# Patient Record
Sex: FEMALE | Race: WHITE | NOT HISPANIC OR LATINO | Employment: OTHER | URBAN - METROPOLITAN AREA
[De-identification: names, ages, dates, MRNs, and addresses within clinical notes are randomized per-mention and may not be internally consistent; named-entity substitution may affect disease eponyms.]

---

## 2017-05-11 DIAGNOSIS — L70.9 ACNE: ICD-10-CM

## 2017-05-18 DIAGNOSIS — M81.0 SENILE OSTEOPOROSIS: ICD-10-CM

## 2017-05-18 RX ORDER — ADAPALENE 45 G/G
GEL TOPICAL
Qty: 45 G | Refills: 3 | Status: SHIPPED | OUTPATIENT
Start: 2017-05-18 | End: 2017-11-20

## 2017-05-18 NOTE — TELEPHONE ENCOUNTER
Received refill request for Fosamax. Unable to fill per protocol. Will pend med to Dr. Hicks for approval.

## 2017-05-19 RX ORDER — ALENDRONATE SODIUM 70 MG/1
70 TABLET ORAL
Qty: 12 TABLET | Refills: 3 | Status: SHIPPED | OUTPATIENT
Start: 2017-05-19 | End: 2017-11-20

## 2017-05-30 ENCOUNTER — RADIANT APPOINTMENT (OUTPATIENT)
Dept: MAMMOGRAPHY | Facility: CLINIC | Age: 67
End: 2017-05-30

## 2017-05-30 DIAGNOSIS — Z12.31 VISIT FOR SCREENING MAMMOGRAM: ICD-10-CM

## 2017-11-20 ENCOUNTER — OFFICE VISIT (OUTPATIENT)
Dept: INTERNAL MEDICINE | Facility: CLINIC | Age: 67
End: 2017-11-20
Attending: INTERNAL MEDICINE
Payer: MEDICARE

## 2017-11-20 VITALS
HEIGHT: 64 IN | HEART RATE: 76 BPM | WEIGHT: 154 LBS | SYSTOLIC BLOOD PRESSURE: 127 MMHG | DIASTOLIC BLOOD PRESSURE: 76 MMHG | BODY MASS INDEX: 26.29 KG/M2

## 2017-11-20 DIAGNOSIS — I10 BENIGN ESSENTIAL HYPERTENSION: ICD-10-CM

## 2017-11-20 DIAGNOSIS — L68.0 FEMALE HIRSUTISM: ICD-10-CM

## 2017-11-20 DIAGNOSIS — M25.551 HIP PAIN, RIGHT: ICD-10-CM

## 2017-11-20 DIAGNOSIS — J45.40 MODERATE PERSISTENT ASTHMA WITHOUT COMPLICATION: ICD-10-CM

## 2017-11-20 DIAGNOSIS — Z00.00 ROUTINE GENERAL MEDICAL EXAMINATION AT A HEALTH CARE FACILITY: Primary | ICD-10-CM

## 2017-11-20 DIAGNOSIS — L68.9 EXCESS BODY AND FACIAL HAIR: ICD-10-CM

## 2017-11-20 DIAGNOSIS — L70.0 ACNE VULGARIS: ICD-10-CM

## 2017-11-20 DIAGNOSIS — E55.9 VITAMIN D DEFICIENCY: ICD-10-CM

## 2017-11-20 DIAGNOSIS — M81.0 SENILE OSTEOPOROSIS: ICD-10-CM

## 2017-11-20 RX ORDER — ALBUTEROL SULFATE 0.83 MG/ML
1 SOLUTION RESPIRATORY (INHALATION) EVERY 6 HOURS PRN
Qty: 100 VIAL | Refills: 3 | Status: SHIPPED | OUTPATIENT
Start: 2017-11-20 | End: 2018-11-26

## 2017-11-20 RX ORDER — ALBUTEROL SULFATE 90 UG/1
AEROSOL, METERED RESPIRATORY (INHALATION)
Qty: 3 INHALER | Refills: 1 | Status: SHIPPED | OUTPATIENT
Start: 2017-11-20 | End: 2017-11-21

## 2017-11-20 RX ORDER — ALENDRONATE SODIUM 70 MG/1
70 TABLET ORAL
Qty: 12 TABLET | Refills: 3 | Status: SHIPPED | OUTPATIENT
Start: 2017-11-20 | End: 2018-06-18

## 2017-11-20 RX ORDER — HYDROCHLOROTHIAZIDE 25 MG/1
25 TABLET ORAL DAILY
Qty: 90 TABLET | Refills: 3 | Status: SHIPPED | OUTPATIENT
Start: 2017-11-20 | End: 2018-11-26

## 2017-11-20 RX ORDER — LOSARTAN POTASSIUM 25 MG/1
25 TABLET ORAL DAILY
Qty: 90 TABLET | Refills: 3 | Status: SHIPPED | OUTPATIENT
Start: 2017-11-20 | End: 2018-11-26

## 2017-11-20 RX ORDER — ADAPALENE 45 G/G
GEL TOPICAL
Qty: 45 G | Refills: 3 | Status: SHIPPED | OUTPATIENT
Start: 2017-11-20 | End: 2018-11-26

## 2017-11-20 RX ORDER — ALBUTEROL SULFATE 90 UG/1
2 AEROSOL, METERED RESPIRATORY (INHALATION) EVERY 6 HOURS PRN
Qty: 1 INHALER | Refills: 1 | Status: CANCELLED | OUTPATIENT
Start: 2017-11-20

## 2017-11-20 ASSESSMENT — ASTHMA QUESTIONNAIRES
QUESTION_4 LAST FOUR WEEKS HOW OFTEN HAVE YOU USED YOUR RESCUE INHALER OR NEBULIZER MEDICATION (SUCH AS ALBUTEROL): ONCE A WEEK OR LESS
ACT_TOTALSCORE: 21
QUESTION_1 LAST FOUR WEEKS HOW MUCH OF THE TIME DID YOUR ASTHMA KEEP YOU FROM GETTING AS MUCH DONE AT WORK, SCHOOL OR AT HOME: NONE OF THE TIME
QUESTION_3 LAST FOUR WEEKS HOW OFTEN DID YOUR ASTHMA SYMPTOMS (WHEEZING, COUGHING, SHORTNESS OF BREATH, CHEST TIGHTNESS OR PAIN) WAKE YOU UP AT NIGHT OR EARLIER THAN USUAL IN THE MORNING: NOT AT ALL
QUESTION_5 LAST FOUR WEEKS HOW WOULD YOU RATE YOUR ASTHMA CONTROL: WELL CONTROLLED
QUESTION_2 LAST FOUR WEEKS HOW OFTEN HAVE YOU HAD SHORTNESS OF BREATH: THREE TO SIX TIMES A WEEK

## 2017-11-20 ASSESSMENT — PAIN SCALES - GENERAL: PAINLEVEL: NO PAIN (0)

## 2017-11-20 NOTE — LETTER
11/20/2017     RE: Yolande Hussein  [address] [facility] [address]     Dear Colleague,    Thank you for referring your patient, Yolande Hussein, to the [facility]  at [facility]. Please see a copy of my visit note below.      SUBJECTIVE:   Yolande Hussein is a 67 year old female who presents for Preventive Visit.      Are you in the first 12 months of your Medicare Part B coverage?      Healthy Habits:    Do you get at least three servings of calcium containing foods daily (dairy, green leafy vegetables, etc.)? yes    Amount of exercise or daily activities, outside of work: walking, not consistent    Problems taking medications regularly No    Medication side effects: No    Have you had an eye exam in the past two years? yes    Do you see a dentist twice per year? no    Do you have sleep apnea, excessive snoring or daytime drowsiness? no    COGNITIVE SCREEN  1) Repeat 3 items (Banana, Sunrise, Chair)    2) Clock draw: NORMAL  3) 3 item recall: Recalls 3 objects  Results: 3 items recalled: COGNITIVE IMPAIRMENT LESS LIKELY    Mini-CogTM Copyright S Kellen. Licensed by the author for use in [facility]; reprinted with permission (hanh@[email or web address]). All rights reserved.        Patient reports that she has been waking up at night She is not able to fall asleep once she is awake. She has been staying in bed and worrying about things. She usually watches TV in bed.   Patient also reports pain in the lateral right hip with standing up from sitting position.         -------------------------------------    Reviewed and updated as needed this visit by clinical staffTobacco  Allergies  Meds         Reviewed and updated as needed this visit by Provider        Social History   Substance Use Topics     Smoking status: Never Smoker     Smokeless tobacco: Never Used     Alcohol use Yes      Comment: rare        The patient does  not drink >3 drinks per day nor >7 drinks per week.     Today's PHQ-2 Score:   PHQ-2 ( 1999 Pfizer) 9/13/2016 4/22/2014   Q1: Little interest or pleasure in doing things 0 0   Q2: Feeling down, depressed or hopeless 0 0   PHQ-2 Score 0 0         Do you feel safe in your environment - Yes    Do you have a Health Care Directive?: Yes: Advance Directive has been received and scanned.    Current providers sharing in care for this patient include: Patient Care Team:  Isabel Hicks MD as PCP - General (Internal Medicine)  Bing Horn MD as MD (Ophthalmology)      Hearing impairment: No    Ability to successfully perform activities of daily living: Yes, no assistance needed     Fall risk:         Home safety:  none identified      The following health maintenance items are reviewed in Epic and correct as of today:Health Maintenance   Topic Date Due     ASTHMA ACTION PLAN Q1 YR  07/04/1955     ASTHMA CONTROL TEST Q6 MOS  07/04/1953     HEPATITIS C SCREENING  07/04/1966     ADVANCE DIRECTIVE PLANNING Q5 YRS  07/04/2003     INFLUENZA VACCINE (SYSTEM ASSIGNED)  09/01/2017     FALL RISK ASSESSMENT  10/05/2017     MAMMO Q1 YR  05/30/2018     LIPID SCREEN Q5 YR FEMALE (SYSTEM ASSIGNED)  10/06/2021     TETANUS IMMUNIZATION (SYSTEM ASSIGNED)  11/12/2022     COLONOSCOPY Q10 YR  12/19/2022     DEXA SCAN SCREENING (SYSTEM ASSIGNED)  Completed     PNEUMOCOCCAL  Completed     Labs reviewed in [facility]    Mammogram Screening: Patient over age 50, mutual decision to screen reflected in health maintenance.      ROS:  C: NEGATIVE for fever, chills, change in weight  I: NEGATIVE for worrisome rashes, moles or lesions  E: NEGATIVE for vision changes or irritation  E/M: NEGATIVE for ear, mouth and throat problems  R: NEGATIVE for significant cough or SOB  B: NEGATIVE for masses, tenderness or discharge  CV: NEGATIVE for chest pain, palpitations or peripheral edema  GI: NEGATIVE for nausea, abdominal pain, heartburn, or change in  "bowel habits  : NEGATIVE for frequency, dysuria, or hematuria  M: NEGATIVE for significant arthralgias or myalgia  N: NEGATIVE for weakness, dizziness or paresthesias  E: NEGATIVE for temperature intolerance, skin/hair changes  H: NEGATIVE for bleeding problems  P: NEGATIVE for changes in mood or affect    OBJECTIVE:   /76  Pulse 76  Ht 1.626 m (5' 4\")  Wt 69.9 kg (154 lb)  Breastfeeding? No  BMI 26.43 kg/m2 Estimated body mass index is 26.43 kg/(m^2) as calculated from the following:    Height as of this encounter: 1.626 m (5' 4\").    Weight as of this encounter: 69.9 kg (154 lb).  EXAM:   GENERAL APPEARANCE: healthy, alert and no distress  EYES: Eyes grossly normal to inspection and conjunctivae and sclerae normal  HENT: nose and mouth without ulcers or lesions, oropharynx clear and oral mucous membranes moist  NECK: no asymmetry, masses, or scars  RESP: normal effort  CV: regular rate and rhythm, normal S1 S2, no S3 or S4, no murmur, click or rub, no peripheral edema and peripheral pulses strong  ABDOMEN: soft and bowel sounds normal  MS: no musculoskeletal defects are noted and gait is age appropriate without ataxia  SKIN: no suspicious lesions or rashes  NEURO: Normal strength and tone, sensory exam grossly normal, mentation intact and speech normal  PSYCH: mentation appears normal and affect normal/bright    ASSESSMENT / PLAN:   1. Senile osteoporosis  Patient was advised on management of osteoporosis. Recommend continue with current medical therapy as she has been able to tolerate it well.   - alendronate (FOSAMAX) 70 MG tablet; Take 1 tablet (70 mg) by mouth every 7 days Take with over 8 ounces water and stay upright for at least 30. Take an hour before eating.  Dispense: 12 tablet; Refill: 3    2. Moderate persistent asthma without complication  Discussed asthma control with patient. Will continue with current dose of Advair. Refills for albuterol were given today.   - albuterol (2.5 MG/3ML) " 0.083% neb solution; Take 1 vial (2.5 mg) by nebulization every 6 hours as needed for shortness of breath / dyspnea  Dispense: 100 vial; Refill: 3  - fluticasone-salmeterol (ADVAIR) 250-50 MCG/DOSE diskus inhaler; Inhale 1 puff into the lungs 2 times daily  Dispense: 3 Inhaler; Refill: 3    3. Benign essential hypertension  Blood pressure is at goal. Will continue with current medical therapy.   - losartan (COZAAR) 25 MG tablet; Take 1 tablet (25 mg) by mouth daily  Dispense: 90 tablet; Refill: 3  - hydrochlorothiazide (HYDRODIURIL) 25 MG tablet; Take 1 tablet (25 mg) by mouth daily  Dispense: 90 tablet; Refill: 3  - Basic Metabolic Panel; Future    4. Female hirsutism    - eflornithine HCl (VANIQA) 13.9 % CREA; Apply every 12 hours.  Dispense: 30 g; Refill: 3    5. Excess body and facial hair    - eflornithine HCl (VANIQA) 13.9 % CREA; Apply every 12 hours.  Dispense: 30 g; Refill: 3    6. Acne vulgaris    - adapalene (DIFFERIN) 0.1 % gel; APPLY TOPICALLY AT BEDTIME AS DIRECTED.  Dispense: 45 g; Refill: 3    7. Routine general medical examination at a health care facility  Patient is up to date on vaccinations. Recommend checking lipid panel. Patient was also advised on colon and breast cancer screening.   - Lipid Profile; Future    8. Hip pain, right  Discussed possible causes, recommend evaluation by Sports Medicine.   - SPORTS MEDICINE REFERRAL    9. Vitamin D deficiency    - 25 OH Vit D therapy monitoring; Future    End of Life Planning:  Patient currently has an advanced directive: No.  I have verified the patient's ablity to prepare an advanced directive/make health care decisions.  Literature was provided to assist patient in preparing an advanced directive.    COUNSELING:  Reviewed preventive health counseling, as reflected in patient instructions       Regular exercise       Healthy diet/nutrition       Vision screening        Estimated body mass index is 26.43 kg/(m^2) as calculated from the following:     "Height as of this encounter: 1.626 m (5' 4\").    Weight as of this encounter: 69.9 kg (154 lb).     reports that she has never smoked. She has never used smokeless tobacco.        Appropriate preventive services were discussed with this patient, including applicable screening as appropriate for cardiovascular disease, diabetes, osteopenia/osteoporosis, and glaucoma.  As appropriate for age/gender, discussed screening for colorectal cancer, prostate cancer, breast cancer, and cervical cancer. Checklist reviewing preventive services available has been given to the patient.    Reviewed patients plan of care and provided an AVS. The Basic Care Plan (routine screening as documented in Health Maintenance) for Yolande meets the Care Plan requirement. This Care Plan has been established and reviewed with the Patient.    Counseling Resources:  ATP IV Guidelines  Pooled Cohorts Equation Calculator  Breast Cancer Risk Calculator  FRAX Risk Assessment  ICSI Preventive Guidelines  Dietary Guidelines for Americans, 2010  USDA's MyPlate  ASA Prophylaxis  Lung CA Screening    Isabel Hicks MD  [facility]     "

## 2017-11-20 NOTE — MR AVS SNAPSHOT
After Visit Summary   11/20/2017    Yolande Hussein    MRN: [id number]           Patient Information     Date Of Birth          1950        Visit Information        Provider Department      11/20/2017 10:00 AM Isabel Hicks MD [facility]         Today's Diagnoses     Routine general medical examination at a health care facility    -  1    Senile osteoporosis        Moderate persistent asthma without complication        Benign essential hypertension        Female hirsutism        Excess body and facial hair        Acne vulgaris        Hip pain, right        Vitamin D deficiency          Care Instructions      Preventive Health Recommendations  Female Ages 65 +    Yearly exam:     See your health care provider every year in order to  o Review health changes.   o Discuss preventive care.    o Review your medicines if your doctor has prescribed any.      You no longer need a yearly Pap test unless you've had an abnormal Pap test in the past 10 years. If you have vaginal symptoms, such as bleeding or discharge, be sure to talk with your provider about a Pap test.      Every 1 to 2 years, have a mammogram.  If you are over 69, talk with your health care provider about whether or not you want to continue having screening mammograms.      Every 10 years, have a colonoscopy. Or, have a yearly FIT test (stool test). These exams will check for colon cancer.       Have a cholesterol test every 5 years, or more often if your doctor advises it.       Have a diabetes test (fasting glucose) every three years. If you are at risk for diabetes, you should have this test more often.       At age 65, have a bone density scan (DEXA) to check for osteoporosis (brittle bone disease).    Shots:    Get a flu shot each year.    Get a tetanus shot every 10 years.    Talk to your doctor about your pneumonia vaccines. There are now two you should receive - Pneumovax (PPSV 23) and Prevnar  (PCV 13).    Talk to your doctor about the shingles vaccine.    Talk to your doctor about the hepatitis B vaccine.    Nutrition:     Eat at least 5 servings of fruits and vegetables each day.      Eat whole-grain bread, whole-wheat pasta and brown rice instead of white grains and rice.      Talk to your provider about Calcium and Vitamin D.     Lifestyle    Exercise at least 150 minutes a week (30 minutes a day, 5 days a week). This will help you control your weight and prevent disease.      Limit alcohol to one drink per day.      No smoking.       Wear sunscreen to prevent skin cancer.       See your dentist twice a year for an exam and cleaning.      See your eye doctor every 1 to 2 years to screen for conditions such as glaucoma, macular degeneration, cataracts, etc           Follow-ups after your visit        Additional Services     SPORTS MEDICINE REFERRAL       Your provider has referred you to:  [facility]: Sports Medicine Clinic [facility] [phone]   [email or web address]    Please be aware that coverage of these services is subject to the terms and limitations of your health insurance plan.  Call member services at your health plan with any benefit or coverage questions.      Please bring the following to your appointment:    >>   Any x-rays, CTs or MRIs which have been performed.  Contact the facility where they were done to arrange for  prior to your scheduled appointment.    >>   List of current medications   >>   This referral request   >>   Any documents/labs given to you for this referral                  Who to contact     Please call your clinic at [phone] to:    Ask questions about your health    Make or cancel appointments    Discuss your medicines    Learn about your test results    Speak to your doctor   If you have compliments or concerns about an experience at your clinic, or if you wish to file a complaint, please contact [facility]  "[place] [facility] Patient Relations at [phone] or email us at [email or web address]         Additional Information About Your Visit        Adlyhart Information     [employer] is an electronic gateway that provides easy, online access to your medical records. With [employer], you can request a clinic appointment, read your test results, renew a prescription or communicate with your care team.     To sign up for [employer] visit the website at [email or web address]   You will be asked to enter the access code listed below, as well as some personal information. Please follow the directions to create your username and password.     Your access code is: [email or web address]  Expires: 2018  6:30 AM     Your access code will  in 90 days. If you need help or a new code, please contact your [facility] or call [phone] for assistance.        Care EveryWhere ID     This is your Care EveryWhere ID. This could be used by other organizations to access your [place] medical records  [id number]        Your Vitals Were     Pulse Height Breastfeeding? BMI (Body Mass Index)          76 1.626 m (5' 4\") No 26.43 kg/m2         Blood Pressure from Last 3 Encounters:   17 127/76   16 117/76   10/05/16 119/72    Weight from Last 3 Encounters:   17 69.9 kg (154 lb)   10/05/16 67.6 kg (149 lb)   16 63.5 kg (140 lb 1.6 oz)              We Performed the Following     25 OH Vit D therapy monitoring     Asthma Action Plan (AAP)     Basic Metabolic Panel     Hepatitis C Screen Reflex to HCV RNA Quant and Genotype     Lipid Profile     SPORTS MEDICINE REFERRAL          Today's Medication Changes          These changes are accurate as of: 17 10:58 AM.  If you have any questions, ask your nurse or doctor.               These medicines have changed or have updated prescriptions.        Dose/Directions    adapalene 0.1 % gel   Commonly known as:  DIFFERIN   This may " have changed:  See the new instructions.   Used for:  Acne vulgaris   Changed by:  Isabel Hicks MD        APPLY TOPICALLY AT BEDTIME AS DIRECTED.   Quantity:  45 g   Refills:  3         Stop taking these medicines if you haven't already. Please contact your care team if you have questions.     levofloxacin 500 MG tablet   Commonly known as:  LEVAQUIN   Stopped by:  Isabel Hicks MD           OMEGA-3 FISH OIL PO   Stopped by:  Isabel Hicks MD                Where to get your medicines      These medications were sent to [facility] [address] [place] [address] [address] [place] [address], N.  [address] [place] [address] N.[facility] [address]     Phone:  [phone]     adapalene 0.1 % gel    albuterol (2.5 MG/3ML) 0.083% neb solution    albuterol 108 (90 BASE) MCG/ACT Inhaler    alendronate 70 MG tablet    eflornithine HCl 13.9 % Crea    fluticasone-salmeterol 250-50 MCG/DOSE diskus inhaler    hydrochlorothiazide 25 MG tablet    losartan 25 MG tablet                Primary Care Provider Office Phone # Fax #    Isabel Hicks [phone]       [facility] [address] [facility] [address]        Equal Access to Services     BLAYNE JONAS AH: Hadii david ku hadasho Soomaali, waaxda luqadaha, qaybta kaalmada adeegyada, waxay idiin hayaan micheline cuevas . So [facility] [phone].    ATENCIÓN: Si habla español, tiene a jacobson disposición servicios gratuitos de asistencia lingüística. Marcusame al [phone].    We comply with applicable federal civil rights laws and [place] laws. We do not discriminate on the basis of race, color, national origin, age, disability, sex, sexual orientation, or gender identity.            Thank you!     Thank you for choosing [facility]   for your care. Our goal is always to provide you with excellent care. Hearing back from our patients is one way we can continue to improve our services. Please take a few minutes to  complete the written survey that you may receive in the mail after your visit with us. Thank you!             Your Updated Medication List - Protect others around you: Learn how to safely use, store and throw away your medicines at www.disposemymeds.org.          This list is accurate as of: 11/20/17 10:58 AM.  Always use your most recent med list.                   Brand Name Dispense Instructions for use Diagnosis    adapalene 0.1 % gel    DIFFERIN    45 g    APPLY TOPICALLY AT BEDTIME AS DIRECTED.    Acne vulgaris       * albuterol 108 (90 BASE) MCG/ACT Inhaler    PROAIR HFA/PROVENTIL HFA/VENTOLIN HFA    1 Inhaler    Inhale 2 puffs into the lungs every 6 hours as needed for shortness of breath / dyspnea or wheezing    Moderate persistent asthma without complication       * albuterol (2.5 MG/3ML) 0.083% neb solution     100 vial    Take 1 vial (2.5 mg) by nebulization every 6 hours as needed for shortness of breath / dyspnea    Moderate persistent asthma without complication       * albuterol 108 (90 BASE) MCG/ACT Inhaler    PROAIR HFA/PROVENTIL HFA/VENTOLIN HFA    3 Inhaler    Inhale 2 puffs into the lungs as needed    Moderate persistent asthma without complication       albuterol 90 MCG/ACT inhaler     1 Inhaler    Inhale 2 puffs into the lungs as needed.    Asthma, moderate persistent       alendronate 70 MG tablet    FOSAMAX    12 tablet    Take 1 tablet (70 mg) by mouth every 7 days Take with over 8 ounces water and stay upright for at least 30. Take an hour before eating.    Senile osteoporosis       calcium citrate-vitamin D 315-250 MG-UNIT Tabs per tablet    CITRACAL     Take 1 tablet by mouth daily        desonide 0.05 % cream    DESOWEN    30 g    Apply to breast BID    Eczema       eflornithine HCl 13.9 % Crea    VANIQA    30 g    Apply every 12 hours.    Female hirsutism, Excess body and facial hair       fluticasone 50 MCG/ACT spray    FLONASE    1 Package    Spray 1-2 sprays into both nostrils daily     Rhinitis, chronic       fluticasone-salmeterol 250-50 MCG/DOSE diskus inhaler    ADVAIR    3 Inhaler    Inhale 1 puff into the lungs 2 times daily    Moderate persistent asthma without complication       hydrochlorothiazide 25 MG tablet    HYDRODIURIL    90 tablet    Take 1 tablet (25 mg) by mouth daily    Benign essential hypertension       ibuprofen 600 MG tablet    ADVIL/MOTRIN    30 tablet    Take 1 tablet (600 mg) by mouth every 6 hours as needed for moderate pain    Acute recurrent maxillary sinusitis       losartan 25 MG tablet    COZAAR    90 tablet    Take 1 tablet (25 mg) by mouth daily    Benign essential hypertension       LUTEIN 20 PO           omega-3 acid ethyl esters 1 G capsule    Lovaza    120 capsule    Take 2 capsules (2 g) by mouth daily        ranitidine 150 MG tablet   Generic drug:  ranitidine      Take 150 mg by mouth At Bedtime 1 tab nightly        vitamin D 1000 UNITS capsule      Take 1 capsule by mouth daily.        * Notice:  This list has 3 medication(s) that are the same as other medications prescribed for you. Read the directions carefully, and ask your doctor or other care provider to review them with you.

## 2017-11-20 NOTE — PROGRESS NOTES
SUBJECTIVE:   Yolande Hussein is a 67 year old female who presents for Preventive Visit.      Are you in the first 12 months of your Medicare Part B coverage?      Healthy Habits:    Do you get at least three servings of calcium containing foods daily (dairy, green leafy vegetables, etc.)? yes    Amount of exercise or daily activities, outside of work: walking, not consistent    Problems taking medications regularly No    Medication side effects: No    Have you had an eye exam in the past two years? yes    Do you see a dentist twice per year? no    Do you have sleep apnea, excessive snoring or daytime drowsiness? no    COGNITIVE SCREEN  1) Repeat 3 items (Banana, Sunrise, Chair)    2) Clock draw: NORMAL  3) 3 item recall: Recalls 3 objects  Results: 3 items recalled: COGNITIVE IMPAIRMENT LESS LIKELY    Mini-CogTM Copyright YOLIE Foreman. Licensed by the author for use in [facility]; reprinted with permission (hanh@[facility]). All rights reserved.        Patient reports that she has been waking up at night She is not able to fall asleep once she is awake. She has been staying in bed and worrying about things. She usually watches TV in bed.   Patient also reports pain in the lateral right hip with standing up from sitting position.         -------------------------------------    Reviewed and updated as needed this visit by clinical staffTobacco  Allergies  Meds         Reviewed and updated as needed this visit by Provider        Social History   Substance Use Topics     Smoking status: Never Smoker     Smokeless tobacco: Never Used     Alcohol use Yes      Comment: rare        The patient does not drink >3 drinks per day nor >7 drinks per week.     Today's PHQ-2 Score:   PHQ-2 ( 1999 Pf[employer]) 9/13/2016 4/22/2014   Q1: Little interest or pleasure in doing things 0 0   Q2: Feeling down, depressed or hopeless 0 0   PHQ-2 Score 0 0         Do you feel safe in your environment - Yes    Do you have a  Health Care Directive?: Yes: Advance Directive has been received and scanned.    Current providers sharing in care for this patient include: Patient Care Team:  Isabel Hicks MD as PCP - General (Internal Medicine)  Bing Horn MD as MD (Ophthalmology)      Hearing impairment: No    Ability to successfully perform activities of daily living: Yes, no assistance needed     Fall risk:         Home safety:  none identified      The following health maintenance items are reviewed in Epic and correct as of today:Health Maintenance   Topic Date Due     ASTHMA ACTION PLAN Q1 YR  07/04/1955     ASTHMA CONTROL TEST Q6 MOS  07/04/1955     HEPATITIS C SCREENING  07/04/1966     ADVANCE DIRECTIVE PLANNING Q5 YRS  07/04/2003     INFLUENZA VACCINE (SYSTEM ASSIGNED)  09/01/2017     FALL RISK ASSESSMENT  10/05/2017     MAMMO Q1 YR  05/30/2018     LIPID SCREEN Q5 YR FEMALE (SYSTEM ASSIGNED)  10/06/2021     TETANUS IMMUNIZATION (SYSTEM ASSIGNED)  11/12/2022     COLONOSCOPY Q10 YR  12/19/2022     DEXA SCAN SCREENING (SYSTEM ASSIGNED)  Completed     PNEUMOCOCCAL  Completed     Labs reviewed in [facility]    Mammogram Screening: Patient over age 50, mutual decision to screen reflected in health maintenance.      ROS:  C: NEGATIVE for fever, chills, change in weight  I: NEGATIVE for worrisome rashes, moles or lesions  E: NEGATIVE for vision changes or irritation  E/M: NEGATIVE for ear, mouth and throat problems  R: NEGATIVE for significant cough or SOB  B: NEGATIVE for masses, tenderness or discharge  CV: NEGATIVE for chest pain, palpitations or peripheral edema  GI: NEGATIVE for nausea, abdominal pain, heartburn, or change in bowel habits  : NEGATIVE for frequency, dysuria, or hematuria  M: NEGATIVE for significant arthralgias or myalgia  N: NEGATIVE for weakness, dizziness or paresthesias  E: NEGATIVE for temperature intolerance, skin/hair changes  H: NEGATIVE for bleeding problems  P: NEGATIVE for changes in mood or  "affect    OBJECTIVE:   /76  Pulse 76  Ht 1.626 m (5' 4\")  Wt 69.9 kg (154 lb)  Breastfeeding? No  BMI 26.43 kg/m2 Estimated body mass index is 26.43 kg/(m^2) as calculated from the following:    Height as of this encounter: 1.626 m (5' 4\").    Weight as of this encounter: 69.9 kg (154 lb).  EXAM:   GENERAL APPEARANCE: healthy, alert and no distress  EYES: Eyes grossly normal to inspection and conjunctivae and sclerae normal  HENT: nose and mouth without ulcers or lesions, oropharynx clear and oral mucous membranes moist  NECK: no asymmetry, masses, or scars  RESP: normal effort  CV: regular rate and rhythm, normal S1 S2, no S3 or S4, no murmur, click or rub, no peripheral edema and peripheral pulses strong  ABDOMEN: soft and bowel sounds normal  MS: no musculoskeletal defects are noted and gait is age appropriate without ataxia  SKIN: no suspicious lesions or rashes  NEURO: Normal strength and tone, sensory exam grossly normal, mentation intact and speech normal  PSYCH: mentation appears normal and affect normal/bright    ASSESSMENT / PLAN:   1. Senile osteoporosis  Patient was advised on management of osteoporosis. Recommend continue with current medical therapy as she has been able to tolerate it well.   - alendronate (FOSAMAX) 70 MG tablet; Take 1 tablet (70 mg) by mouth every 7 days Take with over 8 ounces water and stay upright for at least 30. Take an hour before eating.  Dispense: 12 tablet; Refill: 3    2. Moderate persistent asthma without complication  Discussed asthma control with patient. Will continue with current dose of Advair. Refills for albuterol were given today.   - albuterol (2.5 MG/3ML) 0.083% neb solution; Take 1 vial (2.5 mg) by nebulization every 6 hours as needed for shortness of breath / dyspnea  Dispense: 100 vial; Refill: 3  - fluticasone-salmeterol (ADVAIR) 250-50 MCG/DOSE diskus inhaler; Inhale 1 puff into the lungs 2 times daily  Dispense: 3 Inhaler; Refill: 3    3. Benign " "essential hypertension  Blood pressure is at goal. Will continue with current medical therapy.   - losartan (COZAAR) 25 MG tablet; Take 1 tablet (25 mg) by mouth daily  Dispense: 90 tablet; Refill: 3  - hydrochlorothiazide (HYDRODIURIL) 25 MG tablet; Take 1 tablet (25 mg) by mouth daily  Dispense: 90 tablet; Refill: 3  - Basic Metabolic Panel; Future    4. Female hirsutism    - eflornithine HCl (VANIQA) 13.9 % CREA; Apply every 12 hours.  Dispense: 30 g; Refill: 3    5. Excess body and facial hair    - eflornithine HCl (VANIQA) 13.9 % CREA; Apply every 12 hours.  Dispense: 30 g; Refill: 3    6. Acne vulgaris    - adapalene (DIFFERIN) 0.1 % gel; APPLY TOPICALLY AT BEDTIME AS DIRECTED.  Dispense: 45 g; Refill: 3    7. Routine general medical examination at a health care facility  Patient is up to date on vaccinations. Recommend checking lipid panel. Patient was also advised on colon and breast cancer screening.   - Lipid Profile; Future    8. Hip pain, right  Discussed possible causes, recommend evaluation by Sports Medicine.   - SPORTS MEDICINE REFERRAL    9. Vitamin D deficiency    - 25 OH Vit D therapy monitoring; Future    End of Life Planning:  Patient currently has an advanced directive: No.  I have verified the patient's ablity to prepare an advanced directive/make health care decisions.  Literature was provided to assist patient in preparing an advanced directive.    COUNSELING:  Reviewed preventive health counseling, as reflected in patient instructions       Regular exercise       Healthy diet/nutrition       Vision screening        Estimated body mass index is 26.43 kg/(m^2) as calculated from the following:    Height as of this encounter: 1.626 m (5' 4\").    Weight as of this encounter: 69.9 kg (154 lb).     reports that she has never smoked. She has never used smokeless tobacco.        Appropriate preventive services were discussed with this patient, including applicable screening as appropriate for " cardiovascular disease, diabetes, osteopenia/osteoporosis, and glaucoma.  As appropriate for age/gender, discussed screening for colorectal cancer, prostate cancer, breast cancer, and cervical cancer. Checklist reviewing preventive services available has been given to the patient.    Reviewed patients plan of care and provided an AVS. The Basic Care Plan (routine screening as documented in Health Maintenance) for Yolande meets the Care Plan requirement. This Care Plan has been established and reviewed with the Patient.    Counseling Resources:  ATP IV Guidelines  Pooled Cohorts Equation Calculator  Breast Cancer Risk Calculator  FRAX Risk Assessment  ICSI Preventive Guidelines  Dietary Guidelines for Americans, 2010  USDA's MyPlate  ASA Prophylaxis  Lung CA Screening    Isabel Hicks MD  [facility]

## 2017-11-21 ENCOUNTER — TELEPHONE (OUTPATIENT)
Dept: OBGYN | Facility: CLINIC | Age: 67
End: 2017-11-21

## 2017-11-21 DIAGNOSIS — J45.40 MODERATE PERSISTENT ASTHMA WITHOUT COMPLICATION: ICD-10-CM

## 2017-11-21 RX ORDER — ALBUTEROL SULFATE 90 UG/1
AEROSOL, METERED RESPIRATORY (INHALATION)
Qty: 3 INHALER | Refills: 1 | Status: SHIPPED | OUTPATIENT
Start: 2017-11-21 | End: 2018-11-26

## 2017-11-21 ASSESSMENT — ASTHMA QUESTIONNAIRES: ACT_TOTALSCORE: 21

## 2017-11-21 NOTE — TELEPHONE ENCOUNTER
[facility] pharmacy requesting Proair inhaler prescription from yesterday requesting more information for how often she can inhale 2 puffs. Discussed with Dr Hicks in clinic and prescription resent

## 2017-11-24 DIAGNOSIS — Z00.00 ROUTINE GENERAL MEDICAL EXAMINATION AT A HEALTH CARE FACILITY: ICD-10-CM

## 2017-11-24 DIAGNOSIS — I10 BENIGN ESSENTIAL HYPERTENSION: ICD-10-CM

## 2017-11-24 DIAGNOSIS — E55.9 VITAMIN D DEFICIENCY: ICD-10-CM

## 2017-11-24 LAB
ANION GAP SERPL CALCULATED.3IONS-SCNC: 8 MMOL/L (ref 3–14)
BUN SERPL-MCNC: 10 MG/DL (ref 7–30)
CALCIUM SERPL-MCNC: 8.8 MG/DL (ref 8.5–10.1)
CHLORIDE SERPL-SCNC: 103 MMOL/L (ref 94–109)
CHOLEST SERPL-MCNC: 210 MG/DL
CO2 SERPL-SCNC: 31 MMOL/L (ref 20–32)
CREAT SERPL-MCNC: 0.54 MG/DL (ref 0.52–1.04)
GFR SERPL CREATININE-BSD FRML MDRD: >90 ML/MIN/1.7M2
GLUCOSE SERPL-MCNC: 97 MG/DL (ref 70–99)
HDLC SERPL-MCNC: 90 MG/DL
LDLC SERPL CALC-MCNC: 107 MG/DL
NONHDLC SERPL-MCNC: 120 MG/DL
POTASSIUM SERPL-SCNC: 3.3 MMOL/L (ref 3.4–5.3)
SODIUM SERPL-SCNC: 142 MMOL/L (ref 133–144)
TRIGL SERPL-MCNC: 65 MG/DL

## 2017-11-24 PROCEDURE — 36415 COLL VENOUS BLD VENIPUNCTURE: CPT | Performed by: INTERNAL MEDICINE

## 2017-11-24 PROCEDURE — 80048 BASIC METABOLIC PNL TOTAL CA: CPT | Performed by: INTERNAL MEDICINE

## 2017-11-24 PROCEDURE — 82306 VITAMIN D 25 HYDROXY: CPT | Performed by: INTERNAL MEDICINE

## 2017-11-24 PROCEDURE — 80061 LIPID PANEL: CPT | Performed by: INTERNAL MEDICINE

## 2017-11-28 LAB
DEPRECATED CALCIDIOL+CALCIFEROL SERPL-MC: <42 UG/L (ref 20–75)
VITAMIN D2 SERPL-MCNC: <5 UG/L
VITAMIN D3 SERPL-MCNC: 37 UG/L

## 2017-12-28 DIAGNOSIS — E87.6 HYPOKALEMIA: Primary | ICD-10-CM

## 2018-06-18 ENCOUNTER — OFFICE VISIT (OUTPATIENT)
Dept: FAMILY MEDICINE | Facility: CLINIC | Age: 68
End: 2018-06-18
Payer: COMMERCIAL

## 2018-06-18 VITALS
SYSTOLIC BLOOD PRESSURE: 124 MMHG | DIASTOLIC BLOOD PRESSURE: 73 MMHG | OXYGEN SATURATION: 97 % | HEART RATE: 78 BPM | BODY MASS INDEX: 25.66 KG/M2 | WEIGHT: 154 LBS | HEIGHT: 65 IN | TEMPERATURE: 98.4 F | RESPIRATION RATE: 16 BRPM

## 2018-06-18 DIAGNOSIS — J01.10 SUBACUTE FRONTAL SINUSITIS: Primary | ICD-10-CM

## 2018-06-18 RX ORDER — DOXYCYCLINE 100 MG/1
100 CAPSULE ORAL 2 TIMES DAILY
Qty: 28 CAPSULE | Refills: 0 | Status: SHIPPED | OUTPATIENT
Start: 2018-06-18 | End: 2018-08-13 | Stop reason: DRUGHIGH

## 2018-06-18 ASSESSMENT — ANXIETY QUESTIONNAIRES
5. BEING SO RESTLESS THAT IT IS HARD TO SIT STILL: NOT AT ALL
2. NOT BEING ABLE TO STOP OR CONTROL WORRYING: NOT AT ALL
1. FEELING NERVOUS, ANXIOUS, OR ON EDGE: NOT AT ALL
6. BECOMING EASILY ANNOYED OR IRRITABLE: NOT AT ALL
3. WORRYING TOO MUCH ABOUT DIFFERENT THINGS: NOT AT ALL
7. FEELING AFRAID AS IF SOMETHING AWFUL MIGHT HAPPEN: NOT AT ALL
IF YOU CHECKED OFF ANY PROBLEMS ON THIS QUESTIONNAIRE, HOW DIFFICULT HAVE THESE PROBLEMS MADE IT FOR YOU TO DO YOUR WORK, TAKE CARE OF THINGS AT HOME, OR GET ALONG WITH OTHER PEOPLE: NOT DIFFICULT AT ALL
GAD7 TOTAL SCORE: 0

## 2018-06-18 ASSESSMENT — ENCOUNTER SYMPTOMS
CHILLS: 0
WEAKNESS: 0
RHINORRHEA: 1
NUMBNESS: 0
TROUBLE SWALLOWING: 0
DIARRHEA: 0
ACTIVITY CHANGE: 1
VOMITING: 0
COUGH: 0
EYE ITCHING: 1
EYE DISCHARGE: 1
NAUSEA: 0
FATIGUE: 1
WHEEZING: 0
SHORTNESS OF BREATH: 0
SINUS PRESSURE: 1
FEVER: 0
DIZZINESS: 1
SINUS PAIN: 1
CONSTIPATION: 0
EYE REDNESS: 1

## 2018-06-18 ASSESSMENT — PATIENT HEALTH QUESTIONNAIRE - PHQ9: 5. POOR APPETITE OR OVEREATING: NOT AT ALL

## 2018-06-18 NOTE — MR AVS SNAPSHOT
After Visit Summary   6/18/2018    Yolande Hussein    MRN: [id number]           Patient Information     Date Of Birth          1950        Visit Information        Provider Department      6/18/2018 10:00 AM Carmen Chu APRN CNP [facility]        Today's Diagnoses     Subacute frontal sinusitis    -  1      Care Instructions    Doxycycline 100mg 1 tab twice daily until gone; take with a large glass of water and remain upright for at least 30 minutes after taking.   Continue the Activa pro-biotic.     If your eye drainage does not improve with the oral antibiotics please let me know.       Sinusitis (Antibiotic Treatment)    The sinuses are air-filled spaces within the bones of the face. They connect to the inside of the nose. Sinusitis is an inflammation of the tissue that lines the sinuses. Sinusitis can occur during a cold. It can also happen due to allergies to pollens and other particles in the air. Sinusitis can cause symptoms of sinus congestion and a feeling of fullness. A sinus infection causes fever, headache, and facial pain. There is often green or yellow fluid draining from the nose or into the back of the throat (post-nasal drip). You have been given antibiotics to treat this condition.  Home care    Take the full course of antibiotics as instructed. Do not stop taking them, even when you feel better.    Drink plenty of water, hot tea, and other liquids. This may help thin nasal mucus. It also may help your sinuses drain fluids.    Heat may help soothe painful areas of your face. Use a towel soaked in hot water. Or,  the shower and direct the warm spray onto your face. Using a vaporizer along with a menthol rub at night may also help soothe symptoms.     An expectorant with guaifenesin may help thin nasal mucus and help your sinuses drain fluids.    You can use an over-the-counter decongestant, unless a similar medicine was prescribed to you. Nasal  sprays work the fastest. Use one that contains phenylephrine or oxymetazoline. First blow your nose gently. Then use the spray. Do not use these medicines more often than directed on the label. If you do, your symptoms may get worse. You may also take pills that contain pseudoephedrine. Don t use products that combine multiple medicines. This is because side effects may be increased. Read labels. You can also ask the pharmacist for help. (People with high blood pressure should not use decongestants. They can raise blood pressure.)    Over-the-counter antihistamines may help if allergies contributed to your sinusitis.      Do not use nasal rinses or irrigation during an acute sinus infection, unless your healthcare provider tells you to. Rinsing may spread the infection to other areas in your sinuses.    Use acetaminophen or ibuprofen to control pain, unless another pain medicine was prescribed to you. If you have chronic liver or kidney disease or ever had a stomach ulcer, talk with your healthcare provider before using these medicines. (Aspirin should never be taken by anyone under age 18 who is ill with a fever. It may cause severe liver damage.)    Don't smoke. This can make symptoms worse.  Follow-up care  Follow up with your healthcare provider or our staff if you are better in 1 week.  When to seek medical advice  Call your healthcare provider if any of these occur:    Facial pain or headache that gets worse    Stiff neck    Unusual drowsiness or confusion    Swelling of your forehead or eyelids    Vision problems, such as blurred or double vision    Fever of 100.4 F (38 C) or higher, or as directed by your healthcare provider    Seizure    Breathing problems    Symptoms don't go away in 10 days  Prevention  Here are steps you can take to help prevent an infection:    Keep good hand washing habits.    Don t have close contact with people who have sore throats, colds, or other upper respiratory  "infections.    Don t smoke, and stay away from secondhand smoke.    Stay up to date with of your vaccines.  Date Last Reviewed: 11/1/2017 2000-2017 [employer] [address] [place] [address], [place] [address]. All rights reserved. This information is not intended as a substitute for professional medical care. Always follow your healthcare professional's instructions.                Follow-ups after your visit        Who to contact     Please call your clinic at [phone] to:    Ask questions about your health    Make or cancel appointments    Discuss your medicines    Learn about your test results    Speak to your doctor            Additional Information About Your Visit        CrossLoop Information     CrossLoop gives you secure access to your electronic health record. If you see a primary care provider, you can also send messages to your care team and make appointments. If you have questions, please call your primary care clinic.  If you do not have a primary care provider, please call [phone] and they will assist you.      CrossLoop is an electronic gateway that provides easy, online access to your medical records. With CrossLoop, you can request a clinic appointment, read your test results, renew a prescription or communicate with your care team.     To access your existing account, please contact your [facility] or call [phone] for assistance.        Care EveryWhere ID     This is your Care EveryWhere ID. This could be used by other organizations to access your [facility] medical records  [id number]        Your Vitals Were     Pulse Temperature Respirations Height Pulse Oximetry BMI (Body Mass Index)    78 98.4  F (36.9  C) (Oral) 16 5' 5.1\" (165.4 cm) 97% 25.55 kg/m2       Blood Pressure from Last 3 Encounters:   06/18/18 124/73   11/20/17 127/76   11/02/16 117/76    Weight from Last 3 Encounters:   06/18/18 154 lb (69.9 kg)   11/20/17 154 lb (69.9 kg)   10/05/16 " 149 lb (67.6 kg)              Today, you had the following     No orders found for display         Today's Medication Changes          These changes are accurate as of 6/18/18 10:16 AM.  If you have any questions, ask your nurse or doctor.               Start taking these medicines.        Dose/Directions    doxycycline 100 MG capsule   Commonly known as:  VIBRAMYCIN   Used for:  Subacute frontal sinusitis   Started by:  Carmen Chu APRN CNP        Dose:  100 mg   Take 1 capsule (100 mg) by mouth 2 times daily   Quantity:  28 capsule   Refills:  0            Where to get your medicines      These medications were sent to [facility] [address] [place], [place] [address], [place] - [address] [place] [address], [address]  [address], [place], [facility] [address]     Phone:  [phone]     doxycycline 100 MG capsule                Primary Care Provider Office Phone # Fax #    Isabel Hortensia Hicks [phone]       [facility] [address]  [facility] [address]        Equal Access to Services     [facility]: Hadii david ku hadasho Soomaali, waaxda luqadaha, qaybta kaalmada adeegyada, waxay shabbirin hayjanie cuevas . So [facility] [phone].    ATENCIÓN: Si habla español, tiene a jacobson disposición servicios gratuitos de asistencia lingüística. Llame al [phone].    We comply with applicable federal civil rights laws and [place] laws. We do not discriminate on the basis of race, color, national origin, age, disability, sex, sexual orientation, or gender identity.            Thank you!     Thank you for choosing [facility]  for your care. Our goal is always to provide you with excellent care. Hearing back from our patients is one way we can continue to improve our services. Please take a few minutes to complete the written survey that you may receive in the mail after your visit with us. Thank you!             Your Updated Medication List - Protect others around you: Learn how to safely  use, store and throw away your medicines at www.disposemymeds.org.          This list is accurate as of 6/18/18 10:16 AM.  Always use your most recent med list.                   Brand Name Dispense Instructions for use Diagnosis    adapalene 0.1 % gel    DIFFERIN    45 g    APPLY TOPICALLY AT BEDTIME AS DIRECTED.    Acne vulgaris       * albuterol (2.5 MG/3ML) 0.083% neb solution     100 vial    Take 1 vial (2.5 mg) by nebulization every 6 hours as needed for shortness of breath / dyspnea    Moderate persistent asthma without complication       * albuterol 108 (90 Base) MCG/ACT Inhaler    PROAIR HFA/PROVENTIL HFA/VENTOLIN HFA    3 Inhaler    Inhale 2 puffs into the lungs every 6 hrs prn    Moderate persistent asthma without complication       calcium citrate-vitamin D 315-250 MG-UNIT Tabs per tablet    CITRACAL     Take 1 tablet by mouth daily        desonide 0.05 % cream    DESOWEN    30 g    Apply to breast BID    Eczema       doxycycline 100 MG capsule    VIBRAMYCIN    28 capsule    Take 1 capsule (100 mg) by mouth 2 times daily    Subacute frontal sinusitis       eflornithine HCl 13.9 % Crea    VANIQA    30 g    Apply every 12 hours.    Female hirsutism, Excess body and facial hair       fluticasone 50 MCG/ACT spray    FLONASE    1 Package    Spray 1-2 sprays into both nostrils daily    Rhinitis, chronic       fluticasone-salmeterol 250-50 MCG/DOSE diskus inhaler    ADVAIR    3 Inhaler    Inhale 1 puff into the lungs 2 times daily    Moderate persistent asthma without complication       hydrochlorothiazide 25 MG tablet    HYDRODIURIL    90 tablet    Take 1 tablet (25 mg) by mouth daily    Benign essential hypertension       ibuprofen 600 MG tablet    ADVIL/MOTRIN    30 tablet    Take 1 tablet (600 mg) by mouth every 6 hours as needed for moderate pain    Acute recurrent maxillary sinusitis       losartan 25 MG tablet    COZAAR    90 tablet    Take 1 tablet (25 mg) by mouth daily    Benign essential hypertension        LUTEIN 20 PO           omega-3 acid ethyl esters 1 g capsule    Lovaza    120 capsule    Take 2 capsules (2 g) by mouth daily        ranitidine 150 MG tablet   Generic drug:  ranitidine      Take 150 mg by mouth At Bedtime 1 tab nightly        vitamin D 1000 units capsule      Take 1 capsule by mouth daily.        * Notice:  This list has 2 medication(s) that are the same as other medications prescribed for you. Read the directions carefully, and ask your doctor or other care provider to review them with you.

## 2018-06-18 NOTE — PATIENT INSTRUCTIONS
Doxycycline 100mg 1 tab twice daily until gone; take with a large glass of water and remain upright for at least 30 minutes after taking.   Continue the Activa pro-biotic.     If your eye drainage does not improve with the oral antibiotics please let me know.       Sinusitis (Antibiotic Treatment)    The sinuses are air-filled spaces within the bones of the face. They connect to the inside of the nose. Sinusitis is an inflammation of the tissue that lines the sinuses. Sinusitis can occur during a cold. It can also happen due to allergies to pollens and other particles in the air. Sinusitis can cause symptoms of sinus congestion and a feeling of fullness. A sinus infection causes fever, headache, and facial pain. There is often green or yellow fluid draining from the nose or into the back of the throat (post-nasal drip). You have been given antibiotics to treat this condition.  Home care    Take the full course of antibiotics as instructed. Do not stop taking them, even when you feel better.    Drink plenty of water, hot tea, and other liquids. This may help thin nasal mucus. It also may help your sinuses drain fluids.    Heat may help soothe painful areas of your face. Use a towel soaked in hot water. Or,  the shower and direct the warm spray onto your face. Using a vaporizer along with a menthol rub at night may also help soothe symptoms.     An expectorant with guaifenesin may help thin nasal mucus and help your sinuses drain fluids.    You can use an over-the-counter decongestant, unless a similar medicine was prescribed to you. Nasal sprays work the fastest. Use one that contains phenylephrine or oxymetazoline. First blow your nose gently. Then use the spray. Do not use these medicines more often than directed on the label. If you do, your symptoms may get worse. You may also take pills that contain pseudoephedrine. Don t use products that combine multiple medicines. This is because side effects may be  increased. Read labels. You can also ask the pharmacist for help. (People with high blood pressure should not use decongestants. They can raise blood pressure.)    Over-the-counter antihistamines may help if allergies contributed to your sinusitis.      Do not use nasal rinses or irrigation during an acute sinus infection, unless your healthcare provider tells you to. Rinsing may spread the infection to other areas in your sinuses.    Use acetaminophen or ibuprofen to control pain, unless another pain medicine was prescribed to you. If you have chronic liver or kidney disease or ever had a stomach ulcer, talk with your healthcare provider before using these medicines. (Aspirin should never be taken by anyone under age 18 who is ill with a fever. It may cause severe liver damage.)    Don't smoke. This can make symptoms worse.  Follow-up care  Follow up with your healthcare provider or our staff if you are better in 1 week.  When to seek medical advice  Call your healthcare provider if any of these occur:    Facial pain or headache that gets worse    Stiff neck    Unusual drowsiness or confusion    Swelling of your forehead or eyelids    Vision problems, such as blurred or double vision    Fever of 100.4 F (38 C) or higher, or as directed by your healthcare provider    Seizure    Breathing problems    Symptoms don't go away in 10 days  Prevention  Here are steps you can take to help prevent an infection:    Keep good hand washing habits.    Don t have close contact with people who have sore throats, colds, or other upper respiratory infections.    Don t smoke, and stay away from secondhand smoke.    Stay up to date with of your vaccines.  Date Last Reviewed: 11/1/2017 2000-2017 The [employer]. [address] [place] [address], [place] [address]. All rights reserved. This information is not intended as a substitute for professional medical care. Always follow your healthcare professional's instructions.

## 2018-06-18 NOTE — NURSING NOTE
"67 year old  Chief Complaint   Patient presents with     Consult     Pt. presents to the clinic today with a possible sinus infection. Pt[facility] and was given amoxicillin and tobramycyn eye drops. Not improving       Blood pressure 124/73, pulse 78, temperature 98.4  F (36.9  C), temperature source Oral, resp. rate 16, height 5' 5.1\" (165.4 cm), weight 154 lb (69.9 kg), SpO2 97 %, not currently breastfeeding. Body mass index is 25.55 kg/(m^2).  BP completed using cuff size:    Patient Active Problem List   Diagnosis     Thoracalgia     Shoulder pain     Hypertension     CARDIOVASCULAR SCREENING; LDL GOAL LESS THAN 130     Menopausal state -- age 49     TMJ (temporomandibular joint syndrome)     Vaginal atrophy     Breast atypical ductal hyperplasia-- Right     Eczema     Nephrolithiasis     Hypertension goal BP (blood pressure) < 140/90     Dysphonia     Neck stiffness     Unilateral vocal fold paresis       Wt Readings from Last 2 Encounters:   06/18/18 154 lb (69.9 kg)   11/20/17 154 lb (69.9 kg)     BP Readings from Last 3 Encounters:   06/18/18 124/73   11/20/17 127/76   11/02/16 117/76       Allergies   Allergen Reactions     Aspirin      Tight breathing     Macrobid [Nitrofuran Derivatives]        Current Outpatient Prescriptions   Medication     adapalene (DIFFERIN) 0.1 % gel     albuterol (2.5 MG/3ML) 0.083% neb solution     albuterol (PROAIR HFA, PROVENTIL HFA, VENTOLIN HFA) 108 (90 BASE) MCG/ACT inhaler     albuterol (PROAIR HFA/PROVENTIL HFA/VENTOLIN HFA) 108 (90 BASE) MCG/ACT Inhaler     albuterol 90 MCG/ACT inhaler     alendronate (FOSAMAX) 70 MG tablet     calcium citrate-vitamin D (CITRACAL) 315-250 MG-UNIT TABS per tablet     Cholecalciferol (VITAMIN D) 1000 UNIT capsule     desonide (DESOWEN) 0.05 % cream     eflornithine HCl (VANIQA) 13.9 % CREA     fluticasone (FLONASE) 50 MCG/ACT nasal spray     fluticasone-salmeterol (ADVAIR) 250-50 MCG/DOSE diskus inhaler     " hydrochlorothiazide (HYDRODIURIL) 25 MG tablet     ibuprofen (ADVIL,MOTRIN) 600 MG tablet     losartan (COZAAR) 25 MG tablet     Misc Natural Products (LUTEIN 20 PO)     omega-3 acid ethyl esters (LOVAZA) 1 G capsule     ranitidine (RANITIDINE) 150 MG tablet     No current facility-administered medications for this visit.        Social History   Substance Use Topics     Smoking status: Never Smoker     Smokeless tobacco: Never Used     Alcohol use Yes      Comment: rare          Honoring Choices - Health Care Directive Guide offered to patient at time of visit.    Health Maintenance Due   Topic Date Due     HEPATITIS C SCREENING  07/04/1966     ADVANCE DIRECTIVE PLANNING Q5 YRS  07/04/2003     ASTHMA CONTROL TEST Q6 MOS  05/20/2018     MAMMO Q1 YR  05/30/2018       Immunization History   Administered Date(s) Administered     Influenza (High Dose) 3 valent vaccine 10/26/2015     Influenza (IIV3) PF 10/16/2013     Pneumo Conj 13-V (2010&after) 08/21/2015     Pneumococcal 23 valent 11/27/2013     TDAP Vaccine (Boostrix) 11/12/2012     Zoster vaccine, live 10/16/2013       Lab Results   Component Value Date    PAP NIL 08/13/2012         Recent Labs   Lab Test  11/24/17   0812  10/06/16   1014  08/25/15   0953   04/22/14   0824   01/14/14   1146  11/27/13   1021  11/14/12   0906   LDL  107*  126*  106   --    --    --    --   113  121   HDL  90  79  74   --    --    --    --   78  74   TRIG  65  68  77   --    --    --    --   88  67   ALT   --    --    --    --    --    --   30   --    --    CR  0.54  0.65  0.61   < >  0.67   < >  0.70  0.70  0.60   GFRESTIMATED  >90  90  >90  Non  GFR Calc     < >  88   < >  84  84  >90   GFRESTBLACK  >90  >90  African American GFR Calc    >90   GFR Calc     < >  >90   < >  >90  >90  >90   ALBUMIN   --    --    --    --   3.9   --   4.4   --    --    POTASSIUM  3.3*  3.7  3.9   < >  4.2   < >  3.7  4.1  3.9   TSH   --    --    --    --    --    --    --    1.67  1.01    < > = values in this interval not displayed.       PHQ-2 ( 1999 Pfizer) 6/18/2018 9/13/2016   Q1: Little interest or pleasure in doing things 0 0   Q2: Feeling down, depressed or hopeless 0 0   PHQ-2 Score 0 0       PHQ-9 SCORE 10/5/2016 11/2/2016 6/18/2018   Total Score 1 5 2       ANNE-7 SCORE 6/18/2018   Total Score 0       ACT Total Scores 11/20/2017   ACT TOTAL SCORE (Goal Greater than or Equal to 20) 21   In the past 12 months, how many times did you visit the emergency room for your asthma without being admitted to the hospital? 0   In the past 12 months, how many times were you hospitalized overnight because of your asthma? 0       Lynn Larios CMA  June 18, 2018 9:56 AM

## 2018-06-18 NOTE — PROGRESS NOTES
"      HPI:       Yolande Hussein is a 67 year old who presents for the following  Patient presents with:  Consult: Pt. presents to the clinic today with a possible sinus infection. Pt. was seen in the [facility] 6/5/18 and was given amoxicillin and tobramycyn eye drops. Not improving    Since end of May hadn't been feeling well - woke up AM of 6/5/18 with matted eyes and was given amoxicillin for 10 days (from [facility]) and antibacterial eye drop. Sinus symptoms resolved with 10 day course (finished Thursday) and on Saturday symptoms started again with jaw pain to where she cannot clench/chew, continues bilateral eye drainage which is \"mucky\". Eye drainage never stopped with amoxicillin.     Problem, Medication and Allergy Lists were   reviewed and are current.      Current Outpatient Prescriptions   Medication Sig Dispense Refill     adapalene (DIFFERIN) 0.1 % gel APPLY TOPICALLY AT BEDTIME AS DIRECTED. 45 g 3     albuterol (2.5 MG/3ML) 0.083% neb solution Take 1 vial (2.5 mg) by nebulization every 6 hours as needed for shortness of breath / dyspnea 100 vial 3     albuterol (PROAIR HFA/PROVENTIL HFA/VENTOLIN HFA) 108 (90 BASE) MCG/ACT Inhaler Inhale 2 puffs into the lungs every 6 hrs prn 3 Inhaler 1     calcium citrate-vitamin D (CITRACAL) 315-250 MG-UNIT TABS per tablet Take 1 tablet by mouth daily       Cholecalciferol (VITAMIN D) 1000 UNIT capsule Take 1 capsule by mouth daily.       desonide (DESOWEN) 0.05 % cream Apply to breast BID 30 g 0     doxycycline (VIBRAMYCIN) 100 MG capsule Take 1 capsule (100 mg) by mouth 2 times daily 28 capsule 0     eflornithine HCl (VANIQA) 13.9 % CREA Apply every 12 hours. 30 g 3     fluticasone (FLONASE) 50 MCG/ACT nasal spray Spray 1-2 sprays into both nostrils daily 1 Package 11     fluticasone-salmeterol (ADVAIR) 250-50 MCG/DOSE diskus inhaler Inhale 1 puff into the lungs 2 times daily 3 Inhaler 3     hydrochlorothiazide (HYDRODIURIL) 25 MG tablet " "Take 1 tablet (25 mg) by mouth daily 90 tablet 3     ibuprofen (ADVIL,MOTRIN) 600 MG tablet Take 1 tablet (600 mg) by mouth every 6 hours as needed for moderate pain 30 tablet 1     losartan (COZAAR) 25 MG tablet Take 1 tablet (25 mg) by mouth daily 90 tablet 3     Misc Natural Products (LUTEIN 20 PO)        omega-3 acid ethyl esters (LOVAZA) 1 G capsule Take 2 capsules (2 g) by mouth daily 120 capsule      ranitidine (RANITIDINE) 150 MG tablet Take 150 mg by mouth At Bedtime 1 tab nightly       [DISCONTINUED] albuterol (PROAIR HFA, PROVENTIL HFA, VENTOLIN HFA) 108 (90 BASE) MCG/ACT inhaler Inhale 2 puffs into the lungs every 6 hours as needed for shortness of breath / dyspnea or wheezing 1 Inhaler 1         Allergies   Allergen Reactions     Aspirin      Tight breathing     Macrobid [Nitrofuran Derivatives]      Patient is a new patient to this clinic.         Review of Systems:   Review of Systems   Constitutional: Positive for activity change and fatigue. Negative for chills and fever.   HENT: Positive for congestion, dental problem, postnasal drip, rhinorrhea, sinus pain and sinus pressure. Negative for tinnitus and trouble swallowing.    Eyes: Positive for discharge, redness and itching.   Respiratory: Negative for cough, shortness of breath and wheezing.    Cardiovascular: Negative for chest pain.   Gastrointestinal: Negative for constipation, diarrhea, nausea and vomiting.   Neurological: Positive for dizziness. Negative for syncope, weakness and numbness.      See under HPI for additional ROS       Physical Exam:   Patient Vitals for the past 24 hrs:   BP Temp Temp src Pulse Resp SpO2 Height Weight   06/18/18 0952 124/73 98.4  F (36.9  C) Oral 78 16 97 % 5' 5.1\" (165.4 cm) 154 lb (69.9 kg)     Body mass index is 25.55 kg/(m^2).  Vitals were reviewed and were normal     Physical Exam   Constitutional: She is oriented to person, place, and time. She appears well-developed and well-nourished. No distress. "   HENT:   Head: Normocephalic and atraumatic.   Right Ear: Hearing and ear canal normal. No drainage. Tympanic membrane is injected. Tympanic membrane is not perforated. A middle ear effusion is present.   Left Ear: Hearing and ear canal normal. Tympanic membrane is injected.   Nose: Rhinorrhea present. Right sinus exhibits maxillary sinus tenderness and frontal sinus tenderness. Left sinus exhibits maxillary sinus tenderness and frontal sinus tenderness.   Mouth/Throat: Uvula is midline. No oropharyngeal exudate.   Eyes: EOM are normal. Pupils are equal, round, and reactive to light. Right eye exhibits discharge. Left eye exhibits discharge. No scleral icterus.   Neck: Normal range of motion. Neck supple. No thyromegaly present.   Cardiovascular: Normal rate, regular rhythm and normal heart sounds.  Exam reveals no gallop and no friction rub.    No murmur heard.  Pulmonary/Chest: Effort normal and breath sounds normal. No respiratory distress. She has no wheezes. She has no rales. She exhibits no tenderness.   Lymphadenopathy:     She has cervical adenopathy.   Neurological: She is alert and oriented to person, place, and time. No cranial nerve deficit. Coordination normal.   Skin: Skin is warm and dry. No rash noted. She is not diaphoretic. No erythema. No pallor.   Psychiatric: She has a normal mood and affect. Her behavior is normal.   Vitals reviewed.      Results:      Results from the last 24 hoursNo results found for this or any previous visit (from the past 24 hour(s)).  Assessment and Plan     1. Subacute frontal sinusitis  Discussed different antibiotic therapies for patient; as she has previously been on amoxicillin for treatment and did not completely treat sinus infection, a different class of antibiotics should be prescribed. Patient agreeable to doxycycline; advised of increased risk of GI upset with known history of GERD - patient will ensure to take with large glass of water and remain upright for  at least 30 minutes after taking. Will contact clinic if eye drainage does not resolve with treatment of ongoing sinusitis.   - doxycycline (VIBRAMYCIN) 100 MG capsule; Take 1 capsule (100 mg) by mouth 2 times daily  Dispense: 28 capsule; Refill: 0  There are no discontinued medications.  Options for treatment and follow-up care were reviewed with the patient. Yolande Hussein  engaged in the decision making process and verbalized understanding of the options discussed and agreed with the final plan.    ESTRADA Dunbar CNP

## 2018-06-19 ASSESSMENT — PATIENT HEALTH QUESTIONNAIRE - PHQ9: SUM OF ALL RESPONSES TO PHQ QUESTIONS 1-9: 2

## 2018-06-19 ASSESSMENT — ANXIETY QUESTIONNAIRES: GAD7 TOTAL SCORE: 0

## 2018-07-02 ENCOUNTER — OFFICE VISIT (OUTPATIENT)
Dept: OPHTHALMOLOGY | Facility: CLINIC | Age: 68
End: 2018-07-02
Attending: OPHTHALMOLOGY
Payer: MEDICARE

## 2018-07-02 DIAGNOSIS — H01.02B SQUAMOUS BLEPHARITIS OF UPPER AND LOWER EYELIDS OF BOTH EYES: Primary | ICD-10-CM

## 2018-07-02 DIAGNOSIS — H01.02A SQUAMOUS BLEPHARITIS OF UPPER AND LOWER EYELIDS OF BOTH EYES: Primary | ICD-10-CM

## 2018-07-02 PROCEDURE — G0463 HOSPITAL OUTPT CLINIC VISIT: HCPCS | Mod: ZF

## 2018-07-02 RX ORDER — NEOMYCIN SULFATE, POLYMYXIN B SULFATE, AND DEXAMETHASONE 3.5; 10000; 1 MG/G; [USP'U]/G; MG/G
0.5 OINTMENT OPHTHALMIC AT BEDTIME
Qty: 1 TUBE | Refills: 1 | Status: SHIPPED | OUTPATIENT
Start: 2018-07-02 | End: 2018-08-13

## 2018-07-02 ASSESSMENT — VISUAL ACUITY
OS_CC+: -1
OS_CC: 20/20
CORRECTION_TYPE: GLASSES
METHOD: SNELLEN - LINEAR
OD_CC: 20/20

## 2018-07-02 ASSESSMENT — CONF VISUAL FIELD
OS_NORMAL: 1
OD_NORMAL: 1
METHOD: COUNTING FINGERS

## 2018-07-02 ASSESSMENT — EXTERNAL EXAM - LEFT EYE: OS_EXAM: NORMAL

## 2018-07-02 ASSESSMENT — TONOMETRY
IOP_METHOD: TONOPEN
OD_IOP_MMHG: 18
OS_IOP_MMHG: 17

## 2018-07-02 ASSESSMENT — CUP TO DISC RATIO
OD_RATIO: 0.1
OS_RATIO: 0.1

## 2018-07-02 ASSESSMENT — SLIT LAMP EXAM - LIDS
COMMENTS: 1+ BLEPHARITIS
COMMENTS: 1+ BLEPHARITIS

## 2018-07-02 ASSESSMENT — EXTERNAL EXAM - RIGHT EYE: OD_EXAM: NORMAL

## 2018-07-02 NOTE — MR AVS SNAPSHOT
"              After Visit Summary   7/2/2018    Yolande Hussein    MRN: [id number]           Patient Information     Date Of Birth          1950        Visit Information        Provider Department      7/2/2018 12:30 PM Bing Horn MD [facility]        Today's Diagnoses     Squamous blepharitis of upper and lower eyelids of both eyes    -  1       Follow-ups after your visit        Follow-up notes from your care team     Return in about 2 weeks (around 7/16/2018) for ocular surface check.      Your next 10 appointments already scheduled     Jul 12, 2018 11:00 AM CDT   MA SCREENING BILATERAL W/ SAMIA with [email or web address]   [facility] ([facility])    [address]  [place] [address] [phone]           Three-dimensional (3D) mammograms are available at [place] locations in [place], [place], [place], [place], [facility], [place], [place], and [place]. [facility] locations include [place] and [facility] in [place]. Benefits of 3D mammograms include: - Improved rate of cancer detection - Decreases your chance of having to go back for more tests, which means fewer: - \"False-positive\" results (This means that there is an abnormal area but it isn't cancer.) - Invasive testing procedures, such as a biopsy or surgery - Can provide clearer images of the breast if you have dense breast tissue. 3D mammography is an optional exam that anyone can have with a 2D mammogram. It doesn't replace or take the place of a 2D mammogram. 2D mammograms remain an effective screening test for all women.  Not all insurance companies cover the cost of a 3D mammogram. Check with your insurance.            Jul 16, 2018  9:45 AM CDT   RETURN GENERAL with Bing Horn MD   [facility] Clinic ([facility])    [address]  [address] [address]  [place] [id number] [phone]              Future tests " that were ordered for you today     Open Future Orders        Priority Expected Expires Ordered    MA Screen Bilateral w/Juancarlos Routine  7/2/2019 7/2/2018            Who to contact     Please call your clinic at [phone] to:    Ask questions about your health    Make or cancel appointments    Discuss your medicines    Learn about your test results    Speak to your doctor            Additional Information About Your Visit        [employer] Information     [employer] gives you secure access to your electronic health record. If you see a primary care provider, you can also send messages to your care team and make appointments. If you have questions, please call your primary care clinic.  If you do not have a primary care provider, please call [phone] and they will assist you.      K[employer] is an electronic gateway that provides easy, online access to your medical records. With [employer], you can request a clinic appointment, read your test results, renew a prescription or communicate with your care team.     To access your existing account, please contact your [facility] or call [phone] for assistance.        Care EveryWhere ID     This is your Care EveryWhere ID. This could be used by other organizations to access your [facility] medical records  [id number]         Blood Pressure from Last 3 Encounters:   06/18/18 124/73   11/20/17 127/76   11/02/16 117/76    Weight from Last 3 Encounters:   06/18/18 69.9 kg (154 lb)   11/20/17 69.9 kg (154 lb)   10/05/16 67.6 kg (149 lb)              Today, you had the following     No orders found for display         Today's Medication Changes          These changes are accurate as of 7/2/18  1:26 PM.  If you have any questions, ask your nurse or doctor.               Start taking these medicines.        Dose/Directions    neomycin-polymyxin-dexamethasone 3.5-82836-4.1 Oint ophthalmic ointment   Commonly known as:  MAXITROL   Used for:  Squamous  blepharitis of upper and lower eyelids of both eyes   Started by:  Bing Horn MD        Dose:  0.5 inch   Place 0.5 inches into both eyes At Bedtime   Quantity:  1 Tube   Refills:  1            Where to get your medicines      These medications were sent to [id number] - [place], [place] - [address]  [address] [address]     Phone:  [phone]     neomycin-polymyxin-dexamethasone 3.5-89303-4.1 Oint ophthalmic ointment                Primary Care Provider Office Phone # Fax #    Isabel Hortensia Hicks [phone]       [facility] [address] [facility] [address]        Equal Access to Services     BLAYNE JONAS AH: Hadii david lyon hadasho Somagda, waaxda luqadaha, qaybta kaalmada adeegyada, carlitos cuevas . [facility] [phone].    ATENCIÓN: Si habla español, tiene a jacobson disposición servicios gratuitos de asistencia lingüística. Llame al [phone].    We comply with applicable federal civil rights laws and [place] laws. We do not discriminate on the basis of race, color, national origin, age, disability, sex, sexual orientation, or gender identity.            Thank you!     Thank you for choosing [facility]  for your care. Our goal is always to provide you with excellent care. Hearing back from our patients is one way we can continue to improve our services. Please take a few minutes to complete the written survey that you may receive in the mail after your visit with us. Thank you!             Your Updated Medication List - Protect others around you: Learn how to safely use, store and throw away your medicines at www.disposemymeds.org.          This list is accurate as of 7/2/18  1:26 PM.  Always use your most recent med list.                   Brand Name Dispense Instructions for use Diagnosis    adapalene 0.1 % gel    DIFFERIN    45 g    APPLY TOPICALLY AT BEDTIME AS DIRECTED.    Acne vulgaris       *  albuterol (2.5 MG/3ML) 0.083% neb solution     100 vial    Take 1 vial (2.5 mg) by nebulization every 6 hours as needed for shortness of breath / dyspnea    Moderate persistent asthma without complication       * albuterol 108 (90 Base) MCG/ACT Inhaler    PROAIR HFA/PROVENTIL HFA/VENTOLIN HFA    3 Inhaler    Inhale 2 puffs into the lungs every 6 hrs prn    Moderate persistent asthma without complication       calcium citrate-vitamin D 315-250 MG-UNIT Tabs per tablet    CITRACAL     Take 1 tablet by mouth daily        desonide 0.05 % cream    DESOWEN    30 g    Apply to breast BID    Eczema       doxycycline 100 MG capsule    VIBRAMYCIN    28 capsule    Take 1 capsule (100 mg) by mouth 2 times daily    Subacute frontal sinusitis       eflornithine HCl 13.9 % Crea    VANIQA    30 g    Apply every 12 hours.    Female hirsutism, Excess body and facial hair       fluticasone 50 MCG/ACT spray    FLONASE    1 Package    Spray 1-2 sprays into both nostrils daily    Rhinitis, chronic       fluticasone-salmeterol 250-50 MCG/DOSE diskus inhaler    ADVAIR    3 Inhaler    Inhale 1 puff into the lungs 2 times daily    Moderate persistent asthma without complication       hydrochlorothiazide 25 MG tablet    HYDRODIURIL    90 tablet    Take 1 tablet (25 mg) by mouth daily    Benign essential hypertension       ibuprofen 600 MG tablet    ADVIL/MOTRIN    30 tablet    Take 1 tablet (600 mg) by mouth every 6 hours as needed for moderate pain    Acute recurrent maxillary sinusitis       losartan 25 MG tablet    COZAAR    90 tablet    Take 1 tablet (25 mg) by mouth daily    Benign essential hypertension       LUTEIN 20 PO           neomycin-polymyxin-dexamethasone 3.5-03702-5.1 Oint ophthalmic ointment    MAXITROL    1 Tube    Place 0.5 inches into both eyes At Bedtime    Squamous blepharitis of upper and lower eyelids of both eyes       omega-3 acid ethyl esters 1 g capsule    Lovaza    120 capsule    Take 2 capsules (2 g) by mouth  daily        ranitidine 150 MG tablet   Generic drug:  ranitidine      Take 150 mg by mouth At Bedtime 1 tab nightly        vitamin D 1000 units capsule      Take 1 capsule by mouth daily.        * Notice:  This list has 2 medication(s) that are the same as other medications prescribed for you. Read the directions carefully, and ask your doctor or other care provider to review them with you.

## 2018-07-03 NOTE — PROGRESS NOTES
HPI  Yolande Hussein is a 67 year old female here for evaluation of eye redness, left > right. It started a few weeks ago with swelling around the left eye. She was treated with oral amoxicillin for sinusitis. Then, when the antibiotics completed, the swelling came back around her left jaw. She was then put on oral doxycycline and topical tobramycin. The swelling is all gone, but she still has some persistent eye redness.   No flashes/floaters. She uses ATs occasionally for dryness.    POH: Cataracts, refractive error  PMH: HTN, no diabetes  FH: Mother and sibling with macular degeneration  SH: Non-smoker    Assessment & Plan    (H01.021,  H01.022,  H01.024,  H01.025) Squamous blepharitis of upper and lower eyelids of both eyes  (primary encounter diagnosis)  Comment: Blepharitis and some conjunctivitis.   Plan: ATs 3-4 x daily, warm compresses OU BID  Maxitrol ointment QHS    Not addressed today:  (Z83.518) Family history of macular degeneration  (primary encounter diagnosis)  Comment: Mother and sibling. No evidence of macular degeneration on exam today.  Plan: Observe.    (H25.13) Senile nuclear sclerosis, bilateral  Comment: Not visually significant  Plan: Observe    (H52.13,  H52.203) Myopia with astigmatism and presbyopia, bilateral  Comment: Good vision with refraction  Plan: Given updated glasses Rx.      -----------------------------------------------------------------------------------    Patient disposition:   Return in about 2 weeks (around 7/16/2018) for ocular surface check. or sooner as needed.    Teaching statement:  Complete documentation of historical and exam elements from today's encounter can be found in the full encounter summary report (not reduplicated in this progress note). I personally obtained the chief complaint(s) and history of present illness.  I confirmed and edited as necessary the review of systems, past medical/surgical history, family history, social history, and  examination findings as documented by others; and I examined the patient myself. I personally reviewed the relevant tests, images, and reports as documented above.     I formulated and edited as necessary the assessment and plan and discussed the findings and management plan with the patient and family.      Bing Horn MD  Comprehensive Ophthalmology & Ocular Pathology  Department of Ophthalmology and Visual Neurosciences  [email or web address]  Pager [phone]

## 2018-07-12 ENCOUNTER — RADIANT APPOINTMENT (OUTPATIENT)
Dept: MAMMOGRAPHY | Facility: CLINIC | Age: 68
End: 2018-07-12
Attending: INTERNAL MEDICINE
Payer: COMMERCIAL

## 2018-07-12 DIAGNOSIS — Z12.31 VISIT FOR SCREENING MAMMOGRAM: ICD-10-CM

## 2018-07-16 ENCOUNTER — OFFICE VISIT (OUTPATIENT)
Dept: OPHTHALMOLOGY | Facility: CLINIC | Age: 68
End: 2018-07-16
Attending: OPHTHALMOLOGY
Payer: MEDICARE

## 2018-07-16 DIAGNOSIS — H01.02A SQUAMOUS BLEPHARITIS OF UPPER AND LOWER EYELIDS OF BOTH EYES: Primary | ICD-10-CM

## 2018-07-16 DIAGNOSIS — H01.02B SQUAMOUS BLEPHARITIS OF UPPER AND LOWER EYELIDS OF BOTH EYES: Primary | ICD-10-CM

## 2018-07-16 PROCEDURE — G0463 HOSPITAL OUTPT CLINIC VISIT: HCPCS | Mod: ZF

## 2018-07-16 RX ORDER — DOXYCYCLINE 100 MG/1
100 CAPSULE ORAL 2 TIMES DAILY
Qty: 60 CAPSULE | Refills: 1 | Status: SHIPPED | OUTPATIENT
Start: 2018-07-16 | End: 2018-11-27

## 2018-07-16 RX ORDER — FLUOROMETHOLONE 0.1 %
1 SUSPENSION, DROPS(FINAL DOSAGE FORM)(ML) OPHTHALMIC (EYE) 2 TIMES DAILY
Qty: 1 BOTTLE | Refills: 1 | Status: SHIPPED | OUTPATIENT
Start: 2018-07-16 | End: 2018-11-27

## 2018-07-16 ASSESSMENT — REFRACTION_WEARINGRX
OD_CYLINDER: +1.00
OD_SPHERE: -4.25
OS_SPHERE: -3.00
OD_AXIS: 141
OS_AXIS: 0
OS_CYLINDER: +0.00

## 2018-07-16 ASSESSMENT — TONOMETRY
OD_IOP_MMHG: 21
IOP_METHOD: ICARE
OS_IOP_MMHG: 20

## 2018-07-16 ASSESSMENT — EXTERNAL EXAM - LEFT EYE: OS_EXAM: NORMAL

## 2018-07-16 ASSESSMENT — VISUAL ACUITY
CORRECTION_TYPE: GLASSES
OD_CC+: +1
METHOD: SNELLEN - LINEAR
OD_CC: 20/25
OS_CC: 20/25
OS_CC+: +1

## 2018-07-16 ASSESSMENT — EXTERNAL EXAM - RIGHT EYE: OD_EXAM: NORMAL

## 2018-07-16 ASSESSMENT — CUP TO DISC RATIO
OS_RATIO: 0.1
OD_RATIO: 0.1

## 2018-07-16 ASSESSMENT — SLIT LAMP EXAM - LIDS
COMMENTS: 1+ BLEPHARITIS
COMMENTS: 1+ BLEPHARITIS

## 2018-07-16 ASSESSMENT — CONF VISUAL FIELD
OS_NORMAL: 1
OD_NORMAL: 1

## 2018-07-16 NOTE — NURSING NOTE
Chief Complaints and History of Present Illnesses   Patient presents with     Follow Up For     Squamous blepharitis of upper and lower eyelids of both eyes...     HPI    Affected eye(s):  Both   Symptoms:     Redness   Dryness   No itching   Burning   Eye discharge (Comment: Pal yellow OU)      Duration:  2 weeks   Frequency:  Constant       Do you have eye pain now?:  No      Comments:  Pt. stated symptoms have improved slightly over the last 2 weeks still discharge and redness OU.  AT's 3 X daily   Oint. At night OU  Uriel Butt  10:08 AM July 16, 2018

## 2018-07-16 NOTE — PROGRESS NOTES
"HPI  Yolande Hussein is a 68 year old female who was seen 2 weeks ago in general clinic for eval for eye discomfort. She noted 2 months ago, eyes were \"goopy\" assoc w/ sinus infections. Got 2 rounds of oral abx from urgent care clinics and felt that this helped somewhat. Still has blurry vision OU, OS>OD. Has been using maxitrol qHS, Soothe ATs TID, warm compresses every morning and most nights. Feels that symptoms have improved but not resolved.     POH: Cataracts, refractive error  PMH: HTN, no diabetes  FH: Mother and sibling with macular degeneration  SH: Non-smoker    Assessment & Plan    (H01.021,  H01.022,  H01.024,  H01.025) Squamous blepharitis of upper and lower eyelids of both eyes  (primary encounter diagnosis)  Comment: Blepharitis and some conjunctivitis.   Plan:   Continue ATs 3-4 x daily, warm compresses OU BID  Continue Maxitrol ointment QHS  Start FML 2x daily  Start Doxycycline 100mg po BID for 1 month, then daily for 2 months    Not addressed today:  (Z83.518) Family history of macular degeneration  (primary encounter diagnosis)  Comment: Mother and sibling. No evidence of macular degeneration on prior dilated exam.  Plan: Observe.    (H25.13) Senile nuclear sclerosis, bilateral  Comment: Not visually significant  Plan: Observe    (H52.13,  H52.203) Myopia with astigmatism and presbyopia, bilateral  Comment: Good vision  Plan: Observe     -----------------------------------------------------------------------------------    Patient disposition:   Return in about 4 weeks (around 8/13/2018) for ocular surface check. or sooner as needed.    Favian Aguayo M.D.  PGY-2  Ophthalmology    Teaching statement:  Complete documentation of historical and exam elements from today's encounter can be found in the full encounter summary report (not reduplicated in this progress note). I personally obtained the chief complaint(s) and history of present illness.  I confirmed and edited as necessary the " review of systems, past medical/surgical history, family history, social history, and examination findings as documented by others; and I examined the patient myself. I personally reviewed the relevant tests, images, and reports as documented above.     I formulated and edited as necessary the assessment and plan and discussed the findings and management plan with the patient and family.    Bing Horn MD  Comprehensive Ophthalmology & Ocular Pathology  Department of Ophthalmology and Visual Neurosciences  [email or web address]  Pager [phone]

## 2018-07-16 NOTE — MR AVS SNAPSHOT
After Visit Summary   7/16/2018    Yolande Hussein    MRN: [id number]           Patient Information     Date Of Birth          1950        Visit Information        Provider Department      7/16/2018 9:45 AM Bing Horn MD [facility]        Today's Diagnoses     Squamous blepharitis of upper and lower eyelids of both eyes    -  1      Care Instructions    CONTINUE:  Warm compresses to both eyes 2 times per day  Artificial tears 3 to 4 times per day  Maxitrol ointment at bedtime    START:  Fluorometholone 1 drop both eyes 2 times per day (morning and mid-day)  Doxycycline pills 100mg by mouth 2 times per day for 1 month, then daily for 2 months          Follow-ups after your visit        Follow-up notes from your care team     Return in about 4 weeks (around 8/13/2018) for ocular surface check.      Your next 10 appointments already scheduled     Aug 15, 2018 10:30 AM CDT   RETURN GENERAL with Bing Horn MD   Eye Clinic ([facility])    [address] [id number] [phone]              Who to contact     Please call your clinic at [phone] to:    Ask questions about your health    Make or cancel appointments    Discuss your medicines    Learn about your test results    Speak to your doctor            Additional Information About Your Visit        [employer] Information     [employer] gives you secure access to your electronic health record. If you see a primary care provider, you can also send messages to your care team and make appointments. If you have questions, please call your primary care clinic.  If you do not have a primary care provider, please call [phone] and they will assist you.      [employer] is an electronic gateway that provides easy, online access to your medical records. With [employer], you can request a clinic appointment, read your test results, renew a prescription or communicate with  your care team.     To access your existing account, please contact your [facility] or call [phone] for assistance.        Care EveryWhere ID     This is your Care EveryWhere ID. This could be used by other organizations to access your [facility] medical records  [id number]         Blood Pressure from Last 3 Encounters:   06/18/18 124/73   11/20/17 127/76   11/02/16 117/76    Weight from Last 3 Encounters:   06/18/18 69.9 kg (154 lb)   11/20/17 69.9 kg (154 lb)   10/05/16 67.6 kg (149 lb)              Today, you had the following     No orders found for display         Today's Medication Changes          These changes are accurate as of 7/16/18 11:26 AM.  If you have any questions, ask your nurse or doctor.               Start taking these medicines.        Dose/Directions    fluorometholone 0.1 % ophthalmic susp   Commonly known as:  FML LIQUIFILM   Used for:  Squamous blepharitis of upper and lower eyelids of both eyes   Started by:  Bing Horn MD        Dose:  1 drop   Place 1 drop into both eyes 2 times daily   Quantity:  1 Bottle   Refills:  1         These medicines have changed or have updated prescriptions.        Dose/Directions    * doxycycline 100 MG capsule   Commonly known as:  VIBRAMYCIN   This may have changed:  Another medication with the same name was added. Make sure you understand how and when to take each.   Used for:  Subacute frontal sinusitis   Changed by:  Bing Horn MD        Dose:  100 mg   Take 1 capsule (100 mg) by mouth 2 times daily   Quantity:  28 capsule   Refills:  0       * doxycycline 100 MG capsule   Commonly known as:  VIBRAMYCIN   This may have changed:  You were already taking a medication with the same name, and this prescription was added. Make sure you understand how and when to take each.   Used for:  Squamous blepharitis of upper and lower eyelids of both eyes   Changed by:  Bing Horn MD        Dose:  100 mg   Take  1 capsule (100 mg) by mouth 2 times daily For 1 month then daily for 2 months   Quantity:  60 capsule   Refills:  1       * Notice:  This list has 2 medication(s) that are the same as other medications prescribed for you. Read the directions carefully, and ask your doctor or other care provider to review them with you.         Where to get your medicines      These medications were sent to [facility] [address] [place] [address], [place] - [address] [place] [address], [place]  [address] [place] [address], [place], [facility] [address]     Phone:  [phone]     doxycycline 100 MG capsule    fluorometholone 0.1 % ophthalmic susp                Primary Care Provider Office Phone # Fax #    Isabel Hicks [phone]       [facility] [address] [facility] [address]        Equal Access to Services     [facility] : Jason Meek, watess langford, janiya finneganaljuan multani, carlitos cuevas . So [facility] [phone].    ATENCIÓN: Si habla español, tiene a jacobson disposición servicios gratuitos de asistencia lingüística. Omid al [phone].    We comply with applicable federal civil rights laws and [place] laws. We do not discriminate on the basis of race, color, national origin, age, disability, sex, sexual orientation, or gender identity.            Thank you!     Thank you for choosing EYE CLINIC  for your care. Our goal is always to provide you with excellent care. Hearing back from our patients is one way we can continue to improve our services. Please take a few minutes to complete the written survey that you may receive in the mail after your visit with us. Thank you!             Your Updated Medication List - Protect others around you: Learn how to safely use, store and throw away your medicines at www.disposemymeds.org.          This list is accurate as of 7/16/18 11:26 AM.  Always use your most recent med list.                   Brand Name Dispense Instructions for  use Diagnosis    adapalene 0.1 % gel    DIFFERIN    45 g    APPLY TOPICALLY AT BEDTIME AS DIRECTED.    Acne vulgaris       * albuterol (2.5 MG/3ML) 0.083% neb solution     100 vial    Take 1 vial (2.5 mg) by nebulization every 6 hours as needed for shortness of breath / dyspnea    Moderate persistent asthma without complication       * albuterol 108 (90 Base) MCG/ACT Inhaler    PROAIR HFA/PROVENTIL HFA/VENTOLIN HFA    3 Inhaler    Inhale 2 puffs into the lungs every 6 hrs prn    Moderate persistent asthma without complication       calcium citrate-vitamin D 315-250 MG-UNIT Tabs per tablet    CITRACAL     Take 1 tablet by mouth daily        desonide 0.05 % cream    DESOWEN    30 g    Apply to breast BID    Eczema       * doxycycline 100 MG capsule    VIBRAMYCIN    28 capsule    Take 1 capsule (100 mg) by mouth 2 times daily    Subacute frontal sinusitis       * doxycycline 100 MG capsule    VIBRAMYCIN    60 capsule    Take 1 capsule (100 mg) by mouth 2 times daily For 1 month then daily for 2 months    Squamous blepharitis of upper and lower eyelids of both eyes       eflornithine HCl 13.9 % Crea    VANIQA    30 g    Apply every 12 hours.    Female hirsutism, Excess body and facial hair       fluorometholone 0.1 % ophthalmic susp    FML LIQUIFILM    1 Bottle    Place 1 drop into both eyes 2 times daily    Squamous blepharitis of upper and lower eyelids of both eyes       fluticasone 50 MCG/ACT spray    FLONASE    1 Package    Spray 1-2 sprays into both nostrils daily    Rhinitis, chronic       fluticasone-salmeterol 250-50 MCG/DOSE diskus inhaler    ADVAIR    3 Inhaler    Inhale 1 puff into the lungs 2 times daily    Moderate persistent asthma without complication       hydrochlorothiazide 25 MG tablet    HYDRODIURIL    90 tablet    Take 1 tablet (25 mg) by mouth daily    Benign essential hypertension       ibuprofen 600 MG tablet    ADVIL/MOTRIN    30 tablet    Take 1 tablet (600 mg) by mouth every 6 hours as needed  for moderate pain    Acute recurrent maxillary sinusitis       losartan 25 MG tablet    COZAAR    90 tablet    Take 1 tablet (25 mg) by mouth daily    Benign essential hypertension       LUTEIN 20 PO           neomycin-polymyxin-dexamethasone 3.5-19090-4.1 Oint ophthalmic ointment    MAXITROL    1 Tube    Place 0.5 inches into both eyes At Bedtime    Squamous blepharitis of upper and lower eyelids of both eyes       omega-3 acid ethyl esters 1 g capsule    Lovaza    120 capsule    Take 2 capsules (2 g) by mouth daily        ranitidine 150 MG tablet   Generic drug:  ranitidine      Take 150 mg by mouth At Bedtime 1 tab nightly        vitamin D 1000 units capsule      Take 1 capsule by mouth daily.        * Notice:  This list has 4 medication(s) that are the same as other medications prescribed for you. Read the directions carefully, and ask your doctor or other care provider to review them with you.

## 2018-07-18 ENCOUNTER — TELEPHONE (OUTPATIENT)
Dept: OPHTHALMOLOGY | Facility: CLINIC | Age: 68
End: 2018-07-18

## 2018-07-18 NOTE — TELEPHONE ENCOUNTER
[facility] Call Center    Phone Message    May a detailed message be left on voicemail: yes    Reason for Call: The RX fluorometholone (FML LIQUIFILM) 0.1 % ophthalmic susp that the PT was prescribed was very expensive and PT is wondering if they can get a different RX that can do the same thing but for a cheaper price. PT can be called back at [phone] with any questions. PT's pharmacy is [facility] [address] [place], [place] [address] - [address] [facility] Thanks.     Action Taken: Message routed to:  [facility] EYE

## 2018-07-20 ENCOUNTER — TELEPHONE (OUTPATIENT)
Dept: OPHTHALMOLOGY | Facility: CLINIC | Age: 68
End: 2018-07-20

## 2018-07-20 DIAGNOSIS — H01.01B ULCERATIVE BLEPHARITIS OF UPPER AND LOWER EYELIDS OF BOTH EYES: Primary | ICD-10-CM

## 2018-07-20 DIAGNOSIS — H01.01A ULCERATIVE BLEPHARITIS OF UPPER AND LOWER EYELIDS OF BOTH EYES: Primary | ICD-10-CM

## 2018-07-20 RX ORDER — LOTEPREDNOL ETABONATE 5 MG/ML
1 SUSPENSION/ DROPS OPHTHALMIC 2 TIMES DAILY
Qty: 1 BOTTLE | Refills: 1 | Status: SHIPPED | OUTPATIENT
Start: 2018-07-20 | End: 2018-08-13

## 2018-07-20 NOTE — TELEPHONE ENCOUNTER
Health Call Center    Phone Message    May a detailed message be left on voicemail: yes    Reason for Call: Medication Question or concern regarding medication   Prescription Clarification  Name of Medication: fluorometholone (FML LIQUIFILM) 0.1 % ophthalmic susp   Prescribing Provider: ABELARDO   Pharmacy: [facility] [address] [place] [address] [address] [facility] [address]    What on the order needs clarification? Pt stated that in addition to the message that was sent over on Wednesday 07/18, Pharmacy has been calling everyday. Current Rx is over $100. Pt is requesting for a cheaper alternative for this Rx. Pt will be going out of town on Monday and her Pharmacy closes on Sunday. Pt would like this Rx sent over to her pharmacy today. I was unable to get a hold of the clinic. Please f/u asap!    Action Taken: Message routed to:  [facility]

## 2018-07-20 NOTE — TELEPHONE ENCOUNTER
Called pt.     Started her on lotemax 1 drop both eyes BID.     Alfie Ruiz MD  Ophthalmology Resident  PGY-2

## 2018-07-20 NOTE — TELEPHONE ENCOUNTER
Previously message to provider 7-18-18.    Will forward to Dr. Lamb to review possible change to lotemax today  Chuck Walden RN 8:56 AM 07/20/18

## 2018-07-20 NOTE — TELEPHONE ENCOUNTER
Health Call Center    Phone Message    May a detailed message be left on voicemail: yes    Reason for Call: Medication Question or concern regarding medication   Prescription Clarification  Name of Medication: flourometholone   Prescribing Provider: Bing Horn   Pharmacy: [facility] in [place]   What on the order needs clarification? This Rx costs hundreds of dollars, pt has to pay over $100.00.  Pt has asked for help on this issue since this past Monday. Pt has called 3x for help this ramu, pharmacy has reached out to clinic 3x this week, per pt.  Pt is leaving to go out of town this weekend.  Pt's pharmacy is NOT open on Sundays.  Pt needs this resolved today, please.  Call pt either to let her know how the clinic can help her.          Action Taken: Message routed to:  [facility]

## 2018-07-23 ENCOUNTER — TELEPHONE (OUTPATIENT)
Dept: OPHTHALMOLOGY | Facility: CLINIC | Age: 68
End: 2018-07-23

## 2018-07-23 NOTE — TELEPHONE ENCOUNTER
The pharmacy noted that the patient picked up the original RX prescribed (Lotemax) and she is now out of town.  She will discuss medication options at her follow up appointment.

## 2018-07-23 NOTE — TELEPHONE ENCOUNTER
Health Call Center    Phone Message    May a detailed message be left on voicemail: yes    Reason for Call: Medication Question or concern regarding medication   Prescription Clarification  Name of Medication: loteprednol (LOTEMAX) 0.5 % ophthalmic susp   Prescribing Provider: Alfie Ruiz MD (on call provider on Fri night) for Dr Horn   Pharmacy: [facility], [address] [place] [address], Ph # [phone]   What on the order needs clarification? The medication Dr Horn put Pt on was going to cost too much money, so she called and then the on call provider is the one on Fri night who called her back and switched medications and sent to her Pharmacy. She said Pharmacy never got it. Please resend. Pt needs asap today as she is leaving for out of town. Please return Pt call to confirm you got this msg. Thanks!          Action Taken: Message routed to:  [facility] Eye Clinic

## 2018-07-23 NOTE — TELEPHONE ENCOUNTER
[facility] Call [facility]    Phone Message    May a detailed message be left on voicemail: yes    Reason for Call: Medication Refill Request    Has the patient contacted the pharmacy for the refill? Yes   Name of medication being requested: loteprednol (LOTEMAX) 0.5 % ophthalmic susp  Provider who prescribed the medication: Dr. Ruiz  Pharmacy: [facility] [place]  Date medication is needed: ASAP - pt is leaving for trip later today, Dr. Ruiz was supposed to send in on Friday         Action Taken: Message routed to:  [facility]): Ophthamology

## 2018-07-23 NOTE — TELEPHONE ENCOUNTER
"Called to discuss steroid perscirption.  Patient frustrated beacause alternative steroid med (lotemax) is more expensive than FML which she requested a cheaper alternative.  She ended up filling the FML anyway because she is going on a trip today.  Discussed rationale for a low potency steroid and confirmed follow-up window of 3 weeks.  Patient to use FML until then.  All questions answered at this time and patient did inform me that she will have a \"good vacation\" now that her medications have been sorted out  "

## 2018-08-13 ENCOUNTER — OFFICE VISIT (OUTPATIENT)
Dept: OPHTHALMOLOGY | Facility: CLINIC | Age: 68
End: 2018-08-13
Attending: OPHTHALMOLOGY
Payer: MEDICARE

## 2018-08-13 DIAGNOSIS — H01.02B SQUAMOUS BLEPHARITIS OF UPPER AND LOWER EYELIDS OF BOTH EYES: Primary | ICD-10-CM

## 2018-08-13 DIAGNOSIS — H04.123 DRY EYES, BILATERAL: ICD-10-CM

## 2018-08-13 DIAGNOSIS — H01.02A SQUAMOUS BLEPHARITIS OF UPPER AND LOWER EYELIDS OF BOTH EYES: Primary | ICD-10-CM

## 2018-08-13 PROCEDURE — G0463 HOSPITAL OUTPT CLINIC VISIT: HCPCS | Mod: ZF

## 2018-08-13 ASSESSMENT — REFRACTION_WEARINGRX
OS_SPHERE: -3.00
OD_AXIS: 141
OD_SPHERE: -4.25
OS_AXIS: 0
OD_CYLINDER: +1.00
OS_CYLINDER: +0.00

## 2018-08-13 ASSESSMENT — TONOMETRY
OD_IOP_MMHG: 19
IOP_METHOD: ICARE
OS_IOP_MMHG: 19

## 2018-08-13 ASSESSMENT — VISUAL ACUITY
OS_CC+: +1
OS_PH_CC: 20/30+2
OD_CC+: -3
METHOD: SNELLEN - LINEAR
OD_CC: 20/25
OS_CC: 20/40
CORRECTION_TYPE: GLASSES

## 2018-08-13 ASSESSMENT — CONF VISUAL FIELD
METHOD: COUNTING FINGERS
OS_NORMAL: 1
OD_NORMAL: 1

## 2018-08-13 ASSESSMENT — CUP TO DISC RATIO
OS_RATIO: 0.1
OD_RATIO: 0.1

## 2018-08-13 ASSESSMENT — EXTERNAL EXAM - LEFT EYE: OS_EXAM: NORMAL

## 2018-08-13 ASSESSMENT — SLIT LAMP EXAM - LIDS
COMMENTS: 1+ BLEPHARITIS
COMMENTS: 1+ BLEPHARITIS

## 2018-08-13 ASSESSMENT — EXTERNAL EXAM - RIGHT EYE: OD_EXAM: NORMAL

## 2018-08-13 NOTE — MR AVS SNAPSHOT
After Visit Summary   8/13/2018    Yolande Hussein    MRN: [id number]           Patient Information     Date Of Birth          1950        Visit Information        Provider Department      8/13/2018 8:45 AM Bing Horn MD [facility]         Follow-ups after your visit        Follow-up notes from your care team     Return in about 4 weeks (around 9/10/2018) for refraction, dilation, macula OCT OU.      Your next 10 appointments already scheduled     Sep 10, 2018  9:45 AM CDT   RETURN GENERAL with Bing Horn MD   Eye Clinic ([facility])    [address] Clin [address]  [facility] [id number] [phone]              Who to contact     Please call your clinic at [phone] to:    Ask questions about your health    Make or cancel appointments    Discuss your medicines    Learn about your test results    Speak to your doctor            Additional Information About Your Visit        MyChart Information     [employer] gives you secure access to your electronic health record. If you see a primary care provider, you can also send messages to your care team and make appointments. If you have questions, please call your primary care clinic.  If you do not have a primary care provider, please call [phone] and they will assist you.      [employer] is an electronic gateway that provides easy, online access to your medical records. With [employer], you can request a clinic appointment, read your test results, renew a prescription or communicate with your care team.     To access your existing account, please contact your [facility] or call [phone] for assistance.        Care EveryWhere ID     This is your Care EveryWhere ID. This could be used by other organizations to access your [place] medical records  [id number]         Blood Pressure from Last 3 Encounters:   06/18/18 124/73   11/20/17 127/76   11/02/16  117/76    Weight from Last 3 Encounters:   06/18/18 69.9 kg (154 lb)   11/20/17 69.9 kg (154 lb)   10/05/16 67.6 kg (149 lb)              Today, you had the following     No orders found for display         Today's Medication Changes          These changes are accurate as of 8/13/18 10:15 AM.  If you have any questions, ask your nurse or doctor.               These medicines have changed or have updated prescriptions.        Dose/Directions    doxycycline 100 MG capsule   Commonly known as:  VIBRAMYCIN   This may have changed:  Another medication with the same name was removed. Continue taking this medication, and follow the directions you see here.   Used for:  Squamous blepharitis of upper and lower eyelids of both eyes   Changed by:  Bing Horn MD        Dose:  100 mg   Take 1 capsule (100 mg) by mouth 2 times daily For 1 month then daily for 2 months   Quantity:  60 capsule   Refills:  1                Primary Care Provider Office Phone # Fax #    Isabel Hortensia Hicks [phone]       [facility] [address] [facility] [address]        Equal Access to Services     [facility] AH: Hadii david rivera Somagda, waaxda luqadaha, qaybta kaaljuan multani, carlitos cuevas . So [facility] [phone].    ATENCIÓN: Si habla español, tiene a jacobson disposición servicios gratuitos de asistencia lingüística. Llame al [phone].    We comply with applicable federal civil rights laws and [place] laws. We do not discriminate on the basis of race, color, national origin, age, disability, sex, sexual orientation, or gender identity.            Thank you!     Thank you for choosing EYE CLINIC  for your care. Our goal is always to provide you with excellent care. Hearing back from our patients is one way we can continue to improve our services. Please take a few minutes to complete the written survey that you may receive in the mail after your visit with us. Thank  you!             Your Updated Medication List - Protect others around you: Learn how to safely use, store and throw away your medicines at www.disposemymeds.org.          This list is accurate as of 8/13/18 10:15 AM.  Always use your most recent med list.                   Brand Name Dispense Instructions for use Diagnosis    adapalene 0.1 % gel    DIFFERIN    45 g    APPLY TOPICALLY AT BEDTIME AS DIRECTED.    Acne vulgaris       * albuterol (2.5 MG/3ML) 0.083% neb solution     100 vial    Take 1 vial (2.5 mg) by nebulization every 6 hours as needed for shortness of breath / dyspnea    Moderate persistent asthma without complication       * albuterol 108 (90 Base) MCG/ACT inhaler    PROAIR HFA/PROVENTIL HFA/VENTOLIN HFA    3 Inhaler    Inhale 2 puffs into the lungs every 6 hrs prn    Moderate persistent asthma without complication       calcium citrate-vitamin D 315-250 MG-UNIT Tabs per tablet    CITRACAL     Take 1 tablet by mouth daily        desonide 0.05 % cream    DESOWEN    30 g    Apply to breast BID    Eczema       doxycycline 100 MG capsule    VIBRAMYCIN    60 capsule    Take 1 capsule (100 mg) by mouth 2 times daily For 1 month then daily for 2 months    Squamous blepharitis of upper and lower eyelids of both eyes       eflornithine HCl 13.9 % Crea    VANIQA    30 g    Apply every 12 hours.    Female hirsutism, Excess body and facial hair       fluorometholone 0.1 % ophthalmic susp    FML LIQUIFILM    1 Bottle    Place 1 drop into both eyes 2 times daily    Squamous blepharitis of upper and lower eyelids of both eyes       fluticasone 50 MCG/ACT spray    FLONASE    1 Package    Spray 1-2 sprays into both nostrils daily    Rhinitis, chronic       fluticasone-salmeterol 250-50 MCG/DOSE diskus inhaler    ADVAIR    3 Inhaler    Inhale 1 puff into the lungs 2 times daily    Moderate persistent asthma without complication       hydrochlorothiazide 25 MG tablet    HYDRODIURIL    90 tablet    Take 1 tablet (25 mg)  by mouth daily    Benign essential hypertension       ibuprofen 600 MG tablet    ADVIL/MOTRIN    30 tablet    Take 1 tablet (600 mg) by mouth every 6 hours as needed for moderate pain    Acute recurrent maxillary sinusitis       losartan 25 MG tablet    COZAAR    90 tablet    Take 1 tablet (25 mg) by mouth daily    Benign essential hypertension       LUTEIN 20 PO           omega-3 acid ethyl esters 1 g capsule    Lovaza    120 capsule    Take 2 capsules (2 g) by mouth daily        ranitidine 150 MG tablet   Generic drug:  ranitidine      Take 150 mg by mouth as needed        SOOTHE OP      Apply to eye as needed        vitamin D 1000 units capsule      Take 1 capsule by mouth daily.        * Notice:  This list has 2 medication(s) that are the same as other medications prescribed for you. Read the directions carefully, and ask your doctor or other care provider to review them with you.

## 2018-08-13 NOTE — NURSING NOTE
"Chief Complaints and History of Present Illnesses   Patient presents with     Follow Up For     1 month follow up Squamous blepharitis of upper and lower eyelids of both eyes      HPI    Affected eye(s):  Both   Symptoms:     No floaters   No flashes   No tearing   No itching         Do you have eye pain now?:  No      Comments:  Pt states that eyes are more comfortable with drops. Pt still seeing \"shadowed images\". Blinking does not clear vision.  Pt still having redness in both eyes, slightly better today.    Martha CANTU August 13, 2018 8:51 AM               "

## 2018-08-13 NOTE — PROGRESS NOTES
HPI  Yolande Hussein is a 68 year old female here for follow-up of blepharitis. The FML drop helps make her eyes feel better, and she thinks she is a little better, but she continues to have some shadowing left eye. This shadowing is making her more uncomfortable with highway driving.  Has been using FML BID, Soothe ATs TID, doxycycline 100mg po BID, warm compresses every morning and most nights.    POH: Cataracts, refractive error  PMH: HTN, no diabetes  FH: Mother and sibling with macular degeneration  SH: Non-smoker    Assessment & Plan    (H01.021,  H01.022,  H01.024,  H01.025) Squamous blepharitis of upper and lower eyelids of both eyes  (primary encounter diagnosis)  Comment: Blepharitis and some conjunctivitis - continued small improvement  Plan:   Continue ATs 3-4 x daily, warm compresses OU BID  Increase FML 3x daily  Continue Doxycycline 100mg po BID for 1 month, then daily for 2 months    Next visit - refraction, dilate, macula OCT. ? Cataract progression causing some of her issues left eye.    Not addressed today:  (Z83.518) Family history of macular degeneration  (primary encounter diagnosis)  Comment: Mother and sibling. No evidence of macular degeneration on prior dilated exam.  Plan: Observe.    (H25.13) Senile nuclear sclerosis, bilateral  Comment: Not visually significant  Plan: Observe    (H52.13,  H52.203) Myopia with astigmatism and presbyopia, bilateral  Comment: Good vision  Plan: Observe     -----------------------------------------------------------------------------------    Patient disposition:   Return in about 4 weeks (around 9/10/2018) for refraction, dilation, macula OCT OU. or sooner as needed.    Teaching statement:  Complete documentation of historical and exam elements from today's encounter can be found in the full encounter summary report (not reduplicated in this progress note). I personally obtained the chief complaint(s) and history of present illness.  I confirmed and  edited as necessary the review of systems, past medical/surgical history, family history, social history, and examination findings as documented by others; and I examined the patient myself. I personally reviewed the relevant tests, images, and reports as documented above.     I formulated and edited as necessary the assessment and plan and discussed the findings and management plan with the patient and family.    Bing Horn MD  Comprehensive Ophthalmology & Ocular Pathology  Department of Ophthalmology and Visual Neurosciences  [email or web address]  Pager [phone]

## 2018-09-10 ENCOUNTER — OFFICE VISIT (OUTPATIENT)
Dept: OPHTHALMOLOGY | Facility: CLINIC | Age: 68
End: 2018-09-10
Attending: OPHTHALMOLOGY
Payer: MEDICARE

## 2018-09-10 DIAGNOSIS — Z83.518 FAMILY HISTORY OF MACULAR DEGENERATION: ICD-10-CM

## 2018-09-10 DIAGNOSIS — H25.13 NUCLEAR SCLEROTIC CATARACT OF BOTH EYES: ICD-10-CM

## 2018-09-10 DIAGNOSIS — H52.13 MYOPIC ASTIGMATISM OF BOTH EYES: ICD-10-CM

## 2018-09-10 DIAGNOSIS — H01.02B SQUAMOUS BLEPHARITIS OF UPPER AND LOWER EYELIDS OF BOTH EYES: Primary | ICD-10-CM

## 2018-09-10 DIAGNOSIS — H04.123 DRY EYES, BILATERAL: ICD-10-CM

## 2018-09-10 DIAGNOSIS — H01.02A SQUAMOUS BLEPHARITIS OF UPPER AND LOWER EYELIDS OF BOTH EYES: Primary | ICD-10-CM

## 2018-09-10 DIAGNOSIS — H52.203 MYOPIC ASTIGMATISM OF BOTH EYES: ICD-10-CM

## 2018-09-10 DIAGNOSIS — H53.2 MONOCULAR DIPLOPIA OF LEFT EYE: ICD-10-CM

## 2018-09-10 DIAGNOSIS — H52.4 PRESBYOPIA: ICD-10-CM

## 2018-09-10 PROCEDURE — G0463 HOSPITAL OUTPT CLINIC VISIT: HCPCS | Mod: ZF

## 2018-09-10 PROCEDURE — 92134 CPTRZ OPH DX IMG PST SGM RTA: CPT | Mod: ZF | Performed by: OPHTHALMOLOGY

## 2018-09-10 PROCEDURE — 92015 DETERMINE REFRACTIVE STATE: CPT | Mod: ZF

## 2018-09-10 ASSESSMENT — REFRACTION_WEARINGRX
OS_CYLINDER: SPHERE
OD_AXIS: 141
OS_SPHERE: -3.00
OD_ADD: +2.50
OD_SPHERE: -4.00
OD_SPHERE: -4.25
OS_ADD: +2.50
OD_AXIS: 139
OD_CYLINDER: +1.00
OD_CYLINDER: +1.50
OS_SPHERE: -3.25
OS_CYLINDER: +0.50
OS_AXIS: 015

## 2018-09-10 ASSESSMENT — REFRACTION_MANIFEST
OD_CYLINDER: +1.25
OD_SPHERE: -4.50
OD_ADD: +3.00
OS_SPHERE: -4.00
OD_AXIS: 135
OS_CYLINDER: +0.50
OS_ADD: +3.00
OS_AXIS: 035

## 2018-09-10 ASSESSMENT — VISUAL ACUITY
METHOD: SNELLEN - LINEAR
OS_CC: 20/30
OS_CC+: -2
OD_CC: 20/30
CORRECTION_TYPE: GLASSES

## 2018-09-10 ASSESSMENT — CONF VISUAL FIELD
OS_NORMAL: 1
OD_NORMAL: 1
METHOD: COUNTING FINGERS

## 2018-09-10 ASSESSMENT — SLIT LAMP EXAM - LIDS
COMMENTS: 1+ BLEPHARITIS
COMMENTS: 1+ BLEPHARITIS

## 2018-09-10 ASSESSMENT — CUP TO DISC RATIO
OS_RATIO: 0.1
OD_RATIO: 0.1

## 2018-09-10 ASSESSMENT — TONOMETRY
OD_IOP_MMHG: 10
IOP_METHOD: TONOPEN
OS_IOP_MMHG: 13

## 2018-09-10 ASSESSMENT — EXTERNAL EXAM - RIGHT EYE: OD_EXAM: NORMAL

## 2018-09-10 ASSESSMENT — EXTERNAL EXAM - LEFT EYE: OS_EXAM: NORMAL

## 2018-09-10 NOTE — PROGRESS NOTES
HPI  Yolande Hussein is a 68 year old female here for follow-up of blepharitis and monocular diplopia left eye. The FML drop helps make her eyes feel better without burning, and she thinks she is a little better, but she continues to have some doubling left eye. This shadowing is making her more uncomfortable with highway driving.  Has been using FML TID, Soothe ATs TID, doxycycline 100mg po daily, warm compresses every morning and most nights.    POH: Cataracts, refractive error  PMH: HTN, no diabetes  FH: Mother and sibling with macular degeneration  SH: Non-smoker    Assessment & Plan    (H01.021,  H01.022,  H01.024,  H01.025) Squamous blepharitis of upper and lower eyelids of both eyes  (primary encounter diagnosis)  Comment: Blepharitis and some conjunctivitis - continued small improvement  Plan:   Continue ATs 3-4 x daily, warm compresses OU BID  Continue FML 3x daily  Continue Doxycycline daily for 2 month course    (Z83.518) Family history of macular degeneration  (primary encounter diagnosis)  Comment: Mother and sibling. No evidence of macular degeneration on exam or macula OCT today.  Plan: Observe.    (H25.13) Senile nuclear sclerosis, bilateral  Comment: Ocular surface now looks pretty good. No retinal cause for monocular diplopia. ? Cataract.  Plan: Observe for now. See below    (H52.13,  H52.203) Myopia with astigmatism and presbyopia, bilateral  Comment: Good vision with updated refraction today  Plan: Recommend updating glasses. If does not improve monocular diplopia, then may need to consider cataract removal     -----------------------------------------------------------------------------------    Patient disposition:   Return in about 3 months (around 12/10/2018) for ocular surface check. or sooner as needed.    Teaching statement:  Complete documentation of historical and exam elements from today's encounter can be found in the full encounter summary report (not reduplicated in this  progress note). I personally obtained the chief complaint(s) and history of present illness.  I confirmed and edited as necessary the review of systems, past medical/surgical history, family history, social history, and examination findings as documented by others; and I examined the patient myself. I personally reviewed the relevant tests, images, and reports as documented above.     I formulated and edited as necessary the assessment and plan and discussed the findings and management plan with the patient and family.    Bing Horn MD  Comprehensive Ophthalmology & Ocular Pathology  Department of Ophthalmology and Visual Neurosciences  [email or web address]  Pager [phone]

## 2018-09-10 NOTE — NURSING NOTE
Chief Complaints and History of Present Illnesses   Patient presents with     Follow Up For     Squamous blepharitis of upper and lower eyelids of both eyes     HPI    Affected eye(s):  Both   Symptoms:     Decreased vision   Double vision   Burning   Photophobia      Duration:  4 weeks   Frequency:  Constant       Do you have eye pain now?:  No      Comments:  Patient presents for 4 wk f/u of Squamous blepharitis of upper and lower eyelids of both eyes. Since the last visit the vision has not changed. Still experiencing some diplopia, the burning sensation has lessened. The diplopia is at distance, as she gets closer to the objects they converge. Increased photophobia. Symptoms are worse in the LE. Taking rx eye drops as prescribed and OTC ATS PRN BE. Wears glasses PT for distance, takes them off for near. No flashes or floaters. Sandra Chavez COT 9:53 AM September 10, 2018

## 2018-09-10 NOTE — MR AVS SNAPSHOT
After Visit Summary   9/10/2018    Yolande Hussein    MRN: [id number]           Patient Information     Date Of Birth          1950        Visit Information        Provider Department      9/10/2018 9:45 AM [facility]        Today's Diagnoses     Squamous blepharitis of upper and lower eyelids of both eyes    -  1    Dry eyes, bilateral        Nuclear sclerotic cataract of both eyes        Myopic astigmatism of both eyes        Presbyopia        Family history of macular degeneration        Monocular diplopia of left eye           Follow-ups after your visit        Follow-up notes from your care team     Return in about 3 months (around 12/10/2018) for ocular surface check.      Who to contact     Please call your clinic at [phone] to:    Ask questions about your health    Make or cancel appointments    Discuss your medicines    Learn about your test results    Speak to your doctor            Additional Information About Your Visit        MyChart Information     [employer] gives you secure access to your electronic health record. If you see a primary care provider, you can also send messages to your care team and make appointments. If you have questions, please call your primary care clinic.  If you do not have a primary care provider, please call [phone] and they will assist you.      [employer] is an electronic gateway that provides easy, online access to your medical records. With [employer], you can request a clinic appointment, read your test results, renew a prescription or communicate with your care team.     To access your existing account, please contact your [facility] or call [phone] for assistance.        Care EveryWhere ID     This is your Care EveryWhere ID. This could be used by other organizations to access your [facility] medical records  [id number]         Blood Pressure from Last 3 Encounters:   06/18/18 124/73    11/20/17 127/76   11/02/16 117/76    Weight from Last 3 Encounters:   06/18/18 69.9 kg (154 lb)   11/20/17 69.9 kg (154 lb)   10/05/16 67.6 kg (149 lb)              We Performed the Following     OCT Retina Spectralis OU (both eyes)        Primary Care Provider Office Phone # Fax #    Isabel Hortensia Hicks [phone]       [facility] [address] [facility] [address]        Equal Access to Services     [facility]: Hadii aad ku hadasho Soomaali, waaxda luqadaha, qaybta kaalmada adetrent, carlitos cuevas . So [facility] [phone].    ATENCIÓN: Si habla español, tiene a jacobson disposición servicios gratuitos de asistencia lingüística. [facility] [phone].    We comply with applicable federal civil rights laws and [place] laws. We do not discriminate on the basis of race, color, national origin, age, disability, sex, sexual orientation, or gender identity.            Thank you!     Thank you for choosing [facility]  for your care. Our goal is always to provide you with excellent care. Hearing back from our patients is one way we can continue to improve our services. Please take a few minutes to complete the written survey that you may receive in the mail after your visit with us. Thank you!             Your Updated Medication List - Protect others around you: Learn how to safely use, store and throw away your medicines at www.disposemymeds.org.          This list is accurate as of 9/10/18 12:14 PM.  Always use your most recent med list.                   Brand Name Dispense Instructions for use Diagnosis    adapalene 0.1 % gel    DIFFERIN    45 g    APPLY TOPICALLY AT BEDTIME AS DIRECTED.    Acne vulgaris       * albuterol (2.5 MG/3ML) 0.083% neb solution     100 vial    Take 1 vial (2.5 mg) by nebulization every 6 hours as needed for shortness of breath / dyspnea    Moderate persistent asthma without complication       * albuterol 108 (90 Base) MCG/ACT  inhaler    PROAIR HFA/PROVENTIL HFA/VENTOLIN HFA    3 Inhaler    Inhale 2 puffs into the lungs every 6 hrs prn    Moderate persistent asthma without complication       calcium citrate-vitamin D 315-250 MG-UNIT Tabs per tablet    CITRACAL     Take 1 tablet by mouth daily        desonide 0.05 % cream    DESOWEN    30 g    Apply to breast BID    Eczema       doxycycline 100 MG capsule    VIBRAMYCIN    60 capsule    Take 1 capsule (100 mg) by mouth 2 times daily For 1 month then daily for 2 months    Squamous blepharitis of upper and lower eyelids of both eyes       eflornithine HCl 13.9 % Crea    VANIQA    30 g    Apply every 12 hours.    Female hirsutism, Excess body and facial hair       fluorometholone 0.1 % ophthalmic susp    FML LIQUIFILM    1 Bottle    Place 1 drop into both eyes 2 times daily    Squamous blepharitis of upper and lower eyelids of both eyes       fluticasone 50 MCG/ACT spray    FLONASE    1 Package    Spray 1-2 sprays into both nostrils daily    Rhinitis, chronic       fluticasone-salmeterol 250-50 MCG/DOSE diskus inhaler    ADVAIR    3 Inhaler    Inhale 1 puff into the lungs 2 times daily    Moderate persistent asthma without complication       hydrochlorothiazide 25 MG tablet    HYDRODIURIL    90 tablet    Take 1 tablet (25 mg) by mouth daily    Benign essential hypertension       ibuprofen 600 MG tablet    ADVIL/MOTRIN    30 tablet    Take 1 tablet (600 mg) by mouth every 6 hours as needed for moderate pain    Acute recurrent maxillary sinusitis       losartan 25 MG tablet    COZAAR    90 tablet    Take 1 tablet (25 mg) by mouth daily    Benign essential hypertension       LUTEIN 20 PO           omega-3 acid ethyl esters 1 g capsule    Lovaza    120 capsule    Take 2 capsules (2 g) by mouth daily        ranitidine 150 MG tablet   Generic drug:  ranitidine      Take 150 mg by mouth as needed        SOOTHE OP      Apply to eye as needed        vitamin D 1000 units capsule      Take 1 capsule by  mouth daily.        * Notice:  This list has 2 medication(s) that are the same as other medications prescribed for you. Read the directions carefully, and ask your doctor or other care provider to review them with you.

## 2018-11-26 ENCOUNTER — OFFICE VISIT (OUTPATIENT)
Dept: INTERNAL MEDICINE | Facility: CLINIC | Age: 68
End: 2018-11-26
Attending: INTERNAL MEDICINE
Payer: MEDICARE

## 2018-11-26 VITALS
SYSTOLIC BLOOD PRESSURE: 130 MMHG | HEIGHT: 64 IN | HEART RATE: 82 BPM | DIASTOLIC BLOOD PRESSURE: 78 MMHG | WEIGHT: 154 LBS | BODY MASS INDEX: 26.29 KG/M2

## 2018-11-26 DIAGNOSIS — Z78.0 ASYMPTOMATIC MENOPAUSAL STATE: Primary | ICD-10-CM

## 2018-11-26 DIAGNOSIS — Z00.00 PREVENTATIVE HEALTH CARE: ICD-10-CM

## 2018-11-26 DIAGNOSIS — L70.0 ACNE VULGARIS: ICD-10-CM

## 2018-11-26 DIAGNOSIS — R14.0 BLOATING: ICD-10-CM

## 2018-11-26 DIAGNOSIS — G89.29 CHRONIC RIGHT SHOULDER PAIN: ICD-10-CM

## 2018-11-26 DIAGNOSIS — M25.511 CHRONIC RIGHT SHOULDER PAIN: ICD-10-CM

## 2018-11-26 DIAGNOSIS — I10 BENIGN ESSENTIAL HYPERTENSION: ICD-10-CM

## 2018-11-26 DIAGNOSIS — J45.40 MODERATE PERSISTENT ASTHMA WITHOUT COMPLICATION: ICD-10-CM

## 2018-11-26 PROCEDURE — 80061 LIPID PANEL: CPT | Performed by: INTERNAL MEDICINE

## 2018-11-26 PROCEDURE — 86803 HEPATITIS C AB TEST: CPT | Performed by: INTERNAL MEDICINE

## 2018-11-26 PROCEDURE — G0463 HOSPITAL OUTPT CLINIC VISIT: HCPCS | Mod: ZF

## 2018-11-26 RX ORDER — ALBUTEROL SULFATE 90 UG/1
AEROSOL, METERED RESPIRATORY (INHALATION)
Qty: 3 INHALER | Refills: 1 | Status: SHIPPED | OUTPATIENT
Start: 2018-11-26 | End: 2019-12-16

## 2018-11-26 RX ORDER — LOSARTAN POTASSIUM 25 MG/1
25 TABLET ORAL DAILY
Qty: 90 TABLET | Refills: 3 | Status: SHIPPED | OUTPATIENT
Start: 2018-11-26 | End: 2019-12-16

## 2018-11-26 RX ORDER — ALBUTEROL SULFATE 0.83 MG/ML
2.5 SOLUTION RESPIRATORY (INHALATION) EVERY 6 HOURS PRN
Qty: 100 VIAL | Refills: 3 | Status: SHIPPED | OUTPATIENT
Start: 2018-11-26 | End: 2020-03-03

## 2018-11-26 RX ORDER — ADAPALENE 45 G/G
GEL TOPICAL
Qty: 45 G | Refills: 3 | Status: SHIPPED | OUTPATIENT
Start: 2018-11-26 | End: 2022-06-14

## 2018-11-26 RX ORDER — HYDROCHLOROTHIAZIDE 25 MG/1
25 TABLET ORAL DAILY
Qty: 90 TABLET | Refills: 3 | Status: SHIPPED | OUTPATIENT
Start: 2018-11-26 | End: 2019-12-16

## 2018-11-26 ASSESSMENT — ASTHMA QUESTIONNAIRES
QUESTION_3 LAST FOUR WEEKS HOW OFTEN DID YOUR ASTHMA SYMPTOMS (WHEEZING, COUGHING, SHORTNESS OF BREATH, CHEST TIGHTNESS OR PAIN) WAKE YOU UP AT NIGHT OR EARLIER THAN USUAL IN THE MORNING: NOT AT ALL
QUESTION_1 LAST FOUR WEEKS HOW MUCH OF THE TIME DID YOUR ASTHMA KEEP YOU FROM GETTING AS MUCH DONE AT WORK, SCHOOL OR AT HOME: A LITTLE OF THE TIME
QUESTION_5 LAST FOUR WEEKS HOW WOULD YOU RATE YOUR ASTHMA CONTROL: WELL CONTROLLED
QUESTION_4 LAST FOUR WEEKS HOW OFTEN HAVE YOU USED YOUR RESCUE INHALER OR NEBULIZER MEDICATION (SUCH AS ALBUTEROL): ONCE A WEEK OR LESS
QUESTION_2 LAST FOUR WEEKS HOW OFTEN HAVE YOU HAD SHORTNESS OF BREATH: ONCE OR TWICE A WEEK
ACT_TOTALSCORE: 21

## 2018-11-26 ASSESSMENT — PAIN SCALES - GENERAL: PAINLEVEL: NO PAIN (0)

## 2018-11-26 ASSESSMENT — PATIENT HEALTH QUESTIONNAIRE - PHQ9: SUM OF ALL RESPONSES TO PHQ QUESTIONS 1-9: 1

## 2018-11-26 NOTE — PATIENT INSTRUCTIONS

## 2018-11-26 NOTE — MR AVS SNAPSHOT
After Visit Summary   11/26/2018    Yolande Hussein    MRN: [id number]           Patient Information     Date Of Birth          1950        Visit Information        Provider Department      11/26/2018 10:40 AM Isabel Hicks MD [facility]         Today's Diagnoses     Asymptomatic menopausal state    -  1    Benign essential hypertension        Acne vulgaris        Moderate persistent asthma without complication        Preventative health care        Chronic right shoulder pain        Bloating          Care Instructions      Preventive Health Recommendations    See your health care provider every year to    Review health changes.     Discuss preventive care.      Review your medicines if your doctor has prescribed any.      You no longer need a yearly Pap test unless you've had an abnormal Pap test in the past 10 years. If you have vaginal symptoms, such as bleeding or discharge, be sure to talk with your provider about a Pap test.      Every 1 to 2 years, have a mammogram.  If you are over 69, talk with your health care provider about whether or not you want to continue having screening mammograms.      Every 10 years, have a colonoscopy. Or, have a yearly FIT test (stool test). These exams will check for colon cancer.       Have a cholesterol test every 5 years, or more often if your doctor advises it.       Have a diabetes test (fasting glucose) every three years. If you are at risk for diabetes, you should have this test more often.       At age 65, have a bone density scan (DEXA) to check for osteoporosis (brittle bone disease).    Shots:    Get a flu shot each year.    Get a tetanus shot every 10 years.    Talk to your doctor about your pneumonia vaccines. There are now two you should receive - Pneumovax (PPSV 23) and Prevnar (PCV 13).    Talk to your pharmacist about the shingles vaccine.    Talk to your doctor about the hepatitis B vaccine.    Nutrition:      Eat at least 5 servings of fruits and vegetables each day.      Eat whole-grain bread, whole-wheat pasta and brown rice instead of white grains and rice.      Get adequate Calcium and Vitamin D.     Lifestyle    Exercise at least 150 minutes a week (30 minutes a day, 5 days a week). This will help you control your weight and prevent disease.      Limit alcohol to one drink per day.      No smoking.       Wear sunscreen to prevent skin cancer.       See your dentist twice a year for an exam and cleaning.      See your eye doctor every 1 to 2 years to screen for conditions such as glaucoma, macular degeneration and cataracts.    Personalized Prevention Plan  You are due for the preventive services outlined below.  Your care team is available to assist you in scheduling these services.  If you have already completed any of these items, please share that information with your care team to update in your medical record.  Health Maintenance Due   Topic Date Due     Hepatitis C Screening  07/04/1966     Discuss Advance Directive Planning  07/04/2003     Asthma Control Test - every 6 months  05/20/2018     Asthma Action Plan - yearly  11/20/2018             Follow-ups after your visit        Additional Services     MED THERAPY MANAGE REFERRAL       Your provider has referred you to: [facility]: [facility] [phone]   [email or web address]    Reason for Referral: asthma    The [place] [facility] department will contact you to schedule an appointment.  You may also schedule the appointment by calling [phone].  For [place] patients, please call [phone] to confirm/schedule your appointment on the next business day.    This service is designed to help you get the most from your medications.  A specially trained Pharmacist will work closely with you and your providers to solve any questions, concerns,  issues or problems related to your medications.    Please bring all of your prescription and non-prescription medications (such as vitamins, over-the-counter medications, and herbals) or a detailed medication list to your appointment.    If you have a glucose meter or other home monitoring information, please also bring this to your appointment (i.e. blood glucose log, blood pressure log, pain log, etc.).            SPORTS MEDICINE REFERRAL       Your provider has referred you to:  [facility]: [facility] [phone]   [email or web address]    Please be aware that coverage of these services is subject to the terms and limitations of your health insurance plan.  Call member services at your health plan with any benefit or coverage questions.      Please bring the following to your appointment:    >>   Any x-rays, CTs or MRIs which have been performed.  Contact the facility where they were done to arrange for  prior to your scheduled appointment.    >>   List of current medications   >>   This referral request   >>   Any documents/labs given to you for this referral                  Future tests that were ordered for you today     Open Future Orders        Priority Expected Expires Ordered    Dexa hip/pelvis/spine Routine  11/26/2019 11/26/2018    General PFT Lab (Please always keep checked) Routine  11/26/2019 11/26/2018    Pulmonary Function Test Routine  11/26/2019 11/26/2018    US Pelvic Complete with Transvaginal Routine  11/26/2019 11/26/2018            Who to contact     Please call your clinic at [phone] to:    Ask questions about your health    Make or cancel appointments    Discuss your medicines    Learn about your test results    Speak to your doctor            Additional Information About Your Visit        MyChart Information     [employer] gives you secure access to your electronic health record. If you see a primary care provider, you  "can also send messages to your care team and make appointments. If you have questions, please call your primary care clinic.  If you do not have a primary care provider, please call [phone] and they will assist you.      [employer] is an electronic gateway that provides easy, online access to your medical records. With [employer], you can request a clinic appointment, read your test results, renew a prescription or communicate with your care team.     To access your existing account, please contact your [facility] or call [phone] for assistance.        Care EveryWhere ID     This is your Care EveryWhere ID. This could be used by other organizations to access your [place] medical records  [id number]        Your Vitals Were     Pulse Height Breastfeeding? BMI (Body Mass Index)          82 1.626 m (5' 4\") No 26.43 kg/m2         Blood Pressure from Last 3 Encounters:   11/26/18 130/78   06/18/18 124/73   11/20/17 127/76    Weight from Last 3 Encounters:   11/26/18 69.9 kg (154 lb)   06/18/18 69.9 kg (154 lb)   11/20/17 69.9 kg (154 lb)              We Performed the Following     Basic Metabolic Panel     Hepatitis C Screen Reflex to HCV RNA Quant and Genotype     Lipid Profile     MED THERAPY MANAGE REFERRAL     SPORTS MEDICINE REFERRAL          Where to get your medicines      These medications were sent to [facility] [address] [place], [place] - [address]  [address] [facility] [address]     Phone:  [phone]     adapalene 0.1 % gel    albuterol (2.5 MG/3ML) 0.083% neb solution    albuterol 108 (90 Base) MCG/ACT inhaler    fluticasone-salmeterol 250-50 MCG/DOSE inhaler    hydrochlorothiazide 25 MG tablet    losartan 25 MG tablet          Primary Care Provider Office Phone # Fax #    Isabel Hicks [phone]       [facility] [address] [facility] [address]        Equal Access to Services     BLAYNE JONAS AH: " Hadii aad ku haddamasoo Sotiffaniali, waaxda luqadaha, qaybta kaalmada nerissa, carlitos orsario. So [facility] [phone].    ATENCIÓN: Si taiwo dubon, tiene a jacobson disposición servicios gratuitos de asistencia lingüística. Marcusame al [phone].    We comply with applicable federal civil rights laws and [place] laws. We do not discriminate on the basis of race, color, national origin, age, disability, sex, sexual orientation, or gender identity.            Thank you!     Thank you for choosing [facility]   for your care. Our goal is always to provide you with excellent care. Hearing back from our patients is one way we can continue to improve our services. Please take a few minutes to complete the written survey that you may receive in the mail after your visit with us. Thank you!             Your Updated Medication List - Protect others around you: Learn how to safely use, store and throw away your medicines at www.disposemymeds.org.          This list is accurate as of 11/26/18 11:28 AM.  Always use your most recent med list.                   Brand Name Dispense Instructions for use Diagnosis    adapalene 0.1 % gel    DIFFERIN    45 g    APPLY TOPICALLY AT BEDTIME AS DIRECTED.    Acne vulgaris       * albuterol (2.5 MG/3ML) 0.083% neb solution    PROVENTIL    100 vial    Take 1 vial (2.5 mg) by nebulization every 6 hours as needed for shortness of breath / dyspnea    Moderate persistent asthma without complication       * albuterol 108 (90 Base) MCG/ACT inhaler    PROAIR HFA/PROVENTIL HFA/VENTOLIN HFA    3 Inhaler    Inhale 2 puffs into the lungs every 6 hrs prn    Moderate persistent asthma without complication       calcium citrate-vitamin D 315-250 MG-UNIT Tabs per tablet    CITRACAL     Take 1 tablet by mouth daily        desonide 0.05 % cream    DESOWEN    30 g    Apply to breast BID    Eczema       doxycycline 100 MG capsule    VIBRAMYCIN    60 capsule    Take 1  capsule (100 mg) by mouth 2 times daily For 1 month then daily for 2 months    Squamous blepharitis of upper and lower eyelids of both eyes       eflornithine HCl 13.9 % Crea    VANIQA    30 g    Apply every 12 hours.    Female hirsutism, Excess body and facial hair       fluorometholone 0.1 % ophthalmic susp    FML LIQUIFILM    1 Bottle    Place 1 drop into both eyes 2 times daily    Squamous blepharitis of upper and lower eyelids of both eyes       fluticasone 50 MCG/ACT spray    FLONASE    1 Package    Spray 1-2 sprays into both nostrils daily    Rhinitis, chronic       fluticasone-salmeterol 250-50 MCG/DOSE inhaler    ADVAIR    3 Inhaler    Inhale 1 puff into the lungs 2 times daily    Moderate persistent asthma without complication       hydrochlorothiazide 25 MG tablet    HYDRODIURIL    90 tablet    Take 1 tablet (25 mg) by mouth daily    Benign essential hypertension       ibuprofen 600 MG tablet    ADVIL/MOTRIN    30 tablet    Take 1 tablet (600 mg) by mouth every 6 hours as needed for moderate pain    Acute recurrent maxillary sinusitis       losartan 25 MG tablet    COZAAR    90 tablet    Take 1 tablet (25 mg) by mouth daily    Benign essential hypertension       LUTEIN 20 PO           omega-3 acid ethyl esters 1 g capsule    Lovaza    120 capsule    Take 2 capsules (2 g) by mouth daily        ranitidine 150 MG tablet   Generic drug:  ranitidine      Take 150 mg by mouth as needed        SOOTHE OP      Apply to eye as needed        vitamin D 1000 units capsule      Take 1 capsule by mouth daily.        * Notice:  This list has 2 medication(s) that are the same as other medications prescribed for you. Read the directions carefully, and ask your doctor or other care provider to review them with you.

## 2018-11-26 NOTE — PROGRESS NOTES
"  SUBJECTIVE:   Yolande Hussein is a 68 year old female who presents for Preventive Visit.      Are you in the first 12 months of your Medicare Part B coverage?  No    Physical Health:    In general, how would you rate your overall physical health? good    Outside of work, how many days during the week do you exercise? 2-3 days/week    Outside of work, approximately how many minutes a day do you exercise?15-30 minutes    If you drink alcohol do you typically have >3 drinks per day or >7 drinks per week? No    Do you usually eat at least 4 servings of fruit and vegetables a day, include whole grains & fiber and avoid regularly eating high fat or \"junk\" foods? Yes    Do you have any problems taking medications regularly?  No    Do you have any side effects from medications? none    Needs assistance for the following daily activities: transportation    Which of the following safety concerns are present in your home?:  none identified     Hearing impairment: No    In the past 6 months, have you been bothered by leaking of urine? no    Mental Health:    In general, how would you rate your overall mental or emotional health? good  PHQ-2 Score:      Additional concerns to address?  YES    Fall risk:               COGNITIVE SCREEN  1) Repeat 3 items (Leader, Season, Table)    2) Clock draw: NORMAL  3) 3 item recall: Recalls 2 objects   Results: NORMAL clock, 1-2 items recalled: COGNITIVE IMPAIRMENT LESS LIKELY    Mini-CogTM Copyright YOLIE Foreman. Licensed by the author for use in [facility]; reprinted with permission ([email or web address]). All rights reserved.            -------------------------------------    Reviewed and updated as needed this visit by clinical staff  Tobacco  Allergies  Meds         Reviewed and updated as needed this visit by Provider        Social History   Substance Use Topics     Smoking status: Never Smoker     Smokeless tobacco: Never Used     Alcohol use Yes      Comment: rare        " "                      Do you feel safe in your environment - Yes    Do you have a Health Care Directive?: Yes: Advance Directive has been received and scanned.    Current providers sharing in care for this patient include:   Patient Care Team:  Isabel Hicks MD as PCP - General (Internal Medicine)  Bing Horn MD as MD (Ophthalmology)    The following health maintenance items are reviewed in Epic and correct as of today:  Health Maintenance   Topic Date Due     HEPATITIS C SCREENING  07/04/1966     ADVANCE DIRECTIVE PLANNING Q5 YRS  07/04/2003     ASTHMA CONTROL TEST Q6 MOS  05/20/2018     ASTHMA ACTION PLAN Q1 YR  11/20/2018     PHQ-2 Q1 YR  06/18/2019     MAMMO Q1 YR  07/12/2019     FALL RISK ASSESSMENT  08/13/2019     TETANUS IMMUNIZATION (SYSTEM ASSIGNED)  11/12/2022     LIPID SCREEN Q5 YR FEMALE (SYSTEM ASSIGNED)  11/24/2022     COLONOSCOPY Q10 YR  12/19/2022     DEXA SCAN SCREENING (SYSTEM ASSIGNED)  Completed     PNEUMOCOCCAL  Completed     INFLUENZA VACCINE  Completed     Labs reviewed in [facility]    Mammogram Screening: Patient over age 50, mutual decision to screen reflected in health maintenance.    ROS:  CONSTITUTIONAL: NEGATIVE for fever, chills, change in weight  INTEGUMENTARY/SKIN: NEGATIVE for worrisome rashes, moles or lesions  EYES: NEGATIVE for vision changes or irritation  ENT/MOUTH: NEGATIVE for ear, mouth and throat problems  RESP: NEGATIVE for significant cough or SOB  BREAST: NEGATIVE for masses, tenderness or discharge  CV: NEGATIVE for chest pain, palpitations or peripheral edema  GI: gas or bloating  : NEGATIVE for frequency, dysuria, or hematuria  MUSCULOSKELETAL:right shoulder pain  NEURO: NEGATIVE for weakness, dizziness or paresthesias  ENDOCRINE: NEGATIVE for temperature intolerance, skin/hair changes  HEME: NEGATIVE for bleeding problems  PSYCHIATRIC: NEGATIVE for changes in mood or affect    OBJECTIVE:   /78  Pulse 82  Ht 1.626 m (5' 4\")  Wt 69.9 kg (154 lb)  " "Breastfeeding? No  BMI 26.43 kg/m2 Estimated body mass index is 26.43 kg/(m^2) as calculated from the following:    Height as of this encounter: 1.626 m (5' 4\").    Weight as of this encounter: 69.9 kg (154 lb).  EXAM:   GENERAL APPEARANCE: healthy, alert and no distress  EYES: Eyes grossly normal to inspection, PERRL and conjunctivae and sclerae normal  HENT: ear canals and TM's normal, nose and mouth without ulcers or lesions, oropharynx clear and oral mucous membranes moist  NECK: no adenopathy, no asymmetry, masses, or scars and thyroid normal to palpation  RESP: lungs clear to auscultation - no rales, rhonchi or wheezes  CV: regular rate and rhythm, normal S1 S2, no S3 or S4, no murmur, click or rub, no peripheral edema and peripheral pulses strong  ABDOMEN: soft, tender to palpation left lower quadrant, no hepatosplenomegaly, no masses and bowel sounds normal  MS: no musculoskeletal defects are noted and gait is age appropriate without ataxia  SKIN: no suspicious lesions or rashes  NEURO: Normal strength and tone, sensory exam grossly normal, mentation intact and speech normal  PSYCH: mentation appears normal and affect normal/bright    Diagnostic Test Results:  none     ASSESSMENT / PLAN:   1. Benign essential hypertension  Blood pressure is within acceptable range. Recommend to continue with current medical therapy without changes. Patient is in agreement with the plan.   - hydrochlorothiazide (HYDRODIURIL) 25 MG tablet; Take 1 tablet (25 mg) by mouth daily  Dispense: 90 tablet; Refill: 3  - losartan (COZAAR) 25 MG tablet; Take 1 tablet (25 mg) by mouth daily  Dispense: 90 tablet; Refill: 3  - Basic Metabolic Panel; Future  - Lipid Profile; Future    2. Acne vulgaris  Refill for adapalene was given to the patient.   - adapalene (DIFFERIN) 0.1 % gel; APPLY TOPICALLY AT BEDTIME AS DIRECTED.  Dispense: 45 g; Refill: 3    3. Moderate persistent asthma without complication  Patient reports good control of asthma " "symptoms. Will continue with current inhalers.   - albuterol (PROVENTIL) (2.5 MG/3ML) 0.083% neb solution; Take 1 vial (2.5 mg) by nebulization every 6 hours as needed for shortness of breath / dyspnea  Dispense: 100 vial; Refill: 3  - albuterol (PROAIR HFA/PROVENTIL HFA/VENTOLIN HFA) 108 (90 Base) MCG/ACT inhaler; Inhale 2 puffs into the lungs every 6 hrs prn  Dispense: 3 Inhaler; Refill: 1  - fluticasone-salmeterol (ADVAIR) 250-50 MCG/DOSE inhaler; Inhale 1 puff into the lungs 2 times daily  Dispense: 3 Inhaler; Refill: 3  - General PFT Lab (Please always keep checked); Future  - Pulmonary Function Test; Future    4. Asymptomatic menopausal state  Patient is due for DEXA scan.   - Dexa hip/pelvis/spine; Future    5. Preventative health care  Patient is up to date on colon and breast cancer screening. Discussed hepatitis C screening ans Shingrix vaccine.   - Hepatitis C Screen Reflex to HCV RNA Quant and Genotype; Future    6. Chronic right shoulder pain  Patient was referred to Sports Medicine for evaluation of possible right frozen shoulder.   - SPORTS MEDICINE REFERRAL    7. Bloating  Discussed possible causes of bloating with patient. Recommend pelvic ultrasound. Patient will be advised on test result accordingly.   - US Pelvic Complete with Transvaginal; Future    End of Life Planning:  Patient currently has an advanced directive: Yes.  Practitioner is supportive of decision.    COUNSELING:  Reviewed preventive health counseling, as reflected in patient instructions       Regular exercise       Healthy diet/nutrition       Vision screening    BP Readings from Last 1 Encounters:   11/26/18 130/78     Estimated body mass index is 26.43 kg/(m^2) as calculated from the following:    Height as of this encounter: 1.626 m (5' 4\").    Weight as of this encounter: 69.9 kg (154 lb).           reports that she has never smoked. She has never used smokeless tobacco.      Appropriate preventive services were discussed with " this patient, including applicable screening as appropriate for cardiovascular disease, diabetes, osteopenia/osteoporosis, and glaucoma.  As appropriate for age/gender, discussed screening for colorectal cancer, prostate cancer, breast cancer, and cervical cancer. Checklist reviewing preventive services available has been given to the patient.    Reviewed patients plan of care and provided an AVS. The Intermediate Care Plan ( asthma action plan, low back pain action plan, and migraine action plan) for Yolande meets the Care Plan requirement. This Care Plan has been established and reviewed with the Patient.    Counseling Resources:  ATP IV Guidelines  Pooled Cohorts Equation Calculator  Breast Cancer Risk Calculator  FRAX Risk Assessment  ICSI Preventive Guidelines  Dietary Guidelines for Americans, 2010  [employer]'s MyPlate  ASA Prophylaxis  Lung CA Screening    Isabel Hicks MD  [facility]

## 2018-11-26 NOTE — LETTER
"11/26/2018       RE: Yolande Hussein  [address]  [facility] [address]     Dear Colleague,    Thank you for referring your patient, Yolande Hussein, to the [facility]  at [facility]. Please see a copy of my visit note below.      SUBJECTIVE:   Yolande Hussein is a 68 year old female who presents for Preventive Visit.      Are you in the first 12 months of your Medicare Part B coverage?  No    Physical Health:    In general, how would you rate your overall physical health? good    Outside of work, how many days during the week do you exercise? 2-3 days/week    Outside of work, approximately how many minutes a day do you exercise?15-30 minutes    If you drink alcohol do you typically have >3 drinks per day or >7 drinks per week? No    Do you usually eat at least 4 servings of fruit and vegetables a day, include whole grains & fiber and avoid regularly eating high fat or \"junk\" foods? Yes    Do you have any problems taking medications regularly?  No    Do you have any side effects from medications? none    Needs assistance for the following daily activities: transportation    Which of the following safety concerns are present in your home?:  none identified     Hearing impairment: No    In the past 6 months, have you been bothered by leaking of urine? no    Mental Health:    In general, how would you rate your overall mental or emotional health? good  PHQ-2 Score:      Additional concerns to address?  YES    Fall risk:               COGNITIVE SCREEN  1) Repeat 3 items (Leader, Season, Table)    2) Clock draw: NORMAL  3) 3 item recall: Recalls 2 objects   Results: NORMAL clock, 1-2 items recalled: COGNITIVE IMPAIRMENT LESS LIKELY    Mini-CogTM Copyright S Kellen. Licensed by the author for use in [facility]; reprinted with permission (hanh@[facility]). All rights reserved.  " "          -------------------------------------    Reviewed and updated as needed this visit by clinical staff  Tobacco  Allergies  Meds         Reviewed and updated as needed this visit by Provider        Social History   Substance Use Topics     Smoking status: Never Smoker     Smokeless tobacco: Never Used     Alcohol use Yes      Comment: rare                              Do you feel safe in your environment - Yes    Do you have a Health Care Directive?: Yes: Advance Directive has been received and scanned.    Current providers sharing in care for this patient include:   Patient Care Team:  Isabel Hicks MD as PCP - General (Internal Medicine)  Bing Horn MD as MD (Ophthalmology)    The following health maintenance items are reviewed in Epic and correct as of today:  Health Maintenance   Topic Date Due     HEPATITIS C SCREENING  07/04/1966     ADVANCE DIRECTIVE PLANNING Q5 YRS  07/04/2003     ASTHMA CONTROL TEST Q6 MOS  05/20/2018     ASTHMA ACTION PLAN Q1 YR  11/20/2018     PHQ-2 Q1 YR  06/18/2019     MAMMO Q1 YR  07/12/2019     FALL RISK ASSESSMENT  08/13/2019     TETANUS IMMUNIZATION (SYSTEM ASSIGNED)  11/12/2022     LIPID SCREEN Q5 YR FEMALE (SYSTEM ASSIGNED)  11/24/2022     COLONOSCOPY Q10 YR  12/19/2022     DEXA SCAN SCREENING (SYSTEM ASSIGNED)  Completed     PNEUMOCOCCAL  Completed     INFLUENZA VACCINE  Completed     Labs reviewed in [facility]    Mammogram Screening: Patient over age 50, mutual decision to screen reflected in health maintenance.    OBJECTIVE:   /78  Pulse 82  Ht 1.626 m (5' 4\")  Wt 69.9 kg (154 lb)  Breastfeeding? No  BMI 26.43 kg/m2 Estimated body mass index is 26.43 kg/(m^2) as calculated from the following:    Height as of this encounter: 1.626 m (5' 4\").    Weight as of this encounter: 69.9 kg (154 lb).  EXAM:   GENERAL APPEARANCE: healthy, alert and no distress  EYES: Eyes grossly normal to inspection, PERRL and conjunctivae and sclerae normal  HENT: ear " canals and TM's normal, nose and mouth without ulcers or lesions, oropharynx clear and oral mucous membranes moist  NECK: no adenopathy, no asymmetry, masses, or scars and thyroid normal to palpation  RESP: lungs clear to auscultation - no rales, rhonchi or wheezes  CV: regular rate and rhythm, normal S1 S2, no S3 or S4, no murmur, click or rub, no peripheral edema and peripheral pulses strong  ABDOMEN: soft, tender to palpation left lower quadrant, no hepatosplenomegaly, no masses and bowel sounds normal  MS: no musculoskeletal defects are noted and gait is age appropriate without ataxia  SKIN: no suspicious lesions or rashes  NEURO: Normal strength and tone, sensory exam grossly normal, mentation intact and speech normal  PSYCH: mentation appears normal and affect normal/bright    Diagnostic Test Results:  none     ASSESSMENT / PLAN:   1. Benign essential hypertension  Blood pressure is within acceptable range. Recommend to continue with current medical therapy without changes. Patient is in agreement with the plan.   - hydrochlorothiazide (HYDRODIURIL) 25 MG tablet; Take 1 tablet (25 mg) by mouth daily  Dispense: 90 tablet; Refill: 3  - losartan (COZAAR) 25 MG tablet; Take 1 tablet (25 mg) by mouth daily  Dispense: 90 tablet; Refill: 3  - Basic Metabolic Panel; Future  - Lipid Profile; Future    2. Acne vulgaris  Refill for adapalene was given to the patient.   - adapalene (DIFFERIN) 0.1 % gel; APPLY TOPICALLY AT BEDTIME AS DIRECTED.  Dispense: 45 g; Refill: 3    3. Moderate persistent asthma without complication  Patient reports good control of asthma symptoms. Will continue with current inhalers.   - albuterol (PROVENTIL) (2.5 MG/3ML) 0.083% neb solution; Take 1 vial (2.5 mg) by nebulization every 6 hours as needed for shortness of breath / dyspnea  Dispense: 100 vial; Refill: 3  - albuterol (PROAIR HFA/PROVENTIL HFA/VENTOLIN HFA) 108 (90 Base) MCG/ACT inhaler; Inhale 2 puffs into the lungs every 6 hrs prn   "Dispense: 3 Inhaler; Refill: 1  - fluticasone-salmeterol (ADVAIR) 250-50 MCG/DOSE inhaler; Inhale 1 puff into the lungs 2 times daily  Dispense: 3 Inhaler; Refill: 3  - General PFT Lab (Please always keep checked); Future  - Pulmonary Function Test; Future    4. Asymptomatic menopausal state  Patient is due for DEXA scan.   - Dexa hip/pelvis/spine; Future    5. Preventative health care  Patient is up to date on colon and breast cancer screening. Discussed hepatitis C screening ans Shingrix vaccine.   - Hepatitis C Screen Reflex to HCV RNA Quant and Genotype; Future    6. Chronic right shoulder pain  Patient was referred to Sports Medicine for evaluation of possible right frozen shoulder.   - SPORTS MEDICINE REFERRAL    7. Bloating  Discussed possible causes of bloating with patient. Recommend pelvic ultrasound. Patient will be advised on test result accordingly.   - US Pelvic Complete with Transvaginal; Future    End of Life Planning:  Patient currently has an advanced directive: Yes.  Practitioner is supportive of decision.    COUNSELING:  Reviewed preventive health counseling, as reflected in patient instructions       Regular exercise       Healthy diet/nutrition       Vision screening    BP Readings from Last 1 Encounters:   11/26/18 130/78     Estimated body mass index is 26.43 kg/(m^2) as calculated from the following:    Height as of this encounter: 1.626 m (5' 4\").    Weight as of this encounter: 69.9 kg (154 lb).           reports that she has never smoked. She has never used smokeless tobacco.      Appropriate preventive services were discussed with this patient, including applicable screening as appropriate for cardiovascular disease, diabetes, osteopenia/osteoporosis, and glaucoma.  As appropriate for age/gender, discussed screening for colorectal cancer, prostate cancer, breast cancer, and cervical cancer. Checklist reviewing preventive services available has been given to the patient.    Reviewed " patients plan of care and provided an AVS. The Intermediate Care Plan ( asthma action plan, low back pain action plan, and migraine action plan) for Yolande meets the Care Plan requirement. This Care Plan has been established and reviewed with the Patient.    Counseling Resources:  ATP IV Guidelines  Pooled Cohorts Equation Calculator  Breast Cancer Risk Calculator  FRAX Risk Assessment  ICSI Preventive Guidelines  Dietary Guidelines for Americans, 2010  [employer] [place]'s MyPlate  ASA Prophylaxis  Lung CA Screening    Isabel Hicks MD  [facility]

## 2018-11-27 ENCOUNTER — ALLIED HEALTH/NURSE VISIT (OUTPATIENT)
Dept: PHARMACY | Facility: CLINIC | Age: 68
End: 2018-11-27
Payer: COMMERCIAL

## 2018-11-27 DIAGNOSIS — E56.9 VITAMIN DEFICIENCY, UNSPECIFIED: ICD-10-CM

## 2018-11-27 DIAGNOSIS — J45.40 MODERATE PERSISTENT ASTHMA, UNSPECIFIED WHETHER COMPLICATED: Primary | ICD-10-CM

## 2018-11-27 DIAGNOSIS — I10 HYPERTENSION GOAL BP (BLOOD PRESSURE) < 140/90: ICD-10-CM

## 2018-11-27 DIAGNOSIS — R12 HEARTBURN: ICD-10-CM

## 2018-11-27 DIAGNOSIS — L67.8 ABNORMAL FACIAL HAIR: ICD-10-CM

## 2018-11-27 DIAGNOSIS — R21 RASH: ICD-10-CM

## 2018-11-27 DIAGNOSIS — R52 PAIN: ICD-10-CM

## 2018-11-27 PROCEDURE — 99605 MTMS BY PHARM NP 15 MIN: CPT | Performed by: PHARMACIST

## 2018-11-27 PROCEDURE — 99607 MTMS BY PHARM ADDL 15 MIN: CPT | Performed by: PHARMACIST

## 2018-11-27 RX ORDER — FLUTICASONE PROPIONATE AND SALMETEROL 113; 14 UG/1; UG/1
1 POWDER, METERED RESPIRATORY (INHALATION) 2 TIMES DAILY
Qty: 3 INHALER | Refills: 3 | Status: SHIPPED | OUTPATIENT
Start: 2018-11-27 | End: 2019-02-11

## 2018-11-27 RX ORDER — OMEGA-3 FATTY ACIDS/FISH OIL 300-1000MG
2 CAPSULE ORAL DAILY
COMMUNITY
End: 2021-03-15

## 2018-11-27 RX ORDER — IBUPROFEN 200 MG
400 TABLET ORAL EVERY 8 HOURS PRN
Status: ON HOLD | COMMUNITY
End: 2018-12-28

## 2018-11-27 RX ORDER — ACETAMINOPHEN 500 MG
500-1000 TABLET ORAL EVERY 6 HOURS PRN
COMMUNITY

## 2018-11-27 ASSESSMENT — ASTHMA QUESTIONNAIRES: ACT_TOTALSCORE: 21

## 2018-11-27 NOTE — MR AVS SNAPSHOT
"              After Visit Summary   11/27/2018    Yolande Hussein    MRN: [id number]           Patient Information     Date Of Birth          1950        Visit Information        Provider Department      11/27/2018 9:00 AM Whitney Bradshaw, [facility] MTM        Today's Diagnoses     Moderate persistent asthma, unspecified whether complicated    -  1    Hypertension goal BP (blood pressure) < 140/90        Pain        Heartburn        Rash        Vitamin deficiency, unspecified        Abnormal facial hair          Care Instructions    Recommendations from today's MTM visit:                                                    MTM (medication therapy management) is a service provided by a clinical pharmacist designed to help you get the most of out of your medicines.     1. We discussed that the cost of your Advair inhaler (fluticasone/salmeterol) contributed to you reaching the \"doughnut hole\" this year.  After looking at prices of similar medications, another form of fluticasone/salmeterol, called AirDuo, is significantly less expensive (~$110/month vs. $400/month with Advair).  The AirDuo should work just as well for you.  The dose will be 1 puff twice a day.  The inhaler device will look different -- be sure to ask your pharmacist as [employer] to show you how to use the new inhaler.  I sent the prescription to your pharmacy, and you can request to have it filled at your convenience.    2. As you know, Dr. Hicks ordered pulmonary function tests for you at [place].  The phone number is [phone]      Next MTM visit: as needed;  Yearly appointments likely fine.      To schedule another MTM appointment, please call the clinic directly or you may call the MTM scheduling line at [phone] or toll-free at [phone].     My Clinical Pharmacist's contact information:                                                      It was a pleasure talking with you today!  " Please feel free to contact me with any questions or concerns you have.      Whitney Bradshaw, Pharm.D., [facility]  Phone:  [phone]      You may receive a survey about the [facility] services you received.  I would appreciate your feedback to help me serve you better in the future. Please fill it out and return it when you can. Your comments will be anonymous.                      Follow-ups after your visit        Follow-up notes from your care team     Return in about 1 year (around 11/27/2019).      Your next 10 appointments already scheduled     Nov 30, 2018  1:00 PM CST   US PELVIC COMPLETE W TRANSVAGINAL with URUS1   [facility], [place], Ultrasound ([facility])    [facility], [id number] [facility] [address] [phone]           How do I prepare for my exam? (Food and drink instructions) Adults: Drink four 8-ounce glasses of fluid. Finish drinking an hour before your exam, so you have a full bladder. If you need to empty your bladder before your exam, try to release only a little urine. Then, drink another glass of fluid.  Children: * Children who are potty trained up to 6 years old should drink at least 2 cups (16 oz) of water/non-carbonated beverage 30 minutes prior to the exam. * Children who are 6-10 years should drink at least 3 cups (24 oz) of water/non-carbonated beverage 45 minutes prior to the exam. * Children who are 10 years or older should drink at least 4 cups (32 oz) of water/non-carbonated beverage 45 minutes prior to the exam.  If your child is very uncomfortable or has an urgent need to pee, please notify a technologist; they will try to find out how much longer the wait may be and provide instructions to help relieve the pressure.  What should I wear: Wear comfortable clothes.  How long does the exam take: Most ultrasounds take 30 to 60 minutes.  What should I bring: Bring a list of your medicines, including vitamins, minerals and over-the-counter  drugs. It is safest to leave personal items at home.  Do I need a :  No  is needed.  What do I need to tell my doctor: Tell your doctor about any allergies you may have.  What should I do after the exam: No restrictions, you may resume normal activities.  What is this test: An ultrasound uses sound waves to make pictures of the body. Sound waves do not cause pain. The only discomfort may be the pressure of the wand against your skin or full bladder.  Who should I call with questions: If you have any questions, please call the Imaging Department where you will have your exam. Directions, parking instructions, and other information are available on our website, [email or web address].            Nov 30, 2018  1:45 PM CST   DX HIP/PELVIS/SPINE with URXR4   Nakul PERKINS Dexa ([facility])    [address] [place] [address] [phone]           How do I prepare for my exam? (Food and drink instructions) No Food and Drink Restrictions.  How do I prepare for my exam? (Other instructions) Please do not take any of the following 24 hours prior to the day of your exam: vitamins, calcium tablets, antacids.  What should I wear: If possible, please wear clothes without metal (snaps, zippers). A sweat suit works well.  How long does the exam take: The exam takes about 20 minutes.  What should I bring: Bring a list of your current medicines to your exam (including vitamins, minerals and over-the-counter drugs).  Do I need a :  No  is needed.  What should I do after the exam: No restrictions, You may resume normal activities.  How do I prepare for my exam? (Food and drink instructions) A DEXA scan is a bone-density scan. It uses a low level of radiation to check the strength of your bones. As you lie on a padded table, a machine will take X-rays. We most often scan the hips and lower spine.  Who should I call with questions: If you have any  questions, please call the Imaging Department where you will have your exam. Directions, parking instructions, and other information is available on our website, [email or web address].            Dec 03, 2018 11:00 AM CST   (Arrive by 10:45 AM)   New Patient Visit with Favian Hoffmann MD   [facility] ([facility])    [address]  [address] [facility] [id number] [phone]              Who to contact     If you have questions or need follow up information about today's clinic visit or your schedule please contact [facility] directly at [phone].  Normal or non-critical lab and imaging results will be communicated to you by MyChart, letter or phone within 4 business days after the clinic has received the results. If you do not hear from us within 7 days, please contact the clinic through [employer] or phone. If you have a critical or abnormal lab result, we will notify you by phone as soon as possible.  Submit refill requests through [employer] or call your pharmacy and they will forward the refill request to us. Please allow 3 business days for your refill to be completed.          Additional Information About Your Visit        [employer] Information     [employer] gives you secure access to your electronic health record. If you see a primary care provider, you can also send messages to your care team and make appointments. If you have questions, please call your primary care clinic.  If you do not have a primary care provider, please call [phone] and they will assist you.        Care EveryWhere ID     This is your Care EveryWhere ID. This could be used by other organizations to access your [place] medical records  [id number]         Blood Pressure from Last 3 Encounters:   11/26/18 130/78   06/18/18 124/73   11/20/17 127/76    Weight from Last 3 Encounters:   11/26/18 154 lb (69.9 kg)   06/18/18 154 lb (69.9 kg)   11/20/17 154 lb  (69.9 kg)              Today, you had the following     No orders found for display         Today's Medication Changes          These changes are accurate as of 11/27/18 11:59 PM.  If you have any questions, ask your nurse or doctor.               Start taking these medicines.        Dose/Directions    fluticasone-salmeterol 113-14 MCG/ACT inhaler   Commonly known as:  AIRDUO RESPICLICK   Used for:  Moderate persistent asthma, unspecified whether complicated   Replaces:  fluticasone-salmeterol 250-50 MCG/DOSE inhaler   Started by:  Whitney Bradshaw RPH        Dose:  1 puff   Inhale 1 puff into the lungs 2 times daily   Quantity:  3 Inhaler   Refills:  3         These medicines have changed or have updated prescriptions.        Dose/Directions    desonide 0.05 % external cream   Commonly known as:  DESOWEN   This may have changed:  additional instructions   Used for:  Eczema        Apply to breast BID   Quantity:  30 g   Refills:  0       fluticasone 50 MCG/ACT nasal spray   Commonly known as:  FLONASE   This may have changed:    - when to take this  - reasons to take this   Used for:  Rhinitis, chronic        Dose:  1-2 spray   Spray 1-2 sprays into both nostrils daily   Quantity:  1 Package   Refills:  11         Stop taking these medicines if you haven't already. Please contact your care team if you have questions.     calcium citrate-vitamin D 315-250 MG-UNIT Tabs per tablet   Commonly known as:  CITRACAL   Stopped by:  Whitney Bradshaw RPH           eflornithine HCl 13.9 % Crea   Commonly known as:  VANIQA   Stopped by:  Whitney Bradshaw RPH           fluticasone-salmeterol 250-50 MCG/DOSE inhaler   Commonly known as:  ADVAIR   Replaced by:  fluticasone-salmeterol 113-14 MCG/ACT inhaler   Stopped by:  Whitney Bradshaw RPH           omega-3 acid ethyl esters 1 g capsule   Commonly known as:  Lovaza   Stopped by:  Whitney Bradshaw RPH                Where to get your medicines       These medications were sent to [id number] - [place], [place] - [address] [place]  [address] [place], [place] [address]     Phone:  [phone]     fluticasone-salmeterol 113-14 MCG/ACT inhaler                Primary Care Provider Office Phone # Fax #    Isabel Hortensia Hicks [phone]       [facility] [address] [facility] [address]        Equal Access to Services     [facility]: Hadii aad ku hadasho Soomaali, waaxda luqadaha, qaybta kaalmada adeegyada, waxashanti sheain hayjanie cuevas . So [facility] [phone].    ATENCIÓN: Si habla español, tiene a jacobson disposición servicios gratuitos de asistencia lingüística. [facility] [phone].    We comply with applicable federal civil rights laws and [place] laws. We do not discriminate on the basis of race, color, national origin, age, disability, sex, sexual orientation, or gender identity.            Thank you!     Thank you for choosing [facility]  for your care. Our goal is always to provide you with excellent care. Hearing back from our patients is one way we can continue to improve our services. Please take a few minutes to complete the written survey that you may receive in the mail after your visit with us. Thank you!             Your Updated Medication List - Protect others around you: Learn how to safely use, store and throw away your medicines at www.disposemymeds.org.          This list is accurate as of 11/27/18 11:59 PM.  Always use your most recent med list.                   Brand Name Dispense Instructions for use Diagnosis    ACETAMINOPHEN EXTRA STRENGTH 500 MG tablet   Generic drug:  acetaminophen      Take 500-1,000 mg by mouth every 6 hours as needed for mild pain        adapalene 0.1 % external gel    DIFFERIN    45 g    APPLY TOPICALLY AT BEDTIME AS DIRECTED.    Acne vulgaris       * albuterol (2.5 MG/3ML) 0.083% neb solution    PROVENTIL    100 vial     Take 1 vial (2.5 mg) by nebulization every 6 hours as needed for shortness of breath / dyspnea    Moderate persistent asthma without complication       * albuterol 108 (90 Base) MCG/ACT inhaler    PROAIR HFA/PROVENTIL HFA/VENTOLIN HFA    3 Inhaler    Inhale 2 puffs into the lungs every 6 hrs prn    Moderate persistent asthma without complication       calcium carbonate 600 mg-vitamin D 400 units 600-400 MG-UNIT per tablet    CALTRATE     Take 1 tablet by mouth daily        desonide 0.05 % external cream    DESOWEN    30 g    Apply to breast BID    Eczema       fluticasone 50 MCG/ACT nasal spray    FLONASE    1 Package    Spray 1-2 sprays into both nostrils daily    Rhinitis, chronic       fluticasone-salmeterol 113-14 MCG/ACT inhaler    AIRDUO RESPICLICK    3 Inhaler    Inhale 1 puff into the lungs 2 times daily    Moderate persistent asthma, unspecified whether complicated       hydrochlorothiazide 25 MG tablet    HYDRODIURIL    90 tablet    Take 1 tablet (25 mg) by mouth daily    Benign essential hypertension       ibuprofen 200 MG tablet    ADVIL/MOTRIN     Take 400 mg by mouth every 8 hours as needed for mild pain        losartan 25 MG tablet    COZAAR    90 tablet    Take 1 tablet (25 mg) by mouth daily    Benign essential hypertension       LUTEIN 20 PO           omega 3 1000 MG Caps      Take 2 capsules by mouth daily        ranitidine 150 MG tablet   Generic drug:  ranitidine      Take 150 mg by mouth as needed        SOOTHE OP      Apply to eye as needed        vitamin D 1000 units capsule      Take 1 capsule by mouth daily.        * Notice:  This list has 2 medication(s) that are the same as other medications prescribed for you. Read the directions carefully, and ask your doctor or other care provider to review them with you.

## 2018-11-27 NOTE — PATIENT INSTRUCTIONS
"Recommendations from today's MTM visit:                                                    MTM (medication therapy management) is a service provided by a clinical pharmacist designed to help you get the most of out of your medicines.     1. We discussed that the cost of your Advair inhaler (fluticasone/salmeterol) contributed to you reaching the \"doughnut hole\" this year.  After looking at prices of similar medications, another form of fluticasone/salmeterol, called AirDuo, is significantly less expensive (~$110/month vs. $400/month with Advair).  The AirDuo should work just as well for you.  The dose will be 1 puff twice a day.  The inhaler device will look different -- be sure to ask your pharmacist as [employer] to show you how to use the new inhaler.  I sent the prescription to your pharmacy, and you can request to have it filled at your convenience.    2. As you know, Dr. Hicks ordered pulmonary function tests for you at [place].  The phone number is [phone]      Next MTM visit: as needed;  Yearly appointments likely fine.      To schedule another MTM appointment, please call the clinic directly or you may call the MTM scheduling line at [phone] or toll-free at [phone].     My Clinical Pharmacist's contact information:                                                      It was a pleasure talking with you today!  Please feel free to contact me with any questions or concerns you have.      Whitney Bradshaw, Pharm.D., [facility]  Phone:  [phone]      You may receive a survey about the MTM services you received.  I would appreciate your feedback to help me serve you better in the future. Please fill it out and return it when you can. Your comments will be anonymous.              "

## 2018-11-27 NOTE — LETTER
My Asthma Action Plan  Name: Yolande Hussein   YOB: 1950  Date: 11/27/2018   My doctor: Whitney Bradshaw [facility]   My clinic: [facility]        My Control Medicine: AirDuo 113-14 mg   My Rescue Medicine: Albuterol (Proair/Ventolin/Proventil) inhaler and albuterol nebs as needed   My Asthma Severity: moderate persistent  Avoid your asthma triggers: pollens and cold air               GREEN ZONE   Good Control    I feel good    No cough or wheeze    Can work, sleep and play without asthma symptoms       Take your asthma control medicine every day.     1. If exercise triggers your asthma, take your rescue medication    15 minutes before exercise or sports, and    During exercise if you have asthma symptoms  2. Spacer to use with inhaler: If you have a spacer, make sure to use it with your inhaler             YELLOW ZONE Getting Worse  I have ANY of these:    I do not feel good    Cough or wheeze    Chest feels tight    Wake up at night   1. Keep taking your Green Zone medications  2. Start taking your rescue medicine:    every 20 minutes for up to 1 hour. Then every 4 hours for 24-48 hours.  3. If you stay in the Yellow Zone for more than 12-24 hours, contact your doctor.  4. If you do not return to the Green Zone in 12-24 hours or you get worse, start taking your oral steroid medicine if prescribed by your provider.           RED ZONE Medical Alert - Get Help  I have ANY of these:    I feel awful    Medicine is not helping    Breathing getting harder    Trouble walking or talking    Nose opens wide to breathe       1. Take your rescue medicine NOW  2. If your provider has prescribed an oral steroid medicine, start taking it NOW  3. Call your doctor NOW  4. If you are still in the Red Zone after 20 minutes and you have not reached your doctor:    Take your rescue medicine again and    Call 911 or go to the emergency room right away    See your regular doctor within 2  weeks of an Emergency Room or Urgent Care visit for follow-up treatment.          Annual Reminders:  Meet with Asthma Educator,  Flu Shot in the Fall, consider Pneumonia Vaccination for patients with asthma (aged 19 and older).    Pharmacy: [facility] [place] - [address]                      Asthma Triggers  How To Control Things That Make Your Asthma Worse    Triggers are things that make your asthma worse.  Look at the list below to help you find your triggers and what you can do about them.  You can help prevent asthma flare-ups by staying away from your triggers.      Trigger                                                          What you can do   Cigarette Smoke  Tobacco smoke can make asthma worse. Do not allow smoking in your home, car or around you.  Be sure no one smokes at a child s day care or school.  If you smoke, ask your health care provider for ways to help you quit.  Ask family members to quit too.  Ask your health care provider for a referral to Quit Plan to help you quit smoking, or call 4-193-322-PLAN.     Colds, Flu, Bronchitis  These are common triggers of asthma. Wash your hands often.  Don t touch your eyes, nose or mouth.  Get a flu shot every year.     Dust Mites  These are tiny bugs that live in cloth or carpet. They are too small to see. Wash sheets and blankets in hot water every week.   Encase pillows and mattress in dust mite proof covers.  Avoid having carpet if you can. If you have carpet, vacuum weekly.   Use a dust mask and HEPA vacuum.   Pollen and Outdoor Mold  Some people are allergic to trees, grass, or weed pollen, or molds. Try to keep your windows closed.  Limit time out doors when pollen count is high.   Ask you health care provider about taking medicine during allergy season.     Animal Dander  Some people are allergic to skin flakes, urine or saliva from pets with fur or feathers. Keep pets with fur or feathers out of your home.    If  you can t keep the pet outdoors, then keep the pet out of your bedroom.  Keep the bedroom door closed.  Keep pets off cloth furniture and away from stuffed toys.     Mice, Rats, and Cockroaches  Some people are allergic to the waste from these pests.   Cover food and garbage.  Clean up spills and food crumbs.  Store grease in the refrigerator.   Keep food out of the bedroom.   Indoor Mold  This can be a trigger if your home has high moisture. Fix leaking faucets, pipes, or other sources of water.   Clean moldy surfaces.  Dehumidify basement if it is damp and smelly.   Smoke, Strong Odors, and Sprays  These can reduce air quality. Stay away from strong odors and sprays, such as perfume, powder, hair spray, paints, smoke incense, paint, cleaning products, candles and new carpet.   Exercise or Sports  Some people with asthma have this trigger. Be active!  Ask your doctor about taking medicine before sports or exercise to prevent symptoms.    Warm up for 5-10 minutes before and after sports or exercise.     Other Triggers of Asthma  Cold air:  Cover your nose and mouth with a scarf.  Sometimes laughing or crying can be a trigger.  Some medicines and food can trigger asthma.

## 2018-11-27 NOTE — PROGRESS NOTES
SUBJECTIVE/OBJECTIVE:                           Yolande Hussein is a 68 year old female called for an initial visit for Medication Therapy Management.  She was referred to me from Dr. Hicks.    Chief Complaint: cost of medication;  Went into the doughnut hole last year and wants to avoid that in 2019      Allergies/ADRs: Reviewed in Epic  Tobacco: No tobacco use  Alcohol: Less than 1 beverage / month    PMH: Reviewed in Epic    Medication Adherence/Access:  Concerns about costs      Asthma/Allergy:  Current asthma medications: Albuterol MDI and Nebs when needed;  Uses Advair 250/50 twice daily .  Asthma triggers include: upper respiratory infections, pollens and cold air, and some perfumes.  Also uses fluticasone nasal spray as needed  Pt reports the following symptoms: stable;  Is bothered by cold air.  .  AAP on file: yes but from 2017  ACT Total Scores 11/20/2017 11/26/2018   ACT TOTAL SCORE (Goal Greater than or Equal to 20) 21 21   In the past 12 months, how many times did you visit the emergency room for your asthma without being admitted to the hospital? 0 0   In the past 12 months, how many times were you hospitalized overnight because of your asthma? 0 0     Hypertension: Current medications include hydrochlorothiazide and losartan. Patient reports no current medication side effects.  Facial hair: liked vaniqa, but became too expensive ($200/month);  She now shaves annoying facial hair.  Heartburn: rare use of ranitidine -- maybe 2 per month  Pain: Uses ibuprofen or APAP for headache/muscle aches approximately 1-2 x/month  Dermatologic agents: has desonide cream available as needed for a rash that comes and goes on her breast -- uses maybe 1 time per year;  Uses adapalene rarely  Supplements/OTC:  Uses calcium, vitamin D, lutein, omega 3's daily.   Has artificial tears for dry eyes.     BP Readings from Last 3 Encounters:   11/26/18 130/78   06/18/18 124/73   11/20/17 127/76     Current Outpatient  Prescriptions   Medication Sig Dispense Refill     acetaminophen (ACETAMINOPHEN EXTRA STRENGTH) 500 MG tablet Take 500-1,000 mg by mouth every 6 hours as needed for mild pain       adapalene (DIFFERIN) 0.1 % gel APPLY TOPICALLY AT BEDTIME AS DIRECTED. 45 g 3     albuterol (PROAIR HFA/PROVENTIL HFA/VENTOLIN HFA) 108 (90 Base) MCG/ACT inhaler Inhale 2 puffs into the lungs every 6 hrs prn 3 Inhaler 1     albuterol (PROVENTIL) (2.5 MG/3ML) 0.083% neb solution Take 1 vial (2.5 mg) by nebulization every 6 hours as needed for shortness of breath / dyspnea 100 vial 3     calcium carbonate 600 mg-vitamin D 400 units (CALTRATE) 600-400 MG-UNIT per tablet Take 1 tablet by mouth daily       Cholecalciferol (VITAMIN D) 1000 UNIT capsule Take 1 capsule by mouth daily.       desonide (DESOWEN) 0.05 % cream Apply to breast BID (Patient taking differently: Apply to breast as needed) 30 g 0     fluticasone (FLONASE) 50 MCG/ACT nasal spray Spray 1-2 sprays into both nostrils daily (Patient taking differently: Spray 1-2 sprays into both nostrils daily as needed ) 1 Package 11     fluticasone-salmeterol (AIRDUO RESPICLICK) 113-14 MCG/ACT inhaler Inhale 1 puff into the lungs 2 times daily 3 Inhaler 3     hydrochlorothiazide (HYDRODIURIL) 25 MG tablet Take 1 tablet (25 mg) by mouth daily 90 tablet 3     ibuprofen (ADVIL/MOTRIN) 200 MG tablet Take 400 mg by mouth every 8 hours as needed for mild pain       losartan (COZAAR) 25 MG tablet Take 1 tablet (25 mg) by mouth daily 90 tablet 3     Misc Natural Products (LUTEIN 20 PO)        omega 3 1000 MG CAPS Take 2 capsules by mouth daily       Propylene Glycol-Glycerin (SOOTHE OP) Apply to eye as needed       ranitidine (RANITIDINE) 150 MG tablet Take 150 mg by mouth as needed             ASSESSMENT:                             Current medications were reviewed today.     Medication Adherence: good, no issues identified    Asthma/Allergy: Stable. Up to date and at goal of ACT > or equal to 20.   Would benefit from switching to less expensive controller inhaler.  Hypertension: Stable. Patient is meeting BP goal of < 140/90mmHg.   Facial hair: stable  Heartburn: stable  Pain: stable;  No safety concerns;  Patient educated to let us know if her need for NSAIDs increases  Dermatologic agents: stable  Supplements/OTC:  stable    PLAN:                            1. Change Advair to AirDuo to save costs.  Orders placed and patient informed.      2. Dr. Hicks ordered pulmonary function tests for you at [facility].  The phone number is [phone]    I spent 30 minutes with this patient today. All changes were made via collaborative practice agreement with Dr. Hicks. A copy of the visit note was provided to the patient's primary care provider.    Will follow up in 1 year or earlier if needed.    The patient was given a summary of these recommendations as an after visit summary.         Whitney Bradshaw, Pharm.D., BCPS

## 2018-11-28 DIAGNOSIS — E87.6 HYPOKALEMIA: ICD-10-CM

## 2018-11-28 DIAGNOSIS — Z00.00 PREVENTATIVE HEALTH CARE: ICD-10-CM

## 2018-11-28 DIAGNOSIS — I10 BENIGN ESSENTIAL HYPERTENSION: ICD-10-CM

## 2018-11-28 PROCEDURE — 86803 HEPATITIS C AB TEST: CPT | Performed by: INTERNAL MEDICINE

## 2018-11-28 PROCEDURE — 80061 LIPID PANEL: CPT | Performed by: INTERNAL MEDICINE

## 2018-11-28 PROCEDURE — 80048 BASIC METABOLIC PNL TOTAL CA: CPT | Performed by: INTERNAL MEDICINE

## 2018-11-28 PROCEDURE — 36415 COLL VENOUS BLD VENIPUNCTURE: CPT | Performed by: INTERNAL MEDICINE

## 2018-11-29 LAB
ANION GAP SERPL CALCULATED.3IONS-SCNC: 5 MMOL/L (ref 3–14)
BUN SERPL-MCNC: 7 MG/DL (ref 7–30)
CALCIUM SERPL-MCNC: 8.9 MG/DL (ref 8.5–10.1)
CHLORIDE SERPL-SCNC: 102 MMOL/L (ref 94–109)
CHOLEST SERPL-MCNC: 229 MG/DL
CO2 SERPL-SCNC: 33 MMOL/L (ref 20–32)
CREAT SERPL-MCNC: 0.67 MG/DL (ref 0.52–1.04)
GFR SERPL CREATININE-BSD FRML MDRD: 87 ML/MIN/1.7M2
GLUCOSE SERPL-MCNC: 85 MG/DL (ref 70–99)
HCV AB SERPL QL IA: NONREACTIVE
HDLC SERPL-MCNC: 78 MG/DL
LDLC SERPL CALC-MCNC: 133 MG/DL
NONHDLC SERPL-MCNC: 151 MG/DL
POTASSIUM SERPL-SCNC: 3.3 MMOL/L (ref 3.4–5.3)
SODIUM SERPL-SCNC: 140 MMOL/L (ref 133–144)
TRIGL SERPL-MCNC: 90 MG/DL

## 2018-11-29 NOTE — TELEPHONE ENCOUNTER
FUTURE VISIT INFORMATION      FUTURE VISIT INFORMATION:    Date: 12/3/18    Time:     [place] [facility]  REFERRAL INFORMATION:    Referring provider:  Isabel Hicks    Referring providers clinic:      Reason for visit/diagnosis  Right Shoulder Pain - Internal Referral - Schedule Per PT     RECORDS REQUESTED FROM:       Clinic name Comments Records Status Imaging Status     internal n/a

## 2018-11-30 ENCOUNTER — TELEPHONE (OUTPATIENT)
Dept: OBGYN | Facility: CLINIC | Age: 68
End: 2018-11-30

## 2018-11-30 ENCOUNTER — RADIANT APPOINTMENT (OUTPATIENT)
Dept: BONE DENSITY | Facility: CLINIC | Age: 68
End: 2018-11-30
Attending: INTERNAL MEDICINE
Payer: MEDICARE

## 2018-11-30 ENCOUNTER — HOSPITAL ENCOUNTER (OUTPATIENT)
Dept: ULTRASOUND IMAGING | Facility: CLINIC | Age: 68
Discharge: HOME | End: 2018-11-30
Attending: INTERNAL MEDICINE | Admitting: INTERNAL MEDICINE
Payer: MEDICARE

## 2018-11-30 DIAGNOSIS — Z78.0 ASYMPTOMATIC MENOPAUSAL STATE: ICD-10-CM

## 2018-11-30 DIAGNOSIS — R14.0 BLOATING: ICD-10-CM

## 2018-11-30 LAB — RADIOLOGIST FLAGS: ABNORMAL

## 2018-11-30 PROCEDURE — 76830 TRANSVAGINAL US NON-OB: CPT

## 2018-11-30 PROCEDURE — 77081 DXA BONE DENSITY APPENDICULR: CPT

## 2018-11-30 PROCEDURE — 77080 DXA BONE DENSITY AXIAL: CPT

## 2018-11-30 NOTE — TELEPHONE ENCOUNTER
Called Yolande to inform ovarian mass has grown and Dr. Hicks recommends consult with OBGYN next week. Pt agrees with plan and ok with scheduling any time but Thursday morning.    ROuting to triage pool to schedule

## 2018-12-03 ENCOUNTER — PRE VISIT (OUTPATIENT)
Dept: ORTHOPEDICS | Facility: CLINIC | Age: 68
End: 2018-12-03

## 2018-12-05 ENCOUNTER — OFFICE VISIT (OUTPATIENT)
Dept: OBGYN | Facility: CLINIC | Age: 68
End: 2018-12-05
Attending: OBSTETRICS & GYNECOLOGY
Payer: MEDICARE

## 2018-12-05 VITALS
HEART RATE: 106 BPM | DIASTOLIC BLOOD PRESSURE: 80 MMHG | WEIGHT: 154 LBS | SYSTOLIC BLOOD PRESSURE: 156 MMHG | HEIGHT: 64 IN | BODY MASS INDEX: 26.29 KG/M2

## 2018-12-05 DIAGNOSIS — N83.8 OVARIAN MASS: ICD-10-CM

## 2018-12-05 DIAGNOSIS — D39.11 NEOPLASM OF UNCERTAIN BEHAVIOR OF RIGHT OVARY: Primary | ICD-10-CM

## 2018-12-05 LAB
CANCER AG125 SERPL-ACNC: 7 U/ML (ref 0–30)
ESTRADIOL SERPL-MCNC: 21 PG/ML
LDH SERPL L TO P-CCNC: 211 U/L (ref 81–234)

## 2018-12-05 PROCEDURE — 84403 ASSAY OF TOTAL TESTOSTERONE: CPT | Performed by: OBSTETRICS & GYNECOLOGY

## 2018-12-05 PROCEDURE — 86304 IMMUNOASSAY TUMOR CA 125: CPT | Performed by: OBSTETRICS & GYNECOLOGY

## 2018-12-05 PROCEDURE — 36415 COLL VENOUS BLD VENIPUNCTURE: CPT | Performed by: OBSTETRICS & GYNECOLOGY

## 2018-12-05 PROCEDURE — 84270 ASSAY OF SEX HORMONE GLOBUL: CPT | Performed by: OBSTETRICS & GYNECOLOGY

## 2018-12-05 PROCEDURE — 82670 ASSAY OF TOTAL ESTRADIOL: CPT | Performed by: OBSTETRICS & GYNECOLOGY

## 2018-12-05 PROCEDURE — 83615 LACTATE (LD) (LDH) ENZYME: CPT | Performed by: OBSTETRICS & GYNECOLOGY

## 2018-12-05 RX ORDER — CEFAZOLIN SODIUM 2 G/100ML
2 INJECTION, SOLUTION INTRAVENOUS
Status: CANCELLED | OUTPATIENT
Start: 2018-12-05

## 2018-12-05 RX ORDER — CEFAZOLIN SODIUM 1 G/3ML
1 INJECTION, POWDER, FOR SOLUTION INTRAMUSCULAR; INTRAVENOUS SEE ADMIN INSTRUCTIONS
Status: CANCELLED | OUTPATIENT
Start: 2018-12-05

## 2018-12-05 RX ORDER — PHENAZOPYRIDINE HYDROCHLORIDE 100 MG/1
200 TABLET, FILM COATED ORAL ONCE
Status: CANCELLED | OUTPATIENT
Start: 2018-12-05 | End: 2018-12-05

## 2018-12-05 NOTE — MR AVS SNAPSHOT
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 If you see a primary care provider, you can also send messages to your care team and make appointments. If you have questions, please call your primary care clinic.  If you do not have a primary care provider, please call [phone] and they will assist you.      [employer] is an electronic gateway that provides easy, online access to your medical records. With [employer], you can request a clinic appointment, read your test results, renew a prescription or communicate with your care team.     To access your existing account, please contact your [facility] or call [phone] for assistance.        Care EveryWhere ID     This is your Care EveryWhere ID. This could be used by other organizations to access your [facility] medical records  [id number]        Your Vitals Were     Pulse Height BMI (Body Mass Index)             106 1.626 m (5' 4\") 26.43 kg/m2          Blood Pressure from Last 3 Encounters:   12/05/18 156/80   11/26/18 130/78   06/18/18 124/73    Weight from Last 3 Encounters:   12/05/18 69.9 kg (154 lb)   11/26/18 69.9 kg (154 lb)   06/18/18 69.9 kg (154 lb)              We Performed the Following  "         Estradiol     Lactate Dehydrogenase     Jen-Operative Worksheet (Total Abdominal Hysterectomy)     Testosterone Free and Total          Today's Medication Changes          These changes are accurate as of 12/5/18  1:40 PM.  If you have any questions, ask your nurse or doctor.               These medicines have changed or have updated prescriptions.        Dose/Directions    desonide 0.05 % external cream   Commonly known as:  DESOWEN   This may have changed:  additional instructions   Used for:  Eczema        Apply to breast BID   Quantity:  30 g   Refills:  0       fluticasone 50 MCG/ACT nasal spray   Commonly known as:  FLONASE   This may have changed:    - when to take this  - reasons to take this   Used for:  Rhinitis, chronic        Dose:  1-2 spray   Spray 1-2 sprays into both nostrils daily   Quantity:  1 Package   Refills:  11                Primary Care Provider Office Phone # Fax #    Isabel Hortensia Hicks [phone]       [facility] [address] [facility] [address]        Equal Access to Services     NATHALY JONAS : Hadtiera rivera Somagda, waaxda luantonio, qaybta kaalmada adetrent, carlitos cuevas . So [facility] [phone].    ATENCIÓN: Si habla español, tiene a jacobson disposición servicios gratuitos de asistencia lingüística. Llame al [phone].    We comply with applicable federal civil rights laws and [place] laws. We do not discriminate on the basis of race, color, national origin, age, disability, sex, sexual orientation, or gender identity.            Thank you!     Thank you for choosing [facility]  for your care. Our goal is always to provide you with excellent care. Hearing back from our patients is one way we can continue to improve our services. Please take a few minutes to complete the written survey that you may receive in the mail after your visit with us. Thank you!             Your  Updated Medication List - Protect others around you: Learn how to safely use, store and throw away your medicines at www.disposemymeds.org.          This list is accurate as of 12/5/18  1:40 PM.  Always use your most recent med list.                   Brand Name Dispense Instructions for use Diagnosis    ACETAMINOPHEN EXTRA STRENGTH 500 MG tablet   Generic drug:  acetaminophen      Take 500-1,000 mg by mouth every 6 hours as needed for mild pain        adapalene 0.1 % external gel    DIFFERIN    45 g    APPLY TOPICALLY AT BEDTIME AS DIRECTED.    Acne vulgaris       * albuterol (2.5 MG/3ML) 0.083% neb solution    PROVENTIL    100 vial    Take 1 vial (2.5 mg) by nebulization every 6 hours as needed for shortness of breath / dyspnea    Moderate persistent asthma without complication       * albuterol 108 (90 Base) MCG/ACT inhaler    PROAIR HFA/PROVENTIL HFA/VENTOLIN HFA    3 Inhaler    Inhale 2 puffs into the lungs every 6 hrs prn    Moderate persistent asthma without complication       calcium carbonate 600 mg-vitamin D 400 units 600-400 MG-UNIT per tablet    CALTRATE     Take 1 tablet by mouth daily        desonide 0.05 % external cream    DESOWEN    30 g    Apply to breast BID    Eczema       fluticasone 50 MCG/ACT nasal spray    FLONASE    1 Package    Spray 1-2 sprays into both nostrils daily    Rhinitis, chronic       fluticasone-salmeterol 113-14 MCG/ACT inhaler    AIRDUO RESPICLICK    3 Inhaler    Inhale 1 puff into the lungs 2 times daily    Moderate persistent asthma, unspecified whether complicated       hydrochlorothiazide 25 MG tablet    HYDRODIURIL    90 tablet    Take 1 tablet (25 mg) by mouth daily    Benign essential hypertension       ibuprofen 200 MG tablet    ADVIL/MOTRIN     Take 400 mg by mouth every 8 hours as needed for mild pain        losartan 25 MG tablet    COZAAR    90 tablet    Take 1 tablet (25 mg) by mouth daily    Benign essential hypertension       LUTEIN 20 PO           omega 3 1000  MG Caps      Take 2 capsules by mouth daily        ranitidine 150 MG tablet   Generic drug:  ranitidine      Take 150 mg by mouth as needed        SOOTHE OP      Apply to eye as needed        vitamin D 1000 units capsule      Take 1 capsule by mouth daily.        * Notice:  This list has 2 medication(s) that are the same as other medications prescribed for you. Read the directions carefully, and ask your doctor or other care provider to review them with you.

## 2018-12-05 NOTE — PROGRESS NOTES
67 yo  here for consultation for right ovarian mass. States has had increased pelvic cramping and hot flashes over past 1-2 months. Dr. Hicks ordered ultrasound which shows 5.9x5.3 cm solid ovarian mass. Similar lesion was seen on CT in  2014 (3.7cm) but not on ultrasound in 2015.   No nausea, emesis, diarrhea, constipation. No change in satiety.   H/o GERD but only takes zantac when eats tomato based products.    Very worried about this lesion. Losing sleep, anxious.     Past Medical History:   Diagnosis Date     Asthma      Breast atypical ductal hyperplasia-- Right 6/13/2002    Lumpectomy.  Problem list name updated by automated process. Provider to review and confirm     Dysphonia 9/8/2014     Eczema 10/15/2012     Gastro-oesophageal reflux disease      Hypertension      Kidney stone      Menopausal state -- age 49 8/13/2012    With NEGATIVE HR HPV neg on Pap today, there is NO further need for routine pap screens.        Neck stiffness 11/3/2014     Nephrolithiasis 5/12/2014     Shoulder pain 3/30/2009    Controlled with stretching, unless lapses or overworks 8/2012      Thoracalgia 3/30/2009     TMJ (temporomandibular joint syndrome) 1/18/2012    Jaw PT and Mouth guard helping! 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  Increase from 2x/wk to 3xs/wk.      Patient Active Problem List   Diagnosis     Thoracalgia     Shoulder pain     Hypertension     CARDIOVASCULAR SCREENING; LDL GOAL LESS THAN 130     Menopausal state -- age 49     TMJ (temporomandibular joint syndrome)     Vaginal atrophy     Breast atypical ductal hyperplasia-- Right     Eczema     Nephrolithiasis     Hypertension goal BP (blood pressure) < 140/90     Dysphonia     Neck stiffness     Unilateral vocal fold paresis     Past Surgical History:   Procedure Laterality Date     BREAST BIOPSY, CORE RT/LT      R-- precancer cells     COLONOSCOPY        EXPLORE NECK N/A 8/19/2014    Procedure: EXPLORE NECK;  Surgeon: Alea Jacobs MD;  Location: [facility] OR     EXTRACORPOREAL SHOCK WAVE LITHOTRIPSY (ESWL)  2/26/2014    Procedure: EXTRACORPOREAL SHOCK WAVE LITHOTRIPSY (ESWL);  Left Kidney Extracorporeal Shockwave Lithotripsy;  Surgeon: Sabas Choi MD;  Location: [facility] OR     LUMPECTOMY BREAST  age 48    R -- margins clear -- declined tamoxofen     PARATHYROIDECTOMY N/A 8/19/2014    Procedure: PARATHYROIDECTOMY;  Surgeon: Alea Jacobs MD;  Location: [facility] OR     STERILIZATION  1984    PP b/4 discharge      TUBAL LIGATION       Social History     Social History     Marital status:      Spouse name: N/A     Number of children: N/A     Years of education: N/A     Social History Main Topics     Smoking status: Never Smoker     Smokeless tobacco: Never Used     Alcohol use Yes      Comment: rare      Drug use: No     Sexual activity: Yes     Partners: Male     Birth control/ protection: Post-menopausal      Comment: age 49     Other Topics Concern     Parent/Sibling W/ Cabg, Mi Or Angioplasty Before 65f 55m? No     Blood Transfusions No     Caffeine Concern No     1s/d     Occupational Exposure No     Hobby Hazards No     Sleep Concern No     Stress Concern No     Weight Concern No     gained 3 lbs since Christmas     Special Diet No     Back Care No     Exercise No     walks 30' daily (one mile)     Bike Helmet No     Social History Narrative    Retired .     Evelyne Yung         Family History   Problem Relation Age of Onset     Asthma Mother      C.A.D. Mother 70     CHF     Diabetes Mother 80     diet and pill tx     Hypertension Mother      Macular Degeneration Mother      Asthma Father      Hypertension Father      Alzheimer Disease Father 70     Asthma Maternal Grandfather      Asthma Daughter      C.A.D. Sister 60     CHF     Hypertension Sister      Macular Degeneration Sister      Glaucoma No family hx of       "Cancer No family hx of      Amblyopia No family hx of      Retinal detachment No family hx of      Obstetric History       T2      L3     SAB2   TAB0   Ectopic0   Multiple0   Live Births3       # Outcome Date GA Lbr Aureliano/2nd Weight Sex Delivery Anes PTL Lv   5 Term 12 40w0d  3.005 kg (6 lb 10 oz) F    LIVE BIRTH      Name: Grace Hammond Term 84 40w0d  3.26 kg (7 lb 3 oz) M    LIVE BIRTH      Name: Tirso Soliz  82 36w0d  3.005 kg (6 lb 10 oz) M    LIVE BIRTH      Name: Kevin Gomez SAB            1 SAB                 /80  Pulse 106  Ht 1.626 m (5' 4\")  Wt 69.9 kg (154 lb)  BMI 26.43 kg/m2  Appears well  Exam:  Constitutional: healthy, alert and no distress  Head: Normocephalic. No masses, lesions, tenderness or abnormalities  Neck: Neck supple. No adenopathy. Thyroid symmetric, normal size,, Carotids without bruits.  ENT:   Cardiovascular: negative, PMI normal. No lifts, heaves, or thrills. RRR. No murmurs, clicks gallops or rub  Respiratory: negative, Percussion normal. Good diaphragmatic excursion. Lungs clear  Gastrointestinal: Abdomen soft, non-tender. BS normal. No masses, organomegaly  : Deferred  Musculoskeletal: extremities normal- no gross deformities noted, gait normal and normal muscle tone  Skin: no suspicious lesions or rashes  Neurologic: Gait normal. Reflexes normal and symmetric. Sensation grossly WNL.  Psychiatric: mentation appears normal and affect normal/bright  Hematologic/Lymphatic/Immunologic: Normal cervical lymph nodes    A/P: 5.9cm right ovarian mass in postmenopausal woman  Will obtain  and hormonal assays (due to recent hot flases)  If labs wnl patient eager to move ahead with pelvic organ removal: desires JARETH BSO. Discussed routes of surgery and she desires laparotomy to avoid risk of spilling ovarian contents (which she read about).     Orders for surgery placed pending lab results.   Take zantac for 7 days daily " preoperatively.  Use inhaler daily for 7 days preoperatively.       Evelyne Yung

## 2018-12-05 NOTE — LETTER
12/5/2018       RE: Yolande Hussein  [address]  [facility] [address]     Dear Colleague,    Thank you for referring your patient, Yolande Hussein, to the [facility] at [facility]. Please see a copy of my visit note below.    69 yo  here for consultation for right ovarian mass. States has had increased pelvic cramping and hot flashes over past 1-2 months. Dr. Hicks ordered ultrasound which shows 5.9x5.3 cm solid ovarian mass. Similar lesion was seen on CT in  2014 (3.7cm) but not on ultrasound in 2015.   No nausea, emesis, diarrhea, constipation. No change in satiety.   H/o GERD but only takes zantac when eats tomato based products.    Very worried about this lesion. Losing sleep, anxious.     Past Medical History:   Diagnosis Date     Asthma      Breast atypical ductal hyperplasia-- Right 6/13/2002    Lumpectomy.  Problem list name updated by automated process. Provider to review and confirm     Dysphonia 9/8/2014     Eczema 10/15/2012     Gastro-oesophageal reflux disease      Hypertension      Kidney stone      Menopausal state -- age 49 8/13/2012    With NEGATIVE HR HPV neg on Pap today, there is NO further need for routine pap screens.        Neck stiffness 11/3/2014     Nephrolithiasis 5/12/2014     Shoulder pain 3/30/2009    Controlled with stretching, unless lapses or overworks 8/2012      Thoracalgia 3/30/2009     TMJ (temporomandibular joint syndrome) 1/18/2012    Jaw PT and Mouth guard helping! 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  Increase from 2x/wk to 3xs/wk.      Patient Active Problem List   Diagnosis     Thoracalgia     Shoulder pain     Hypertension     CARDIOVASCULAR SCREENING; LDL GOAL LESS THAN 130     Menopausal state -- age 49     TMJ (temporomandibular joint syndrome)     Vaginal atrophy     Breast  atypical ductal hyperplasia-- Right     Eczema     Nephrolithiasis     Hypertension goal BP (blood pressure) < 140/90     Dysphonia     Neck stiffness     Unilateral vocal fold paresis     Past Surgical History:   Procedure Laterality Date     BREAST BIOPSY, CORE RT/LT      R-- precancer cells     COLONOSCOPY       EXPLORE NECK N/A 8/19/2014    Procedure: EXPLORE NECK;  Surgeon: Alea Jacobs MD;  Location: [facility] OR     EXTRACORPOREAL SHOCK WAVE LITHOTRIPSY (ESWL)  2/26/2014    Procedure: EXTRACORPOREAL SHOCK WAVE LITHOTRIPSY (ESWL);  Left Kidney Extracorporeal Shockwave Lithotripsy;  Surgeon: Sabas Choi MD;  Location: [facility] OR     LUMPECTOMY BREAST  age 48    R -- margins clear -- declined tamoxofen     PARATHYROIDECTOMY N/A 8/19/2014    Procedure: PARATHYROIDECTOMY;  Surgeon: Alea Jacobs MD;  Location: [facility] OR     STERILIZATION  1984    PP b/4 discharge      TUBAL LIGATION       Social History     Social History     Marital status:      Spouse name: N/A     Number of children: N/A     Years of education: N/A     Social History Main Topics     Smoking status: Never Smoker     Smokeless tobacco: Never Used     Alcohol use Yes      Comment: rare      Drug use: No     Sexual activity: Yes     Partners: Male     Birth control/ protection: Post-menopausal      Comment: age 49     Other Topics Concern     Parent/Sibling W/ Cabg, Mi Or Angioplasty Before 65f 55m? No     Blood Transfusions No     Caffeine Concern No     1s/d     Occupational Exposure No     Hobby Hazards No     Sleep Concern No     Stress Concern No     Weight Concern No     gained 3 lbs since Christmas     Special Diet No     Back Care No     Exercise No     walks 30' daily (one mile)     Bike Helmet No     Social History Narrative    Retired .     Evelyne Yung         Family History   Problem Relation Age of Onset     Asthma Mother      C.A.D. Mother 70     CHF     Diabetes Mother 80     diet and  "pill tx     Hypertension Mother      Macular Degeneration Mother      Asthma Father      Hypertension Father      Alzheimer Disease Father 70     Asthma Maternal Grandfather      Asthma Daughter      C.A.D. Sister 60     CHF     Hypertension Sister      Macular Degeneration Sister      Glaucoma No family hx of      Cancer No family hx of      Amblyopia No family hx of      Retinal detachment No family hx of      Obstetric History       T2      L3     SAB2   TAB0   Ectopic0   Multiple0   Live Births3       # Outcome Date GA Lbr Aureliano/2nd Weight Sex Delivery Anes PTL Lv   5 Term 12 40w0d  3.005 kg (6 lb 10 oz) F    LIVE BIRTH      Name: Grace Hammond Term 84 40w0d  3.26 kg (7 lb 3 oz) M    LIVE BIRTH      Name: Tirso Soliz  82 36w0d  3.005 kg (6 lb 10 oz) M    LIVE BIRTH      Name: Kevin   2 SAB            1 SAB                 /80  Pulse 106  Ht 1.626 m (5' 4\")  Wt 69.9 kg (154 lb)  BMI 26.43 kg/m2  Appears well  Exam:  Constitutional: healthy, alert and no distress  Head: Normocephalic. No masses, lesions, tenderness or abnormalities  Neck: Neck supple. No adenopathy. Thyroid symmetric, normal size,, Carotids without bruits.  ENT:   Cardiovascular: negative, PMI normal. No lifts, heaves, or thrills. RRR. No murmurs, clicks gallops or rub  Respiratory: negative, Percussion normal. Good diaphragmatic excursion. Lungs clear  Gastrointestinal: Abdomen soft, non-tender. BS normal. No masses, organomegaly  : Deferred  Musculoskeletal: extremities normal- no gross deformities noted, gait normal and normal muscle tone  Skin: no suspicious lesions or rashes  Neurologic: Gait normal. Reflexes normal and symmetric. Sensation grossly WNL.  Psychiatric: mentation appears normal and affect normal/bright  Hematologic/Lymphatic/Immunologic: Normal cervical lymph nodes    A/P: 5.9cm right ovarian mass in postmenopausal woman  Will obtain  and hormonal assays (due to recent " hot flases)  If labs wnl patient eager to move ahead with pelvic organ removal: desires JARETH BSO. Discussed routes of surgery and she desires laparotomy to avoid risk of spilling ovarian contents (which she read about).     Orders for surgery placed pending lab results.   Take zantac for 7 days daily preoperatively.  Use inhaler daily for 7 days preoperatively.       Evelyne Yung

## 2018-12-06 NOTE — PROGRESS NOTES
TC to patient with results.   Reviewed results and surgery and options/risks/benefits. We will move ahead with TAHBSO and evaluate for any vaginal/uterine prolapse at that time.   Evelyne Yung

## 2018-12-07 LAB
SHBG SERPL-SCNC: 71 NMOL/L (ref 30–135)
TESTOST FREE SERPL-MCNC: 0.29 NG/DL (ref 0.06–0.38)
TESTOST SERPL-MCNC: 29 NG/DL (ref 8–60)

## 2018-12-26 ENCOUNTER — ANESTHESIA EVENT (OUTPATIENT)
Dept: SURGERY | Facility: CLINIC | Age: 68
DRG: 340 | End: 2018-12-26
Payer: MEDICARE

## 2018-12-27 ENCOUNTER — HOSPITAL ENCOUNTER (INPATIENT)
Facility: CLINIC | Age: 68
LOS: 3 days | Discharge: HOME | DRG: 340 | End: 2018-12-30
Attending: OBSTETRICS & GYNECOLOGY | Admitting: SURGERY
Payer: MEDICARE

## 2018-12-27 ENCOUNTER — ANESTHESIA (OUTPATIENT)
Dept: SURGERY | Facility: CLINIC | Age: 68
DRG: 340 | End: 2018-12-27
Payer: MEDICARE

## 2018-12-27 ENCOUNTER — SURGERY (OUTPATIENT)
Age: 68
End: 2018-12-27
Payer: COMMERCIAL

## 2018-12-27 DIAGNOSIS — Z90.710 S/P ABDOMINAL HYSTERECTOMY: Primary | ICD-10-CM

## 2018-12-27 LAB
ABO + RH BLD: NORMAL
ABO + RH BLD: NORMAL
BLD GP AB SCN SERPL QL: NORMAL
BLOOD BANK CMNT PATIENT-IMP: NORMAL
CREAT SERPL-MCNC: 0.69 MG/DL (ref 0.52–1.04)
ERYTHROCYTE [DISTWIDTH] IN BLOOD BY AUTOMATED COUNT: 14.1 % (ref 10–15)
GFR SERPL CREATININE-BSD FRML MDRD: 88 ML/MIN/{1.73_M2}
GLUCOSE BLDC GLUCOMTR-MCNC: 119 MG/DL (ref 70–99)
HCT VFR BLD AUTO: 44.2 % (ref 35–47)
HGB BLD-MCNC: 14.7 G/DL (ref 11.7–15.7)
INTERPRETATION ECG - MUSE: NORMAL
MCH RBC QN AUTO: 28.1 PG (ref 26.5–33)
MCHC RBC AUTO-ENTMCNC: 33.3 G/DL (ref 31.5–36.5)
MCV RBC AUTO: 84 FL (ref 78–100)
PLATELET # BLD AUTO: 215 10E9/L (ref 150–450)
POTASSIUM SERPL-SCNC: 3.4 MMOL/L (ref 3.4–5.3)
RBC # BLD AUTO: 5.24 10E12/L (ref 3.8–5.2)
SPECIMEN EXP DATE BLD: NORMAL
WBC # BLD AUTO: 7.4 10E9/L (ref 4–11)

## 2018-12-27 PROCEDURE — 25000132 ZZH RX MED GY IP 250 OP 250 PS 637: Mod: GY | Performed by: ANESTHESIOLOGY

## 2018-12-27 PROCEDURE — 0UT70ZZ RESECTION OF BILATERAL FALLOPIAN TUBES, OPEN APPROACH: ICD-10-PCS | Performed by: OBSTETRICS & GYNECOLOGY

## 2018-12-27 PROCEDURE — A9270 NON-COVERED ITEM OR SERVICE: HCPCS | Mod: GY | Performed by: ANESTHESIOLOGY

## 2018-12-27 PROCEDURE — 25000128 H RX IP 250 OP 636: Performed by: OBSTETRICS & GYNECOLOGY

## 2018-12-27 PROCEDURE — 85027 COMPLETE CBC AUTOMATED: CPT | Performed by: OBSTETRICS & GYNECOLOGY

## 2018-12-27 PROCEDURE — 88309 TISSUE EXAM BY PATHOLOGIST: CPT | Performed by: OBSTETRICS & GYNECOLOGY

## 2018-12-27 PROCEDURE — 00000159 ZZHCL STATISTIC H-SEND OUTS PREP: Performed by: OBSTETRICS & GYNECOLOGY

## 2018-12-27 PROCEDURE — 25000128 H RX IP 250 OP 636: Performed by: STUDENT IN AN ORGANIZED HEALTH CARE EDUCATION/TRAINING PROGRAM

## 2018-12-27 PROCEDURE — 86901 BLOOD TYPING SEROLOGIC RH(D): CPT | Performed by: OBSTETRICS & GYNECOLOGY

## 2018-12-27 PROCEDURE — 25000128 H RX IP 250 OP 636: Performed by: INTERNAL MEDICINE

## 2018-12-27 PROCEDURE — 88305 TISSUE EXAM BY PATHOLOGIST: CPT | Mod: 26 | Performed by: OBSTETRICS & GYNECOLOGY

## 2018-12-27 PROCEDURE — 0DTJ0ZZ RESECTION OF APPENDIX, OPEN APPROACH: ICD-10-PCS | Performed by: SURGERY

## 2018-12-27 PROCEDURE — 44950 APPENDECTOMY: CPT | Performed by: SURGERY

## 2018-12-27 PROCEDURE — 37000009 ZZH ANESTHESIA TECHNICAL FEE, EACH ADDTL 15 MIN: Performed by: OBSTETRICS & GYNECOLOGY

## 2018-12-27 PROCEDURE — 88304 TISSUE EXAM BY PATHOLOGIST: CPT | Mod: 26 | Performed by: OBSTETRICS & GYNECOLOGY

## 2018-12-27 PROCEDURE — C9290 INJ, BUPIVACAINE LIPOSOME: HCPCS | Performed by: STUDENT IN AN ORGANIZED HEALTH CARE EDUCATION/TRAINING PROGRAM

## 2018-12-27 PROCEDURE — 84132 ASSAY OF SERUM POTASSIUM: CPT | Performed by: ANESTHESIOLOGY

## 2018-12-27 PROCEDURE — 36415 COLL VENOUS BLD VENIPUNCTURE: CPT | Performed by: OBSTETRICS & GYNECOLOGY

## 2018-12-27 PROCEDURE — A9270 NON-COVERED ITEM OR SERVICE: HCPCS | Mod: GY | Performed by: OBSTETRICS & GYNECOLOGY

## 2018-12-27 PROCEDURE — 25000132 ZZH RX MED GY IP 250 OP 250 PS 637: Mod: GY | Performed by: OBSTETRICS & GYNECOLOGY

## 2018-12-27 PROCEDURE — 71000015 ZZH RECOVERY PHASE 1 LEVEL 2 EA ADDTL HR: Performed by: OBSTETRICS & GYNECOLOGY

## 2018-12-27 PROCEDURE — 88309 TISSUE EXAM BY PATHOLOGIST: CPT | Mod: 26 | Performed by: OBSTETRICS & GYNECOLOGY

## 2018-12-27 PROCEDURE — 88304 TISSUE EXAM BY PATHOLOGIST: CPT | Performed by: OBSTETRICS & GYNECOLOGY

## 2018-12-27 PROCEDURE — 0UT90ZZ RESECTION OF UTERUS, OPEN APPROACH: ICD-10-PCS | Performed by: OBSTETRICS & GYNECOLOGY

## 2018-12-27 PROCEDURE — 88112 CYTOPATH CELL ENHANCE TECH: CPT | Performed by: OBSTETRICS & GYNECOLOGY

## 2018-12-27 PROCEDURE — 86900 BLOOD TYPING SEROLOGIC ABO: CPT | Performed by: OBSTETRICS & GYNECOLOGY

## 2018-12-27 PROCEDURE — 00000146 ZZHCL STATISTIC GLUCOSE BY METER IP

## 2018-12-27 PROCEDURE — 25000125 ZZHC RX 250: Performed by: NURSE ANESTHETIST, CERTIFIED REGISTERED

## 2018-12-27 PROCEDURE — 25000566 ZZH SEVOFLURANE, EA 15 MIN: Performed by: OBSTETRICS & GYNECOLOGY

## 2018-12-27 PROCEDURE — 25000128 H RX IP 250 OP 636: Performed by: NURSE ANESTHETIST, CERTIFIED REGISTERED

## 2018-12-27 PROCEDURE — 27210794 ZZH OR GENERAL SUPPLY STERILE: Performed by: OBSTETRICS & GYNECOLOGY

## 2018-12-27 PROCEDURE — 25000125 ZZHC RX 250: Performed by: ANESTHESIOLOGY

## 2018-12-27 PROCEDURE — 93010 ELECTROCARDIOGRAM REPORT: CPT | Performed by: INTERNAL MEDICINE

## 2018-12-27 PROCEDURE — 0UT20ZZ RESECTION OF BILATERAL OVARIES, OPEN APPROACH: ICD-10-PCS | Performed by: OBSTETRICS & GYNECOLOGY

## 2018-12-27 PROCEDURE — 36000057 ZZH SURGERY LEVEL 3 1ST 30 MIN - UMMC: Performed by: OBSTETRICS & GYNECOLOGY

## 2018-12-27 PROCEDURE — 25000128 H RX IP 250 OP 636: Performed by: ANESTHESIOLOGY

## 2018-12-27 PROCEDURE — 36000059 ZZH SURGERY LEVEL 3 EA 15 ADDTL MIN UMMC: Performed by: OBSTETRICS & GYNECOLOGY

## 2018-12-27 PROCEDURE — 71000014 ZZH RECOVERY PHASE 1 LEVEL 2 FIRST HR: Performed by: OBSTETRICS & GYNECOLOGY

## 2018-12-27 PROCEDURE — 86850 RBC ANTIBODY SCREEN: CPT | Performed by: OBSTETRICS & GYNECOLOGY

## 2018-12-27 PROCEDURE — 00000155 ZZHCL STATISTIC H-CELL BLOCK W/STAIN: Performed by: OBSTETRICS & GYNECOLOGY

## 2018-12-27 PROCEDURE — 88112 CYTOPATH CELL ENHANCE TECH: CPT | Mod: 26 | Performed by: OBSTETRICS & GYNECOLOGY

## 2018-12-27 PROCEDURE — 12000001 ZZH R&B MED SURG/OB UMMC

## 2018-12-27 PROCEDURE — 88305 TISSUE EXAM BY PATHOLOGIST: CPT | Performed by: OBSTETRICS & GYNECOLOGY

## 2018-12-27 PROCEDURE — 37000008 ZZH ANESTHESIA TECHNICAL FEE, 1ST 30 MIN: Performed by: OBSTETRICS & GYNECOLOGY

## 2018-12-27 PROCEDURE — 40000170 ZZH STATISTIC PRE-PROCEDURE ASSESSMENT II: Performed by: OBSTETRICS & GYNECOLOGY

## 2018-12-27 PROCEDURE — 82565 ASSAY OF CREATININE: CPT | Performed by: ANESTHESIOLOGY

## 2018-12-27 PROCEDURE — 40000065 ZZH STATISTIC EKG NON-CHARGEABLE

## 2018-12-27 RX ORDER — ONDANSETRON 4 MG/1
4 TABLET, ORALLY DISINTEGRATING ORAL EVERY 30 MIN PRN
Status: DISCONTINUED | OUTPATIENT
Start: 2018-12-27 | End: 2018-12-27 | Stop reason: HOSPADM

## 2018-12-27 RX ORDER — SODIUM CHLORIDE, SODIUM LACTATE, POTASSIUM CHLORIDE, CALCIUM CHLORIDE 600; 310; 30; 20 MG/100ML; MG/100ML; MG/100ML; MG/100ML
INJECTION, SOLUTION INTRAVENOUS CONTINUOUS
Status: DISCONTINUED | OUTPATIENT
Start: 2018-12-27 | End: 2018-12-30 | Stop reason: HOSPADM

## 2018-12-27 RX ORDER — ONDANSETRON 2 MG/ML
4 INJECTION INTRAMUSCULAR; INTRAVENOUS EVERY 30 MIN PRN
Status: DISCONTINUED | OUTPATIENT
Start: 2018-12-27 | End: 2018-12-27 | Stop reason: HOSPADM

## 2018-12-27 RX ORDER — KETOROLAC TROMETHAMINE 30 MG/ML
15 INJECTION, SOLUTION INTRAMUSCULAR; INTRAVENOUS EVERY 6 HOURS
Status: CANCELLED | OUTPATIENT
Start: 2018-12-27 | End: 2018-12-28

## 2018-12-27 RX ORDER — NALOXONE HYDROCHLORIDE 0.4 MG/ML
.1-.4 INJECTION, SOLUTION INTRAMUSCULAR; INTRAVENOUS; SUBCUTANEOUS
Status: DISCONTINUED | OUTPATIENT
Start: 2018-12-27 | End: 2018-12-30 | Stop reason: HOSPADM

## 2018-12-27 RX ORDER — FENTANYL CITRATE 50 UG/ML
INJECTION, SOLUTION INTRAMUSCULAR; INTRAVENOUS PRN
Status: DISCONTINUED | OUTPATIENT
Start: 2018-12-27 | End: 2018-12-27

## 2018-12-27 RX ORDER — IBUPROFEN 600 MG/1
600 TABLET, FILM COATED ORAL EVERY 6 HOURS PRN
Status: DISCONTINUED | OUTPATIENT
Start: 2018-12-27 | End: 2018-12-30 | Stop reason: HOSPADM

## 2018-12-27 RX ORDER — LIDOCAINE 40 MG/G
CREAM TOPICAL
Status: DISCONTINUED | OUTPATIENT
Start: 2018-12-27 | End: 2018-12-27

## 2018-12-27 RX ORDER — PROPOFOL 10 MG/ML
INJECTION, EMULSION INTRAVENOUS PRN
Status: DISCONTINUED | OUTPATIENT
Start: 2018-12-27 | End: 2018-12-27

## 2018-12-27 RX ORDER — HYDROMORPHONE HYDROCHLORIDE 1 MG/ML
.3-.5 INJECTION, SOLUTION INTRAMUSCULAR; INTRAVENOUS; SUBCUTANEOUS EVERY 5 MIN PRN
Status: DISCONTINUED | OUTPATIENT
Start: 2018-12-27 | End: 2018-12-27 | Stop reason: HOSPADM

## 2018-12-27 RX ORDER — LIDOCAINE 40 MG/G
CREAM TOPICAL
Status: DISCONTINUED | OUTPATIENT
Start: 2018-12-27 | End: 2018-12-27 | Stop reason: HOSPADM

## 2018-12-27 RX ORDER — PHENAZOPYRIDINE HYDROCHLORIDE 200 MG/1
200 TABLET, FILM COATED ORAL ONCE
Status: COMPLETED | OUTPATIENT
Start: 2018-12-27 | End: 2018-12-27

## 2018-12-27 RX ORDER — DIPHENHYDRAMINE HYDROCHLORIDE 50 MG/ML
12.5 INJECTION INTRAMUSCULAR; INTRAVENOUS EVERY 6 HOURS PRN
Status: DISCONTINUED | OUTPATIENT
Start: 2018-12-27 | End: 2018-12-30 | Stop reason: HOSPADM

## 2018-12-27 RX ORDER — NALOXONE HYDROCHLORIDE 0.4 MG/ML
.1-.4 INJECTION, SOLUTION INTRAMUSCULAR; INTRAVENOUS; SUBCUTANEOUS
Status: DISCONTINUED | OUTPATIENT
Start: 2018-12-27 | End: 2018-12-27

## 2018-12-27 RX ORDER — ACETAMINOPHEN 325 MG/1
650 TABLET ORAL EVERY 4 HOURS PRN
Status: DISCONTINUED | OUTPATIENT
Start: 2018-12-30 | End: 2018-12-30 | Stop reason: HOSPADM

## 2018-12-27 RX ORDER — FENTANYL CITRATE 50 UG/ML
25-50 INJECTION, SOLUTION INTRAMUSCULAR; INTRAVENOUS
Status: DISCONTINUED | OUTPATIENT
Start: 2018-12-27 | End: 2018-12-27 | Stop reason: HOSPADM

## 2018-12-27 RX ORDER — ACETAMINOPHEN 325 MG/1
975 TABLET ORAL ONCE
Status: DISCONTINUED | OUTPATIENT
Start: 2018-12-27 | End: 2018-12-27 | Stop reason: HOSPADM

## 2018-12-27 RX ORDER — FLUTICASONE PROPIONATE 50 MCG
1-2 SPRAY, SUSPENSION (ML) NASAL DAILY PRN
Status: DISCONTINUED | OUTPATIENT
Start: 2018-12-27 | End: 2018-12-30 | Stop reason: HOSPADM

## 2018-12-27 RX ORDER — ONDANSETRON 2 MG/ML
4 INJECTION INTRAMUSCULAR; INTRAVENOUS EVERY 6 HOURS PRN
Status: DISCONTINUED | OUTPATIENT
Start: 2018-12-27 | End: 2018-12-30 | Stop reason: HOSPADM

## 2018-12-27 RX ORDER — ACETAMINOPHEN 325 MG/1
975 TABLET ORAL ONCE
Status: COMPLETED | OUTPATIENT
Start: 2018-12-27 | End: 2018-12-27

## 2018-12-27 RX ORDER — OXYCODONE HYDROCHLORIDE 5 MG/1
5-10 TABLET ORAL EVERY 4 HOURS PRN
Status: DISCONTINUED | OUTPATIENT
Start: 2018-12-27 | End: 2018-12-30 | Stop reason: HOSPADM

## 2018-12-27 RX ORDER — BUPIVACAINE HYDROCHLORIDE AND EPINEPHRINE 2.5; 5 MG/ML; UG/ML
INJECTION, SOLUTION INFILTRATION; PERINEURAL PRN
Status: DISCONTINUED | OUTPATIENT
Start: 2018-12-27 | End: 2018-12-27

## 2018-12-27 RX ORDER — HYDROMORPHONE HYDROCHLORIDE 1 MG/ML
.3-.5 INJECTION, SOLUTION INTRAMUSCULAR; INTRAVENOUS; SUBCUTANEOUS
Status: DISCONTINUED | OUTPATIENT
Start: 2018-12-27 | End: 2018-12-30 | Stop reason: HOSPADM

## 2018-12-27 RX ORDER — NALOXONE HYDROCHLORIDE 0.4 MG/ML
.1-.4 INJECTION, SOLUTION INTRAMUSCULAR; INTRAVENOUS; SUBCUTANEOUS
Status: DISCONTINUED | OUTPATIENT
Start: 2018-12-27 | End: 2018-12-27 | Stop reason: HOSPADM

## 2018-12-27 RX ORDER — SODIUM CHLORIDE, SODIUM LACTATE, POTASSIUM CHLORIDE, CALCIUM CHLORIDE 600; 310; 30; 20 MG/100ML; MG/100ML; MG/100ML; MG/100ML
INJECTION, SOLUTION INTRAVENOUS CONTINUOUS PRN
Status: DISCONTINUED | OUTPATIENT
Start: 2018-12-27 | End: 2018-12-27

## 2018-12-27 RX ORDER — DIPHENHYDRAMINE HCL 12.5MG/5ML
12.5 LIQUID (ML) ORAL EVERY 6 HOURS PRN
Status: DISCONTINUED | OUTPATIENT
Start: 2018-12-27 | End: 2018-12-30 | Stop reason: HOSPADM

## 2018-12-27 RX ORDER — ALBUTEROL SULFATE 90 UG/1
1-2 AEROSOL, METERED RESPIRATORY (INHALATION) EVERY 4 HOURS PRN
Status: DISCONTINUED | OUTPATIENT
Start: 2018-12-27 | End: 2018-12-30 | Stop reason: HOSPADM

## 2018-12-27 RX ORDER — AMOXICILLIN 250 MG
1 CAPSULE ORAL 2 TIMES DAILY
Status: DISCONTINUED | OUTPATIENT
Start: 2018-12-27 | End: 2018-12-30 | Stop reason: HOSPADM

## 2018-12-27 RX ORDER — SODIUM CHLORIDE, SODIUM LACTATE, POTASSIUM CHLORIDE, CALCIUM CHLORIDE 600; 310; 30; 20 MG/100ML; MG/100ML; MG/100ML; MG/100ML
INJECTION, SOLUTION INTRAVENOUS CONTINUOUS
Status: DISCONTINUED | OUTPATIENT
Start: 2018-12-27 | End: 2018-12-27 | Stop reason: HOSPADM

## 2018-12-27 RX ORDER — GABAPENTIN 300 MG/1
300 CAPSULE ORAL ONCE
Status: COMPLETED | OUTPATIENT
Start: 2018-12-27 | End: 2018-12-27

## 2018-12-27 RX ORDER — CEFAZOLIN SODIUM 2 G/100ML
2 INJECTION, SOLUTION INTRAVENOUS
Status: COMPLETED | OUTPATIENT
Start: 2018-12-27 | End: 2018-12-27

## 2018-12-27 RX ORDER — FLUMAZENIL 0.1 MG/ML
0.2 INJECTION, SOLUTION INTRAVENOUS
Status: DISCONTINUED | OUTPATIENT
Start: 2018-12-27 | End: 2018-12-27 | Stop reason: HOSPADM

## 2018-12-27 RX ORDER — CEFAZOLIN SODIUM 1 G/3ML
1 INJECTION, POWDER, FOR SOLUTION INTRAMUSCULAR; INTRAVENOUS SEE ADMIN INSTRUCTIONS
Status: DISCONTINUED | OUTPATIENT
Start: 2018-12-27 | End: 2018-12-27 | Stop reason: HOSPADM

## 2018-12-27 RX ORDER — EPHEDRINE SULFATE 50 MG/ML
INJECTION, SOLUTION INTRAMUSCULAR; INTRAVENOUS; SUBCUTANEOUS PRN
Status: DISCONTINUED | OUTPATIENT
Start: 2018-12-27 | End: 2018-12-27

## 2018-12-27 RX ORDER — DEXAMETHASONE SODIUM PHOSPHATE 4 MG/ML
INJECTION, SOLUTION INTRA-ARTICULAR; INTRALESIONAL; INTRAMUSCULAR; INTRAVENOUS; SOFT TISSUE PRN
Status: DISCONTINUED | OUTPATIENT
Start: 2018-12-27 | End: 2018-12-27

## 2018-12-27 RX ORDER — ONDANSETRON 4 MG/1
4 TABLET, ORALLY DISINTEGRATING ORAL EVERY 6 HOURS PRN
Status: DISCONTINUED | OUTPATIENT
Start: 2018-12-27 | End: 2018-12-30 | Stop reason: HOSPADM

## 2018-12-27 RX ORDER — ACETAMINOPHEN 325 MG/1
975 TABLET ORAL EVERY 8 HOURS
Status: COMPLETED | OUTPATIENT
Start: 2018-12-27 | End: 2018-12-30

## 2018-12-27 RX ORDER — ONDANSETRON 2 MG/ML
INJECTION INTRAMUSCULAR; INTRAVENOUS PRN
Status: DISCONTINUED | OUTPATIENT
Start: 2018-12-27 | End: 2018-12-27

## 2018-12-27 RX ORDER — HYDROCHLOROTHIAZIDE 25 MG/1
25 TABLET ORAL DAILY
Status: DISCONTINUED | OUTPATIENT
Start: 2018-12-28 | End: 2018-12-30 | Stop reason: HOSPADM

## 2018-12-27 RX ORDER — AMOXICILLIN 250 MG
2 CAPSULE ORAL 2 TIMES DAILY
Status: DISCONTINUED | OUTPATIENT
Start: 2018-12-27 | End: 2018-12-30 | Stop reason: HOSPADM

## 2018-12-27 RX ORDER — LOSARTAN POTASSIUM 25 MG/1
25 TABLET ORAL DAILY
Status: DISCONTINUED | OUTPATIENT
Start: 2018-12-28 | End: 2018-12-30 | Stop reason: HOSPADM

## 2018-12-27 RX ADMIN — DEXAMETHASONE SODIUM PHOSPHATE 4 MG: 4 INJECTION, SOLUTION INTRAMUSCULAR; INTRAVENOUS at 07:32

## 2018-12-27 RX ADMIN — BUPIVACAINE HYDROCHLORIDE AND EPINEPHRINE BITARTRATE 20 ML: 2.5; .005 INJECTION, SOLUTION INFILTRATION; PERINEURAL at 07:00

## 2018-12-27 RX ADMIN — RANITIDINE 150 MG: 150 TABLET ORAL at 21:19

## 2018-12-27 RX ADMIN — PHENYLEPHRINE HYDROCHLORIDE 100 MCG: 10 INJECTION, SOLUTION INTRAMUSCULAR; INTRAVENOUS; SUBCUTANEOUS at 07:55

## 2018-12-27 RX ADMIN — IBUPROFEN 600 MG: 600 TABLET ORAL at 20:09

## 2018-12-27 RX ADMIN — ACETAMINOPHEN 975 MG: 325 TABLET, FILM COATED ORAL at 23:41

## 2018-12-27 RX ADMIN — FENTANYL CITRATE 25 MCG: 50 INJECTION, SOLUTION INTRAMUSCULAR; INTRAVENOUS at 10:50

## 2018-12-27 RX ADMIN — SODIUM CHLORIDE, POTASSIUM CHLORIDE, SODIUM LACTATE AND CALCIUM CHLORIDE: 600; 310; 30; 20 INJECTION, SOLUTION INTRAVENOUS at 07:25

## 2018-12-27 RX ADMIN — SODIUM CHLORIDE, POTASSIUM CHLORIDE, SODIUM LACTATE AND CALCIUM CHLORIDE: 600; 310; 30; 20 INJECTION, SOLUTION INTRAVENOUS at 18:00

## 2018-12-27 RX ADMIN — PROPOFOL 50 MG: 10 INJECTION, EMULSION INTRAVENOUS at 07:34

## 2018-12-27 RX ADMIN — FENTANYL CITRATE 25 MCG: 50 INJECTION, SOLUTION INTRAMUSCULAR; INTRAVENOUS at 10:58

## 2018-12-27 RX ADMIN — ONDANSETRON 4 MG: 2 INJECTION INTRAMUSCULAR; INTRAVENOUS at 08:18

## 2018-12-27 RX ADMIN — PROPOFOL 100 MG: 10 INJECTION, EMULSION INTRAVENOUS at 07:31

## 2018-12-27 RX ADMIN — FENTANYL CITRATE 25 MCG: 50 INJECTION, SOLUTION INTRAMUSCULAR; INTRAVENOUS at 07:55

## 2018-12-27 RX ADMIN — BUPIVACAINE 20 ML: 13.3 INJECTION, SUSPENSION, LIPOSOMAL INFILTRATION at 07:00

## 2018-12-27 RX ADMIN — SENNOSIDES AND DOCUSATE SODIUM 2 TABLET: 8.6; 5 TABLET ORAL at 20:09

## 2018-12-27 RX ADMIN — SUGAMMADEX 130 MG: 100 INJECTION, SOLUTION INTRAVENOUS at 09:37

## 2018-12-27 RX ADMIN — CEFAZOLIN SODIUM 2 G: 2 INJECTION, SOLUTION INTRAVENOUS at 07:38

## 2018-12-27 RX ADMIN — GABAPENTIN 300 MG: 300 CAPSULE ORAL at 06:52

## 2018-12-27 RX ADMIN — FENTANYL CITRATE 25 MCG: 50 INJECTION, SOLUTION INTRAMUSCULAR; INTRAVENOUS at 10:40

## 2018-12-27 RX ADMIN — MIDAZOLAM HYDROCHLORIDE 0.5 MG: 1 INJECTION, SOLUTION INTRAMUSCULAR; INTRAVENOUS at 06:58

## 2018-12-27 RX ADMIN — IBUPROFEN 600 MG: 600 TABLET ORAL at 14:20

## 2018-12-27 RX ADMIN — PROPOFOL 20 MG: 10 INJECTION, EMULSION INTRAVENOUS at 09:42

## 2018-12-27 RX ADMIN — ACETAMINOPHEN 975 MG: 325 TABLET, FILM COATED ORAL at 06:52

## 2018-12-27 RX ADMIN — SODIUM CHLORIDE, POTASSIUM CHLORIDE, SODIUM LACTATE AND CALCIUM CHLORIDE: 600; 310; 30; 20 INJECTION, SOLUTION INTRAVENOUS at 23:42

## 2018-12-27 RX ADMIN — FLUTICASONE FUROATE AND VILANTEROL TRIFENATATE 1 PUFF: 100; 25 POWDER RESPIRATORY (INHALATION) at 18:00

## 2018-12-27 RX ADMIN — FENTANYL CITRATE 25 MCG: 50 INJECTION, SOLUTION INTRAMUSCULAR; INTRAVENOUS at 11:04

## 2018-12-27 RX ADMIN — SODIUM CHLORIDE, POTASSIUM CHLORIDE, SODIUM LACTATE AND CALCIUM CHLORIDE 500 ML: 600; 310; 30; 20 INJECTION, SOLUTION INTRAVENOUS at 20:09

## 2018-12-27 RX ADMIN — ACETAMINOPHEN 975 MG: 325 TABLET, FILM COATED ORAL at 16:09

## 2018-12-27 RX ADMIN — Medication 5 MG: at 07:52

## 2018-12-27 RX ADMIN — ROCURONIUM BROMIDE 50 MG: 10 INJECTION INTRAVENOUS at 07:32

## 2018-12-27 RX ADMIN — SODIUM CHLORIDE, POTASSIUM CHLORIDE, SODIUM LACTATE AND CALCIUM CHLORIDE: 600; 310; 30; 20 INJECTION, SOLUTION INTRAVENOUS at 09:47

## 2018-12-27 RX ADMIN — FENTANYL CITRATE 50 MCG: 50 INJECTION, SOLUTION INTRAMUSCULAR; INTRAVENOUS at 06:59

## 2018-12-27 RX ADMIN — PHENAZOPYRIDINE HYDROCHLORIDE 200 MG: 200 TABLET, FILM COATED ORAL at 06:52

## 2018-12-27 RX ADMIN — FENTANYL CITRATE 50 MCG: 50 INJECTION, SOLUTION INTRAMUSCULAR; INTRAVENOUS at 10:26

## 2018-12-27 ASSESSMENT — ACTIVITIES OF DAILY LIVING (ADL)
FALL_HISTORY_WITHIN_LAST_SIX_MONTHS: NO
TRANSFERRING: 0-->INDEPENDENT
RETIRED_EATING: 0-->INDEPENDENT
DRESS: 0-->INDEPENDENT
RETIRED_COMMUNICATION: 0-->UNDERSTANDS/COMMUNICATES WITHOUT DIFFICULTY
AMBULATION: 0-->INDEPENDENT
SWALLOWING: 0-->SWALLOWS FOODS/LIQUIDS WITHOUT DIFFICULTY
ADLS_ACUITY_SCORE: 14
ADLS_ACUITY_SCORE: 14
TOILETING: 0-->INDEPENDENT
BATHING: 0-->INDEPENDENT
COGNITION: 0 - NO COGNITION ISSUES REPORTED

## 2018-12-27 ASSESSMENT — MIFFLIN-ST. JEOR: SCORE: 1175

## 2018-12-27 NOTE — PROGRESS NOTES
Gynecology Progress Note    Subjective:  Patient is doing well this morning. She is resting comfortably in bed. Pain is well controlled with pain medications, improved from yesterday.  Ruiz is in place. Ambulating without lightheadedness or dizziness. Tolerating clear liquid diet without nausea or vomiting. Denies chest pain, SOB.      Objective:  Vitals:    18 1900 18 2259 18 2300 18 2356   BP: 142/63 125/64 125/64    BP Location: Left arm Left arm     Pulse: 104 83 86 71   Resp: 20 19  15   Temp:  99.9  F (37.7  C)  97.4  F (36.3  C)   TempSrc:  Oral  Oral   SpO2:  96%  96%   Weight:       Height:         General:  A&Ox3. NAD. Resting in bed  CV: Regular rate   Pulm: Normal respiratory effort.  Abd: Soft, non-distended. Appropriately tender. Incision with dressing in place C/D/I.   Ext: SCDs in place, no edema, warm, well-perfused    Assessment/Plan:  Yolande Hussein is a 68 year old  female who is POD#1 s/p JARETH, BSO, appendectomy, pelvic washings for suspected right ovarian mass, found to have an appendiceal mass.  Doing well postoperatively.     FEN: Wean IVFs, regular diet.   Pain: S/p TAP block.  Scheduled tylenol, PRN ibuprofen, oxycodone.  Breakthrough IV dilaudid. Unable to have toradol due to aspirin allergy.   CV: Hypertension, PTA lisinopril and hydrochlorothiazide to restart today.   Pulm: Encourage IS.  Asthma, continue PTA inhalers. Wean O2 as able.   Heme: Hgb 14.7 >EBL 20 mL> AM pending.   : Low UOP overnight, resolved with 500 ml bolus.  Plan to remove ruiz this morning and follow-up void.   GI: GERD, PTA zantac continued. ADAT, scheduled bowel regimen and PRN anti-emetics.   PPX: SCDs while in bed, encourage ambulation   Dispo: Discharge to home on POD#2-3 pending meeting postop goals    Mitzy Greer MD   OB/GYN PGY-4     I have seen and examined the patient without the resident. I have reviewed, edited, and agree with the note.   Appears well.   Abdomen:  soft, NT, ND.   Incision Bandage clean and dry  Hemoglobin   Date Value Ref Range Status   12/28/2018 13.5 11.7 - 15.7 g/dL Final   12/27/2018 14.7 11.7 - 15.7 g/dL Final     Reviewed surgical course and findings. Gen surg will not round today and plan will develop following pathology result.  Discussed possible options including: no further treatment needed, med onc consult, or surgical consult.     Patient states understanding and agreement.   Evelyne Yung MD

## 2018-12-27 NOTE — OR NURSING
PACU to Inpatient Nursing Handoff    Patient Yolande Hussein is a 68 year old female who speaks English.   Procedure Procedure(s):  TOTAL ABDOMINAL HYSTERECTOMY WITH BILATERAL SALPINGO-OOPHORECTOMY, appendectomy, pelvic washings   Surgeon(s) Primary: Evelyne Yung MD  Assisting: Sabas Vicente MD  Resident - Assisting: Mitzy Greer MD; Leandra Muñoz MD     Allergies   Allergen Reactions     Macrobid [Nitrofurantoin] Hives     Aspirin      Tight breathing     Macrobid [Nitrofuran Derivatives]        Isolation  [unfilled]     Past Medical History   has a past medical history of Asthma, Breast atypical ductal hyperplasia-- Right (6/13/2002), Dysphonia (9/8/2014), Eczema (10/15/2012), Gastro-oesophageal reflux disease, Hypertension, Kidney stone, Menopausal state -- age 49 (8/13/2012), Neck stiffness (11/3/2014), Nephrolithiasis (5/12/2014), Shoulder pain (3/30/2009), Thoracalgia (3/30/2009), TMJ (temporomandibular joint syndrome) (1/18/2012), Unilateral vocal fold paresis (11/3/2014), and Vaginal atrophy (8/13/2012).    Anesthesia Combined General with Block   Dermatome Level     Preop Meds acetaminophen (Tylenol) - time given: 652  phenazopyridine (Pyridium) - time given: 652   Nerve block Transversus abdominus plane (TAP).  Location:bilateral. Med:Exparel (liposomal bupivacaine). Time given: 0937   Intraop Meds dexamethasone (Decadron)  fentanyl (Sublimaze): 25 mcg total  ondansetron (Zofran): last given at 0818   Local Meds No   Antibiotics cefazolin (Ancef) - last given at 0738     Pain Patient Currently in Pain: no  Comfort: tolerable with discomfort  Pain Control: partially effective   PACU meds  fentanyl (Sublimaze): 150 mcg (total dose) last given at 1104    PCA / epidural No   Capnography Respiratory Monitoring (EtCO2): 44 mmHg  Integrated Pulmonary Index (IPI): 8-9   Telemetry ECG Rhythm: Normal sinus rhythm   Inpatient Telemetry Monitor Ordered? No        Labs Glucose Lab  Results   Component Value Date    GLC 85 11/28/2018       Hgb Lab Results   Component Value Date    HGB 14.7 12/27/2018       INR Lab Results   Component Value Date    INR 1.01 01/14/2014      PACU Imaging Not applicable     Wound/Incision Incision/Surgical Site 08/19/14 Bilateral Neck (Active)   Number of days: 1591       Incision/Surgical Site 12/27/18 Bilateral Abdomen (Active)   Incision Assessment UTV 12/27/2018 11:28 AM   Closure KOURTNEY 12/27/2018 11:28 AM   Incision Drainage Amount None 12/27/2018 11:28 AM   Dressing Intervention Clean, dry, intact 12/27/2018 11:28 AM   Number of days: 0      CMS        Equipment ice pack and capnography abd binder   Other LDA       IV Access Peripheral IV 12/27/18 Right;Dorsal Hand (Active)   Site Assessment WDL 12/27/2018 11:30 AM   Line Status Infusing 12/27/2018 11:30 AM   Phlebitis Scale 0-->no symptoms 12/27/2018 11:30 AM   Infiltration Scale 0 12/27/2018  6:30 AM   Dressing Intervention New dressing  12/27/2018  6:30 AM   Number of days: 0      Blood Products Not applicable EBL 20 mL   Intake/Output Date 12/27/18 0700 - 12/28/18 0659   Shift 9057-3794 8984-9022 2232-5536 24 Hour Total   INTAKE   P.O. 30   30   I.V. 1122   1122   Shift Total(mL/kg) 1152(17.19)   1152(17.19)   OUTPUT   Urine 210   210   Blood 20   20   Shift Total(mL/kg) 230(3.43)   230(3.43)   Weight (kg) 67 67 67 67      Drains / Ramsay Urethral Catheter Latex;Straight-tip 16 fr (Active)   Tube Description UT 12/27/2018 11:25 AM   Collection Container Standard 12/27/2018 11:25 AM   Securement Method Securing device (Describe) 12/27/2018 11:25 AM   Rationale for Continued Use /GI/GYN Pelvic Procedure 12/27/2018 11:25 AM   Urine Output 110 mL 12/27/2018 11:25 AM   Number of days: 0      Time of void PreOp Void Prior to Procedure: 0430 (12/27/18 0620)    PostOp      Diapered? No   Bladder Scan     PO 30 mL (12/27/18 1136)  tolerating sips     Vitals    B/P: 127/64  T: 97.7  F (36.5  C)    Temp src:  Axillary  P:  Pulse: 55 (12/27/18 1115)    Heart Rate: 54 (12/27/18 1130)     R: 14  O2:  SpO2: 100 %    O2 Device: Nasal cannula (12/27/18 1130)    Oxygen Delivery: 2 LPM (12/27/18 1130)         Family/support present significant other   Patient belongings     Patient transported on cart   DC meds/scripts (obs/outpt) Not applicable   Inpatient Pain Meds Released? Yes       Special needs/considerations None   Tasks needing completion None       Curly Anthony RN  ASCOM [id number]

## 2018-12-27 NOTE — BRIEF OP NOTE
[facility]  Brief Operative Note    Date of Surgery: 12/27/2018    Preop Dx:    - Right ovarian mass    Postop Dx:     - Appendiceal mass, concerning for carcinoid tumor     Procedure:   Total abdominal hysterectomy, bilateral salpingo-oophorectomy, appendectomy, pelvic washings    Surgeon:   Evelyne Yung MD    Intraoperative consult: Dr. Vicente (general surgery)    Assistants:   Mitzy Greer MD, PGY-4, Leandra Muñoz MD PGY-1     Anesthesia:  GETA, TAP block     EBL:  20 ml     IVF:  1000 ml crystalloid     UOP:  100 ml clear yellow urine     Drains: Ramsay catheter     Specimen: Uterus, cervix, left and right fallopian tubes and ovaries, appendix, pelvic washings     Complications: None apparent     Findings: On EUA, small, mobile uterus with fullness noted in right adnexa.  On laparotomy, approximately 5 mass noted in mid appendix with approximately 2 cm of normal appearing appendix at the base.  No significant ascites or tumor implants noted.  Normal appearing bilateral ovaries and fallopian tubes except post-surgical evidence of bilateral tubal ligation.  Normal appearing, small uterus.  Smooth liver edge except approximately <0.5 cm nodule palpated.  Normal appearing bowel otherwise except for approximately 1 cm likely diverticula.      Disposition:  Transferred in stable condition to the PACU    Mitzy Greer MD   Ob/Gyn, PGY-4    Evelyne Yung

## 2018-12-27 NOTE — PROGRESS NOTES
Gynecology Progress Note    Subjective:  Patient is resting comfortably in bed.  Complains of abdominal pain, does not feel like medications are helping.  Ramsay is in place. She denies any nausea/vomiting, chest pain, shortness of breath, or other systemic complaints.  Would like some water.     Objective:  Vitals:    18 1230 18 1245 18 1300 18 1315   BP: 141/65 143/64 131/65 131/55   BP Location:       Pulse: 79 74 72 71   Resp: 16 16 15 15   Temp:       TempSrc:       SpO2: 94% 93% 95% 91%   Weight:       Height:           General:  A&Ox3. NAD. Resting in bed  CV: RRR.  Pulm: CTAB. Normal respiratory effort.  Abd: Soft, non-distended. Appropriately tender. Incision with dressing in place C/D/I.   Ext: SCDs in place, no edema, warm, well-perfused    Assessment/Plan:  Yolande Hussein is a 68 year old  female who is POD#0 s/p JARETH, BSO, appendectomy, pelvic washings for suspected right ovarian mass, found to have an appendiceal mass.  Doing well in the immediate post-operative period.    FEN: LR at 100 ml/hr, ADAT.   Pain: S/p TAP block.  Scheduled tylenol, PRN ibuprofen, oxycodone.  Breakthrough IV dilaudid. Can change to PCA as needed. Unable to have toradol due to aspirin allergy.   CV: Hypertension, PTA lisinopril and hydrochlorothiazide ordered to restart POD#1.   Pulm: Encourage IS.  Asthma, continue PTA inhalers. Wean O2 as able.   Heme: Hgb 14.7 >EBL 20 mL> AM pending.   : Ramsay in place, remove AM of POD#1.   GI: GERD, PTA zantac continued. ADAT, scheduled bowel regimen and PRN anti-emetics.   PPX: SCDs while in bed, encourage ambulation   Dispo: Discharge to home on POD#2-3 pending meeting postop goals    Discussed intraoperative events and findings.  Dr. Vicente to stop by tomorrow to discuss as well.  All patient questions were answered.     Mitzy Greer MD  OBGYN PGY-4

## 2018-12-27 NOTE — OP NOTE
Gynecology Operative Report     Yolande Hussein   68 year old   [id number]     Pre-operative Diagnosis:    - Right ovarian mass     Post-operative Diagnosis:    - Appendiceal mass, concerning for carcinoid tumor     Procedure: Total abdominal hysterectomy, bilateral salpingo-oophorectomy, appendectomy, pelvic washings      Surgeon:  Evelyne Yung MD  Intraoperative Consults: Dr. Dwain Vicente     Assistants:    Mitzy Greer MD OB/GYN PGY-4        Leandra Muñoz MD OBGYN PGY1     Anesthesia:  GETA, TAP block       EBL:  20 ml      IVF:  1000 ml crystalloid     UOP:  100 ml clear yellow urine     Drains: Ramsay catheter     Specimens: Uterus, cervix, left and right fallopian tubes and ovaries     Complications:  None apparent     Indications:  Ms. Yolande Hussein is a 68 year old  who was noted to have an approximately 5 cm right ovarian cyst on imaging for work-up of abdominal pain.  Pre-operative tumor markers were normal.  She was counseled regarding possible management options and desired to proceed with the above listed procedure. The risks, benefits, and alternatives of surgery were discussed, and she agreed to proceed.  Informed consent was signed.      Operative Findings:   On EUA, small, mobile uterus with fullness noted in right adnexa.  On laparotomy, approximately 5 cm mass noted in mid appendix with approximately 2 cm of normal appearing appendix at the base.  No significant ascites or tumor implants noted. Normal appearing bilateral ovaries and fallopian tubes except post-surgical evidence of bilateral tubal ligation.  Normal appearing, small uterus.  Smooth liver edge except approximately <0.5 cm nodule palpated.  Normal appearing bowel otherwise except for approximately 1 cm likely diverticulum.     Operative Procedure:  The patient was taken to the operating room and placed in supine position. General anesthetic was administered without noted difficulties. Patient was then  placed in dorsal lithotomy position using Rajan stirrups. An exam under anesthesia was performed.  She received IV ancef for fabrice-operative antibiotics and had SCDs in place. The patient was then prepped and draped in standard fashion. Ramsay catheter was placed under sterile technique. A safety timeout was performed.  A pfannenstiel skin incision was made approximately 2 fingerbreadths above the pubic symphysis.  The incision was carried through the subcutaneous tissues using cautery.  The fascia was then incised and extended with cautery. Kocher clamps were used to elevated the superior edge of the fascia and the rectus muscles were dissected from the fascia using sharp and blunt dissection.  The same was done on the inferior edge of the fascia to the level of the pubic symphysis.  The peritoneum was bluntly entered and extended using digital pressure. A large Reagan-O retractor was placed and the bowels were packed away with moist laparotomy sponges.  An abdominal survey was then performed.     An approximately 5 cm mass was noted in the right adnexa. Upon further exploration, the mass was found to be attached to the cecum, consistent with an appendiceal mass concerning for carcinoid tumor. There was approximately 2 cm of normal appearing appendix at the base of the mass. The upper abdomen was explored and a small anterior liver nodule measuring <0.5 cm was palpated. At this point, general surgery was asked to assist and Dr. Vicente arrived to perform the appendectomy. The mesosalpinx containing the appendiceal artery was isolated, clamped, cut and tied with 0 Vicryl. The appendix was then double clamped, cut and tied with 0 Vicryl. The mucosa of the appendiceal stump was then cauterized. A pursestring stitch of 0 Vicryl was then used to invert the appendiceal stump. Please see Dr. Vicente's note for complete description of the procedure.     Telephone call was made to patient's  to discuss findings and to  discuss whether or not to proceed with JARETH BSO given intraoperative findings. After some discussion he endorsed moving forward with original procedure.       Attention was then turned to the pelvis. The uterus was elevated to the level of the skin incision.  The right and left cornua were grasped with a clamp. The left round ligament was identified and elevated with an Allis clamp. The round ligament was suture ligated with 0 Vicryl and transected with cautery. The posterior leaf of the broad ligament was transected. The anterior leaf of the broad ligament was then transected to the level of the vesicoperitoneal reflection in order to start bladder flap development. The ureter was identified along the medial leaf of the broad ligament. A window was made in the peritoneum below the IP ligament and above the ureter. The IP ligament was then doubly clamped and ligated. The left uterine artery was then skeletonized. The uterine artery was then clamped, cut, and suture ligated with 0 Vicryl.         Attention was then turned to the right round ligament which was grasped with an Allis clamp and suture ligated with 0 Vicryl. The right round ligament was transected with cautery and the incision was extended to open the anterior broad ligament to connect to the incision to continue bladder flap development. The ureter was identified along the medial leaf of the broad ligament. A window was made in the peritoneum below the IP ligament and above the ureter. The IP ligament was then doubly clamped and ligated. The right uterine artery was skeletonized.  The right uterine artery was then clamped, cut, and suture ligated with 0 Vicryl.  The bilateral cardinal ligaments were next clamped and cut and suture ligated with 0 Vicryl followed by the uterosacral ligaments, first on the left then the right. After a series of pedicles and ensuring that the bladder was well below the cervix, curved Zeppelin clamps were placed immediately  inferior to the level of the cervix.  The Fady scissors were used to perform the colpotomy and the specimen was amputated with complete resection of the cervix.  The apices were suture ligated to include the uterosacral ligaments with Christa stitches of 0 Vicryl.  The remainder of the vaginal cuff was reapproximated with two figure of X sutures of 0 Vicryl.  Judicious use of cautery was used to control oozing.       The pelvis was irrigated with warm saline.  All pedicles were noted to be hemostatic.  The Reagan-O retractor and moist laparotomy sponges were removed from the abdomen. A closing tray was then utilized.  The fascia was closed with a running suture of 0 Vicryl.  The subcutaneous tissues were irrigated and areas of oozing were controlled with cautery. The subcutaneous tissue was approximated with a running stitch of 3-0 Vicryl.The skin was closed with a subcuticular stitch of 4-0 Vicryl. The incision was covered with steri-strips and an island dressing.       Sponge, lap, instrument and needle counts were reported as correct x 2. The patient was extubated and transferred to the PACU in stable condition.  Dr. Yung was present for the entire procedure.     Leandra Muñoz MD  Obstetrics and Gynecology, PGY1  12/27/2018 10:22 AM    I was present and scrubbed through fascial closure and immediately available thereafter.   I have reviewed and edited the note.  Evelyne Yung

## 2018-12-27 NOTE — PLAN OF CARE
VS: Temp: 97.4  F (36.3  C) Temp src: Oral BP: 140/64 Pulse: 77 Heart Rate: 64 Resp: 15 SpO2: 92 % O2 Device: Nasal cannula Oxygen Delivery: 2 LPM    O2: Nasal Cannula 2 LPM   Output: Ramsay in place, patent and draining.    Last BM: 12/26/2018   Activity: Sleepy when arrived to the floor, stayed in bed.    Skin: Intact except for incisions   Pain: Tolerable with prn ibuprofen   CMS: Intact   Dressing: Dressing CDI over incisions   Diet: Clear liquid   LDA: PIV infusing   Equipment: IV pump and pole, capnography, PCDs, abdominal binder   Plan: Continue to monitor   Additional Info: Low tolerance for narcotics

## 2018-12-27 NOTE — OP NOTE
Procedure Date: 12/27/2018      PREOPERATIVE DIAGNOSIS:  Ovarian cyst.        POSTOPERATIVE DIAGNOSIS:  Appendiceal mass.        GYNECOLOGIST:   Evelyne Yung MD      SURGEON:  Sabas Vicente MD      ANESTHESIA:  General.      ESTIMATED BLOOD LOSS:  Less than 5 mL.      SPECIMENS:  Appendix.      DRAINS:  None.      COMPLICATIONS:  None.      OPERATIVE FINDINGS:  Large 2 x 4 cm distal appendiceal cystic mass, possible carcinoid.      OPERATIVE PROCEDURE:  This 68-year-old female was undergoing a hysterectomy and had diagnosis of probable ovarian cyst on the right side.  Please see Dr. Yung's operative note.  She had already made an incision and had a wound protector in place and, under exploration of the abdomen, noted that the cyst was actually a large appendiceal mass.        An intraoperative consultation was done.  We discussed with the patient's , as the patient was already under general anesthetic, that this mass in most cases would be electively removed.  He agreed to that urgent consenting process.      As the cecum was mobile and exposed within the wound, with 2 mosquito clamps I dissected the mesoappendix near the base of the ileum, tied it in continuity x2 and then  it.  There were no masses or nodes within the mesoappendix.  Then, with the cautery, I came across the remainder of the mesoappendix at the base of the appendix.  The mass was in the very distal tip of the appendix.  The base of the appendix was roughly 4-5 cm and was grossly normal and quite small.  At this point, I ligated the base of the appendix, transected the appendix, passed the specimen off in its entirety intact, cauterized the mucosa of the appendiceal tip and then dunked the tip with a pursestring of 3-0 Vicryl.  I confirmed hemostasis in the field.  There were no masses or large nodes within the mesocolon.  Potentially, there was a small nodule palpable on the liver.      PLAN:  Confirm pathology of this lesion.   If it is an adenocarcinoma or other mass, would discuss with the patient back in clinic and refer here to an adult oncologic surgeon and potentially consider right colectomy or further therapy at that time depending on the pathologic diagnosis of this specimen.        Again, please see remainder of Dr. Yung's note as she then moved forward with the remainder of her operation.         KRZYSZTOF HARDEN MD             D: 2018   T: 2018   MT: BERYL      Name:     YI MINAYA   MRN:      [id number]        Account:        [id number]   :      1950           Procedure Date: 2018      Document: [id number]       cc: [facility] Surgery Billing

## 2018-12-27 NOTE — ANESTHESIA CARE TRANSFER NOTE
Patient: Yolande Hussein    Procedure(s):  TOTAL ABDOMINAL HYSTERECTOMY WITH BILATERAL SALPINGO-OOPHORECTOMY, appendectomy, pelvic washings    Diagnosis: Ovarian Mass and Menopause  Diagnosis Additional Information: No value filed.    Anesthesia Type:   No value filed.     Note:  Airway :Face Mask  Patient transferred to:PACU  Comments: Regular respirations and patent airway. VSS. IV patent and infusing. Pt resting comfortably. Report given to RN  Handoff Report: Identifed the Patient, Identified the Reponsible Provider, Reviewed the pertinent medical history, Discussed the surgical course, Reviewed Intra-OP anesthesia mangement and issues during anesthesia, Set expectations for post-procedure period and Allowed opportunity for questions and acknowledgement of understanding      Vitals: (Last set prior to Anesthesia Care Transfer)    CRNA VITALS  12/27/2018 0929 - 12/27/2018 1004      12/27/2018             NIBP:  124/58    Pulse:  62                Electronically Signed By: ESTRADA Fan CRNA  December 27, 2018  10:04 AM

## 2018-12-27 NOTE — ANESTHESIA PROCEDURE NOTES
Peripheral Nerve Block Procedure Note    Staff:     Anesthesiologist:  Paz Caraballo MD  Location: Pre-op  Procedure Start/Stop TImes:      12/27/2018 6:58 AM     12/27/2018 7:05 AM    patient identified, IV checked, site marked, risks and benefits discussed, informed consent, monitors and equipment checked, pre-op evaluation, at physician/surgeon's request and post-op pain management      Correct Patient: Yes      Correct Position: Yes      Correct Site: Yes      Correct Procedure: Yes      Correct Laterality:  Yes    Site Marked:  Yes  Procedure details:     Procedure:  TAP    Laterality:  Bilateral    Position:  Supine    Sterile Prep: chloraprep, mask and sterile gloves      Local skin infiltration:  None    Needle:  Insulated and short bevel    Needle gauge:  22    Needle length (inches):  4    Ultrasound: Yes      Ultrasound used to identify targeted nerve, plexus, or vascular structure and placed a needle adjacent to it      Permanent Image entered into patiient's record      Abnormal pain on injection: No      Blood Aspirated: No      Paresthesias:  No    Bleeding at site: No      Bolus via:  Needle    Infusion Method:  Single Shot    Complications:  None  Assessment/Narrative:     Injection made incrementally with aspirations every (mL):  5

## 2018-12-27 NOTE — DISCHARGE SUMMARY
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 Yolande Hussein is a 68 year old  who was noted to have an approximately 5 cm right ovarian cyst on imaging for work-up of abdominal pain.  Pre-operative tumor markers were normal.  She was counseled regarding possible management options and desired to proceed with the above listed procedure. The risks, benefits, and alternatives of surgery were discussed, and she agreed to proceed.  Informed consent was signed.     Operative Course:  She underwent JARETH, BSO, appendectomy, pelvic washings, which was uncomplicated. EBL was 20 cc. See operative report for further details.    Operative Findings:   On EUA, small, mobile uterus with fullness noted in right adnexa.  On laparotomy, approximately 5 mass noted in mid appendix with approximately 2 cm of normal appearing appendix at the base.  No significant ascites or tumor implants noted.  Normal appearing bilateral ovaries and fallopian tubes except post-surgical evidence of bilateral tubal ligation.  Normal appearing, small uterus.  Smooth liver edge except approximately <0.5 cm nodule palpated.  Normal appearing bowel otherwise except for approximately 1 cm likely diverticula.      Hospital Course:  Her postoperative course was uncomplicated. She was initially maintained on IV fluids with PO pain medications and ruiz  catheter in place. On POD#1, diet was advanced and ruiz catheter was removed. On POD#3 she was tolerating regular diet, ambulating without difficulty, voiding spontaneously, and controlling pain with PO medications, and she was deemed stable for discharge. Hemoglobin at the time of discharge was 13.5. She did require duonebs for some tight breathing that she related to a developing URI and her asthma. The neb treatments improved her breathing    Discharge Plans:  - The patient was discharged to home  - PO Activity:   - No lifting >15 lbs or strenuous exercise for 6 weeks   - No driving while taking narcotics or until you can slam on the breaks without pain  - She was instructed to call or return to ED if she has any of the following:    - Temperature greater than 100.4F   - Pain not controlled by pain medications   - Uncontrolled nausea/vomiting   - Foul-smelling vaginal discharge   - Vaginal bleeding soaking 1 pad per hour for 2 hours in a row  - She will follow up with Dr. Yung on 2/1/19 at 1:15 pm     - Discharge medications include:  -       Review of your medicines      START taking      Dose / Directions   oxyCODONE 5 MG tablet  Commonly known as:  ROXICODONE  Used for:  S/P abdominal hysterectomy      Dose:  5-10 mg  Take 1-2 tablets (5-10 mg) by mouth every 4 hours as needed for moderate to severe pain  Quantity:  10 tablet  Refills:  0     senna-docusate 8.6-50 MG tablet  Commonly known as:  SENOKOT-S/PERICOLACE  Used for:  S/P abdominal hysterectomy      Dose:  1 tablet  Take 1 tablet by mouth 2 times daily  Quantity:  60 tablet  Refills:  0        CONTINUE these medicines which may have CHANGED, or have new prescriptions. If we are uncertain of the size of tablets/capsules you have at home, strength may be listed as something that might have changed.      Dose / Directions   desonide 0.05 % external cream  Commonly known as:  DESOWEN  This may have changed:  additional instructions  Used for:   Eczema      Apply to breast BID  Quantity:  30 g  Refills:  0     fluticasone 50 MCG/ACT nasal spray  Commonly known as:  FLONASE  This may have changed:      when to take this    reasons to take this  Used for:  Rhinitis, chronic      Dose:  1-2 spray  Spray 1-2 sprays into both nostrils daily  Quantity:  1 Package  Refills:  11     ibuprofen 600 MG tablet  Commonly known as:  ADVIL/MOTRIN  This may have changed:      medication strength    how much to take    when to take this    reasons to take this  Used for:  S/P abdominal hysterectomy      Dose:  600 mg  Take 1 tablet (600 mg) by mouth every 6 hours as needed for moderate pain  Quantity:  30 tablet  Refills:  0        CONTINUE these medicines which have NOT CHANGED      Dose / Directions   ACETAMINOPHEN EXTRA STRENGTH 500 MG tablet  Generic drug:  acetaminophen      Dose:  500-1000 mg  Take 500-1,000 mg by mouth every 6 hours as needed for mild pain  Refills:  0     adapalene 0.1 % external gel  Commonly known as:  DIFFERIN  Used for:  Acne vulgaris      APPLY TOPICALLY AT BEDTIME AS DIRECTED.  Quantity:  45 g  Refills:  3     * albuterol (2.5 MG/3ML) 0.083% neb solution  Commonly known as:  PROVENTIL  Used for:  Moderate persistent asthma without complication      Dose:  2.5 mg  Take 1 vial (2.5 mg) by nebulization every 6 hours as needed for shortness of breath / dyspnea  Quantity:  100 vial  Refills:  3     * albuterol 108 (90 Base) MCG/ACT inhaler  Commonly known as:  PROAIR HFA/PROVENTIL HFA/VENTOLIN HFA  Used for:  Moderate persistent asthma without complication      Inhale 2 puffs into the lungs every 6 hrs prn  Quantity:  3 Inhaler  Refills:  1     calcium carbonate 600 mg-vitamin D 400 units 600-400 MG-UNIT per tablet  Commonly known as:  CALTRATE      Dose:  1 tablet  Take 1 tablet by mouth daily  Refills:  0     fluticasone-salmeterol 113-14 MCG/ACT inhaler  Commonly known as:  AIRDUO RESPICLICK  Used for:  Moderate persistent asthma, unspecified  whether complicated      Dose:  1 puff  Inhale 1 puff into the lungs 2 times daily  Quantity:  3 Inhaler  Refills:  3     hydrochlorothiazide 25 MG tablet  Commonly known as:  HYDRODIURIL  Used for:  Benign essential hypertension      Dose:  25 mg  Take 1 tablet (25 mg) by mouth daily  Quantity:  90 tablet  Refills:  3     losartan 25 MG tablet  Commonly known as:  COZAAR  Used for:  Benign essential hypertension      Dose:  25 mg  Take 1 tablet (25 mg) by mouth daily  Quantity:  90 tablet  Refills:  3     LUTEIN 20 PO      Refills:  0     omega 3 1000 MG Caps      Dose:  2 capsule  Take 2 capsules by mouth daily  Refills:  0     ranitidine 150 MG tablet  Generic drug:  ranitidine      Dose:  150 mg  Take 150 mg by mouth as needed Take every evening  Refills:  0     SOOTHE OP      Apply to eye as needed  Refills:  0     vitamin D 1000 units capsule      Dose:  1 capsule  Take 1 capsule by mouth daily.  Refills:  0         * This list has 2 medication(s) that are the same as other medications prescribed for you. Read the directions carefully, and ask your doctor or other care provider to review them with you.               Where to get your medicines      These medications were sent to [facility] [address]  [address] [place] [address], [facility] [address]    Phone:  [phone]     ibuprofen 600 MG tablet    senna-docusate 8.6-50 MG tablet     Some of these will need a paper prescription and others can be bought over the counter. Ask your nurse if you have questions.    Bring a paper prescription for each of these medications    oxyCODONE 5 MG tablet             If pathology returns carcinoid- needs follow up appt with Dr. Solorzano. Pathology pending at time of discharge,. Note sent to [facility] RN.       Billie Mariee MD   PGY-3 Ob/Gyn    Appreciate note by Dr. Mariee. Patient has been seen and examined by me separate from the resident, agree with above note.     Deyanira Rodriguez  MD Ml  5:18 AM

## 2018-12-28 LAB
COPATH REPORT: NORMAL
GLUCOSE BLDC GLUCOMTR-MCNC: 94 MG/DL (ref 70–99)
HGB BLD-MCNC: 13.5 G/DL (ref 11.7–15.7)

## 2018-12-28 PROCEDURE — 85018 HEMOGLOBIN: CPT | Performed by: OBSTETRICS & GYNECOLOGY

## 2018-12-28 PROCEDURE — 12000001 ZZH R&B MED SURG/OB UMMC

## 2018-12-28 PROCEDURE — 25000132 ZZH RX MED GY IP 250 OP 250 PS 637: Mod: GY | Performed by: OBSTETRICS & GYNECOLOGY

## 2018-12-28 PROCEDURE — 00000146 ZZHCL STATISTIC GLUCOSE BY METER IP

## 2018-12-28 PROCEDURE — 36416 COLLJ CAPILLARY BLOOD SPEC: CPT | Performed by: OBSTETRICS & GYNECOLOGY

## 2018-12-28 PROCEDURE — A9270 NON-COVERED ITEM OR SERVICE: HCPCS | Mod: GY | Performed by: OBSTETRICS & GYNECOLOGY

## 2018-12-28 RX ORDER — BISACODYL 10 MG
10 SUPPOSITORY, RECTAL RECTAL DAILY PRN
Status: DISCONTINUED | OUTPATIENT
Start: 2018-12-28 | End: 2018-12-30 | Stop reason: HOSPADM

## 2018-12-28 RX ORDER — IBUPROFEN 600 MG/1
600 TABLET, FILM COATED ORAL EVERY 6 HOURS PRN
Qty: 30 TABLET | Refills: 0 | Status: SHIPPED | OUTPATIENT
Start: 2018-12-28 | End: 2019-01-27

## 2018-12-28 RX ORDER — SIMETHICONE 80 MG
80 TABLET,CHEWABLE ORAL EVERY 6 HOURS PRN
Status: DISCONTINUED | OUTPATIENT
Start: 2018-12-28 | End: 2018-12-30 | Stop reason: HOSPADM

## 2018-12-28 RX ORDER — AMOXICILLIN 250 MG
1 CAPSULE ORAL 2 TIMES DAILY
Qty: 60 TABLET | Refills: 0 | Status: SHIPPED | OUTPATIENT
Start: 2018-12-28 | End: 2019-02-01

## 2018-12-28 RX ADMIN — ALBUTEROL SULFATE 2 PUFF: 90 AEROSOL, METERED RESPIRATORY (INHALATION) at 21:00

## 2018-12-28 RX ADMIN — HYDROCHLOROTHIAZIDE 25 MG: 25 TABLET ORAL at 09:01

## 2018-12-28 RX ADMIN — ACETAMINOPHEN 975 MG: 325 TABLET, FILM COATED ORAL at 16:38

## 2018-12-28 RX ADMIN — LOSARTAN POTASSIUM 25 MG: 25 TABLET, FILM COATED ORAL at 09:01

## 2018-12-28 RX ADMIN — IBUPROFEN 600 MG: 600 TABLET ORAL at 21:01

## 2018-12-28 RX ADMIN — SENNOSIDES AND DOCUSATE SODIUM 2 TABLET: 8.6; 5 TABLET ORAL at 09:01

## 2018-12-28 RX ADMIN — ACETAMINOPHEN 975 MG: 325 TABLET, FILM COATED ORAL at 09:01

## 2018-12-28 RX ADMIN — SENNOSIDES AND DOCUSATE SODIUM 2 TABLET: 8.6; 5 TABLET ORAL at 21:02

## 2018-12-28 RX ADMIN — FLUTICASONE FUROATE AND VILANTEROL TRIFENATATE 1 PUFF: 100; 25 POWDER RESPIRATORY (INHALATION) at 09:00

## 2018-12-28 RX ADMIN — IBUPROFEN 600 MG: 600 TABLET ORAL at 05:06

## 2018-12-28 RX ADMIN — RANITIDINE 150 MG: 150 TABLET ORAL at 21:02

## 2018-12-28 ASSESSMENT — ACTIVITIES OF DAILY LIVING (ADL)
ADLS_ACUITY_SCORE: 12

## 2018-12-28 NOTE — PLAN OF CARE
Patient is alert and oriented.  Ramsay removed this morning and is voiding good amounts spontaneously with low PVRs.  Dressing is CDI.  Patient gets up with SBA to use bathroom.  Is having scant amount of bleeding on her pad.  Pain is being controlled with Tylenol.  PIV is SL.  O2 weaned down to 1L.  VSS.  Call light is within reach.  Is able to make needs known.  Will continue with plan of care.

## 2018-12-28 NOTE — PLAN OF CARE
VS:   Temp: 99.5  F (37.5  C) Temp src: Oral BP: 142/63 Pulse: 104 Heart Rate: 64 Resp: 20 SpO2: 94 % O2 Device: None (Room air) Oxygen Delivery: 2 LPM  Lung sounds clear  Denies chest pain/shortness of breath   Output:   Ramsay catheter is patent - urine output low at start of shift - improved with bolus administration.    Activity:   Ambulated in wei X1 with stand by assist.    Skin: Abdominal incision - otherwise intact.    Pain:   Pain well managed with PRN ibuprofen and scheduled tylenol.    Neuro/CMS:   Intact. Denies numbness/tingling. Alert and oriented X4.    Dressing(s):   Dressing to abdominal incision is clean/dry/intact.    Diet:   Tolerating clear liquids - denies nausea/vomiting.    LDA:   PIV infusing LR at 100mL/hr.    Equipment:   Capnography monitor, IV pump/pole, PCD's    Plan:   Discharge plan to be determined.   Additional Info:   Able to make needs known. Will continue with plan of care.

## 2018-12-28 NOTE — PLAN OF CARE
A&O x4. CMS and Neuros intact. VSS. Pain well managed overnight with scheduled Tylenol. Pt denied nausea, SOB, numbness/tingling in extremities or any other new concerns overnight. Lung sounds clear and equal bilateral. Pt is SBA with ambulation. Dressing to abdomen is CDI. Pt had ruiz which was patent and draining good amounts overnight. Ruiz removed this am. No gas per pt report. No Bm this shift. Bowel sounds hypoactive. IV in right hand is patent and infusing LR at 100 mL/hr. Pt slept majority of shift. Call light within reach and pt able to make needs known.

## 2018-12-28 NOTE — ANESTHESIA PREPROCEDURE EVALUATION
Anesthesia Pre-Procedure Evaluation    Patient: Yolande Hussein   MRN:     [id number] Gender:   female   Age:    68 year old :      1950        Preoperative Diagnosis: Ovarian Mass and Menopause   Procedure(s):  TOTAL ABDOMINAL HYSTERECTOMY WITH BILATERAL SALPINGO-OOPHORECTOMY, appendectomy, pelvic washings     Past Medical History:   Diagnosis Date     Asthma      Breast atypical ductal hyperplasia-- Right 2002    Lumpectomy.  Problem list name updated by automated process. Provider to review and confirm     Dysphonia 2014     Eczema 10/15/2012     Gastro-oesophageal reflux disease      Hypertension      Kidney stone      Menopausal state -- age 49 2012    With NEGATIVE HR HPV neg on Pap today, there is NO further need for routine pap screens.        Neck stiffness 11/3/2014     Nephrolithiasis 2014     Shoulder pain 3/30/2009    Controlled with stretching, unless lapses or overworks 2012      Thoracalgia 3/30/2009     TMJ (temporomandibular joint syndrome) 2012    Jaw PT and Mouth guard helping! 2012      Unilateral vocal fold paresis 11/3/2014     Vaginal atrophy 2012    Estrogen loss with increasing sxs on Vagifem 10 mcg.  Increase from 2x/wk to 3xs/wk.       Past Surgical History:   Procedure Laterality Date     BREAST BIOPSY, CORE RT/LT      R-- precancer cells     COLONOSCOPY       EXPLORE NECK N/A 2014    Procedure: EXPLORE NECK;  Surgeon: Alea Jacobs MD;  Location: [facility] OR     EXTRACORPOREAL SHOCK WAVE LITHOTRIPSY (ESWL)  2014    Procedure: EXTRACORPOREAL SHOCK WAVE LITHOTRIPSY (ESWL);  Left Kidney Extracorporeal Shockwave Lithotripsy;  Surgeon: Sabas Choi MD;  Location: [facility] OR     LUMPECTOMY BREAST  age 48    R -- margins clear -- declined tamoxofen     PARATHYROIDECTOMY N/A 2014    Procedure: PARATHYROIDECTOMY;  Surgeon: Alea Jacobs MD;  Location: [facility] OR     STERILIZATION      PP b/4 discharge      TUBAL LIGATION   "               JZG [facility] AN PHYSICAL EXAM    Lab Results   Component Value Date    WBC 7.4 12/27/2018    HGB 14.7 12/27/2018    HCT 44.2 12/27/2018     12/27/2018     11/28/2018    POTASSIUM 3.4 12/27/2018    CHLORIDE 102 11/28/2018    CO2 33 (H) 11/28/2018    BUN 7 11/28/2018    CR 0.69 12/27/2018    GLC 85 11/28/2018    CARLY 8.9 11/28/2018    PHOS 3.4 04/22/2014    MAG 1.8 12/27/2006    ALBUMIN 3.9 04/22/2014    PROTTOTAL 7.3 01/14/2014    ALT 30 01/14/2014    AST 21 01/14/2014    ALKPHOS 78 01/14/2014    BILITOTAL 0.6 01/14/2014    LIPASE 58 01/14/2014    PTT 24 01/14/2014    INR 1.01 01/14/2014    TSH 1.67 11/27/2013       Preop Vitals  BP Readings from Last 3 Encounters:   12/27/18 125/64   12/05/18 156/80   11/26/18 130/78    Pulse Readings from Last 3 Encounters:   12/27/18 71   12/05/18 106   11/26/18 82      Resp Readings from Last 3 Encounters:   12/27/18 15   06/18/18 16   09/13/16 16    SpO2 Readings from Last 3 Encounters:   12/27/18 96%   06/18/18 97%   09/13/16 96%      Temp Readings from Last 1 Encounters:   12/27/18 36.3  C (97.4  F) (Oral)    Ht Readings from Last 1 Encounters:   12/27/18 1.626 m (5' 4\")      Wt Readings from Last 1 Encounters:   12/27/18 67 kg (147 lb 11.3 oz)    Estimated body mass index is 25.35 kg/m  as calculated from the following:    Height as of this encounter: 1.626 m (5' 4\").    Weight as of this encounter: 67 kg (147 lb 11.3 oz).     LDA:  Peripheral IV 12/27/18 Right;Dorsal Hand (Active)   Site Assessment WDL 12/27/2018  5:41 PM   Line Status Infusing 12/27/2018  5:41 PM   Phlebitis Scale 0-->no symptoms 12/27/2018  5:41 PM   Infiltration Scale 0 12/27/2018  5:41 PM   Extravasation? No 12/27/2018  5:41 PM   Dressing Intervention New dressing  12/27/2018  6:30 AM   Number of days: 1            Assessment:   ASA SCORE: 2    NPO Status: > 6 hours since completed Solid Foods   Documentation: H&P complete; Preop Testing complete; Consents complete   Proceeding: " Proceed without further delay  Tobacco Use:  NO Active use of Tobacco/UNKNOWN Tobacco use status     Plan:   Anes. Type:  General; Regional     RA Details:  FOR POSTOP PAIN CONTROL; Exparel; SS     RA-Location/Type: TAP; Plane Block   Pre-Induction: Acetaminophen PO   Induction:  IV (Standard)   Airway: Oral ETT   Access/Monitoring: PIV   Maintenance: Balanced   Emergence: Procedure Site   Logistics: Same Day Surgery     Postop Pain/Sedation Strategy:  Standard-Options: Opioids PRN     PONV Management:  Adult Risk Factors: Female, Non-Smoker, Postop Opioids  Prevention: Ondansetron; Dexamethasone     CONSENT: Direct conversation   Plan and risks discussed with: Patient   Blood Products: Consented (ALL Blood Products)                         Paz Caraballo MD

## 2018-12-28 NOTE — PROGRESS NOTES
"Brief Gyn Progress Note     Patient seen at bedside.  Pain is tolerable with medications.  She is tolerating regular diet.  Feeling a little weak when walking.  Voiding spontaneously s/p ruiz.  Passed flatus once.  No other concerns.  Discussed possible discharge tomorrow.  All patient questions were answered.      Vital signs:  Temp: 99.5  F (37.5  C) Temp src: Oral BP: 134/62 Pulse: 90 Heart Rate: 69 Resp: 16 SpO2: 97 % O2 Device: Nasal cannula Oxygen Delivery: 2 LPM Height: 162.6 cm (5' 4\") Weight: 67 kg (147 lb 11.3 oz)  Estimated body mass index is 25.35 kg/m  as calculated from the following:    Height as of this encounter: 1.626 m (5' 4\").    Weight as of this encounter: 67 kg (147 lb 11.3 oz).    Mitzy Greer MD   OB/GYN PGY-4   "

## 2018-12-28 NOTE — PROGRESS NOTES
Care Coordinator Progress Note    Admission Date/Time:  12/27/2018  Attending MD:  Evelyne Yung MD    Data  Chart reviewed, discussed with interdisciplinary team.   Patient was admitted for: S/P abdominal hysterectomy.    Concerns with insurance coverage for discharge needs: None.  Current Living Situation: Patient lives with spouse.  Support System: Supportive  Services Involved: No services in home prior to admit  Transportation at Discharge: Car  Transportation to Medical Appointments:   - Not applicable  Barriers to Discharge: No barriers identified that would impact discharge to home    Coordination of Care and Referrals: N/A        Assessment  Patient doesn't appear to have any complex needs during admission or at discharge. Patient is independent with mobility. Plan is for patient to return to previous living situation when ready for discharge.  No needs identified at this time, RNCC will continue to follow and assess if needs arise      Plan  Anticipated Discharge Date:  Discharge to home on POD#2-3 pending meeting postop goals   Anticipated Discharge Plan:  Home with assist from spouse    Kera MCELROYN RN CCM  RN Care Coordinator [address] [place] [id number]  [email or web address]  Office: [phone]  Pager: [phone]    To contact Weekend RNCC, dial [phone] and enter job code [phone] at prompt. This pager can not be contacted by text page or outside line.

## 2018-12-28 NOTE — PROVIDER NOTIFICATION
Notified OB oncall via telephone of pts low urine output (see flowsheet) - 500mL bolus ordered and started. Will continue to monitor output closely.

## 2018-12-28 NOTE — ANESTHESIA POSTPROCEDURE EVALUATION
Anesthesia POST Procedure Evaluation    Patient: Yolande Hussein   MRN:     [id number] Gender:   female   Age:    68 year old :      1950        Preoperative Diagnosis: Ovarian Mass and Menopause   Procedure(s):  TOTAL ABDOMINAL HYSTERECTOMY WITH BILATERAL SALPINGO-OOPHORECTOMY, appendectomy, pelvic washings   Postop Comments: No value filed.       Anesthesia Type:  No value filed.    Reportable Event: NO     PAIN: Uncomplicated   Sign Out status: Comfortable, Well controlled pain     PONV: No PONV   Sign Out status:  No Nausea or Vomiting     Neuro/Psych: Uneventful perioperative course   Sign Out Status: Preoperative baseline; Age appropriate mentation     Airway/Resp.: Uneventful perioperative course   Sign Out Status: Non labored breathing, age appropriate RR; Resp. Status within EXPECTED Parameters     CV: Uneventful perioperative course   Sign Out status: Appropriate BP and perfusion indices; Appropriate HR/Rhythm     Disposition:   Sign Out in:  PACU  Disposition:  Floor  Recovery Course: Uneventful  Follow-Up: Not required           Last Anesthesia Record Vitals:  CRNA VITALS  2018 0929 - 2018 1029      2018             NIBP:  124/58    Pulse:  62          Last PACU/Preop Vitals:  Vitals:    18 2259 18 2300 18 2356   BP: 125/64 125/64    Pulse: 83 86 71   Resp: 19  15   Temp: 37.7  C (99.9  F)  36.3  C (97.4  F)   SpO2: 96%  96%         Electronically Signed By: Paz Caraballo MD, 2018, 6:25 AM

## 2018-12-29 LAB
GLUCOSE BLDC GLUCOMTR-MCNC: 103 MG/DL (ref 70–99)
GLUCOSE BLDC GLUCOMTR-MCNC: 87 MG/DL (ref 70–99)

## 2018-12-29 PROCEDURE — 12000001 ZZH R&B MED SURG/OB UMMC

## 2018-12-29 PROCEDURE — A9270 NON-COVERED ITEM OR SERVICE: HCPCS | Mod: GY | Performed by: OBSTETRICS & GYNECOLOGY

## 2018-12-29 PROCEDURE — 25000132 ZZH RX MED GY IP 250 OP 250 PS 637: Mod: GY | Performed by: OBSTETRICS & GYNECOLOGY

## 2018-12-29 PROCEDURE — 00000146 ZZHCL STATISTIC GLUCOSE BY METER IP

## 2018-12-29 PROCEDURE — 94640 AIRWAY INHALATION TREATMENT: CPT | Mod: 76

## 2018-12-29 PROCEDURE — 94640 AIRWAY INHALATION TREATMENT: CPT

## 2018-12-29 PROCEDURE — 25000125 ZZHC RX 250: Performed by: OBSTETRICS & GYNECOLOGY

## 2018-12-29 PROCEDURE — 40000275 ZZH STATISTIC RCP TIME EA 10 MIN

## 2018-12-29 RX ORDER — IPRATROPIUM BROMIDE AND ALBUTEROL SULFATE 2.5; .5 MG/3ML; MG/3ML
3 SOLUTION RESPIRATORY (INHALATION)
Status: DISCONTINUED | OUTPATIENT
Start: 2018-12-29 | End: 2018-12-30 | Stop reason: HOSPADM

## 2018-12-29 RX ORDER — POLYETHYLENE GLYCOL 3350 17 G/17G
17 POWDER, FOR SOLUTION ORAL DAILY
Status: DISCONTINUED | OUTPATIENT
Start: 2018-12-29 | End: 2018-12-30 | Stop reason: HOSPADM

## 2018-12-29 RX ADMIN — SENNOSIDES AND DOCUSATE SODIUM 2 TABLET: 8.6; 5 TABLET ORAL at 08:36

## 2018-12-29 RX ADMIN — ACETAMINOPHEN 975 MG: 325 TABLET, FILM COATED ORAL at 10:51

## 2018-12-29 RX ADMIN — FLUTICASONE FUROATE AND VILANTEROL TRIFENATATE 1 PUFF: 100; 25 POWDER RESPIRATORY (INHALATION) at 08:35

## 2018-12-29 RX ADMIN — IPRATROPIUM BROMIDE AND ALBUTEROL SULFATE 3 ML: .5; 3 SOLUTION RESPIRATORY (INHALATION) at 18:19

## 2018-12-29 RX ADMIN — BISACODYL 10 MG: 10 SUPPOSITORY RECTAL at 19:38

## 2018-12-29 RX ADMIN — HYDROCHLOROTHIAZIDE 25 MG: 25 TABLET ORAL at 08:36

## 2018-12-29 RX ADMIN — LOSARTAN POTASSIUM 25 MG: 25 TABLET, FILM COATED ORAL at 08:36

## 2018-12-29 RX ADMIN — SENNOSIDES AND DOCUSATE SODIUM 2 TABLET: 8.6; 5 TABLET ORAL at 19:34

## 2018-12-29 RX ADMIN — IPRATROPIUM BROMIDE AND ALBUTEROL SULFATE 3 ML: .5; 3 SOLUTION RESPIRATORY (INHALATION) at 12:46

## 2018-12-29 RX ADMIN — POLYETHYLENE GLYCOL 3350 17 G: 17 POWDER, FOR SOLUTION ORAL at 10:51

## 2018-12-29 RX ADMIN — IPRATROPIUM BROMIDE AND ALBUTEROL SULFATE 3 ML: .5; 3 SOLUTION RESPIRATORY (INHALATION) at 09:38

## 2018-12-29 RX ADMIN — ACETAMINOPHEN 975 MG: 325 TABLET, FILM COATED ORAL at 17:27

## 2018-12-29 RX ADMIN — ACETAMINOPHEN 975 MG: 325 TABLET, FILM COATED ORAL at 02:08

## 2018-12-29 RX ADMIN — IPRATROPIUM BROMIDE AND ALBUTEROL SULFATE 3 ML: .5; 3 SOLUTION RESPIRATORY (INHALATION) at 21:44

## 2018-12-29 RX ADMIN — OXYCODONE HYDROCHLORIDE 5 MG: 5 TABLET ORAL at 02:13

## 2018-12-29 RX ADMIN — RANITIDINE 150 MG: 150 TABLET ORAL at 20:54

## 2018-12-29 ASSESSMENT — MIFFLIN-ST. JEOR: SCORE: 1180.86

## 2018-12-29 ASSESSMENT — ACTIVITIES OF DAILY LIVING (ADL)
ADLS_ACUITY_SCORE: 11

## 2018-12-29 NOTE — PLAN OF CARE
VSS. CMS/neuros intact. Denies headache, SOB, nausea, vomiting.  LS clear. BS audible and active in all quadrants. Passing flatus. Last BM 12/26.  Abd incision dry and intact. Voids spontaneously without difficulty (missed the hat this shift but pt hasn't c/o of any discomfort).  Ambulates with SBA. Diet: regular.  Pain managed with scheduled tylenol and 5mg of PRN oxycodone. States pain is tolerable with discomfort.  Able to make needs known. Continue POC.

## 2018-12-29 NOTE — PLAN OF CARE
VSS. Weaned down to room air, pulse ox on. A&Ox4. LS clear. Pt denies SOB and chest pain. Pt ambulating with SBA. BS active and audible in all quadrants. Pt passing gas. Voiding without difficulty. Skin intact. Regular diet, tolerating well. Pt denies numbness/tingling. PIV SL. Pt pain managed well with alternating tylenol and ibuprofen. Continue to monitor. Pt able to make needs known, call light within reach.

## 2018-12-29 NOTE — PROGRESS NOTES
Pt went for a walk in the hallway with this writer. Pt walked from her room to the elevators when pt said that she felt tightness in her chest. Charge RN gave this writer the portable pulse oximeter. Pt's oxygen saturation was at 95%, HR at 101. Pt denied feeling lightheaded and dizziness.

## 2018-12-29 NOTE — PROGRESS NOTES
Gynecology Progress Note  Patient name: Yolande Hussein  MRN: [id number]  : 1950    Subjective:  Patient is doing okay this morning. She is resting comfortably in bed. Pain is well controlled with pain medications, voiding without difficulty. Did not ambulate yesterday except to bathroom, but did so without dizziness or lightheadedness. Tolerating clear liquid diet without nausea or vomiting. She reports feeling like a cold is coming on and her breathing is tight. + flatus    Objective:  Vitals:    18 1511 18 2100 18 0000 18 0825   BP: 134/62  130/68 159/68   BP Location: Left arm  Left arm Left arm   Pulse:   71 69   Resp: 16  16 16   Temp: 99.5  F (37.5  C)  98.5  F (36.9  C) 98.3  F (36.8  C)   TempSrc: Oral  Oral Oral   SpO2: 97% 96% 95% 100%   Weight:   67.6 kg (149 lb)    Height:         General:  A&Ox3. NAD. Resting in bed  CV: Regular rate   Pulm: Normal respiratory effort.  Abd: Soft, non-distended. Appropriately tender. Incision with some dried drainage on steri strips, but appears CDI mild erythema on left inferior aspect without fluctuation or induration.  Ext: SCDs in place, no edema, warm, well-perfused, +2 pedal pulses    Hgb 14.7>13.5    Assessment/Plan:  Yolande Hussein is a 68 year old  female who is POD#2 s/p JARETH, BSO, appendectomy, pelvic washings for suspected right ovarian mass, found to have an appendiceal mass.  Doing well postoperatively, but feeling like a cold is coming on. With history of asthma, patient typically uses nebs and if that does not work she goes to clinic and gets antibiotics.     FEN: Wean IVFs, regular diet.   Pain: S/p TAP block.  Scheduled tylenol, PRN ibuprofen, oxycodone.   CV: Hypertension, PTA lisinopril and hydrochlorothiazide continued   Pulm: Feeling of URI. Encourage IS.  Asthma, continue PTA inhalers. Will try neb   Heme: Asymptomatic from acute blood loss.  : Voiding spontaneously.   GI: GERD, PTA zantac  continued. ADAT, scheduled bowel regimen and PRN anti-emetics. Miralax added to help with BM   PPX: SCDs while in bed, encourage ambulation   Dispo: Discharge to home possibly today, but more likely to tomorrow to ensure stable respiratory status.    Trish Villagomez MD 12/29/2018 9:21 AM   OB/GYN Staff -- Pt seen and examined by me. Agree with note as above. discharge tomorrow when meeting all goals.  MD Tammi

## 2018-12-30 VITALS
WEIGHT: 150.2 LBS | SYSTOLIC BLOOD PRESSURE: 148 MMHG | HEIGHT: 64 IN | DIASTOLIC BLOOD PRESSURE: 70 MMHG | OXYGEN SATURATION: 94 % | RESPIRATION RATE: 18 BRPM | HEART RATE: 90 BPM | TEMPERATURE: 98.2 F | BODY MASS INDEX: 25.64 KG/M2

## 2018-12-30 PROCEDURE — 40000275 ZZH STATISTIC RCP TIME EA 10 MIN

## 2018-12-30 PROCEDURE — A9270 NON-COVERED ITEM OR SERVICE: HCPCS | Mod: GY | Performed by: OBSTETRICS & GYNECOLOGY

## 2018-12-30 PROCEDURE — 25000125 ZZHC RX 250: Performed by: OBSTETRICS & GYNECOLOGY

## 2018-12-30 PROCEDURE — 25000132 ZZH RX MED GY IP 250 OP 250 PS 637: Mod: GY | Performed by: OBSTETRICS & GYNECOLOGY

## 2018-12-30 PROCEDURE — 94640 AIRWAY INHALATION TREATMENT: CPT

## 2018-12-30 RX ORDER — OXYCODONE HYDROCHLORIDE 5 MG/1
5-10 TABLET ORAL EVERY 4 HOURS PRN
Qty: 10 TABLET | Refills: 0 | Status: SHIPPED | OUTPATIENT
Start: 2018-12-30 | End: 2019-01-02

## 2018-12-30 RX ADMIN — HYDROCHLOROTHIAZIDE 25 MG: 25 TABLET ORAL at 08:45

## 2018-12-30 RX ADMIN — LOSARTAN POTASSIUM 25 MG: 25 TABLET, FILM COATED ORAL at 08:46

## 2018-12-30 RX ADMIN — SENNOSIDES AND DOCUSATE SODIUM 2 TABLET: 8.6; 5 TABLET ORAL at 08:45

## 2018-12-30 RX ADMIN — FLUTICASONE FUROATE AND VILANTEROL TRIFENATATE 1 PUFF: 100; 25 POWDER RESPIRATORY (INHALATION) at 09:47

## 2018-12-30 RX ADMIN — ACETAMINOPHEN 975 MG: 325 TABLET, FILM COATED ORAL at 02:03

## 2018-12-30 RX ADMIN — IPRATROPIUM BROMIDE AND ALBUTEROL SULFATE 3 ML: .5; 3 SOLUTION RESPIRATORY (INHALATION) at 09:40

## 2018-12-30 RX ADMIN — ACETAMINOPHEN 975 MG: 325 TABLET, FILM COATED ORAL at 10:01

## 2018-12-30 ASSESSMENT — ACTIVITIES OF DAILY LIVING (ADL)
ADLS_ACUITY_SCORE: 11

## 2018-12-30 ASSESSMENT — MIFFLIN-ST. JEOR: SCORE: 1186.3

## 2018-12-30 NOTE — PLAN OF CARE
VS: VSS   O2: RA   Output: Voiding without difficulty    Last BM: 12/29 pt was given suppository   Activity: Independent   Skin: Intact except for incisions   Pain: Tylenol   CMS: Intact   Dressing: Dressing CDI    Diet: Regular   LDA: PIV SL   Equipment: PCDs   Plan: Home 12/30   Additional Info:

## 2018-12-30 NOTE — PROGRESS NOTES
Gynecology Progress Note  Patient name: Yolande Hussein  MRN: [id number]  : 1950    Subjective:  Patient is doing better this morning. The duonebs helped her breathing. She had a small BM with a suppository and is passing small amounts of gas. She is tolerating her diet without difficulty. She is ready to go home today.  Denies CP, SOB  Objective:  Vitals:    18 1819 18 2144 18 0022 18 0100   BP:   136/68    BP Location:   Left arm    Pulse:   94    Resp:   18    Temp:   99.2  F (37.3  C)    TempSrc:   Oral    SpO2: 98% 99% 96%    Weight:    68.1 kg (150 lb 3.2 oz)   Height:         General:  A&Ox3. NAD. Resting in bed  Pulm: Normal respiratory effort.  Abd: Soft, non-distended. Appropriately tender. Incision with some dried drainage on steri strips, but appears CDI mild erythema on left inferior aspect without fluctuation or induration.  Ext: SCDs in place, no edema, warm, well-perfused, +2 pedal pulses    Hgb 14.7>13.5    Assessment/Plan:  Yolande Hussein is a 68 year old  female who is POD#3 s/p JARETH, BSO, appendectomy, pelvic washings for suspected right ovarian mass, found to have an appendiceal mass.  Doing well postoperatively, but feeling like a cold is coming on. With history of asthma, patient typically uses nebs which helped.    FEN: Regular diet.   Pain: S/p TAP block.  Scheduled tylenol, PRN ibuprofen, oxycodone.   CV: Hypertension, PTA lisinopril and hydrochlorothiazide continued   Pulm: Feeling of URI. Encourage IS.  Asthma, continue PTA inhalers. Neb helped URI symptoms  Heme: Asymptomatic from acute blood loss.  : Voiding spontaneously.   GI: GERD, PTA zantac continued. ADAT, scheduled bowel regimen and PRN anti-emetics. Miralax added to help with BM   PPX: SCDs while in bed, encourage ambulation   Dispo: Discharge to home today    Trish Villagomez MD 2018 6:05 AM     Appreciate note by Dr. Villagomez. Patient has been seen and examined by  me separate from the resident, agree with above note. Reviewed pathology still pending- if returns carcinoid will plan follow up with Dr. Solorzano.     Deyanira Bull MD  12:58 PM

## 2018-12-30 NOTE — PLAN OF CARE
Pt is ready for discharge. Declines pain medicine, only using scheduled tylenol.   Denies numbness/tingling, is voiding spontaneously and had a small BM after suppository last night. Lung sounds are clear, neb given this morning with BREO inhaler. BS active.     Incision looks well approximated with steri strips and an abd loosely over it with mesh panties. Pt states she will take the abd off when she gets home and shower. Some dried drainage on the steri strips.    Hard copy of oxycodone prescription sent home with Pt as well as ibuprofen and senna. Post discharge pain management and discharge education completed with Pt., along with teach back. Will be picked up by  and daughter in the next 30-45 mins.

## 2018-12-30 NOTE — PLAN OF CARE
Patient is alert and oriented.  Is up walking in hallway unassisted.  Has taken tylenol for pain when scheduled.  Is tolerating regular diet.  Abdominal binder is on.  Patient states she feels a cold is coming on and is now receiving scheduled neb treatments which are helping with her cold symptoms.  Call light is within reach.  Is able to make needs known.  Will continue with plan of care.

## 2019-01-04 ENCOUNTER — TELEPHONE (OUTPATIENT)
Dept: OBGYN | Facility: CLINIC | Age: 69
End: 2019-01-04

## 2019-01-04 NOTE — TELEPHONE ENCOUNTER
Phone call from patient wondering status of pathology results from surgery 12/27. Results are in process.     Phone call to Yolande -- let her know results are still pending. She should get call early next week. Asked her to call clinic if she has not heard results by Friday 1/11.

## 2019-01-05 LAB — COPATH REPORT: NORMAL

## 2019-01-08 ENCOUNTER — TELEPHONE (OUTPATIENT)
Dept: OBGYN | Facility: CLINIC | Age: 69
End: 2019-01-08

## 2019-01-08 ENCOUNTER — TELEPHONE (OUTPATIENT)
Dept: ONCOLOGY | Facility: CLINIC | Age: 69
End: 2019-01-08

## 2019-01-08 DIAGNOSIS — D37.3 LOW GRADE MUCINOUS NEOPLASM OF APPENDIX: Primary | ICD-10-CM

## 2019-01-08 NOTE — TELEPHONE ENCOUNTER
ONCOLOGY INTAKE: Records Information      APPT INFORMATION: 2/11/19 at 3:00PM  Referring provider:  Dr. Evelyne Yung  Referring provider s clinic:  [facility]  Reason for visit/diagnosis:  Low Grade Mucinous Neoplasm of Appendix    Were the records received with the referral (via [employer])? In Epic    Has patient been seen for any external appt for this diagnosis (enter clinic/location)? No - per pt no outside records.

## 2019-01-08 NOTE — TELEPHONE ENCOUNTER
Haven't heard back from [facility] or [facility] yet but Dariela Ocampo suggests to put in referral to [facility]. Will let patient know to call [phone].    Evelyne Yung

## 2019-01-08 NOTE — TELEPHONE ENCOUNTER
TC to review path in detail. Stated I have reached out to [facility] colleagues for next steps.     States healing ok and only using ibuprofen 200mg at night.   Evelyne Yung

## 2019-01-09 ENCOUNTER — TELEPHONE (OUTPATIENT)
Dept: OBGYN | Facility: CLINIC | Age: 69
End: 2019-01-09

## 2019-01-09 NOTE — TELEPHONE ENCOUNTER
Patient called to request pathology report from surgery on 12/27. Reports she would like to send to daughter's friend who is an MD for review and opinion.    Emailing to pt preferred email per her request [email or web address]    Patient would also like it noted to Dr. Yung that she can not get in with Dr. Bosch until mid February if there is anything Dr. Yung can do to influence a sooner appointment.

## 2019-01-11 NOTE — TELEPHONE ENCOUNTER
Patient called to inform she will be seeking a second opinion from [facility] and would like information on how to go about this. Advised we can send referral (per Dr. Yung) but will need department, provider name, and fax number. She can have her records sent by calling HIM Records Release.    Providers she is interested in seeing are Dr. Adebayo Silva or Dr. Bob Ballesteros (first available).      Advised patient we will await fax number and department (colorectal? GI?), then will place and fax referral. Patient will call us back with this information.

## 2019-02-01 ENCOUNTER — OFFICE VISIT (OUTPATIENT)
Dept: OBGYN | Facility: CLINIC | Age: 69
End: 2019-02-01
Attending: OBSTETRICS & GYNECOLOGY
Payer: COMMERCIAL

## 2019-02-01 VITALS
WEIGHT: 144.1 LBS | HEART RATE: 97 BPM | HEIGHT: 64 IN | BODY MASS INDEX: 24.6 KG/M2 | DIASTOLIC BLOOD PRESSURE: 74 MMHG | SYSTOLIC BLOOD PRESSURE: 127 MMHG

## 2019-02-01 DIAGNOSIS — Z98.890 POSTOPERATIVE STATE: Primary | ICD-10-CM

## 2019-02-01 PROCEDURE — G0463 HOSPITAL OUTPT CLINIC VISIT: HCPCS | Mod: ZF

## 2019-02-01 ASSESSMENT — MIFFLIN-ST. JEOR: SCORE: 1158.63

## 2019-02-01 NOTE — LETTER
"  RE: Yolande Hussein  [address]  [facility] [address]     Dear Colleague,    Thank you for referring your patient, Yolande Hussein, to the [facility] at [facility]. Please see a copy of my visit note below.    POST-OP VISIT  SUBJECTIVE   Yolande Hussein is a 68 year old  here for post-operative visit following JARETH, BSO, appendectomy, and pelvic washings on . Her surgery was significant for an incidentally discovered appendiceal mass, with pathology showing low-grade appendiceal mucinous neoplasm.  She reports feeling okay and is concerned regarding her pathology. She is scheduled for an Oncology appointment with Dr. Bosch as well as for a second opinion at [place] [facility] in May. In terms of physical symptoms, she reports a \"nervous stomach\" as well as a persistent cough since leaving the hospital. She was initially producing a significant amount of gray sputum but her cough is now non-productive. She has been using a saline nebulizer as well, initially every four hours but now just twice a day, which helps with her symptoms. She also reports that her incision continues to be very sensitive to touch. She is otherwise doing well and back to her daily activities. She denies any vaginal bleeding or discharge, fevers, chills, vomiting, loss of appetite, problems with urination or bowel movements, redness around or drainage from her incision, or abdominal pain.    Past Medical History  Past Medical History:   Diagnosis Date     Asthma      Breast atypical ductal hyperplasia-- Right 2002    Lumpectomy.  Problem list name updated by automated process. Provider to review and confirm     Dysphonia 2014     Eczema 10/15/2012     Gastro-oesophageal reflux disease      Hypertension      Kidney stone      Menopausal state -- age 49 2012    With NEGATIVE HR HPV neg on Pap today, there is NO further need for routine pap " "screens.        Neck stiffness 11/3/2014     Nephrolithiasis 5/12/2014     Shoulder pain 3/30/2009    Controlled with stretching, unless lapses or overworks 8/2012      Thoracalgia 3/30/2009     TMJ (temporomandibular joint syndrome) 1/18/2012    Jaw PT and Mouth guard helping! 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  Increase from 2x/wk to 3xs/wk.      Medications  Current Outpatient Medications   Medication     albuterol (PROAIR HFA/PROVENTIL HFA/VENTOLIN HFA) 108 (90 Base) MCG/ACT inhaler     albuterol (PROVENTIL) (2.5 MG/3ML) 0.083% neb solution     calcium carbonate 600 mg-vitamin D 400 units (CALTRATE) 600-400 MG-UNIT per tablet     Cholecalciferol (VITAMIN D) 1000 UNIT capsule     fluticasone-salmeterol (AIRDUO RESPICLICK) 113-14 MCG/ACT inhaler     hydrochlorothiazide (HYDRODIURIL) 25 MG tablet     losartan (COZAAR) 25 MG tablet     Misc Natural Products (LUTEIN 20 PO)     omega 3 1000 MG CAPS     ranitidine (RANITIDINE) 150 MG tablet     acetaminophen (ACETAMINOPHEN EXTRA STRENGTH) 500 MG tablet     adapalene (DIFFERIN) 0.1 % gel     desonide (DESOWEN) 0.05 % cream     fluticasone (FLONASE) 50 MCG/ACT nasal spray     Propylene Glycol-Glycerin (SOOTHE OP)     No current facility-administered medications for this visit.      Allergies  Allergies   Allergen Reactions     Macrobid [Nitrofurantoin] Hives     Aspirin      Tight breathing     Macrobid [Nitrofuran Derivatives]        OBJECTIVE   /74 (BP Location: Right arm, Patient Position: Chair)   Pulse 97   Ht 1.626 m (5' 4\")   Wt 65.4 kg (144 lb 1.6 oz)   Breastfeeding? No   BMI 24.73 kg/m     General:  Alert, no distress   Head:  Normocephalic, without obvious abnormality   Lungs:  Coarse rhonchi in RLL, otherwise clear to auscultation   Heart:  Regular rate and rhythm, no murmur   Abdomen:  Soft, non-tender, non-distended. Incision well-healed, clean, " dry, intact   Pelvic: -nefg  -vaginal cuff intact to palpation, mildly tender   Extremities:  normal     Labs:   Hemoglobin   Date Value Ref Range Status   12/28/2018 13.5 11.7 - 15.7 g/dL Final     Pathology:   Copath Report   Date Value Ref Range Status   12/27/2018   Final    Patient Name: YI MINAYA  MR#: [id number]  Specimen #: [id number]  Collected: 12/27/2018  Received: 12/27/2018  Reported: 1/5/2019 14:41  Ordering Phy(s): MINI HINES    For improved result formatting, select 'View Enhanced Report Format' under   Linked Documents section.    SPECIMEN(S):  A: Appendix  B: Uterus, cervix, bilateral fallopian tubes and ovaries    FINAL DIAGNOSIS:  A. APPENDIX, APPENDECTOMY:  - Low-grade appendiceal mucinous neoplasm (LAMN)  - Surgical margin negative for neoplasia    B. UTERUS, CERVIX, BILATERAL FALLOPIAN TUBES AND OVARIES, HYSTERECTOMY   WITH BILATERAL SALPINGO-OOPHORECTOMY:  - Endometrium with cystic atrophy  - Two leiomyomas (0.4 cm)  - Cervix with atrophic changes and nabothian cysts  - Ovaries with atrophic changes, negative for neoplasia  - Fallopian tubes with no significant histologic abnormality    Report Name: Appendix - Resection       Status: Submitted Checklist Inst: 1      Last Updated By: Casimiro Uribe M.D., PhD, [facility],  01/05/2019 14:40:37  Part(s) Involved:  A: Appendix    Synoptic Report:    SPECIMEN    Procedure:        - Appendectomy    TUMOR    Tumor Site:        - Diffusely involving appendix    Histologic Type:        - Low-grade appendiceal mucinous neoplasm    Histologic Grade:        - G1: Well differentiated    Tumor Size: 8.2 Centimeters (cm)    Tumor Deposits:        - Not identified    Tumor Extent      Tumor Extension:          - Tumor invades submucosa    Accessory Findings      Lymphovascular Invasion:          - Not identified    MARGINS    Proximal Margin:        - Uninvolved by invasive carcinoma      Status of Mucinous Neoplasm at Proximal  "Margin:          - Uninvolved by appendiceal mucinous neoplasm    LYMPH NODES    Regional Lymph Nodes:        - No lymph nodes submitted or found    PATHOLOGIC STAGE CLASSIFICATION (PTNM, AJCC 8TH EDITION)    Primary Tumor (pT):        - pT1    Regional Lymph Nodes (pN):        - pNX    COMMENTS   Staging is offered based on the extent of the neoplastic epithelium.        However, free mucin (with no recognizable neoplastic cells present)   is        present well beyond the muscularis propria and into the appendiceal        mesenteric adipose tissue.    2017 June AJCC 8th Edition CAP Annual Release    I have personally reviewed all specimens  and/or slides, including the   listed special stains, and used them  with my medical judgement to determine or confirm the final diagnosis.    Electronically signed out by:  Casimiro Uribe M.D., PhD, Physians    CLINICAL HISTORY:  Ovarian mass and menopause    GROSS:  A:  The specimen is received in formalin with proper patient   identification, labeled \"appendix\".  The specimen  consists of a 64.2 g intact appendix (8.2 x 4.8 x 4.2 cm) with attached   mesoappendix.  The surgical margin is  inked blue.  The serosa is herniated tan-brown to white and intact. The   specimen reveals a lumen ranging from  less than 0.1-4.2 cm in greatest diameter containing a translucent yellow   mucinoid material.  The wall of  assessment is of uniform thickness (0.1 cm) and no focal areas of   thickening are grossly identified.  A gross  photograph is taken. The specimen is sectioned and entirely submitted   cassettes A1- A25 (surgical margin, en  face, bisected are submitted in cassette A1)    B:  The specimen is received fresh with proper patient identification,   labeled \"uterus, cervix, bilateral  fallopian tubes and ovaries\".  The specimen consists of 67.8 g uterus with   attached cervix (10.8 cm fundus to  ectocervix by 4.2 cm cornu to cornu by 2.2 cm anterior posterior) with "   attached right fallopian tube (2.5  centimeter in length x 0.5 centimeter in diameter), right ovary (2.7 x 1.4   x 1.3 cm), left fallopian tube (3.0  cm in length by 0.5 cm in diameter, and left ovary (2.8 x 2.0 x 1.8 cm).    The parametrial and paracervical  margins are inked black.  The cervix is tan-pink, smooth and gross   unremarkable with a slitlike os (0.5 cm  greatest dimension).  Attached posteriorly is a segment of vaginal cuff   (1.6 x 1.2 x 0.4 cm), and the surgical  margin is inked green.  The serosal surface is tan-brown, smooth and   grossly unremarkable.  The specimen  reveals a tan pink unremarkable endometrial cavity (4.8 x 2.4 cm with no   masses, polyps or lesions grossly  identified.  Endometrium is tan-pink, smooth and glistening with an   average thickness of 0.2 cm.  The  myometrium is tan-pink, soft and trabecular with adenomyosis and four   tan-white, firm, well-circumscribed  nodules ranging from 0.3-0.8 cm in greatest dimension.  The grossly   uninvolved myometrium ranges from 1.1-1.5  cm in thickness.  No masses or lesions are grossly identified.  The left   fallopian tube is remarkable for a  translucent paratubal cyst (1.0 x 0.9 x 0.8 cm) containing a translucent   serous fluid and no papillary  excrescences.  The remaining serosal surface of both fallopian tubes is   tan-purple, smooth and grossly  unremarkable.  Both fallopian tubes are truncated.  The cut surface   reveals a tan-white unremarkable mucosa  with a pinpoint lumen.  Both ovaries have a tan-yellow, convoluted and   intact external surface.  The cut  sections are grossly unremarkable with no grossly identified masses or   lesions.  Representative sections are  submitted as per summary of sections:    Summary of Sections:  B1-B2 - anterior cervix to lower uterine segment, area of bisection inked   blue.  B3-B4 - posterior cervix to lower uterine segment, area of bisection inked   blue  B5-B6 - anterior endomyometrium,  full-thickness with nodules  B7-B8 - posterior endomyometrium, full-thickness with nodules  B9 - right fallopian tube, representative and fimbria, entirely  B10 - right ovary, representative  B11 - left fallopian tube, representative and fimbria, entirely  B12 - left ovary, representative (Dictated by: Sabas Caballero 2018   01:41 PM)    MICROSCOPIC:  Microscopic examination was performed.    CPT Codes:  A: 14200-KR4  B: 87910-WC2    TESTING LAB LOCATION:  [address] [address]  [phone]    COLLECTION SITE:  Client: [facility]  Location: [facility] (B)         ASSESSMENT   Yolande Hussein is a 68 year old , postop from JARETH, BSO, appendectomy, and pelvic washings on . Recovering well.    PLAN   1. Reviewed pathology, continued expectations for recovery. Encouraged follow-up with Oncology here and at [place].  2.  Advised that she can continue to use her nebulizer treatments until she feels like her cough is better    Return to clinic in one year for annual exam or prn sooner with questions or concerns.    Lu White MD  Ob/Gyn Resident, PGY-1  19 2:21 PM     I have seen and examined the patient with the resident. I have reviewed, edited, and agree with the note.   My findings are:appears well  Lungs CTA bilaterally except RLL minimal rhonchi/wheeze  Incision CDI  Vaginal cuff intact on bimanual exam    Evelyne Yung MD

## 2019-02-01 NOTE — PROGRESS NOTES
"POST-OP VISIT  SUBJECTIVE   Yolande Hussein is a 68 year old  here for post-operative visit following JARETH, BSO, appendectomy, and pelvic washings on . Her surgery was significant for an incidentally discovered appendiceal mass, with pathology showing low-grade appendiceal mucinous neoplasm.  She reports feeling okay and is concerned regarding her pathology. She is scheduled for an Oncology appointment with Dr. Bosch as well as for a second opinion at [facility] in May. In terms of physical symptoms, she reports a \"nervous stomach\" as well as a persistent cough since leaving the hospital. She was initially producing a significant amount of gray sputum but her cough is now non-productive. She has been using a saline nebulizer as well, initially every four hours but now just twice a day, which helps with her symptoms. She also reports that her incision continues to be very sensitive to touch. She is otherwise doing well and back to her daily activities. She denies any vaginal bleeding or discharge, fevers, chills, vomiting, loss of appetite, problems with urination or bowel movements, redness around or drainage from her incision, or abdominal pain.    Past Medical History  Past Medical History:   Diagnosis Date     Asthma      Breast atypical ductal hyperplasia-- Right 2002    Lumpectomy.  Problem list name updated by automated process. Provider to review and confirm     Dysphonia 2014     Eczema 10/15/2012     Gastro-oesophageal reflux disease      Hypertension      Kidney stone      Menopausal state -- age 49 2012    With NEGATIVE HR HPV neg on Pap today, there is NO further need for routine pap screens.        Neck stiffness 11/3/2014     Nephrolithiasis 2014     Shoulder pain 3/30/2009    Controlled with stretching, unless lapses or overworks 2012      Thoracalgia 3/30/2009     TMJ (temporomandibular joint syndrome) 2012    Jaw PT and Mouth guard helping! 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  Increase from 2x/wk to 3xs/wk.      Medications  Current Outpatient Medications   Medication     albuterol (PROAIR HFA/PROVENTIL HFA/VENTOLIN HFA) 108 (90 Base) MCG/ACT inhaler     albuterol (PROVENTIL) (2.5 MG/3ML) 0.083% neb solution     calcium carbonate 600 mg-vitamin D 400 units (CALTRATE) 600-400 MG-UNIT per tablet     Cholecalciferol (VITAMIN D) 1000 UNIT capsule     fluticasone-salmeterol (AIRDUO RESPICLICK) 113-14 MCG/ACT inhaler     hydrochlorothiazide (HYDRODIURIL) 25 MG tablet     losartan (COZAAR) 25 MG tablet     Misc Natural Products (LUTEIN 20 PO)     omega 3 1000 MG CAPS     ranitidine (RANITIDINE) 150 MG tablet     acetaminophen (ACETAMINOPHEN EXTRA STRENGTH) 500 MG tablet     adapalene (DIFFERIN) 0.1 % gel     desonide (DESOWEN) 0.05 % cream     fluticasone (FLONASE) 50 MCG/ACT nasal spray     Propylene Glycol-Glycerin (SOOTHE OP)     No current facility-administered medications for this visit.      Allergies  Allergies   Allergen Reactions     Macrobid [Nitrofurantoin] Hives     Aspirin      Tight breathing     Macrobid [Nitrofuran Derivatives]      Review of Systems  10 point ROS of systems including Constitutional, Eyes, Respiratory, Cardiovascular, Gastroenterology, Genitourinary, Integumentary, Muscularskeletal, Psychiatric were all negative except for pertinent positives noted in the HPI.    OBJECTIVE   /74 (BP Location: Right arm, Patient Position: Chair)   Pulse 97   Ht 1.626 m (5' 4\")   Wt 65.4 kg (144 lb 1.6 oz)   Breastfeeding? No   BMI 24.73 kg/m    General:  Alert, no distress   Head:  Normocephalic, without obvious abnormality   Lungs:  Coarse rhonchi in RLL, otherwise clear to auscultation   Heart:  Regular rate and rhythm, no murmur   Abdomen:  Soft, non-tender, non-distended. Incision well-healed, clean, dry, intact   Pelvic: -nefg  -vaginal " cuff intact to palpation, mildly tender   Extremities:  normal     Labs:   Hemoglobin   Date Value Ref Range Status   12/28/2018 13.5 11.7 - 15.7 g/dL Final     Pathology:   Copath Report   Date Value Ref Range Status   12/27/2018   Final    Patient Name: YI MINAYA  MR#: [id number]  Specimen #: [id number]  Collected: 12/27/2018  Received: 12/27/2018  Reported: 1/5/2019 14:41  Ordering Phy(s): MINI HINES    For improved result formatting, select 'View Enhanced Report Format' under   Linked Documents section.    SPECIMEN(S):  A: Appendix  B: Uterus, cervix, bilateral fallopian tubes and ovaries    FINAL DIAGNOSIS:  A. APPENDIX, APPENDECTOMY:  - Low-grade appendiceal mucinous neoplasm (LAMN)  - Surgical margin negative for neoplasia    B. UTERUS, CERVIX, BILATERAL FALLOPIAN TUBES AND OVARIES, HYSTERECTOMY   WITH BILATERAL SALPINGO-OOPHORECTOMY:  - Endometrium with cystic atrophy  - Two leiomyomas (0.4 cm)  - Cervix with atrophic changes and nabothian cysts  - Ovaries with atrophic changes, negative for neoplasia  - Fallopian tubes with no significant histologic abnormality    Report Name: Appendix - Resection       Status: Submitted Checklist Inst: 1      Last Updated By: Casimiro Uribe M.D., PhD, Physicians,  01/05/2019 14:40:37  Part(s) Involved:  A: Appendix    Synoptic Report:    SPECIMEN    Procedure:        - Appendectomy    TUMOR    Tumor Site:        - Diffusely involving appendix    Histologic Type:        - Low-grade appendiceal mucinous neoplasm    Histologic Grade:        - G1: Well differentiated    Tumor Size: 8.2 Centimeters (cm)    Tumor Deposits:        - Not identified    Tumor Extent      Tumor Extension:          - Tumor invades submucosa    Accessory Findings      Lymphovascular Invasion:          - Not identified    MARGINS    Proximal Margin:        - Uninvolved by invasive carcinoma      Status of Mucinous Neoplasm at Proximal Margin:          - Uninvolved by  "appendiceal mucinous neoplasm    LYMPH NODES    Regional Lymph Nodes:        - No lymph nodes submitted or found    PATHOLOGIC STAGE CLASSIFICATION (PTNM, AJCC 8TH EDITION)    Primary Tumor (pT):        - pT1    Regional Lymph Nodes (pN):        - pNX    COMMENTS   Staging is offered based on the extent of the neoplastic epithelium.        However, free mucin (with no recognizable neoplastic cells present)   is        present well beyond the muscularis propria and into the appendiceal        mesenteric adipose tissue.    2017 June AJCC 8th Edition CAP Annual Release    I have personally reviewed all specimens  and/or slides, including the   listed special stains, and used them  with my medical judgement to determine or confirm the final diagnosis.    Electronically signed out by:  Casimiro Uribe M.D., PhD, [facility]    CLINICAL HISTORY:  Ovarian mass and menopause    GROSS:  A:  The specimen is received in formalin with proper patient   identification, labeled \"appendix\".  The specimen  consists of a 64.2 g intact appendix (8.2 x 4.8 x 4.2 cm) with attached   mesoappendix.  The surgical margin is  inked blue.  The serosa is herniated tan-brown to white and intact. The   specimen reveals a lumen ranging from  less than 0.1-4.2 cm in greatest diameter containing a translucent yellow   mucinoid material.  The wall of  assessment is of uniform thickness (0.1 cm) and no focal areas of   thickening are grossly identified.  A gross  photograph is taken. The specimen is sectioned and entirely submitted   cassettes A1- A25 (surgical margin, en  face, bisected are submitted in cassette A1)    B:  The specimen is received fresh with proper patient identification,   labeled \"uterus, cervix, bilateral  fallopian tubes and ovaries\".  The specimen consists of 67.8 g uterus with   attached cervix (10.8 cm fundus to  ectocervix by 4.2 cm cornu to cornu by 2.2 cm anterior posterior) with   attached right fallopian tube " (2.5  centimeter in length x 0.5 centimeter in diameter), right ovary (2.7 x 1.4   x 1.3 cm), left fallopian tube (3.0  cm in length by 0.5 cm in diameter, and left ovary (2.8 x 2.0 x 1.8 cm).    The parametrial and paracervical  margins are inked black.  The cervix is tan-pink, smooth and gross   unremarkable with a slitlike os (0.5 cm  greatest dimension).  Attached posteriorly is a segment of vaginal cuff   (1.6 x 1.2 x 0.4 cm), and the surgical  margin is inked green.  The serosal surface is tan-brown, smooth and   grossly unremarkable.  The specimen  reveals a tan pink unremarkable endometrial cavity (4.8 x 2.4 cm with no   masses, polyps or lesions grossly  identified.  Endometrium is tan-pink, smooth and glistening with an   average thickness of 0.2 cm.  The  myometrium is tan-pink, soft and trabecular with adenomyosis and four   tan-white, firm, well-circumscribed  nodules ranging from 0.3-0.8 cm in greatest dimension.  The grossly   uninvolved myometrium ranges from 1.1-1.5  cm in thickness.  No masses or lesions are grossly identified.  The left   fallopian tube is remarkable for a  translucent paratubal cyst (1.0 x 0.9 x 0.8 cm) containing a translucent   serous fluid and no papillary  excrescences.  The remaining serosal surface of both fallopian tubes is   tan-purple, smooth and grossly  unremarkable.  Both fallopian tubes are truncated.  The cut surface   reveals a tan-white unremarkable mucosa  with a pinpoint lumen.  Both ovaries have a tan-yellow, convoluted and   intact external surface.  The cut  sections are grossly unremarkable with no grossly identified masses or   lesions.  Representative sections are  submitted as per summary of sections:    Summary of Sections:  B1-B2 - anterior cervix to lower uterine segment, area of bisection inked   blue.  B3-B4 - posterior cervix to lower uterine segment, area of bisection inked   blue  B5-B6 - anterior endomyometrium, full-thickness with nodules  B7-B8  - posterior endomyometrium, full-thickness with nodules  B9 - right fallopian tube, representative and fimbria, entirely  B10 - right ovary, representative  B11 - left fallopian tube, representative and fimbria, entirely  B12 - left ovary, representative (Dictated by: Sabas Caballero 2018   01:41 PM)    MICROSCOPIC:  Microscopic examination was performed.    CPT Codes:  A: 44200-WD5  B: 86306-XS0    TESTING LAB LOCATION:  [address] [address] [phone]    COLLECTION SITE:  Client: [facility]  Location: [facility] (B)         ASSESSMENT   Yolande Hussein is a 68 year old , postop from JARETH, BSO, appendectomy, and pelvic washings on . Recovering well.    PLAN   1. Reviewed pathology, continued expectations for recovery. Encouraged follow-up with Oncology here and at [facility].  2.  Advised that she can continue to use her nebulizer treatments until she feels like her cough is better    Return to clinic in one year for annual exam or prn sooner with questions or concerns.    Lu White MD  Ob/Gyn Resident, PGY-1  19 2:21 PM     I have seen and examined the patient with the resident. I have reviewed, edited, and agree with the note.   My findings are:appears well  Lungs CTA bilaterally except RLL minimal rhonchi/wheeze  Incision CDI  Vaginal cuff intact on bimanual exam    Evelyne Yung MD

## 2019-02-11 ENCOUNTER — ONCOLOGY VISIT (OUTPATIENT)
Dept: ONCOLOGY | Facility: CLINIC | Age: 69
End: 2019-02-11
Attending: INTERNAL MEDICINE
Payer: COMMERCIAL

## 2019-02-11 VITALS
BODY MASS INDEX: 24.62 KG/M2 | OXYGEN SATURATION: 95 % | WEIGHT: 144.2 LBS | HEART RATE: 84 BPM | HEIGHT: 64 IN | SYSTOLIC BLOOD PRESSURE: 144 MMHG | TEMPERATURE: 98.4 F | RESPIRATION RATE: 16 BRPM | DIASTOLIC BLOOD PRESSURE: 71 MMHG

## 2019-02-11 DIAGNOSIS — D37.3 LOW GRADE MUCINOUS NEOPLASM OF APPENDIX: Primary | ICD-10-CM

## 2019-02-11 PROCEDURE — G0463 HOSPITAL OUTPT CLINIC VISIT: HCPCS | Mod: ZF

## 2019-02-11 PROCEDURE — 99205 OFFICE O/P NEW HI 60 MIN: CPT | Mod: ZP | Performed by: INTERNAL MEDICINE

## 2019-02-11 RX ORDER — ALBUTEROL SULFATE 90 UG/1
AEROSOL, METERED RESPIRATORY (INHALATION) PRN
COMMUNITY
Start: 2019-01-31 | End: 2021-03-15

## 2019-02-11 ASSESSMENT — PAIN SCALES - GENERAL: PAINLEVEL: NO PAIN (0)

## 2019-02-11 ASSESSMENT — MIFFLIN-ST. JEOR: SCORE: 1159.34

## 2019-02-11 NOTE — LETTER
"2/11/2019     RE: Yolande Hussein  [address] [facility] [address]     Dear Colleague,    Thank you for referring your patient, Yolande Hussein, to the [facility]. Please see a copy of my visit note below.    [facility]  New Outpatient Clinic Note    Hem/onc History:   Ms. Yolande Hussein is a 67 yo F with a hx of nephrolithiasis 2/2 hyperparathyroidism and asthma who was incidentally found to have [id number] low-grade appendiceal mucinous neoplasm. She reports her history starting 1/14/2014 when she presented to the ED with abdominal pain and was diagnosed with nephrolithiasis. On the CT scan at that time, a presumed right ovarian cyst was seen measuring 3.7cm. She reports having a follow up pelvic ultrasound a couple of months later which did not visualize the cyst and so spontaneous resolution was assumed. She was doing well until late 2018 when she developed hot flashes and menstrual cramps particularly in the LLQ. A pelvic ultrasound on 11/30/2018 revealed a complex isoechoic mass measuring 5.3 x 4.3 x 4.2 cm in the right ovary as well as a 2.3cm anechoic cystic focus anterior to this. She was seen by ob/gyn and elected to undergo JARETH/BSO on 12/27/18. Intraoperatively, the uterus, ovaries and fallopian tubes appeared normal. However, there was a ~5cm mass in the mid appendix with ~2cm of normal appearing appendix at the base per the ob/gyn note, and therefore an appendectomy was performed. Per the operative note, \"the base of the appendix was roughly 4-5 cm and grossly normal and quite small.\" There were no tumor implants, nor nodes in the mesoappendix, nor ascites appreciated.  She did generally well postoperatively and presents today for initial consultation.    Reason for consultation:   Newly diagnosed low-grade appendiceal mucinous neoplasm    Referring provider:   Evelyne Yung MD    Interval history:   Ms. Hussein presents with her  " Torey today.  She has been very anxious since this diagnosis.  Of note, she has had bad experiences with her family members were diagnosed with cancer.  Her nephew was diagnosed with early stage kidney cancer, but later developed metastatic disease and  of this.  Furthermore her friend had appendiceal cancer of unknown subtype nor stage and was treated at [facility], but appears to have also  of this disease creating signficant anxiety for the patient.    Regarding her presenting symptoms, her hot flashes have resolved.  However she continues to have occasional left lower quadrant pain particularly noted after eating.    Her last colonoscopy was approximately 8 years ago at which time she reports it being normal, with her next being due 10 years after that.    In addition to the above, review of systems notable for  +Weight loss - about 13 lbs since surgery on , but no weight loss prior to that.  She attributes this to anorexia secondary to anxiety leading to a sensitive stomach  +cough and SOB postop, now improving to resolved    Comprehensive ROS otherwise negative.    Past Medical History:   Reviewed in Epic and with patient    Past Medical History:   Diagnosis Date     Asthma      Breast atypical ductal hyperplasia-- Right 2002    Lumpectomy.  Problem list name updated by automated process. Provider to review and confirm     Dysphonia 2014     Eczema 10/15/2012     Gastro-oesophageal reflux disease      Hypertension      Kidney stone      Menopausal state -- age 49 2012    With NEGATIVE HR HPV neg on Pap today, there is NO further need for routine pap screens.        Neck stiffness 11/3/2014     Nephrolithiasis 2014     Shoulder pain 3/30/2009    Controlled with stretching, unless lapses or overworks 2012      Thoracalgia 3/30/2009     TMJ (temporomandibular joint syndrome) 2012    Jaw PT and Mouth guard helping! 2012      Unilateral vocal fold paresis  11/3/2014     Vaginal atrophy 8/13/2012    Estrogen loss with increasing sxs on Vagifem 10 mcg.  Increase from 2x/wk to 3xs/wk.       Past Surgical History:   Reviewed in EPIC and with patient    Parathyroidectomy due to hypercalcemia?    Past Surgical History:   Procedure Laterality Date     APPENDECTOMY OPEN N/A 12/27/2018    Procedure: Appendectomy open;  Surgeon: Evelyne Yung MD;  [facility]     BREAST BIOPSY, CORE RT/LT      R-- precancer cells     COLONOSCOPY       EXPLORE NECK N/A 8/19/2014    Procedure: EXPLORE NECK;  Surgeon: Alea Jacobs MD;  [facility]: [facility]     EXTRACORPOREAL SHOCK WAVE LITHOTRIPSY (ESWL)  2/26/2014    Procedure: EXTRACORPOREAL SHOCK WAVE LITHOTRIPSY (ESWL);  Left Kidney Extracorporeal Shockwave Lithotripsy;  Surgeon: Sabas Choi MD;  Location: [facility]     HYSTERECTOMY TOTAL ABDOMINAL, BILATERAL SALPINGO-OOPHORECTOMY, COMBINED Bilateral 12/27/2018    Procedure: TOTAL ABDOMINAL HYSTERECTOMY WITH BILATERAL SALPINGO-OOPHORECTOMY, appendectomy, pelvic washings;  Surgeon: Evelyne Yung MD;  Location: [facility]     LUMPECTOMY BREAST  age 48    R -- margins clear -- declined tamoxofen     PARATHYROIDECTOMY N/A 8/19/2014    Procedure: PARATHYROIDECTOMY;  Surgeon: Alea Jacobs MD;  Location: [facility]     STERILIZATION  1984    PP b/4 discharge      TUBAL LIGATION        Social History:   Reviewed in EPIC    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 Mother 70        CHF     Diabetes Mother 80        diet and pill tx     Hypertension Mother       Macular Degeneration Mother      Asthma Father      Hypertension Father      Alzheimer Disease Father 70     Asthma Maternal Grandfather      Asthma Daughter      C.A.D. Sister 60        CHF     Hypertension Sister      Macular Degeneration Sister      Glaucoma No family hx of      Cancer No family hx of      Amblyopia No family hx of      Retinal detachment No family hx of      Medications:   Reviewed in EPIC and with patient    Current Outpatient Medications   Medication Sig     ADVAIR DISKUS 250-50 MCG/DOSE inhaler      albuterol (PROAIR HFA/PROVENTIL HFA/VENTOLIN HFA) 108 (90 Base) MCG/ACT inhaler Inhale 2 puffs into the lungs every 6 hrs prn     albuterol (PROVENTIL) (2.5 MG/3ML) 0.083% neb solution Take 1 vial (2.5 mg) by nebulization every 6 hours as needed for shortness of breath / dyspnea     calcium carbonate 600 mg-vitamin D 400 units (CALTRATE) 600-400 MG-UNIT per tablet Take 1 tablet by mouth daily     Cholecalciferol (VITAMIN D) 1000 UNIT capsule Take 1 capsule by mouth daily.     hydrochlorothiazide (HYDRODIURIL) 25 MG tablet Take 1 tablet (25 mg) by mouth daily     losartan (COZAAR) 25 MG tablet Take 1 tablet (25 mg) by mouth daily     minoxidil (ROGAINE) 2 % external solution Apply topically 2 times daily     Misc Natural Products (LUTEIN 20 PO)      omega 3 1000 MG CAPS Take 2 capsules by mouth daily     acetaminophen (ACETAMINOPHEN EXTRA STRENGTH) 500 MG tablet Take 500-1,000 mg by mouth every 6 hours as needed for mild pain     adapalene (DIFFERIN) 0.1 % gel APPLY TOPICALLY AT BEDTIME AS DIRECTED. (Patient not taking: Reported on 2/11/2019)     albuterol (PROAIR HFA/PROVENTIL HFA/VENTOLIN HFA) 108 (90 Base) MCG/ACT inhaler      desonide (DESOWEN) 0.05 % cream Apply to breast BID (Patient not taking: Reported on 2/11/2019)     fluticasone (FLONASE) 50 MCG/ACT nasal spray Spray 1-2 sprays into both nostrils daily (Patient not taking: Reported on 2/11/2019)     Propylene Glycol-Glycerin (SOOTHE  "OP) Apply to eye as needed     ranitidine (RANITIDINE) 150 MG tablet Take 150 mg by mouth as needed Take every evening     No current facility-administered medications for this visit.       Physical Exam (Resident / Clinician):   Blood pressure 144/71, pulse 84, temperature 98.4  F (36.9  C), temperature source Oral, resp. rate 16, height 1.626 m (5' 4.02\"), weight 65.4 kg (144 lb 3.2 oz), SpO2 95 %, not currently breastfeeding.    ECOG PS: 0-1  Constitutional: WDWN female in NAD, pleasant and appropriate  HEENT:  NC/AT, no icterus, OP clear, MMM  Skin: No jaundice nor ecchymoses  Lungs: CTAB, no w/r/r, nonlabored breathing  Cardiovascular: RRR, S1, S2, no m/r/g  GI/Abdomen: +BS, soft, minimal tenderness with palpation around incision site, nondistended, no organomegaly nor masses  Back: no tenderness over spinous processes, iliac crests, scapulae  MSK/Extremities: Warm, well perfused. No edema  LN: no cervical, supraclavicular, axillary, nor inguinal lymphadenopathy  Neurologic: alert, answering questions appropriately, moving all extremities spontaneously  Psych: appropriate affect  Data:   Reviewed in EPIC and notable for:    11/30/18  pelvic u/s  IMPRESSION:  1. Complex right ovarian mass measuring up to 5.3 cm, previously  measuring 3.7 cm on 1/14/2014. Ovarian dermoid is favored given  hyperechoic portions. However, malignancy is not excluded. Consider  further evaluation with pelvic MRI or surgical consultation.  2. Anechoic, slightly tubular cystic focus anterior to the mass may  represent focal hydrosalpinx.    Pathology  12/27/18  FINAL DIAGNOSIS:   A. APPENDIX, APPENDECTOMY:   - Low-grade appendiceal mucinous neoplasm (LAMN)   - Surgical margin negative for neoplasia     Staging is offered based on the extent of the neoplastic epithelium.         However, free mucin (with no recognizable neoplastic cells present)   is present well beyond the muscularis propria and into the appendiceal         mesenteric " adipose tissue.     B. UTERUS, CERVIX, BILATERAL FALLOPIAN TUBES AND OVARIES, HYSTERECTOMY   WITH BILATERAL SALPINGO-OOPHORECTOMY:   - Endometrium with cystic atrophy   - Two leiomyomas (0.4 cm)   - Cervix with atrophic changes and nabothian cysts   - Ovaries with atrophic changes, negative for neoplasia   - Fallopian tubes with no significant histologic abnormality     Assessment and Plan:     #Low-grade mucinous appendiceal neoplasm    Ms. Hussein is a pleasant 68-year-old female with a recent diagnosis of the above.  Given the rarity of this disease, there is limited data to help guide recommendations.  In retrospect, a cecectomy and lymph node sampling could have been considered.  It is reassuring that the gross surgical description revealed a significant amount of normal appendix at the base which was consistent pathologically.  Furthermore, as would be expected with this entity, there are no high risk features pathologically including the lack of lymphovascular invasion.  Pending final staging, would anticipate her to be stage I (T1N0M0).    Assuming localized, completely resected disease, would recommend surveillance.  Even in the rare event that she were to have radiographically occult disease that could potentially be removed by a more comprehensive surgical procedure, the generally indolent nature of this entity in the context of her age raises the concern that further surgical resection would result in increased morbidity with minimal to no effect on her overall survival.    Today, we reviewed the aformentioned information with Ms. Hussein and her .  She expressed that her anxiety was slightly improved.  She has a prescheduled second opinion at the [facility] on March 4 where blood work and scans have already been scheduled.  She thinks that it will be easier just to keep the schedule and have her baseline scans and labs done there.  Ultimately, she would prefer ongoing management and  surveillance at [facility] given the proximity to her home.    Historical imaging and treatment plan reviewed with patient. All questions answered.    Follow-up: Return to clinic in 3 months with CT chest, abdomen, pelvis, and blood work including CEA and CA-19-9    In this 60 minutes visit, > 50% spent in counseling and coordinating care.    Patient seen and discussed with Dr. Bosch.    Ruby ([place] Springs[place]itta Houser MD, PhD   Hem/Onc Fellow    Attending note: I saw the patient in conjunction with the fellow and agree with the above.   She is at relatively low risk for recurrence. If she were younger I would consider a completion right hemicolectomy to confirm the lack of alda involvement, but her risk of positive nodes is small enough I don't think we need put her through further surgery--assuming we don't see anything worrisome on her baseline CT.    Again, thank you for allowing me to participate in the care of your patient.      Sincerely,    Zeke Bosch MD

## 2019-02-11 NOTE — PROGRESS NOTES
"[facility]  New Outpatient Clinic Note    Hem/onc History:   Ms. Yolande Hussein is a 67 yo F with a hx of nephrolithiasis 2/2 hyperparathyroidism and asthma who was incidentally found to have [id number] low-grade appendiceal mucinous neoplasm. She reports her history starting 2014 when she presented to the ED with abdominal pain and was diagnosed with nephrolithiasis. On the CT scan at that time, a presumed right ovarian cyst was seen measuring 3.7cm. She reports having a follow up pelvic ultrasound a couple of months later which did not visualize the cyst and so spontaneous resolution was assumed. She was doing well until late 2018 when she developed hot flashes and menstrual cramps particularly in the LLQ. A pelvic ultrasound on 2018 revealed a complex isoechoic mass measuring 5.3 x 4.3 x 4.2 cm in the right ovary as well as a 2.3cm anechoic cystic focus anterior to this. She was seen by ob/gyn and elected to undergo JARETH/BSO on 18. Intraoperatively, the uterus, ovaries and fallopian tubes appeared normal. However, there was a ~5cm mass in the mid appendix with ~2cm of normal appearing appendix at the base per the ob/gyn note, and therefore an appendectomy was performed. Per the operative note, \"the base of the appendix was roughly 4-5 cm and grossly normal and quite small.\" There were no tumor implants, nor nodes in the mesoappendix, nor ascites appreciated.  She did generally well postoperatively and presents today for initial consultation.    Reason for consultation:   Newly diagnosed low-grade appendiceal mucinous neoplasm    Referring provider:   Evelyne Yung MD    Interval history:   Ms. Hussein presents with her  Torey today.  She has been very anxious since this diagnosis.  Of note, she has had bad experiences with her family members were diagnosed with cancer.  Her nephew was diagnosed with early stage kidney cancer, but later developed metastatic disease and  " of this.  Furthermore her friend had appendiceal cancer of unknown subtype nor stage and was treated at [facility], but appears to have also  of this disease creating signficant anxiety for the patient.    Regarding her presenting symptoms, her hot flashes have resolved.  However she continues to have occasional left lower quadrant pain particularly noted after eating.    Her last colonoscopy was approximately 8 years ago at which time she reports it being normal, with her next being due 10 years after that.    In addition to the above, review of systems notable for  +Weight loss - about 13 lbs since surgery on , but no weight loss prior to that.  She attributes this to anorexia secondary to anxiety leading to a sensitive stomach  +cough and SOB postop, now improving to resolved    Comprehensive ROS otherwise negative.    Past Medical History:   Reviewed in Epic and with patient    Past Medical History:   Diagnosis Date     Asthma      Breast atypical ductal hyperplasia-- Right 2002    Lumpectomy.  Problem list name updated by automated process. Provider to review and confirm     Dysphonia 2014     Eczema 10/15/2012     Gastro-oesophageal reflux disease      Hypertension      Kidney stone      Menopausal state -- age 49 2012    With NEGATIVE HR HPV neg on Pap today, there is NO further need for routine pap screens.        Neck stiffness 11/3/2014     Nephrolithiasis 2014     Shoulder pain 3/30/2009    Controlled with stretching, unless lapses or overworks 2012      Thoracalgia 3/30/2009     TMJ (temporomandibular joint syndrome) 2012    Jaw PT and Mouth guard helping! 2012      Unilateral vocal fold paresis 11/3/2014     Vaginal atrophy 2012    Estrogen loss with increasing sxs on Vagifem 10 mcg.  Increase from 2x/wk to 3xs/wk.       Past Surgical History:   Reviewed in EPIC and with patient    Parathyroidectomy due to hypercalcemia?    Past Surgical History:    Procedure Laterality Date     APPENDECTOMY OPEN N/A 12/27/2018    Procedure: Appendectomy open;  Surgeon: Evelyne Yung MD;  [facility]     BREAST BIOPSY, CORE RT/LT      R-- precancer cells     COLONOSCOPY       EXPLORE NECK N/A 8/19/2014    Procedure: EXPLORE NECK;  Surgeon: Alea Jacobs MD;  [facility]     EXTRACORPOREAL SHOCK WAVE LITHOTRIPSY (ESWL)  2/26/2014    Procedure: EXTRACORPOREAL SHOCK WAVE LITHOTRIPSY (ESWL);  Left Kidney Extracorporeal Shockwave Lithotripsy;  Surgeon: Sabas Choi MD;  [facility]: [facility] OR     HYSTERECTOMY TOTAL ABDOMINAL, BILATERAL SALPINGO-OOPHORECTOMY, COMBINED Bilateral 12/27/2018    Procedure: TOTAL ABDOMINAL HYSTERECTOMY WITH BILATERAL SALPINGO-OOPHORECTOMY, appendectomy, pelvic washings;  Surgeon: Evelyne Yung MD;  Location: [facility] OR     LUMPECTOMY BREAST  age 48    R -- margins clear -- declined tamoxofen     PARATHYROIDECTOMY N/A 8/19/2014    Procedure: PARATHYROIDECTOMY;  Surgeon: Alea Jacobs MD;  [facility]: [facility] OR     STERILIZATION  1984    PP b/4 discharge      TUBAL LIGATION        Social History:   Reviewed in EPIC    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 Mother 70        CHF     Diabetes Mother 80        diet and pill tx     Hypertension Mother      Macular Degeneration Mother      Asthma Father      Hypertension Father      Alzheimer Disease Father 70     Asthma Maternal Grandfather      Asthma Daughter      C.A.D. Sister 60        CHF     Hypertension Sister      Macular Degeneration Sister       Glaucoma No family hx of      Cancer No family hx of      Amblyopia No family hx of      Retinal detachment No family hx of      Medications:   Reviewed in EPIC and with patient    Current Outpatient Medications   Medication Sig     ADVAIR DISKUS 250-50 MCG/DOSE inhaler      albuterol (PROAIR HFA/PROVENTIL HFA/VENTOLIN HFA) 108 (90 Base) MCG/ACT inhaler Inhale 2 puffs into the lungs every 6 hrs prn     albuterol (PROVENTIL) (2.5 MG/3ML) 0.083% neb solution Take 1 vial (2.5 mg) by nebulization every 6 hours as needed for shortness of breath / dyspnea     calcium carbonate 600 mg-vitamin D 400 units (CALTRATE) 600-400 MG-UNIT per tablet Take 1 tablet by mouth daily     Cholecalciferol (VITAMIN D) 1000 UNIT capsule Take 1 capsule by mouth daily.     hydrochlorothiazide (HYDRODIURIL) 25 MG tablet Take 1 tablet (25 mg) by mouth daily     losartan (COZAAR) 25 MG tablet Take 1 tablet (25 mg) by mouth daily     minoxidil (ROGAINE) 2 % external solution Apply topically 2 times daily     Misc Natural Products (LUTEIN 20 PO)      omega 3 1000 MG CAPS Take 2 capsules by mouth daily     acetaminophen (ACETAMINOPHEN EXTRA STRENGTH) 500 MG tablet Take 500-1,000 mg by mouth every 6 hours as needed for mild pain     adapalene (DIFFERIN) 0.1 % gel APPLY TOPICALLY AT BEDTIME AS DIRECTED. (Patient not taking: Reported on 2/11/2019)     albuterol (PROAIR HFA/PROVENTIL HFA/VENTOLIN HFA) 108 (90 Base) MCG/ACT inhaler      desonide (DESOWEN) 0.05 % cream Apply to breast BID (Patient not taking: Reported on 2/11/2019)     fluticasone (FLONASE) 50 MCG/ACT nasal spray Spray 1-2 sprays into both nostrils daily (Patient not taking: Reported on 2/11/2019)     Propylene Glycol-Glycerin (SOOTHE OP) Apply to eye as needed     ranitidine (RANITIDINE) 150 MG tablet Take 150 mg by mouth as needed Take every evening     No current facility-administered medications for this visit.       Physical Exam (Resident / Clinician):   Blood pressure 144/71,  "pulse 84, temperature 98.4  F (36.9  C), temperature source Oral, resp. rate 16, height 1.626 m (5' 4.02\"), weight 65.4 kg (144 lb 3.2 oz), SpO2 95 %, not currently breastfeeding.    ECOG PS: 0-1  Constitutional: WDWN female in NAD, pleasant and appropriate  HEENT:  NC/AT, no icterus, OP clear, MMM  Skin: No jaundice nor ecchymoses  Lungs: CTAB, no w/r/r, nonlabored breathing  Cardiovascular: RRR, S1, S2, no m/r/g  GI/Abdomen: +BS, soft, minimal tenderness with palpation around incision site, nondistended, no organomegaly nor masses  Back: no tenderness over spinous processes, iliac crests, scapulae  MSK/Extremities: Warm, well perfused. No edema  LN: no cervical, supraclavicular, axillary, nor inguinal lymphadenopathy  Neurologic: alert, answering questions appropriately, moving all extremities spontaneously  Psych: appropriate affect  Data:   Reviewed in EPIC and notable for:    11/30/18  pelvic u/s  IMPRESSION:  1. Complex right ovarian mass measuring up to 5.3 cm, previously  measuring 3.7 cm on 1/14/2014. Ovarian dermoid is favored given  hyperechoic portions. However, malignancy is not excluded. Consider  further evaluation with pelvic MRI or surgical consultation.  2. Anechoic, slightly tubular cystic focus anterior to the mass may  represent focal hydrosalpinx.    Pathology  12/27/18  FINAL DIAGNOSIS:   A. APPENDIX, APPENDECTOMY:   - Low-grade appendiceal mucinous neoplasm (LAMN)   - Surgical margin negative for neoplasia     Staging is offered based on the extent of the neoplastic epithelium.         However, free mucin (with no recognizable neoplastic cells present)   is present well beyond the muscularis propria and into the appendiceal         mesenteric adipose tissue.     B. UTERUS, CERVIX, BILATERAL FALLOPIAN TUBES AND OVARIES, HYSTERECTOMY   WITH BILATERAL SALPINGO-OOPHORECTOMY:   - Endometrium with cystic atrophy   - Two leiomyomas (0.4 cm)   - Cervix with atrophic changes and nabothian cysts   - " Ovaries with atrophic changes, negative for neoplasia   - Fallopian tubes with no significant histologic abnormality     Assessment and Plan:     #Low-grade mucinous appendiceal neoplasm    Ms. Hussein is a pleasant 68-year-old female with a recent diagnosis of the above.  Given the rarity of this disease, there is limited data to help guide recommendations.  In retrospect, a cecectomy and lymph node sampling could have been considered.  It is reassuring that the gross surgical description revealed a significant amount of normal appendix at the base which was consistent pathologically.  Furthermore, as would be expected with this entity, there are no high risk features pathologically including the lack of lymphovascular invasion.  Pending final staging, would anticipate her to be stage I (T1N0M0).    Assuming localized, completely resected disease, would recommend surveillance.  Even in the rare event that she were to have radiographically occult disease that could potentially be removed by a more comprehensive surgical procedure, the generally indolent nature of this entity in the context of her age raises the concern that further surgical resection would result in increased morbidity with minimal to no effect on her overall survival.    Today, we reviewed the aformentioned information with Ms. Hussein and her .  She expressed that her anxiety was slightly improved.  She has a prescheduled second opinion at the [facility] on March 4 where blood work and scans have already been scheduled.  She thinks that it will be easier just to keep the schedule and have her baseline scans and labs done there.  Ultimately, she would prefer ongoing management and surveillance at [facility] given the proximity to her home.    Historical imaging and treatment plan reviewed with patient. All questions answered.    Follow-up: Return to clinic in 3 months with CT chest, abdomen, pelvis, and blood work including CEA and  CA-19-9    In this 60 minutes visit, > 50% spent in counseling and coordinating care.    Patient seen and discussed with Dr. Bosch.    Ruby (Seth) MD Mik, PhD   Hem/Onc Fellow    Attending note: I saw the patient in conjunction with the fellow and agree with the above.   She is at relatively low risk for recurrence. If she were younger I would consider a completion right hemicolectomy to confirm the lack of alda involvement, but her risk of positive nodes is small enough I don't think we need put her through further surgery--assuming we don't see anything worrisome on her baseline CT.

## 2019-02-27 ENCOUNTER — TELEPHONE (OUTPATIENT)
Dept: OBGYN | Facility: CLINIC | Age: 69
End: 2019-02-27

## 2019-02-27 NOTE — TELEPHONE ENCOUNTER
----- Message from Shari Carranza sent at 2/26/2019  4:08 PM CST -----  Regarding: Request Patholgy slides from [facility]  Contact: [phone]  Dianne Cosby, from [facility] in [place] called and would like to speak with Dr. Yung or a nurse in regards to pathology slides. Please call back Dianne castro.    Thanks,  Shari Carranza  Intake Representative   Call Center

## 2019-02-27 NOTE — TELEPHONE ENCOUNTER
----- Message from Shari Carranza sent at 2/26/2019  4:08 PM CST -----  Regarding: Request Patholgy slides from [facility]  Contact: [phone]  Dianne Cosby, from [facility] in [place] called and would like to speak with Dr. Yung or a nurse in regards to pathology slides. Please call back Dianne castro.    Thanks,  Shari Carranza  Intake Representative   Call Center

## 2019-02-27 NOTE — TELEPHONE ENCOUNTER
Called [place] [facility] and advised will need to call HIM record release for pathology slides.     Gave phone number for Record release

## 2019-03-25 DIAGNOSIS — J45.40 MODERATE PERSISTENT ASTHMA WITHOUT COMPLICATION: Primary | ICD-10-CM

## 2019-10-22 ENCOUNTER — ANCILLARY PROCEDURE (OUTPATIENT)
Dept: MAMMOGRAPHY | Facility: CLINIC | Age: 69
End: 2019-10-22
Attending: INTERNAL MEDICINE
Payer: COMMERCIAL

## 2019-10-22 DIAGNOSIS — Z12.31 VISIT FOR SCREENING MAMMOGRAM: ICD-10-CM

## 2019-11-02 ENCOUNTER — HEALTH MAINTENANCE LETTER (OUTPATIENT)
Age: 69
End: 2019-11-02

## 2019-12-16 ENCOUNTER — OFFICE VISIT (OUTPATIENT)
Dept: INTERNAL MEDICINE | Facility: CLINIC | Age: 69
End: 2019-12-16
Attending: INTERNAL MEDICINE
Payer: COMMERCIAL

## 2019-12-16 ENCOUNTER — TELEPHONE (OUTPATIENT)
Dept: GASTROENTEROLOGY | Facility: CLINIC | Age: 69
End: 2019-12-16

## 2019-12-16 VITALS
SYSTOLIC BLOOD PRESSURE: 147 MMHG | BODY MASS INDEX: 24.36 KG/M2 | WEIGHT: 142 LBS | DIASTOLIC BLOOD PRESSURE: 71 MMHG | HEART RATE: 97 BPM

## 2019-12-16 DIAGNOSIS — Z12.11 SPECIAL SCREENING FOR MALIGNANT NEOPLASMS, COLON: Primary | ICD-10-CM

## 2019-12-16 DIAGNOSIS — I10 BENIGN ESSENTIAL HYPERTENSION: ICD-10-CM

## 2019-12-16 DIAGNOSIS — I10 HYPERTENSION GOAL BP (BLOOD PRESSURE) < 140/90: ICD-10-CM

## 2019-12-16 DIAGNOSIS — J45.40 MODERATE PERSISTENT ASTHMA WITHOUT COMPLICATION: ICD-10-CM

## 2019-12-16 DIAGNOSIS — Z00.00 ENCOUNTER FOR MEDICARE ANNUAL WELLNESS EXAM: ICD-10-CM

## 2019-12-16 PROCEDURE — G0463 HOSPITAL OUTPT CLINIC VISIT: HCPCS | Mod: ZF

## 2019-12-16 RX ORDER — ALBUTEROL SULFATE 90 UG/1
AEROSOL, METERED RESPIRATORY (INHALATION)
Qty: 3 INHALER | Refills: 1 | Status: SHIPPED | OUTPATIENT
Start: 2019-12-16 | End: 2020-11-17

## 2019-12-16 RX ORDER — LOSARTAN POTASSIUM 50 MG/1
50 TABLET ORAL DAILY
Qty: 90 TABLET | Refills: 3 | Status: SHIPPED | OUTPATIENT
Start: 2019-12-16 | End: 2020-11-17

## 2019-12-16 RX ORDER — HYDROCHLOROTHIAZIDE 25 MG/1
25 TABLET ORAL DAILY
Qty: 90 TABLET | Refills: 3 | Status: SHIPPED | OUTPATIENT
Start: 2019-12-16 | End: 2020-11-17

## 2019-12-16 ASSESSMENT — ANXIETY QUESTIONNAIRES
1. FEELING NERVOUS, ANXIOUS, OR ON EDGE: SEVERAL DAYS
6. BECOMING EASILY ANNOYED OR IRRITABLE: NOT AT ALL
3. WORRYING TOO MUCH ABOUT DIFFERENT THINGS: NOT AT ALL
GAD7 TOTAL SCORE: 2
7. FEELING AFRAID AS IF SOMETHING AWFUL MIGHT HAPPEN: SEVERAL DAYS
2. NOT BEING ABLE TO STOP OR CONTROL WORRYING: NOT AT ALL
5. BEING SO RESTLESS THAT IT IS HARD TO SIT STILL: NOT AT ALL

## 2019-12-16 ASSESSMENT — ASTHMA QUESTIONNAIRES
QUESTION_3 LAST FOUR WEEKS HOW OFTEN DID YOUR ASTHMA SYMPTOMS (WHEEZING, COUGHING, SHORTNESS OF BREATH, CHEST TIGHTNESS OR PAIN) WAKE YOU UP AT NIGHT OR EARLIER THAN USUAL IN THE MORNING: NOT AT ALL
ACUTE_EXACERBATION_TODAY: NO
QUESTION_1 LAST FOUR WEEKS HOW MUCH OF THE TIME DID YOUR ASTHMA KEEP YOU FROM GETTING AS MUCH DONE AT WORK, SCHOOL OR AT HOME: NONE OF THE TIME
QUESTION_2 LAST FOUR WEEKS HOW OFTEN HAVE YOU HAD SHORTNESS OF BREATH: ONCE OR TWICE A WEEK
QUESTION_4 LAST FOUR WEEKS HOW OFTEN HAVE YOU USED YOUR RESCUE INHALER OR NEBULIZER MEDICATION (SUCH AS ALBUTEROL): ONCE A WEEK OR LESS
ACT_TOTALSCORE: 22
QUESTION_5 LAST FOUR WEEKS HOW WOULD YOU RATE YOUR ASTHMA CONTROL: WELL CONTROLLED

## 2019-12-16 ASSESSMENT — PAIN SCALES - GENERAL: PAINLEVEL: NO PAIN (0)

## 2019-12-16 ASSESSMENT — PATIENT HEALTH QUESTIONNAIRE - PHQ9
SUM OF ALL RESPONSES TO PHQ QUESTIONS 1-9: 1
5. POOR APPETITE OR OVEREATING: NOT AT ALL

## 2019-12-16 NOTE — PATIENT INSTRUCTIONS
Patient Education   Personalized Prevention Plan  You are due for the preventive services outlined below.  Your care team is available to assist you in scheduling these services.  If you have already completed any of these items, please share that information with your care team to update in your medical record.  Health Maintenance Due   Topic Date Due     Zoster (Shingles) Vaccine (2 of 3) 12/11/2013     Asthma Control Test  05/26/2019     FALL RISK ASSESSMENT  08/13/2019     Annual Wellness Visit  11/26/2019     Asthma Action Plan - yearly  11/27/2019

## 2019-12-16 NOTE — LETTER
"2019       RE: Yolande Hussein  [address]  [place] [address]     Dear Colleague,    Thank you for referring your patient, Yolande Hussein, to the [facility]  at [facility]. Please see a copy of my visit note below.    SUBJECTIVE:   Yolande Hussein is a 69 year old female who presents for Preventive Visit.      Are you in the first 12 months of your Medicare Part B coverage?  No    Physical Health:    In general, how would you rate your overall physical health? good    Outside of work, how many days during the week do you exercise? 6-7 days/week    Outside of work, approximately how many minutes a day do you exercise?15-30 minutes    If you drink alcohol do you typically have >3 drinks per day or >7 drinks per week? No    Do you usually eat at least 4 servings of fruit and vegetables a day, include whole grains & fiber and avoid regularly eating high fat or \"junk\" foods? Yes    Do you have any problems taking medications regularly?  No    Do you have any side effects from medications? none    Needs assistance for the following daily activities: no assistance needed    Which of the following safety concerns are present in your home?  none identified     Hearing impairment: No    In the past 6 months, have you been bothered by leaking of urine? no    Mental Health:    In general, how would you rate your overall mental or emotional health? good  PHQ-2 Score: 0    Do you feel safe in your environment? Yes    Have you ever done Advance Care Planning? (For example, a Health Directive, POLST, or a discussion with a medical provider or your loved ones about your wishes): Yes, advance care planning is on file.    Additional concerns to address?  No    Fall risk:  Fallen 2 or more times in the past year?: No  Any fall with injury in the past year?: No    Cognitive Screenin) Repeat 3 items (Leader, Season, Table)  `  2) " Clock draw: NORMAL  3) 3 item recall: Recalls 3 objects  Results: 3 items recalled: COGNITIVE IMPAIRMENT LESS LIKELY    Mini-CogTM Copyright YOLIE Foreman. Licensed by the author for use in [facility]; reprinted with permission (hanh@[facility]). All rights reserved.      Do you have sleep apnea, excessive snoring or daytime drowsiness?: no        -------------------------------------    Reviewed and updated as needed this visit by clinical staff  Tobacco  Allergies  Meds         Reviewed and updated as needed this visit by Provider        Social History     Tobacco Use     Smoking status: Never Smoker     Smokeless tobacco: Never Used   Substance Use Topics     Alcohol use: Yes     Comment: rare                            Current providers sharing in care for this patient include:   Patient Care Team:  Isabel Hicks MD as PCP - General (Internal Medicine)  Bing Horn MD as MD (Ophthalmology)  Favian Hoffmann MD as MD (Family Medicine - Sports Medicine)  Zeke Bosch MD as MD (Oncology)  Maribell Pastrana RN as Nurse Coordinator (Oncology)    The following health maintenance items are reviewed in Epic and correct as of today:  Health Maintenance   Topic Date Due     ZOSTER IMMUNIZATION (2 of 3) 12/11/2013     PHQ-2  01/01/2019     ASTHMA CONTROL TEST  05/26/2019     FALL RISK ASSESSMENT  08/13/2019     MEDICARE ANNUAL WELLNESS VISIT  11/26/2019     ASTHMA ACTION PLAN  11/27/2019     MAMMO SCREENING  10/22/2020     DTAP/TDAP/TD IMMUNIZATION (2 - Td) 11/12/2022     COLONOSCOPY  12/19/2022     LIPID  11/28/2023     ADVANCE CARE PLANNING  12/28/2023     DEXA  Completed     HEPATITIS C SCREENING  Completed     INFLUENZA VACCINE  Completed     PNEUMOCOCCAL IMMUNIZATION 65+ LOW/MEDIUM RISK  Completed     IPV IMMUNIZATION  Aged Out     MENINGITIS IMMUNIZATION  Aged Out     Labs reviewed in EPIC  Mammogram Screening: Mammogram Screening: Patient over age 50, mutual decision to screen  "reflected in health maintenance.    ROS:  Constitutional, HEENT, cardiovascular, pulmonary, GI, , musculoskeletal, neuro, skin, endocrine and psych systems are negative, except as otherwise noted.    OBJECTIVE:   BP (!) 147/71   Pulse 97   Wt 64.4 kg (142 lb)   Breastfeeding No   BMI 24.36 kg/m    Estimated body mass index is 24.36 kg/m  as calculated from the following:    Height as of 2/11/19: 1.626 m (5' 4.02\").    Weight as of this encounter: 64.4 kg (142 lb).  EXAM:   GENERAL APPEARANCE: healthy, alert and no distress  EYES: Eyes grossly normal to inspection, PERRL and conjunctivae and sclerae normal  HENT: ear canals and TM's normal, nose and mouth without ulcers or lesions, oropharynx clear and oral mucous membranes moist  NECK: no adenopathy, no asymmetry, masses, or scars and thyroid normal to palpation  RESP: lungs clear to auscultation - no rales, rhonchi or wheezes  CV: regular rate and rhythm, normal S1 S2, no S3 or S4, no murmur, click or rub, no peripheral edema and peripheral pulses strong  ABDOMEN: soft, nontender, no hepatosplenomegaly, no masses and bowel sounds normal  MS: no musculoskeletal defects are noted and gait is age appropriate without ataxia  SKIN: no suspicious lesions or rashes  NEURO: Normal strength and tone, sensory exam grossly normal, mentation intact and speech normal  PSYCH: mentation appears normal and affect normal/bright    Diagnostic Test Results:  Labs reviewed in Epic    ASSESSMENT / PLAN:   1. Special screening for malignant neoplasms, colon  Recommend schedule colonoscopy as patient is due.  Referral was placed today.    - GASTROENTEROLOGY ADULT REF PROCEDURE ONLY    2. Hypertension goal BP (blood pressure) < 140/90  Blood pressure is elevated today.  Recommend increasing the dose of losartan to 50 mg.  Follow-up in 3 to 4 weeks is recommended.  - Comprehensive metabolic panel; Future  - Lipid Profile; Future    3. Benign essential hypertension    - losartan " "(COZAAR) 50 MG tablet; Take 1 tablet (50 mg) by mouth daily  Dispense: 90 tablet; Refill: 3  - hydrochlorothiazide (HYDRODIURIL) 25 MG tablet; Take 1 tablet (25 mg) by mouth daily  Dispense: 90 tablet; Refill: 3    4. Moderate persistent asthma without complication  Reviewed asthma management.  Prescriptions for albuterol inhaler as well as for Advair Diskus were given to the patient today.  Her asthma control test was consistent with good asthma control.  - albuterol (PROAIR HFA/PROVENTIL HFA/VENTOLIN HFA) 108 (90 Base) MCG/ACT inhaler; Inhale 2 puffs into the lungs every 6 hrs prn  Dispense: 3 Inhaler; Refill: 1  - fluticasone-salmeterol (ADVAIR DISKUS) 250-50 MCG/DOSE inhaler; Inhale 1 puff into the lungs 2 times daily  Dispense: 180 Inhaler; Refill: 3    5. Encounter for Medicare annual wellness exam.  Reviewed vaccinations today as well as appropriate cancer screening.  Patient is up-to-date on vaccinations.      COUNSELING:  Reviewed preventive health counseling, as reflected in patient instructions       Regular exercise       Healthy diet/nutrition       Vision screening    Estimated body mass index is 24.36 kg/m  as calculated from the following:    Height as of 2/11/19: 1.626 m (5' 4.02\").    Weight as of this encounter: 64.4 kg (142 lb).         reports that she has never smoked. She has never used smokeless tobacco.      Appropriate preventive services were discussed with this patient, including applicable screening as appropriate for cardiovascular disease, diabetes, osteopenia/osteoporosis, and glaucoma.  As appropriate for age/gender, discussed screening for colorectal cancer, prostate cancer, breast cancer, and cervical cancer. Checklist reviewing preventive services available has been given to the patient.    Reviewed patients plan of care and provided an AVS. The Basic Care Plan (routine screening as documented in Health Maintenance) for Yolande meets the Care Plan requirement. This Care Plan has " been established and reviewed with the Patient.    Counseling Resources:  ATP IV Guidelines  Pooled Cohorts Equation Calculator  Breast Cancer Risk Calculator  FRAX Risk Assessment  ICSI Preventive Guidelines  Dietary Guidelines for Americans, 2010  [employer]'s MyPlate  ASA Prophylaxis  Lung CA Screening    Isabel Hicks MD  [facility]

## 2019-12-16 NOTE — PROGRESS NOTES
"SUBJECTIVE:   Yolande Hussein is a 69 year old female who presents for Preventive Visit.      Are you in the first 12 months of your Medicare Part B coverage?  No    Physical Health:    In general, how would you rate your overall physical health? good    Outside of work, how many days during the week do you exercise? 6-7 days/week    Outside of work, approximately how many minutes a day do you exercise?15-30 minutes    If you drink alcohol do you typically have >3 drinks per day or >7 drinks per week? No    Do you usually eat at least 4 servings of fruit and vegetables a day, include whole grains & fiber and avoid regularly eating high fat or \"junk\" foods? Yes    Do you have any problems taking medications regularly?  No    Do you have any side effects from medications? none    Needs assistance for the following daily activities: no assistance needed    Which of the following safety concerns are present in your home?  none identified     Hearing impairment: No    In the past 6 months, have you been bothered by leaking of urine? no    Mental Health:    In general, how would you rate your overall mental or emotional health? good  PHQ-2 Score: 0    Do you feel safe in your environment? Yes    Have you ever done Advance Care Planning? (For example, a Health Directive, POLST, or a discussion with a medical provider or your loved ones about your wishes): Yes, advance care planning is on file.    Additional concerns to address?  No    Fall risk:  Fallen 2 or more times in the past year?: No  Any fall with injury in the past year?: No    Cognitive Screenin) Repeat 3 items (Leader, Season, Table)  `  2) Clock draw: NORMAL  3) 3 item recall: Recalls 3 objects  Results: 3 items recalled: COGNITIVE IMPAIRMENT LESS LIKELY    Mini-CogTM Copyright YOLIE Foreman. Licensed by the author for use in [facility]; reprinted with permission (hanh@.[facility]). All rights reserved.      Do you have sleep apnea, excessive " snoring or daytime drowsiness?: no        -------------------------------------    Reviewed and updated as needed this visit by clinical staff  Tobacco  Allergies  Meds         Reviewed and updated as needed this visit by Provider        Social History     Tobacco Use     Smoking status: Never Smoker     Smokeless tobacco: Never Used   Substance Use Topics     Alcohol use: Yes     Comment: rare                            Current providers sharing in care for this patient include:   Patient Care Team:  Isabel Hicks MD as PCP - General (Internal Medicine)  Bing Horn MD as MD (Ophthalmology)  Favian Hoffmann MD as MD (Family Medicine - Sports Medicine)  Zeke Bosch MD as MD (Oncology)  Maribell Pastrana, RN as Nurse Coordinator (Oncology)    The following health maintenance items are reviewed in Epic and correct as of today:  Health Maintenance   Topic Date Due     ZOSTER IMMUNIZATION (2 of 3) 12/11/2013     PHQ-2  01/01/2019     ASTHMA CONTROL TEST  05/26/2019     FALL RISK ASSESSMENT  08/13/2019     MEDICARE ANNUAL WELLNESS VISIT  11/26/2019     ASTHMA ACTION PLAN  11/27/2019     MAMMO SCREENING  10/22/2020     DTAP/TDAP/TD IMMUNIZATION (2 - Td) 11/12/2022     COLONOSCOPY  12/19/2022     LIPID  11/28/2023     ADVANCE CARE PLANNING  12/28/2023     DEXA  Completed     HEPATITIS C SCREENING  Completed     INFLUENZA VACCINE  Completed     PNEUMOCOCCAL IMMUNIZATION 65+ LOW/MEDIUM RISK  Completed     IPV IMMUNIZATION  Aged Out     MENINGITIS IMMUNIZATION  Aged Out     Labs reviewed in EPIC  Mammogram Screening: Mammogram Screening: Patient over age 50, mutual decision to screen reflected in health maintenance.    ROS:  Constitutional, HEENT, cardiovascular, pulmonary, GI, , musculoskeletal, neuro, skin, endocrine and psych systems are negative, except as otherwise noted.    OBJECTIVE:   BP (!) 147/71   Pulse 97   Wt 64.4 kg (142 lb)   Breastfeeding No   BMI 24.36 kg/m   Estimated  "body mass index is 24.36 kg/m  as calculated from the following:    Height as of 2/11/19: 1.626 m (5' 4.02\").    Weight as of this encounter: 64.4 kg (142 lb).  EXAM:   GENERAL APPEARANCE: healthy, alert and no distress  EYES: Eyes grossly normal to inspection, PERRL and conjunctivae and sclerae normal  HENT: ear canals and TM's normal, nose and mouth without ulcers or lesions, oropharynx clear and oral mucous membranes moist  NECK: no adenopathy, no asymmetry, masses, or scars and thyroid normal to palpation  RESP: lungs clear to auscultation - no rales, rhonchi or wheezes  CV: regular rate and rhythm, normal S1 S2, no S3 or S4, no murmur, click or rub, no peripheral edema and peripheral pulses strong  ABDOMEN: soft, nontender, no hepatosplenomegaly, no masses and bowel sounds normal  MS: no musculoskeletal defects are noted and gait is age appropriate without ataxia  SKIN: no suspicious lesions or rashes  NEURO: Normal strength and tone, sensory exam grossly normal, mentation intact and speech normal  PSYCH: mentation appears normal and affect normal/bright    Diagnostic Test Results:  Labs reviewed in Epic    ASSESSMENT / PLAN:   1. Special screening for malignant neoplasms, colon  Recommend schedule colonoscopy as patient is due.  Referral was placed today.    - GASTROENTEROLOGY ADULT REF PROCEDURE ONLY    2. Hypertension goal BP (blood pressure) < 140/90  Blood pressure is elevated today.  Recommend increasing the dose of losartan to 50 mg.  Follow-up in 3 to 4 weeks is recommended.  - Comprehensive metabolic panel; Future  - Lipid Profile; Future    3. Benign essential hypertension    - losartan (COZAAR) 50 MG tablet; Take 1 tablet (50 mg) by mouth daily  Dispense: 90 tablet; Refill: 3  - hydrochlorothiazide (HYDRODIURIL) 25 MG tablet; Take 1 tablet (25 mg) by mouth daily  Dispense: 90 tablet; Refill: 3    4. Moderate persistent asthma without complication  Reviewed asthma management.  Prescriptions for " "albuterol inhaler as well as for Advair Diskus were given to the patient today.  Her asthma control test was consistent with good asthma control.  - albuterol (PROAIR HFA/PROVENTIL HFA/VENTOLIN HFA) 108 (90 Base) MCG/ACT inhaler; Inhale 2 puffs into the lungs every 6 hrs prn  Dispense: 3 Inhaler; Refill: 1  - fluticasone-salmeterol (ADVAIR DISKUS) 250-50 MCG/DOSE inhaler; Inhale 1 puff into the lungs 2 times daily  Dispense: 180 Inhaler; Refill: 3    5. Encounter for Medicare annual wellness exam.  Reviewed vaccinations today as well as appropriate cancer screening.  Patient is up-to-date on vaccinations.      COUNSELING:  Reviewed preventive health counseling, as reflected in patient instructions       Regular exercise       Healthy diet/nutrition       Vision screening    Estimated body mass index is 24.36 kg/m  as calculated from the following:    Height as of 2/11/19: 1.626 m (5' 4.02\").    Weight as of this encounter: 64.4 kg (142 lb).         reports that she has never smoked. She has never used smokeless tobacco.      Appropriate preventive services were discussed with this patient, including applicable screening as appropriate for cardiovascular disease, diabetes, osteopenia/osteoporosis, and glaucoma.  As appropriate for age/gender, discussed screening for colorectal cancer, prostate cancer, breast cancer, and cervical cancer. Checklist reviewing preventive services available has been given to the patient.    Reviewed patients plan of care and provided an AVS. The Basic Care Plan (routine screening as documented in Health Maintenance) for Yolande meets the Care Plan requirement. This Care Plan has been established and reviewed with the Patient.    Counseling Resources:  ATP IV Guidelines  Pooled Cohorts Equation Calculator  Breast Cancer Risk Calculator  FRAX Risk Assessment  ICSI Preventive Guidelines  Dietary Guidelines for Americans, 2010  USDA's MyPlate  ASA Prophylaxis  Lung CA Screening    Isabel " Hortensia Hicks MD  [facility]

## 2019-12-17 ASSESSMENT — ANXIETY QUESTIONNAIRES: GAD7 TOTAL SCORE: 2

## 2019-12-17 ASSESSMENT — ASTHMA QUESTIONNAIRES: ACT_TOTALSCORE: 22

## 2020-01-08 DIAGNOSIS — I10 HYPERTENSION GOAL BP (BLOOD PRESSURE) < 140/90: ICD-10-CM

## 2020-01-08 LAB
ALBUMIN SERPL-MCNC: 3.7 G/DL (ref 3.4–5)
ALP SERPL-CCNC: 57 U/L (ref 40–150)
ALT SERPL W P-5'-P-CCNC: 21 U/L (ref 0–50)
ANION GAP SERPL CALCULATED.3IONS-SCNC: 6 MMOL/L (ref 3–14)
AST SERPL W P-5'-P-CCNC: 13 U/L (ref 0–45)
BILIRUB SERPL-MCNC: 0.5 MG/DL (ref 0.2–1.3)
BUN SERPL-MCNC: 13 MG/DL (ref 7–30)
CALCIUM SERPL-MCNC: 8.7 MG/DL (ref 8.5–10.1)
CHLORIDE SERPL-SCNC: 104 MMOL/L (ref 94–109)
CHOLEST SERPL-MCNC: 235 MG/DL
CO2 SERPL-SCNC: 30 MMOL/L (ref 20–32)
CREAT SERPL-MCNC: 0.56 MG/DL (ref 0.52–1.04)
GFR SERPL CREATININE-BSD FRML MDRD: >90 ML/MIN/{1.73_M2}
GLUCOSE SERPL-MCNC: 93 MG/DL (ref 70–99)
HDLC SERPL-MCNC: 89 MG/DL
LDLC SERPL CALC-MCNC: 133 MG/DL
NONHDLC SERPL-MCNC: 146 MG/DL
POTASSIUM SERPL-SCNC: 3.3 MMOL/L (ref 3.4–5.3)
PROT SERPL-MCNC: 6.9 G/DL (ref 6.8–8.8)
SODIUM SERPL-SCNC: 140 MMOL/L (ref 133–144)
TRIGL SERPL-MCNC: 66 MG/DL

## 2020-01-08 PROCEDURE — 80061 LIPID PANEL: CPT | Performed by: INTERNAL MEDICINE

## 2020-01-08 PROCEDURE — 36415 COLL VENOUS BLD VENIPUNCTURE: CPT | Performed by: INTERNAL MEDICINE

## 2020-01-08 PROCEDURE — 80053 COMPREHEN METABOLIC PANEL: CPT | Performed by: INTERNAL MEDICINE

## 2020-01-16 ENCOUNTER — TELEPHONE (OUTPATIENT)
Dept: OBGYN | Facility: CLINIC | Age: 70
End: 2020-01-16

## 2020-01-16 NOTE — TELEPHONE ENCOUNTER
Spoke with Yolande who is struggling with constipation. She believes it might be caused by her increased dose of losartan. Discussed with Dr. Hicks, this is unlikely. Advised starting fibrous foods and miralax. Patient agreeable to this. She is also wondering about her potassium that came back low. She will get that rechecked in two weeks.

## 2020-01-22 ENCOUNTER — TELEPHONE (OUTPATIENT)
Dept: GASTROENTEROLOGY | Facility: CLINIC | Age: 70
End: 2020-01-22

## 2020-01-22 DIAGNOSIS — Z12.11 ENCOUNTER FOR SCREENING COLONOSCOPY: Primary | ICD-10-CM

## 2020-01-22 NOTE — TELEPHONE ENCOUNTER
Patient scheduled for Colonoscopy    Indication for procedure. Special screening for malignant neoplasms, colon    Referring Provider. Isabel Hicks MD, Omkar Francisco [facility]    ? No     Arrival time verified? 7:45 AM    Facility location verified? [facility] [facility]    Instructions given regarding prep and procedure patient declined review    Prep Type Golytely    Are you taking any anticoagulants or blood thinners? No     Instructions given? N/a     Electronic implanted devices? Denies     Pre procedure teaching completed? Yes    Transportation from procedure?  policy reviewed. Instructed patient to have someone stay with her for 6 hours post exam    H&P / Pre op physical completed? N/A    Advised patient to bring inhaler

## 2020-01-24 DIAGNOSIS — I10 BENIGN ESSENTIAL HYPERTENSION: ICD-10-CM

## 2020-01-24 LAB
ANION GAP SERPL CALCULATED.3IONS-SCNC: 7 MMOL/L (ref 3–14)
BUN SERPL-MCNC: 8 MG/DL (ref 7–30)
CALCIUM SERPL-MCNC: 9.1 MG/DL (ref 8.5–10.1)
CHLORIDE SERPL-SCNC: 103 MMOL/L (ref 94–109)
CO2 SERPL-SCNC: 30 MMOL/L (ref 20–32)
CREAT SERPL-MCNC: 0.55 MG/DL (ref 0.52–1.04)
GFR SERPL CREATININE-BSD FRML MDRD: >90 ML/MIN/{1.73_M2}
GLUCOSE SERPL-MCNC: 97 MG/DL (ref 70–99)
POTASSIUM SERPL-SCNC: 3.7 MMOL/L (ref 3.4–5.3)
SODIUM SERPL-SCNC: 139 MMOL/L (ref 133–144)

## 2020-01-24 PROCEDURE — 36415 COLL VENOUS BLD VENIPUNCTURE: CPT | Performed by: INTERNAL MEDICINE

## 2020-01-24 PROCEDURE — 80048 BASIC METABOLIC PNL TOTAL CA: CPT | Performed by: INTERNAL MEDICINE

## 2020-01-28 ENCOUNTER — HOSPITAL ENCOUNTER (OUTPATIENT)
Facility: AMBULATORY SURGERY CENTER | Age: 70
End: 2020-01-28
Attending: INTERNAL MEDICINE
Payer: COMMERCIAL

## 2020-01-28 VITALS
BODY MASS INDEX: 25.1 KG/M2 | TEMPERATURE: 97.5 F | HEART RATE: 76 BPM | SYSTOLIC BLOOD PRESSURE: 136 MMHG | HEIGHT: 64 IN | DIASTOLIC BLOOD PRESSURE: 59 MMHG | WEIGHT: 147 LBS | OXYGEN SATURATION: 98 % | RESPIRATION RATE: 14 BRPM

## 2020-01-28 LAB — COLONOSCOPY: NORMAL

## 2020-01-28 PROCEDURE — 88305 TISSUE EXAM BY PATHOLOGIST: CPT | Performed by: INTERNAL MEDICINE

## 2020-01-28 RX ORDER — FENTANYL CITRATE 50 UG/ML
INJECTION, SOLUTION INTRAMUSCULAR; INTRAVENOUS PRN
Status: DISCONTINUED | OUTPATIENT
Start: 2020-01-28 | End: 2020-01-28 | Stop reason: HOSPADM

## 2020-01-28 RX ORDER — ONDANSETRON 4 MG/1
4 TABLET, ORALLY DISINTEGRATING ORAL EVERY 6 HOURS PRN
Status: DISCONTINUED | OUTPATIENT
Start: 2020-01-28 | End: 2020-01-29 | Stop reason: HOSPADM

## 2020-01-28 RX ORDER — NALOXONE HYDROCHLORIDE 0.4 MG/ML
.1-.4 INJECTION, SOLUTION INTRAMUSCULAR; INTRAVENOUS; SUBCUTANEOUS
Status: DISCONTINUED | OUTPATIENT
Start: 2020-01-28 | End: 2020-01-29 | Stop reason: HOSPADM

## 2020-01-28 RX ORDER — SIMETHICONE
LIQUID (ML) MISCELLANEOUS PRN
Status: DISCONTINUED | OUTPATIENT
Start: 2020-01-28 | End: 2020-01-28 | Stop reason: HOSPADM

## 2020-01-28 RX ORDER — ONDANSETRON 2 MG/ML
4 INJECTION INTRAMUSCULAR; INTRAVENOUS EVERY 6 HOURS PRN
Status: DISCONTINUED | OUTPATIENT
Start: 2020-01-28 | End: 2020-01-29 | Stop reason: HOSPADM

## 2020-01-28 RX ORDER — ONDANSETRON 2 MG/ML
4 INJECTION INTRAMUSCULAR; INTRAVENOUS
Status: DISCONTINUED | OUTPATIENT
Start: 2020-01-28 | End: 2020-01-28 | Stop reason: HOSPADM

## 2020-01-28 RX ORDER — FLUMAZENIL 0.1 MG/ML
0.2 INJECTION, SOLUTION INTRAVENOUS
Status: ACTIVE | OUTPATIENT
Start: 2020-01-28 | End: 2020-01-28

## 2020-01-28 RX ORDER — LIDOCAINE 40 MG/G
CREAM TOPICAL
Status: DISCONTINUED | OUTPATIENT
Start: 2020-01-28 | End: 2020-01-28 | Stop reason: HOSPADM

## 2020-01-28 RX ADMIN — Medication 500 ML: at 09:38

## 2020-01-28 ASSESSMENT — MIFFLIN-ST. JEOR: SCORE: 1166.79

## 2020-01-30 LAB — COPATH REPORT: NORMAL

## 2020-03-03 DIAGNOSIS — J45.40 MODERATE PERSISTENT ASTHMA WITHOUT COMPLICATION: ICD-10-CM

## 2020-03-03 RX ORDER — ALBUTEROL SULFATE 0.83 MG/ML
2.5 SOLUTION RESPIRATORY (INHALATION) EVERY 6 HOURS PRN
Qty: 100 VIAL | Refills: 3 | Status: SHIPPED | OUTPATIENT
Start: 2020-03-03 | End: 2022-06-14

## 2020-03-03 NOTE — TELEPHONE ENCOUNTER
Received refill request for Albuterol nebulizer solution.  Last in clinic December 2019.  Refill sent per protocol.

## 2020-03-17 DIAGNOSIS — J45.40 MODERATE PERSISTENT ASTHMA WITHOUT COMPLICATION: ICD-10-CM

## 2020-03-27 DIAGNOSIS — J45.40 MODERATE PERSISTENT ASTHMA WITHOUT COMPLICATION: ICD-10-CM

## 2020-03-27 NOTE — TELEPHONE ENCOUNTER
Received call from patient that her pharmacy informed her the next fill for advair 250-50 will be out of pocket cost and not covered by insurance. Will route to PA team to help determine if PA is required    Prior Authorization Retail Medication Request    Medication/Dose: advair 250-50mcg  ICD code (if different than what is on RX):    Previously Tried and Failed:    Rationale:     Insurance Name:  [employer]  Insurance ID: [id number]      Pharmacy Information (if different than what is on RX)  Name:  [facility]  Phone:

## 2020-11-17 ENCOUNTER — VIRTUAL VISIT (OUTPATIENT)
Dept: INTERNAL MEDICINE | Facility: CLINIC | Age: 70
End: 2020-11-17
Attending: INTERNAL MEDICINE
Payer: COMMERCIAL

## 2020-11-17 DIAGNOSIS — J45.40 MODERATE PERSISTENT ASTHMA WITHOUT COMPLICATION: ICD-10-CM

## 2020-11-17 DIAGNOSIS — I10 BENIGN ESSENTIAL HYPERTENSION: Primary | ICD-10-CM

## 2020-11-17 DIAGNOSIS — Z12.31 ENCOUNTER FOR SCREENING MAMMOGRAM FOR BREAST CANCER: ICD-10-CM

## 2020-11-17 PROCEDURE — 99213 OFFICE O/P EST LOW 20 MIN: CPT | Mod: 95 | Performed by: INTERNAL MEDICINE

## 2020-11-17 RX ORDER — HYDROCHLOROTHIAZIDE 25 MG/1
25 TABLET ORAL DAILY
Qty: 90 TABLET | Refills: 3 | Status: SHIPPED | OUTPATIENT
Start: 2020-11-17 | End: 2022-02-01

## 2020-11-17 RX ORDER — ALBUTEROL SULFATE 90 UG/1
AEROSOL, METERED RESPIRATORY (INHALATION)
Qty: 3 INHALER | Refills: 1 | Status: SHIPPED | OUTPATIENT
Start: 2020-11-17 | End: 2022-06-14

## 2020-11-17 RX ORDER — LOSARTAN POTASSIUM 50 MG/1
50 TABLET ORAL DAILY
Qty: 90 TABLET | Refills: 3 | Status: SHIPPED | OUTPATIENT
Start: 2020-11-17 | End: 2022-02-01

## 2020-11-17 NOTE — PROGRESS NOTES
"Yolande Hussein is a 70 year old female who is being evaluated via a billable telephone visit.      The patient has been notified of following:     \"This telephone visit will be conducted via a call between you and your physician/provider. We have found that certain health care needs can be provided without the need for a physical exam.  This service lets us provide the care you need with a short phone conversation.  If a prescription is necessary we can send it directly to your pharmacy.  If lab work is needed we can place an order for that and you can then stop by our lab to have the test done at a later time.    Telephone visits are billed at different rates depending on your insurance coverage. During this emergency period, for some insurers they may be billed the same as an in-person visit.  Please reach out to your insurance provider with any questions.    If during the course of the call the physician/provider feels a telephone visit is not appropriate, you will not be charged for this service.\"    Patient has given verbal consent for Telephone visit?  Yes    What phone number would you like to be contacted at? [phone]    How would you like to obtain your AVS? Meliton Meléndez     Yolande Hussein is a 70 year old female who presents via phone visit today for the following health issues:    HPI       Patient reports that she needs refills on several medications. She reports that she is due for follow up at [facility]. She reports that she has not been checking her blood pressure at home on the regular basis.  Patient reports that her asthma has been under good control. She has been using albuterol inhaler very infrequently, currently about 3 times a week. She denies waking up at night with shortness of breath. She denies ER visits or hospitalizations for asthma. She states that asthma has not been a limiting factor for her activities of daily living. She has been walking outside of home " when the weather was warmer. She is able to get up and down stairs without any shortness of breath or cough.         Review of Systems   Constitutional, HEENT, cardiovascular, pulmonary, GI, , musculoskeletal, neuro, skin, endocrine and psych systems are negative, except as otherwise noted.       Objective          Vitals:  No vitals were obtained today due to virtual visit.    healthy, alert and no distress  PSYCH: Alert and oriented times 3; coherent speech, normal   rate and volume, able to articulate logical thoughts, able   to abstract reason, no tangential thoughts, no hallucinations   or delusions  Her affect is normal  RESP: No cough, no audible wheezing, able to talk in full sentences  Remainder of exam unable to be completed due to telephone visits            Assessment/Plan:    Assessment & Plan     Benign essential hypertension  Patient reports that her blood pressure has been under good control at home.  Recommend checking lipid panel, basic chemistry panel, and continue with current medical therapy without changes as she has been able to tolerate them well.  - Lipid Profile  - Basic metabolic panel  - hydrochlorothiazide (HYDRODIURIL) 25 MG tablet  Dispense: 90 tablet; Refill: 3  - losartan (COZAAR) 50 MG tablet  Dispense: 90 tablet; Refill: 3    Moderate persistent asthma without complication  Reviewed asthma management with patient.  We will continue with use of Advair at current dose as patient reports good control of her symptoms.  Prescription for albuterol was also given to the patient to use as needed.  - ADVAIR DISKUS 250-50 MCG/DOSE inhaler  Dispense: 180 Inhaler; Refill: 3  - albuterol (PROAIR HFA/PROVENTIL HFA/VENTOLIN HFA) 108 (90 Base) MCG/ACT inhaler  Dispense: 3 Inhaler; Refill: 1    Encounter for screening mammogram for breast cancer  Patient is due for mammogram.  Order was placed for the patient today.  - MA Screen Bilateral w/Juancarlos       BMI:   Estimated body mass index is 25.23  "kg/m  as calculated from the following:    Height as of 1/28/20: 1.626 m (5' 4\").    Weight as of 1/28/20: 66.7 kg (147 lb).                No follow-ups on file.    Isabel Hicks MD  [facility]    Phone call duration:  17 minutes                "

## 2020-11-17 NOTE — LETTER
"11/17/2020       RE: Yolande Hussein  [address] [place] [address]     Dear Colleague,    Thank you for referring your patient, Yolande Hussein, to the [facility] [place] at [facility]. Please see a copy of my visit note below.    Yolande Hussein is a 70 year old female who is being evaluated via a billable telephone visit.      The patient has been notified of following:     \"This telephone visit will be conducted via a call between you and your physician/provider. We have found that certain health care needs can be provided without the need for a physical exam.  This service lets us provide the care you need with a short phone conversation.  If a prescription is necessary we can send it directly to your pharmacy.  If lab work is needed we can place an order for that and you can then stop by our lab to have the test done at a later time.    Telephone visits are billed at different rates depending on your insurance coverage. During this emergency period, for some insurers they may be billed the same as an in-person visit.  Please reach out to your insurance provider with any questions.    If during the course of the call the physician/provider feels a telephone visit is not appropriate, you will not be charged for this service.\"    Patient has given verbal consent for Telephone visit?  Yes    What phone number would you like to be contacted at? [phone]    How would you like to obtain your AVS? MyChart    Subjective     Yolande Hussein is a 70 year old female who presents via phone visit today for the following health issues:    HPI       Patient reports that she needs refills on several medications. She reports that she is due for follow up at [facility]. She reports that she has not been checking her blood pressure at home on the regular basis.  Patient reports that her asthma has been under good control. " She has been using albuterol inhaler very infrequently, currently about 3 times a week. She denies waking up at night with shortness of breath. She denies ER visits or hospitalizations for asthma. She states that asthma has not been a limiting factor for her activities of daily living. She has been walking outside of home when the weather was warmer. She is able to get up and down stairs without any shortness of breath or cough.         Review of Systems   Constitutional, HEENT, cardiovascular, pulmonary, GI, , musculoskeletal, neuro, skin, endocrine and psych systems are negative, except as otherwise noted.     Objective      Vitals:  No vitals were obtained today due to virtual visit.    healthy, alert and no distress  PSYCH: Alert and oriented times 3; coherent speech, normal   rate and volume, able to articulate logical thoughts, able   to abstract reason, no tangential thoughts, no hallucinations   or delusions  Her affect is normal  RESP: No cough, no audible wheezing, able to talk in full sentences  Remainder of exam unable to be completed due to telephone visits    Assessment & Plan     Benign essential hypertension  Patient reports that her blood pressure has been under good control at home.  Recommend checking lipid panel, basic chemistry panel, and continue with current medical therapy without changes as she has been able to tolerate them well.  - Lipid Profile  - Basic metabolic panel  - hydrochlorothiazide (HYDRODIURIL) 25 MG tablet  Dispense: 90 tablet; Refill: 3  - losartan (COZAAR) 50 MG tablet  Dispense: 90 tablet; Refill: 3    Moderate persistent asthma without complication  Reviewed asthma management with patient.  We will continue with use of Advair at current dose as patient reports good control of her symptoms.  Prescription for albuterol was also given to the patient to use as needed.  - ADVAIR DISKUS 250-50 MCG/DOSE inhaler  Dispense: 180 Inhaler; Refill: 3  - albuterol (PROAIR  "HFA/PROVENTIL HFA/VENTOLIN HFA) 108 (90 Base) MCG/ACT inhaler  Dispense: 3 Inhaler; Refill: 1    Encounter for screening mammogram for breast cancer  Patient is due for mammogram.  Order was placed for the patient today.  - MA Screen Bilateral w/Juancarlos       BMI:   Estimated body mass index is 25.23 kg/m  as calculated from the following:    Height as of 1/28/20: 1.626 m (5' 4\").    Weight as of 1/28/20: 66.7 kg (147 lb).     No follow-ups on file.    Isabel Hicks MD  [facility] [place]    Phone call duration:  17 minutes      "

## 2021-01-13 DIAGNOSIS — I10 BENIGN ESSENTIAL HYPERTENSION: ICD-10-CM

## 2021-01-13 PROCEDURE — 80048 BASIC METABOLIC PNL TOTAL CA: CPT | Performed by: INTERNAL MEDICINE

## 2021-01-13 PROCEDURE — 36415 COLL VENOUS BLD VENIPUNCTURE: CPT | Performed by: INTERNAL MEDICINE

## 2021-01-13 PROCEDURE — 80061 LIPID PANEL: CPT | Performed by: INTERNAL MEDICINE

## 2021-01-14 LAB
ANION GAP SERPL CALCULATED.3IONS-SCNC: 4 MMOL/L (ref 3–14)
BUN SERPL-MCNC: 8 MG/DL (ref 7–30)
CALCIUM SERPL-MCNC: 8.9 MG/DL (ref 8.5–10.1)
CHLORIDE SERPL-SCNC: 105 MMOL/L (ref 94–109)
CHOLEST SERPL-MCNC: 228 MG/DL
CO2 SERPL-SCNC: 29 MMOL/L (ref 20–32)
CREAT SERPL-MCNC: 0.62 MG/DL (ref 0.52–1.04)
GFR SERPL CREATININE-BSD FRML MDRD: 90 ML/MIN/{1.73_M2}
GLUCOSE SERPL-MCNC: 92 MG/DL (ref 70–99)
HDLC SERPL-MCNC: 93 MG/DL
LDLC SERPL CALC-MCNC: 123 MG/DL
NONHDLC SERPL-MCNC: 135 MG/DL
POTASSIUM SERPL-SCNC: 4.1 MMOL/L (ref 3.4–5.3)
SODIUM SERPL-SCNC: 138 MMOL/L (ref 133–144)
TRIGL SERPL-MCNC: 62 MG/DL

## 2021-01-15 ENCOUNTER — HEALTH MAINTENANCE LETTER (OUTPATIENT)
Age: 71
End: 2021-01-15

## 2021-02-06 ENCOUNTER — HEALTH MAINTENANCE LETTER (OUTPATIENT)
Age: 71
End: 2021-02-06

## 2021-03-03 ENCOUNTER — OFFICE VISIT (OUTPATIENT)
Dept: OPHTHALMOLOGY | Facility: CLINIC | Age: 71
End: 2021-03-03
Attending: OPHTHALMOLOGY
Payer: COMMERCIAL

## 2021-03-03 DIAGNOSIS — H52.4 MYOPIA OF BOTH EYES WITH ASTIGMATISM AND PRESBYOPIA: ICD-10-CM

## 2021-03-03 DIAGNOSIS — Z83.518 FAMILY HISTORY OF MACULAR DEGENERATION: ICD-10-CM

## 2021-03-03 DIAGNOSIS — H25.813 COMBINED FORMS OF AGE-RELATED CATARACT OF BOTH EYES: Primary | ICD-10-CM

## 2021-03-03 DIAGNOSIS — H35.373 EPIRETINAL MEMBRANE (ERM) OF BOTH EYES: ICD-10-CM

## 2021-03-03 DIAGNOSIS — H01.02A SQUAMOUS BLEPHARITIS OF UPPER AND LOWER EYELIDS OF BOTH EYES: ICD-10-CM

## 2021-03-03 DIAGNOSIS — H52.13 MYOPIA OF BOTH EYES WITH ASTIGMATISM AND PRESBYOPIA: ICD-10-CM

## 2021-03-03 DIAGNOSIS — H52.203 MYOPIA OF BOTH EYES WITH ASTIGMATISM AND PRESBYOPIA: ICD-10-CM

## 2021-03-03 DIAGNOSIS — H01.02B SQUAMOUS BLEPHARITIS OF UPPER AND LOWER EYELIDS OF BOTH EYES: ICD-10-CM

## 2021-03-03 PROCEDURE — G0463 HOSPITAL OUTPT CLINIC VISIT: HCPCS | Mod: 25

## 2021-03-03 PROCEDURE — 99214 OFFICE O/P EST MOD 30 MIN: CPT | Mod: GC | Performed by: OPHTHALMOLOGY

## 2021-03-03 PROCEDURE — 92134 CPTRZ OPH DX IMG PST SGM RTA: CPT | Performed by: OPHTHALMOLOGY

## 2021-03-03 PROCEDURE — 76519 ECHO EXAM OF EYE: CPT | Performed by: OPHTHALMOLOGY

## 2021-03-03 PROCEDURE — 92015 DETERMINE REFRACTIVE STATE: CPT

## 2021-03-03 ASSESSMENT — REFRACTION_WEARINGRX
OS_AXIS: 015
OS_CYLINDER: SPHERE
OD_ADD: +2.50
OS_ADD: +2.50
OD_SPHERE: -4.00
OS_CYLINDER: +0.50
OD_CYLINDER: +1.50
OD_AXIS: 141
OD_AXIS: 139
OD_CYLINDER: +1.00
OS_SPHERE: -3.00
OS_SPHERE: -3.25
OD_SPHERE: -4.25

## 2021-03-03 ASSESSMENT — REFRACTION_MANIFEST
OD_SPHERE: -5.50
OS_AXIS: 011
OD_ADD: +2.25
OD_AXIS: 141
OS_CYLINDER: +0.50
OS_ADD: +2.25
OD_CYLINDER: +1.00
OS_SPHERE: -4.00

## 2021-03-03 ASSESSMENT — VISUAL ACUITY
OS_CC: J2
METHOD: SNELLEN - LINEAR
OD_PH_CC: 20/20
OS_CC: 20/50
OD_CC+: +2
OS_PH_CC+: -3
OS_CC+: -2
OS_PH_CC: 20/25
OD_PH_CC+: -2
OD_CC: J2
OD_CC: 20/60

## 2021-03-03 ASSESSMENT — TONOMETRY
OD_IOP_MMHG: 21
OS_IOP_MMHG: 19
IOP_METHOD: TONOPEN

## 2021-03-03 ASSESSMENT — EXTERNAL EXAM - RIGHT EYE: OD_EXAM: NORMAL

## 2021-03-03 ASSESSMENT — CONF VISUAL FIELD
METHOD: COUNTING FINGERS
OD_NORMAL: 1
OS_NORMAL: 1

## 2021-03-03 ASSESSMENT — CUP TO DISC RATIO
OS_RATIO: 0.1
OD_RATIO: 0.1

## 2021-03-03 ASSESSMENT — EXTERNAL EXAM - LEFT EYE: OS_EXAM: NORMAL

## 2021-03-03 ASSESSMENT — SLIT LAMP EXAM - LIDS
COMMENTS: 1+ BLEPHARITIS
COMMENTS: 1+ BLEPHARITIS

## 2021-03-03 NOTE — PROGRESS NOTES
HPI  Yolande Husseni is a 70 year old female here for CEE.    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 She notes that her vision is more blurred at distance and near since she was last here. She states that she has glare and difficulty driving at nighttime. She denies eye pain and irritation. No new flashes/change in floaters. She is currently not using any eye drops or ointment. She is taking lutein vitamins daily.    POH: Cataracts, refractive error  PMH: HTN, no diabetes  FH: Mother and sibling with macular degeneration  SH: Non-smoker    Assessment & Plan      (H25.813) Combined forms of age-related cataract of both eyes  (primary encounter diagnosis)  Comment: Visually significant both eyes  Plan:     Dilates to: 9 mm  Alpha blockers/Flomax: None  Trauma/Pseudoxfoliation: None  Fuchs dystrophy/guttae: None    Diabetes: No  Anticoagulation: None    Cyl:1.50 @ 107 right eye, 0.88 @ 074 left eye    We discussed the risks and benefits of cataract surgery in the left eye, and informed consent was obtained.  Proceed with CE/IOL left eye followed by right eye.     Surgical plan:  Topical  Considering monofocal aiming ~ -2.50 both eyes vs Vivity (possible Vivity toric). She will let me know what she chooses.    ADDENDUM 3/4/21: Yolande called and has opted for standard IOL with an emmetropic target    (Z83.518) Family history of macular degeneration  Comment: Mother and sibling. Very mild ERM, but No evidence of macular degeneration on exam or macula OCT today.  Plan: Observe.    (H01.02A,  H01.02B) Squamous blepharitis of upper and lower eyelids of both eyes  Comment: 1+ MGD bilaterally. Ocular surface looks fairly healthy.  Plan: Start AT BID and WC BID    (H52.13,  H52.203,  H52.4) Myopia of both eyes with astigmatism and  presbyopia  Comment: Results of refraction discussed  Plan: Defer new MR until after CEIOL     -----------------------------------------------------------------------------------    Return for scheduled procedure, or sooner as needed. or sooner as needed.    Elaina Jimenez MD  Ophthalmology Resident, PGY-2    Teaching statement:  Complete documentation of historical and exam elements from today's encounter can be found in the full encounter summary report (not reduplicated in this progress note). I personally obtained the chief complaint(s) and history of present illness.  I confirmed and edited as necessary the review of systems, past medical/surgical history, family history, social history, and examination findings as documented by others; and I examined the patient myself. I personally reviewed the relevant tests, images, and reports as documented above.     I formulated and edited as necessary the assessment and plan and discussed the findings and management plan with the patient and family.    Bing Horn MD  Comprehensive Ophthalmology & Ocular Pathology  Department of Ophthalmology and Visual Neurosciences  [email or web address]  Pager [phone]

## 2021-03-03 NOTE — NURSING NOTE
Chief Complaints and History of Present Illnesses   Patient presents with     Annual Eye Exam     Chief Complaint(s) and History of Present Illness(es)     Annual Eye Exam     Associated symptoms: floaters (sometimes).  Negative for flashes, dryness and itching    Pain scale: 0/10              Comments     Yolande is here for her annual eye exam. It has been longer than usual due to COVID. She states vision is not as good as it used to be. She says she also has cataracts and hopes they are getting to the point where something has to be done. She is having trouble driving at night.     Bud Rogel COT 9:33 AM March 3, 2021

## 2021-03-04 ENCOUNTER — TELEPHONE (OUTPATIENT)
Dept: OPHTHALMOLOGY | Facility: CLINIC | Age: 71
End: 2021-03-04

## 2021-03-04 DIAGNOSIS — Z11.59 ENCOUNTER FOR SCREENING FOR OTHER VIRAL DISEASES: ICD-10-CM

## 2021-03-04 PROBLEM — H25.813 COMBINED FORMS OF AGE-RELATED CATARACT OF BOTH EYES: Status: ACTIVE | Noted: 2021-03-04

## 2021-03-04 NOTE — TELEPHONE ENCOUNTER
Spoke with patient to schedule left eye surgery with Dr. Horn    Surgery was scheduled on 3/19 at [facility]  Patient will have H&P at [facility]     Patient is aware a COVID-19 test is needed before their procedure. The test should be with-in 4 days of their procedure.   Test Details: Date 3/16 Location [facility]    Post-Op visit was scheduled on 4/5  Patient was advised a / is needed day of surgery. As well as, for 24 hours after their surgery procedure.  Surgery packet was mailed 3/4, patient has my direct contact information for any further questions [phone].

## 2021-03-04 NOTE — TELEPHONE ENCOUNTER
Spoke with patient to schedule right eye surgery with Dr. Horn    Surgery was scheduled on 4/2 at [facility]  Patient will have H&P at [facility]     Patient is aware a COVID-19 test is needed before their procedure. The test should be with-in 4 days of their procedure.   Test Details: Date 3/29 Location [facility]    Post-Op visit was scheduled on 4/26  Patient was advised a / is needed day of surgery. As well as, for 24 hours after their surgery procedure.  Surgery packet was mailed 3/4, patient has my direct contact information for any further questions [phone].

## 2021-03-05 NOTE — TELEPHONE ENCOUNTER
FUTURE VISIT INFORMATION      SURGERY INFORMATION:    Date: 3.19.21    Location: [facility] OR    Surgeon:  Kory    Anesthesia Type:  combined MAC with topical    Procedure: LEFT EYE CATARACT REMOVAL WITH INTRAOCULAR LENS IMPLANT    Consult: 3.3.21    RECORDS REQUESTED FROM:       Primary Care Provider: Fabiola    Most recent EKG+ [id number]    Most recent PFT's: 9.3.15

## 2021-03-15 ENCOUNTER — VIRTUAL VISIT (OUTPATIENT)
Dept: SURGERY | Facility: CLINIC | Age: 71
End: 2021-03-15
Payer: COMMERCIAL

## 2021-03-15 ENCOUNTER — PRE VISIT (OUTPATIENT)
Dept: SURGERY | Facility: CLINIC | Age: 71
End: 2021-03-15

## 2021-03-15 ENCOUNTER — ANESTHESIA EVENT (OUTPATIENT)
Dept: SURGERY | Facility: CLINIC | Age: 71
End: 2021-03-15

## 2021-03-15 DIAGNOSIS — Z01.818 PRE-OP EXAMINATION: Primary | ICD-10-CM

## 2021-03-15 DIAGNOSIS — H25.812 COMBINED FORM OF AGE-RELATED CATARACT, LEFT EYE: Primary | ICD-10-CM

## 2021-03-15 PROCEDURE — 99202 OFFICE O/P NEW SF 15 MIN: CPT | Mod: 95 | Performed by: PHYSICIAN ASSISTANT

## 2021-03-15 RX ORDER — OFLOXACIN 3 MG/ML
SOLUTION/ DROPS OPHTHALMIC
Qty: 5 ML | Refills: 0 | Status: SHIPPED | OUTPATIENT
Start: 2021-03-15 | End: 2021-04-26

## 2021-03-15 RX ORDER — PREDNISOLONE ACETATE 10 MG/ML
SUSPENSION/ DROPS OPHTHALMIC
Qty: 5 ML | Refills: 1 | Status: SHIPPED | OUTPATIENT
Start: 2021-03-15 | End: 2021-04-02

## 2021-03-15 ASSESSMENT — ENCOUNTER SYMPTOMS: SEIZURES: 0

## 2021-03-15 ASSESSMENT — PAIN SCALES - GENERAL: PAINLEVEL: MILD PAIN (2)

## 2021-03-15 NOTE — H&P (VIEW-ONLY)
Pre-Operative H & P     CC:  Preoperative exam to assess for increased cardiopulmonary risk while undergoing surgery and anesthesia.    Date of Encounter: 3/15/2021  Primary Care Physician:  Isabel Hicks     Reason for visit: pre operative examination, Combined forms of age-related cataract of both eyes     CYNDIE Hussein is a 70 year old female who presents for pre-operative H & P in preparation for LEFT EYE CATARACT REMOVAL WITH INTRAOCULAR LENS IMPLANT  with Dr. Horn on 3/19/21 at [facility].     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  The patient was seen by Dr. Horn in 2018 for issues with her cataracts causing vision glare and difficult driving at nighttime.  She then followed up with Dr. Horn on 3/3/2021 and they again discussed her treatment options for her cataract given she now finds that reading is difficult. She continued to have follow-up after the resection of her low-grade appendiceal mucinous neoplasm with acellular mucin outside appendix was resected and she remains disease-free and she has received her 2 doses of the COVID-19 vaccine. She is now scheduled for procedure as above.       History is obtained from the patient and chart review    Past Medical History  Past Medical History:   Diagnosis Date     Asthma      Breast atypical ductal hyperplasia-- Right 06/13/2002    Lumpectomy.  Problem list name updated by automated process. Provider to review and confirm     Dysphonia 09/08/2014     Eczema 10/15/2012     Gastro-oesophageal reflux disease      HLD (hyperlipidemia)      Hypertension      Kidney stone      Low grade mucinous neoplasm of appendix      Menopausal  state -- age 49 08/13/2012    With NEGATIVE HR HPV neg on Pap today, there is NO further need for routine pap screens.        Neck stiffness 11/03/2014     Nephrolithiasis 05/12/2014     Nonsenile cataract      Shoulder pain 03/30/2009    Controlled with stretching, unless lapses or overworks 8/2012      Thoracalgia 03/30/2009     TMJ (temporomandibular joint syndrome) 01/18/2012    Jaw PT and Mouth guard helping! 8/2012      Unilateral vocal fold paresis 11/03/2014     Vaginal atrophy 08/13/2012    Estrogen loss with increasing sxs on Vagifem 10 mcg.  Increase from 2x/wk to 3xs/wk.        Past Surgical History  Past Surgical History:   Procedure Laterality Date     APPENDECTOMY OPEN N/A 12/27/2018    Procedure: Appendectomy open;  Surgeon: Evelyne Yung MD;  Location: [facility] OR     BREAST BIOPSY, CORE RT/LT      R-- precancer cells     COLONOSCOPY       COLONOSCOPY N/A 1/28/2020    Procedure: COLONOSCOPY, WITH POLYPECTOMY AND BIOPSY;  Surgeon: Aparna Soriano MD;  [facility]     EXPLORE NECK N/A 8/19/2014    Procedure: EXPLORE NECK;  Surgeon: Alea Jacobs MD;  Location: [facility]     EXTRACORPOREAL SHOCK WAVE LITHOTRIPSY (ESWL)  2/26/2014    Procedure: EXTRACORPOREAL SHOCK WAVE LITHOTRIPSY (ESWL);  Left Kidney Extracorporeal Shockwave Lithotripsy;  Surgeon: Sabas Choi MD;  Location: [facility]     HYSTERECTOMY TOTAL ABDOMINAL, BILATERAL SALPINGO-OOPHORECTOMY, COMBINED Bilateral 12/27/2018    Procedure: TOTAL ABDOMINAL HYSTERECTOMY WITH BILATERAL SALPINGO-OOPHORECTOMY, appendectomy, pelvic washings;  Surgeon: Evelyne Yung MD;  Location: [facility] OR     LUMPECTOMY BREAST  age 48    R -- margins clear -- declined tamoxofen     PARATHYROIDECTOMY N/A 8/19/2014    Procedure: PARATHYROIDECTOMY;  Surgeon: Alea Jacobs MD;  Location: [facility] OR     STERILIZATION  1984    PP b/4 discharge      TUBAL LIGATION         Hx of Blood transfusions/reactions: denies     Hx of abnormal bleeding  or anti-platelet use: none    Menstrual history: No LMP recorded. Patient has had a hysterectomy.:     Steroid use in the last year: none    Personal or FH with difficulty with Anesthesia:  Slow to wake     Prior to Admission Medications  Current Outpatient Medications   Medication Sig Dispense Refill     acetaminophen (ACETAMINOPHEN EXTRA STRENGTH) 500 MG tablet Take 500-1,000 mg by mouth every 6 hours as needed for mild pain       ADVAIR DISKUS 250-50 MCG/DOSE inhaler Inhale 1 puff into the lungs 2 times daily 180 Inhaler 3     albuterol (PROAIR HFA/PROVENTIL HFA/VENTOLIN HFA) 108 (90 Base) MCG/ACT inhaler Inhale 2 puffs into the lungs every 6 hrs prn (Patient taking differently: as needed Inhale 2 puffs into the lungs every 6 hrs prn) 3 Inhaler 1     albuterol (PROVENTIL) (2.5 MG/3ML) 0.083% neb solution Take 1 vial (2.5 mg) by nebulization every 6 hours as needed for shortness of breath / dyspnea 100 vial 3     hydrochlorothiazide (HYDRODIURIL) 25 MG tablet Take 1 tablet (25 mg) by mouth daily (Patient taking differently: Take 25 mg by mouth every morning ) 90 tablet 3     losartan (COZAAR) 50 MG tablet Take 1 tablet (50 mg) by mouth daily (Patient taking differently: Take 50 mg by mouth every morning ) 90 tablet 3     minoxidil (ROGAINE) 2 % external solution Apply topically 2 times daily       Propylene Glycol-Glycerin (SOOTHE OP) Apply to eye as needed       adapalene (DIFFERIN) 0.1 % gel APPLY TOPICALLY AT BEDTIME AS DIRECTED. 45 g 3       Allergies  Allergies   Allergen Reactions     Macrobid [Nitrofurantoin] Hives     Aspirin      Tight breathing     Macrobid [Nitrofuran Derivatives]        Social History  Social History     Socioeconomic History     Marital status:      Spouse name: Not on file     Number of children: Not on file     Years of education: Not on file     Highest education level: Not on file   Occupational History     Not on file   Social Needs     Financial resource strain: Not on  file     Food insecurity     Worry: Not on file     Inability: Not on file     Transportation needs     Medical: Not on file     Non-medical: Not on file   Tobacco Use     Smoking status: Never Smoker     Smokeless tobacco: Never Used   Substance and Sexual Activity     Alcohol use: Yes     Comment: rare      Drug use: No     Sexual activity: Yes     Partners: Male     Birth control/protection: Post-menopausal     Comment: age 49   Lifestyle     Physical activity     Days per week: Not on file     Minutes per session: Not on file     Stress: Not on file   Relationships     Social connections     Talks on phone: Not on file     Gets together: Not on file     Attends Congregational service: Not on file     Active member of club or organization: Not on file     Attends meetings of clubs or organizations: Not on file     Relationship status: Not on file     Intimate partner violence     Fear of current or ex partner: Not on file     Emotionally abused: Not on file     Physically abused: Not on file     Forced sexual activity: Not on file   Other Topics Concern     Parent/sibling w/ CABG, MI or angioplasty before 65F 55M? No      Service Not Asked     Blood Transfusions No     Caffeine Concern No     Comment: 1s/d     Occupational Exposure No     Hobby Hazards No     Sleep Concern No     Stress Concern No     Weight Concern No     Comment: gained 3 lbs since [place]     Special Diet No     Back Care No     Exercise No     Comment: walks 30' daily (one mile)     Bike Helmet No     Seat Belt Not Asked     Self-Exams Not Asked   Social History Narrative    Retired .     Evelyne Yung       Family History  Family History   Problem Relation Age of Onset     Asthma Mother      Diabetes Mother 80        diet and pill tx     Hypertension Mother      Macular Degeneration Mother      Abdominal Aortic Aneurysm Mother      Heart Failure Mother 70        CHF     Asthma Father      Hypertension Father       Alzheimer Disease Father 70     Asthma Maternal Grandfather      Asthma Daughter      Heart Failure Sister 60        CHF - mild     Hypertension Sister      Macular Degeneration Sister      Glaucoma No family hx of      Cancer No family hx of      Amblyopia No family hx of      Retinal detachment No family hx of        Review of Systems  ROS/MED HX  ENT/Pulmonary: Comment: History of clicking TMJ and unilateral vocal fold paresis after parathyroidectomy now resolved.     (+) Intermittent, asthma Treatment: Inhaled steroids, Nebulizer prn and Inhaler prn,      Neurologic:  - neg neurologic ROS  (-) no seizures, no CVA and migraines   Cardiovascular:     (+) Dyslipidemia hypertension-----Previous cardiac testing   Echo: Date: Results:    Stress Test: Date: Results:    ECG Reviewed: Date: 12/27/18 Results:  Sinus rhythm with sinus arrhythmia, possible left atrial enlargement, non specific ST and T wave abnormality.   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    Other:  - neg other ROS   (-) Any chance pregnant       The complete review of systems is negative other than noted in the HPI or here.                         0 lbs 0 oz  Data Unavailable   There is no height or weight on file to calculate BMI.       Physical Exam  Constitutional: Awake, alert, cooperative,  no apparent distress, and appears stated age.  Eyes: glasses   HENT: Normocephalic  Respiratory: Non labored breathing    Neurologic: Awake, alert, oriented to name, place and time.   Neuropsychiatric: Calm, cooperative. Normal affect.     Labs: (personally reviewed)  CBC:   Lab Results   Component Value Date    WBC 7.4 2018    WBC 10.5 2014    HGB 13.5 2018    HGB 14.7 2018    HCT 44.2 2018    HCT 42.8 2014     2018     2014     BMP:   Lab Results   Component Value Date     2021     2020    POTASSIUM 4.1 2021    POTASSIUM 3.7 2020    CHLORIDE 105 2021    CHLORIDE 103 2020    CO2 29 2021    CO2 30 2020    BUN 8 2021    BUN 8 2020    CR 0.62 2021    CR 0.55 2020    GLC 92 2021    GLC 97 2020     COAGS:   Lab Results   Component Value Date    PTT 24 2014    INR 1.01 2014     POC:   Lab Results   Component Value Date    BGM 87 2018     HEPATIC:   Lab Results   Component Value Date    ALBUMIN 3.7 2020    PROTTOTAL 6.9 2020    ALT 21 2020    AST 13 2020    ALKPHOS 57 2020    BILITOTAL 0.5 2020     OTHER:   Lab Results   Component Value Date    PH 7.43 2005    LACT 1.8 2014    CARLY 8.9 2021    PHOS 3.4 2014    MAG 1.8 2006    LIPASE 58 2014    TSH 1.67 2013       EK18  Sinus rhythm with sinus arrhythmia, possible left atrial enlargement, non specific ST and T wave abnormality.       The patient's records and results personally reviewed by this provider.     Outside records reviewed from: care everywhere     ASSESSMENT and PLAN  Yolande is a 70 year old woman who is scheduled for LEFT EYE CATARACT REMOVAL WITH INTRAOCULAR LENS IMPLANT on 3/19/21 by Dr. Horn in treatment of Combined forms of age-related cataract of both eyes.  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  Cardiac:  Functional status- METS >4, the patient is active walking, taking stairs and exercising. She denies any cardiac symptoms.  Low risk surgery with 0.4% (RCRI #) risk of major adverse cardiac event.  The patient had EKG on 12/27/18 with Sinus rhythm with sinus arrhythmia, possible left atrial enlargement, non specific ST and T wave abnormality. No further testing for low risk procedure.   ~ HTN - continue Cozaar and HCTZ given MAC   ~ HLD - diet controlled.      2.  Pulm:  Airway feasible.  HYUN risk: Low (age, HTN)   ~ Asthma - well controlled. Continue Advair Diskus and PRN albuterol inhaler and nebulizer. She denies any recent excerbations      3. ENT: TMJ - now resolved.   ~ Patient reports after parathyroidectomy she had unilateral vocal fold paresis and lost her voice for 3 months. After speech therapy this resolved and has not had any further issues - the patient had ETT in 2018 with easy intubation with CMAC.      4. Endo: s/p parathyroidectomy     5. GI:  Risk of PONV score = 2  If > 2, anti-emetic intervention recommended.   ~ History of low grade appendiceal mucinous neoplasm with acellular mucin outside the appendix - s/p reaction on 12/27/18 at [place]. The patient had recent follow up with her team on 11/30/20 by Dr. Francisco and remains disease free. Plan for follow up every 12 months     6. : history of nephrolithiasis  - no current symptoms.   ~ History of breast atypical ductal hyperplasia - s/p lumpectomy     VTE risk: 0.5%     **Please refer to the physical examination documented by the anesthesiologist in the anesthesia record on the day of surgery**       The  patient is optimized for their procedure. AVS with information on surgery time/arrival time, meds and NPO status given by nursing staff.          Video-Visit Details    Type of service:  Video Visit    Patient verbally consented to video service today: YES      Video Start Time: 10:12  Video End Time (time video stopped): 10:26    Originating Location (pt. Location): Home    Distant Location (provider location):  [facility] PREOPERAT[facility]     Mode of Communication:  Video Conference via [facility]        Dianna Orozco PA-C  Preoperative Assessment Center  [facility]  Phone: [phone]  Fax: [phone]

## 2021-03-15 NOTE — H&P (VIEW-ONLY)
Pre-Operative H & P     CC:  Preoperative exam to assess for increased cardiopulmonary risk while undergoing surgery and anesthesia.    Date of Encounter: 3/15/2021  Primary Care Physician:  Isabel Hicks     Reason for visit: pre operative examination, Combined forms of age-related cataract of both eyes     CYNDIE Hussein is a 70 year old female who presents for pre-operative H & P in preparation for LEFT EYE CATARACT REMOVAL WITH INTRAOCULAR LENS IMPLANT  with Dr. Horn on 3/19/21 at [facility].     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  The patient was seen by Dr. Horn in 2018 for issues with her cataracts causing vision glare and difficult driving at nighttime.  She then followed up with Dr. Horn on 3/3/2021 and they again discussed her treatment options for her cataract given she now finds that reading is difficult. She continued to have follow-up after the resection of her low-grade appendiceal mucinous neoplasm with acellular mucin outside appendix was resected and she remains disease-free and she has received her 2 doses of the COVID-19 vaccine. She is now scheduled for procedure as above.       History is obtained from the patient and chart review    Past Medical History  Past Medical History:   Diagnosis Date     Asthma      Breast atypical ductal hyperplasia-- Right 06/13/2002    Lumpectomy.  Problem list name updated by automated process. Provider to review and confirm     Dysphonia 09/08/2014     Eczema 10/15/2012     Gastro-oesophageal reflux disease      HLD (hyperlipidemia)      Hypertension      Kidney stone      Low grade mucinous neoplasm of appendix      Menopausal  state -- age 49 08/13/2012    With NEGATIVE HR HPV neg on Pap today, there is NO further need for routine pap screens.        Neck stiffness 11/03/2014     Nephrolithiasis 05/12/2014     Nonsenile cataract      Shoulder pain 03/30/2009    Controlled with stretching, unless lapses or overworks 8/2012      Thoracalgia 03/30/2009     TMJ (temporomandibular joint syndrome) 01/18/2012    Jaw PT and Mouth guard helping! 8/2012      Unilateral vocal fold paresis 11/03/2014     Vaginal atrophy 08/13/2012    Estrogen loss with increasing sxs on Vagifem 10 mcg.  Increase from 2x/wk to 3xs/wk.        Past Surgical History  Past Surgical History:   Procedure Laterality Date     APPENDECTOMY OPEN N/A 12/27/2018    Procedure: Appendectomy open;  Surgeon: Evelyne Yung MD;  Location: [facility] OR     BREAST BIOPSY, CORE RT/LT      R-- precancer cells     COLONOSCOPY       COLONOSCOPY N/A 1/28/2020    Procedure: COLONOSCOPY, WITH POLYPECTOMY AND BIOPSY;  Surgeon: Aparna Soriano MD;  [facility]     EXPLORE NECK N/A 8/19/2014    Procedure: EXPLORE NECK;  Surgeon: Alea Jacobs MD;  Location: [facility]     EXTRACORPOREAL SHOCK WAVE LITHOTRIPSY (ESWL)  2/26/2014    Procedure: EXTRACORPOREAL SHOCK WAVE LITHOTRIPSY (ESWL);  Left Kidney Extracorporeal Shockwave Lithotripsy;  Surgeon: Sabas Choi MD;  Location: [facility]     HYSTERECTOMY TOTAL ABDOMINAL, BILATERAL SALPINGO-OOPHORECTOMY, COMBINED Bilateral 12/27/2018    Procedure: TOTAL ABDOMINAL HYSTERECTOMY WITH BILATERAL SALPINGO-OOPHORECTOMY, appendectomy, pelvic washings;  Surgeon: Evelyne Yung MD;  Location: [facility] OR     LUMPECTOMY BREAST  age 48    R -- margins clear -- declined tamoxofen     PARATHYROIDECTOMY N/A 8/19/2014    Procedure: PARATHYROIDECTOMY;  Surgeon: Alea Jacobs MD;  Location: [facility] OR     STERILIZATION  1984    PP b/4 discharge      TUBAL LIGATION         Hx of Blood transfusions/reactions: denies     Hx of abnormal bleeding  or anti-platelet use: none    Menstrual history: No LMP recorded. Patient has had a hysterectomy.:     Steroid use in the last year: none    Personal or FH with difficulty with Anesthesia:  Slow to wake     Prior to Admission Medications  Current Outpatient Medications   Medication Sig Dispense Refill     acetaminophen (ACETAMINOPHEN EXTRA STRENGTH) 500 MG tablet Take 500-1,000 mg by mouth every 6 hours as needed for mild pain       ADVAIR DISKUS 250-50 MCG/DOSE inhaler Inhale 1 puff into the lungs 2 times daily 180 Inhaler 3     albuterol (PROAIR HFA/PROVENTIL HFA/VENTOLIN HFA) 108 (90 Base) MCG/ACT inhaler Inhale 2 puffs into the lungs every 6 hrs prn (Patient taking differently: as needed Inhale 2 puffs into the lungs every 6 hrs prn) 3 Inhaler 1     albuterol (PROVENTIL) (2.5 MG/3ML) 0.083% neb solution Take 1 vial (2.5 mg) by nebulization every 6 hours as needed for shortness of breath / dyspnea 100 vial 3     hydrochlorothiazide (HYDRODIURIL) 25 MG tablet Take 1 tablet (25 mg) by mouth daily (Patient taking differently: Take 25 mg by mouth every morning ) 90 tablet 3     losartan (COZAAR) 50 MG tablet Take 1 tablet (50 mg) by mouth daily (Patient taking differently: Take 50 mg by mouth every morning ) 90 tablet 3     minoxidil (ROGAINE) 2 % external solution Apply topically 2 times daily       Propylene Glycol-Glycerin (SOOTHE OP) Apply to eye as needed       adapalene (DIFFERIN) 0.1 % gel APPLY TOPICALLY AT BEDTIME AS DIRECTED. 45 g 3       Allergies  Allergies   Allergen Reactions     Macrobid [Nitrofurantoin] Hives     Aspirin      Tight breathing     Macrobid [Nitrofuran Derivatives]        Social History  Social History     Socioeconomic History     Marital status:      Spouse name: Not on file     Number of children: Not on file     Years of education: Not on file     Highest education level: Not on file   Occupational History     Not on file   Social Needs     Financial resource strain: Not on  file     Food insecurity     Worry: Not on file     Inability: Not on file     Transportation needs     Medical: Not on file     Non-medical: Not on file   Tobacco Use     Smoking status: Never Smoker     Smokeless tobacco: Never Used   Substance and Sexual Activity     Alcohol use: Yes     Comment: rare      Drug use: No     Sexual activity: Yes     Partners: Male     Birth control/protection: Post-menopausal     Comment: age 49   Lifestyle     Physical activity     Days per week: Not on file     Minutes per session: Not on file     Stress: Not on file   Relationships     Social connections     Talks on phone: Not on file     Gets together: Not on file     Attends Sikhism service: Not on file     Active member of club or organization: Not on file     Attends meetings of clubs or organizations: Not on file     Relationship status: Not on file     Intimate partner violence     Fear of current or ex partner: Not on file     Emotionally abused: Not on file     Physically abused: Not on file     Forced sexual activity: Not on file   Other Topics Concern     Parent/sibling w/ CABG, MI or angioplasty before 65F 55M? No      Service Not Asked     Blood Transfusions No     Caffeine Concern No     Comment: 1s/d     Occupational Exposure No     Hobby Hazards No     Sleep Concern No     Stress Concern No     Weight Concern No     Comment: gained 3 lbs since [place]     Special Diet No     Back Care No     Exercise No     Comment: walks 30' daily (one mile)     Bike Helmet No     Seat Belt Not Asked     Self-Exams Not Asked   Social History Narrative    Retired .     Evelyne Yung       Family History  Family History   Problem Relation Age of Onset     Asthma Mother      Diabetes Mother 80        diet and pill tx     Hypertension Mother      Macular Degeneration Mother      Abdominal Aortic Aneurysm Mother      Heart Failure Mother 70        CHF     Asthma Father      Hypertension Father       Alzheimer Disease Father 70     Asthma Maternal Grandfather      Asthma Daughter      Heart Failure Sister 60        CHF - mild     Hypertension Sister      Macular Degeneration Sister      Glaucoma No family hx of      Cancer No family hx of      Amblyopia No family hx of      Retinal detachment No family hx of        Review of Systems  ROS/MED HX  ENT/Pulmonary: Comment: History of clicking TMJ and unilateral vocal fold paresis after parathyroidectomy now resolved.     (+) Intermittent, asthma Treatment: Inhaled steroids, Nebulizer prn and Inhaler prn,      Neurologic:  - neg neurologic ROS  (-) no seizures, no CVA and migraines   Cardiovascular:     (+) Dyslipidemia hypertension-----Previous cardiac testing   Echo: Date: Results:    Stress Test: Date: Results:    ECG Reviewed: Date: 12/27/18 Results:  Sinus rhythm with sinus arrhythmia, possible left atrial enlargement, non specific ST and T wave abnormality.   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    Other:  - neg other ROS   (-) Any chance pregnant       The complete review of systems is negative other than noted in the HPI or here.                         0 lbs 0 oz  Data Unavailable   There is no height or weight on file to calculate BMI.       Physical Exam  Constitutional: Awake, alert, cooperative,  no apparent distress, and appears stated age.  Eyes: glasses   HENT: Normocephalic  Respiratory: Non labored breathing    Neurologic: Awake, alert, oriented to name, place and time.   Neuropsychiatric: Calm, cooperative. Normal affect.     Labs: (personally reviewed)  CBC:   Lab Results   Component Value Date    WBC 7.4 2018    WBC 10.5 2014    HGB 13.5 2018    HGB 14.7 2018    HCT 44.2 2018    HCT 42.8 2014     2018     2014     BMP:   Lab Results   Component Value Date     2021     2020    POTASSIUM 4.1 2021    POTASSIUM 3.7 2020    CHLORIDE 105 2021    CHLORIDE 103 2020    CO2 29 2021    CO2 30 2020    BUN 8 2021    BUN 8 2020    CR 0.62 2021    CR 0.55 2020    GLC 92 2021    GLC 97 2020     COAGS:   Lab Results   Component Value Date    PTT 24 2014    INR 1.01 2014     POC:   Lab Results   Component Value Date    BGM 87 2018     HEPATIC:   Lab Results   Component Value Date    ALBUMIN 3.7 2020    PROTTOTAL 6.9 2020    ALT 21 2020    AST 13 2020    ALKPHOS 57 2020    BILITOTAL 0.5 2020     OTHER:   Lab Results   Component Value Date    PH 7.43 2005    LACT 1.8 2014    CARLY 8.9 2021    PHOS 3.4 2014    MAG 1.8 2006    LIPASE 58 2014    TSH 1.67 2013       EK18  Sinus rhythm with sinus arrhythmia, possible left atrial enlargement, non specific ST and T wave abnormality.       The patient's records and results personally reviewed by this provider.     Outside records reviewed from: care everywhere     ASSESSMENT and PLAN  Yolande is a 70 year old woman who is scheduled for LEFT EYE CATARACT REMOVAL WITH INTRAOCULAR LENS IMPLANT on 3/19/21 by Dr. Horn in treatment of Combined forms of age-related cataract of both eyes.  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  Cardiac:  Functional status- METS >4, the patient is active walking, taking stairs and exercising. She denies any cardiac symptoms.  Low risk surgery with 0.4% (RCRI #) risk of major adverse cardiac event.  The patient had EKG on 12/27/18 with Sinus rhythm with sinus arrhythmia, possible left atrial enlargement, non specific ST and T wave abnormality. No further testing for low risk procedure.   ~ HTN - continue Cozaar and HCTZ given MAC   ~ HLD - diet controlled.      2.  Pulm:  Airway feasible.  HYUN risk: Low (age, HTN)   ~ Asthma - well controlled. Continue Advair Diskus and PRN albuterol inhaler and nebulizer. She denies any recent excerbations      3. ENT: TMJ - now resolved.   ~ Patient reports after parathyroidectomy she had unilateral vocal fold paresis and lost her voice for 3 months. After speech therapy this resolved and has not had any further issues - the patient had ETT in 2018 with easy intubation with CMAC.      4. Endo: s/p parathyroidectomy     5. GI:  Risk of PONV score = 2  If > 2, anti-emetic intervention recommended.   ~ History of low grade appendiceal mucinous neoplasm with acellular mucin outside the appendix - s/p reaction on 12/27/18 at [place]. The patient had recent follow up with her team on 11/30/20 by Dr. Francisco and remains disease free. Plan for follow up every 12 months     6. : history of nephrolithiasis  - no current symptoms.   ~ History of breast atypical ductal hyperplasia - s/p lumpectomy     VTE risk: 0.5%     **Please refer to the physical examination documented by the anesthesiologist in the anesthesia record on the day of surgery**       The  patient is optimized for their procedure. AVS with information on surgery time/arrival time, meds and NPO status given by nursing staff.          Video-Visit Details    Type of service:  Video Visit    Patient verbally consented to video service today: YES      Video Start Time: 10:12  Video End Time (time video stopped): 10:26    Originating Location (pt. Location): Home    Distant Location (provider location):  [facility] PREOPERATIVE ASSESSMENT CENTER     Mode of Communication:  Video Conference via [facility]        Dianna Orozco PA-C  Preoperative Assessment Center  [facility]  Phone: [phone]  Fax: [phone]

## 2021-03-15 NOTE — H&P
Pre-Operative H & P     CC:  Preoperative exam to assess for increased cardiopulmonary risk while undergoing surgery and anesthesia.    Date of Encounter: 3/15/2021  Primary Care Physician:  Isabel Hicks     Reason for visit: pre operative examination, Combined forms of age-related cataract of both eyes     CYNDIE Hussein is a 70 year old female who presents for pre-operative H & P in preparation for LEFT EYE CATARACT REMOVAL WITH INTRAOCULAR LENS IMPLANT  with Dr. Horn on 3/19/21 at [facility].     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  The patient was seen by Dr. Horn in 2018 for issues with her cataracts causing vision glare and difficult driving at nighttime.  She then followed up with Dr. Horn on 3/3/2021 and they again discussed her treatment options for her cataract given she now finds that reading is difficult. She continued to have follow-up after the resection of her low-grade appendiceal mucinous neoplasm with acellular mucin outside appendix was resected and she remains disease-free and she has received her 2 doses of the COVID-19 vaccine. She is now scheduled for procedure as above.       History is obtained from the patient and chart review    Past Medical History  Past Medical History:   Diagnosis Date     Asthma      Breast atypical ductal hyperplasia-- Right 06/13/2002    Lumpectomy.  Problem list name updated by automated process. Provider to review and confirm     Dysphonia 09/08/2014     Eczema 10/15/2012     Gastro-oesophageal reflux disease      HLD (hyperlipidemia)      Hypertension      Kidney stone      Low grade mucinous neoplasm of appendix      Menopausal  state -- age 49 08/13/2012    With NEGATIVE HR HPV neg on Pap today, there is NO further need for routine pap screens.        Neck stiffness 11/03/2014     Nephrolithiasis 05/12/2014     Nonsenile cataract      Shoulder pain 03/30/2009    Controlled with stretching, unless lapses or overworks 8/2012      Thoracalgia 03/30/2009     TMJ (temporomandibular joint syndrome) 01/18/2012    Jaw PT and Mouth guard helping! 8/2012      Unilateral vocal fold paresis 11/03/2014     Vaginal atrophy 08/13/2012    Estrogen loss with increasing sxs on Vagifem 10 mcg.  Increase from 2x/wk to 3xs/wk.        Past Surgical History  Past Surgical History:   Procedure Laterality Date     APPENDECTOMY OPEN N/A 12/27/2018    Procedure: Appendectomy open;  Surgeon: Evelyne Yung MD;  [facility]     BREAST BIOPSY, CORE RT/LT      R-- precancer cells     COLONOSCOPY       COLONOSCOPY N/A 1/28/2020    Procedure: COLONOSCOPY, WITH POLYPECTOMY AND BIOPSY;  Surgeon: Aparna Soriano MD;  [facility]     EXPLORE NECK N/A 8/19/2014    Procedure: EXPLORE NECK;  Surgeon: Alea Jacobs MD;  [facility]     EXTRACORPOREAL SHOCK WAVE LITHOTRIPSY (ESWL)  2/26/2014    Procedure: EXTRACORPOREAL SHOCK WAVE LITHOTRIPSY (ESWL);  Left Kidney Extracorporeal Shockwave Lithotripsy;  Surgeon: Sabas Choi MD;  [facility]     HYSTERECTOMY TOTAL ABDOMINAL, BILATERAL SALPINGO-OOPHORECTOMY, COMBINED Bilateral 12/27/2018    Procedure: TOTAL ABDOMINAL HYSTERECTOMY WITH BILATERAL SALPINGO-OOPHORECTOMY, appendectomy, pelvic washings;  Surgeon: Evelyne Yung MD;  [facility]     LUMPECTOMY BREAST  age 48    R -- margins clear -- declined tamoxofen     PARATHYROIDECTOMY N/A 8/19/2014    Procedure: PARATHYROIDECTOMY;  Surgeon: Alea Jacobs MD;  [facility]     STERILIZATION  1984    PP b/4 discharge      TUBAL LIGATION         Hx of Blood transfusions/reactions: denies     Hx of abnormal bleeding  or anti-platelet use: none    Menstrual history: No LMP recorded. Patient has had a hysterectomy.:     Steroid use in the last year: none    Personal or FH with difficulty with Anesthesia:  Slow to wake     Prior to Admission Medications  Current Outpatient Medications   Medication Sig Dispense Refill     acetaminophen (ACETAMINOPHEN EXTRA STRENGTH) 500 MG tablet Take 500-1,000 mg by mouth every 6 hours as needed for mild pain       ADVAIR DISKUS 250-50 MCG/DOSE inhaler Inhale 1 puff into the lungs 2 times daily 180 Inhaler 3     albuterol (PROAIR HFA/PROVENTIL HFA/VENTOLIN HFA) 108 (90 Base) MCG/ACT inhaler Inhale 2 puffs into the lungs every 6 hrs prn (Patient taking differently: as needed Inhale 2 puffs into the lungs every 6 hrs prn) 3 Inhaler 1     albuterol (PROVENTIL) (2.5 MG/3ML) 0.083% neb solution Take 1 vial (2.5 mg) by nebulization every 6 hours as needed for shortness of breath / dyspnea 100 vial 3     hydrochlorothiazide (HYDRODIURIL) 25 MG tablet Take 1 tablet (25 mg) by mouth daily (Patient taking differently: Take 25 mg by mouth every morning ) 90 tablet 3     losartan (COZAAR) 50 MG tablet Take 1 tablet (50 mg) by mouth daily (Patient taking differently: Take 50 mg by mouth every morning ) 90 tablet 3     minoxidil (ROGAINE) 2 % external solution Apply topically 2 times daily       Propylene Glycol-Glycerin (SOOTHE OP) Apply to eye as needed       adapalene (DIFFERIN) 0.1 % gel APPLY TOPICALLY AT BEDTIME AS DIRECTED. 45 g 3       Allergies  Allergies   Allergen Reactions     Macrobid [Nitrofurantoin] Hives     Aspirin      Tight breathing     Macrobid [Nitrofuran Derivatives]        Social History  Social History     Socioeconomic History     Marital status:      Spouse name: Not on file     Number of children: Not on file     Years of education: Not on file     Highest education level: Not on file   Occupational History     Not on file   Social Needs     Financial resource strain: Not on  file     Food insecurity     Worry: Not on file     Inability: Not on file     Transportation needs     Medical: Not on file     Non-medical: Not on file   Tobacco Use     Smoking status: Never Smoker     Smokeless tobacco: Never Used   Substance and Sexual Activity     Alcohol use: Yes     Comment: rare      Drug use: No     Sexual activity: Yes     Partners: Male     Birth control/protection: Post-menopausal     Comment: age 49   Lifestyle     Physical activity     Days per week: Not on file     Minutes per session: Not on file     Stress: Not on file   Relationships     Social connections     Talks on phone: Not on file     Gets together: Not on file     Attends Zoroastrianism service: Not on file     Active member of club or organization: Not on file     Attends meetings of clubs or organizations: Not on file     Relationship status: Not on file     Intimate partner violence     Fear of current or ex partner: Not on file     Emotionally abused: Not on file     Physically abused: Not on file     Forced sexual activity: Not on file   Other Topics Concern     Parent/sibling w/ CABG, MI or angioplasty before 65F 55M? No      Service Not Asked     Blood Transfusions No     Caffeine Concern No     Comment: 1s/d     Occupational Exposure No     Hobby Hazards No     Sleep Concern No     Stress Concern No     Weight Concern No     Comment: gained 3 lbs since [place]     Special Diet No     Back Care No     Exercise No     Comment: walks 30' daily (one mile)     Bike Helmet No     Seat Belt Not Asked     Self-Exams Not Asked   Social History Narrative    Retired .     Evelyne Yung       Family History  Family History   Problem Relation Age of Onset     Asthma Mother      Diabetes Mother 80        diet and pill tx     Hypertension Mother      Macular Degeneration Mother      Abdominal Aortic Aneurysm Mother      Heart Failure Mother 70        CHF     Asthma Father      Hypertension Father       Alzheimer Disease Father 70     Asthma Maternal Grandfather      Asthma Daughter      Heart Failure Sister 60        CHF - mild     Hypertension Sister      Macular Degeneration Sister      Glaucoma No family hx of      Cancer No family hx of      Amblyopia No family hx of      Retinal detachment No family hx of        Review of Systems  ROS/MED HX  ENT/Pulmonary: Comment: History of clicking TMJ and unilateral vocal fold paresis after parathyroidectomy now resolved.     (+) Intermittent, asthma Treatment: Inhaled steroids, Nebulizer prn and Inhaler prn,      Neurologic:  - neg neurologic ROS  (-) no seizures, no CVA and migraines   Cardiovascular:     (+) Dyslipidemia hypertension-----Previous cardiac testing   Echo: Date: Results:    Stress Test: Date: Results:    ECG Reviewed: Date: 12/27/18 Results:  Sinus rhythm with sinus arrhythmia, possible left atrial enlargement, non specific ST and T wave abnormality.   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    Other:  - neg other ROS   (-) Any chance pregnant       The complete review of systems is negative other than noted in the HPI or here.                         0 lbs 0 oz  Data Unavailable   There is no height or weight on file to calculate BMI.       Physical Exam  Constitutional: Awake, alert, cooperative,  no apparent distress, and appears stated age.  Eyes: glasses   HENT: Normocephalic  Respiratory: Non labored breathing    Neurologic: Awake, alert, oriented to name, place and time.   Neuropsychiatric: Calm, cooperative. Normal affect.     Labs: (personally reviewed)  CBC:   Lab Results   Component Value Date    WBC 7.4 2018    WBC 10.5 2014    HGB 13.5 2018    HGB 14.7 2018    HCT 44.2 2018    HCT 42.8 2014     2018     2014     BMP:   Lab Results   Component Value Date     2021     2020    POTASSIUM 4.1 2021    POTASSIUM 3.7 2020    CHLORIDE 105 2021    CHLORIDE 103 2020    CO2 29 2021    CO2 30 2020    BUN 8 2021    BUN 8 2020    CR 0.62 2021    CR 0.55 2020    GLC 92 2021    GLC 97 2020     COAGS:   Lab Results   Component Value Date    PTT 24 2014    INR 1.01 2014     POC:   Lab Results   Component Value Date    BGM 87 2018     HEPATIC:   Lab Results   Component Value Date    ALBUMIN 3.7 2020    PROTTOTAL 6.9 2020    ALT 21 2020    AST 13 2020    ALKPHOS 57 2020    BILITOTAL 0.5 2020     OTHER:   Lab Results   Component Value Date    PH 7.43 2005    LACT 1.8 2014    CARLY 8.9 2021    PHOS 3.4 2014    MAG 1.8 2006    LIPASE 58 2014    TSH 1.67 2013       EK18  Sinus rhythm with sinus arrhythmia, possible left atrial enlargement, non specific ST and T wave abnormality.       The patient's records and results personally reviewed by this provider.     Outside records reviewed from: care everywhere     ASSESSMENT and PLAN  Yolande is a 70 year old woman who is scheduled for LEFT EYE CATARACT REMOVAL WITH INTRAOCULAR LENS IMPLANT on 3/19/21 by Dr. Horn in treatment of Combined forms of age-related cataract of both eyes.  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  Cardiac:  Functional status- METS >4, the patient is active walking, taking stairs and exercising. She denies any cardiac symptoms.  Low risk surgery with 0.4% (RCRI #) risk of major adverse cardiac event.  The patient had EKG on 12/27/18 with Sinus rhythm with sinus arrhythmia, possible left atrial enlargement, non specific ST and T wave abnormality. No further testing for low risk procedure.   ~ HTN - continue Cozaar and HCTZ given MAC   ~ HLD - diet controlled.      2.  Pulm:  Airway feasible.  HYUN risk: Low (age, HTN)   ~ Asthma - well controlled. Continue Advair Diskus and PRN albuterol inhaler and nebulizer. She denies any recent excerbations      3. ENT: TMJ - now resolved.   ~ Patient reports after parathyroidectomy she had unilateral vocal fold paresis and lost her voice for 3 months. After speech therapy this resolved and has not had any further issues - the patient had ETT in 2018 with easy intubation with CMAC.      4. Endo: s/p parathyroidectomy     5. GI:  Risk of PONV score = 2  If > 2, anti-emetic intervention recommended.   ~ History of low grade appendiceal mucinous neoplasm with acellular mucin outside the appendix - s/p reaction on 12/27/18 at [place]. The patient had recent follow up with her team on 11/30/20 by Dr. Francisco and remains disease free. Plan for follow up every 12 months     6. : history of nephrolithiasis  - no current symptoms.   ~ History of breast atypical ductal hyperplasia - s/p lumpectomy     VTE risk: 0.5%     **Please refer to the physical examination documented by the anesthesiologist in the anesthesia record on the day of surgery**       The  patient is optimized for their procedure. AVS with information on surgery time/arrival time, meds and NPO status given by nursing staff.          Video-Visit Details    Type of service:  Video Visit    Patient verbally consented to video service today: YES      Video Start Time: 10:12  Video End Time (time video stopped): 10:26    Originating Location (pt. Location): Home    Distant Location (provider location):  [facility] PREOPERATIVE ASSESSMENT [facility]ENTER     Mode of Communication:  Video Conference via [facility]        Dianna Orozco PA-C  Preoperative Assessment Center  [facility]  Clinic and [facility]  Phone: [phone]  Fax: [phone]

## 2021-03-15 NOTE — ANESTHESIA PREPROCEDURE EVALUATION
Anesthesia Pre-Procedure Evaluation    Patient: Yolande Hussein   MRN: [id number] : 1950        Preoperative Diagnosis: * No surgery found *   Procedure :      Past Medical History:   Diagnosis Date     Asthma      Breast atypical ductal hyperplasia-- Right 2002    Lumpectomy.  Problem list name updated by automated process. Provider to review and confirm     Dysphonia 2014     Eczema 10/15/2012     Gastro-oesophageal reflux disease      Hypertension      Kidney stone      Menopausal state -- age 49 2012    With NEGATIVE HR HPV neg on Pap today, there is NO further need for routine pap screens.        Neck stiffness 11/3/2014     Nephrolithiasis 2014     Nonsenile cataract      Shoulder pain 3/30/2009    Controlled with stretching, unless lapses or overworks 2012      Thoracalgia 3/30/2009     TMJ (temporomandibular joint syndrome) 2012    Jaw PT and Mouth guard helping! 2012      Unilateral vocal fold paresis 11/3/2014     Vaginal atrophy 2012    Estrogen loss with increasing sxs on Vagifem 10 mcg.  Increase from 2x/wk to 3xs/wk.       Past Surgical History:   Procedure Laterality Date     APPENDECTOMY OPEN N/A 2018    Procedure: Appendectomy open;  Surgeon: Evelyne Yung MD;  [facility]     BREAST BIOPSY, CORE RT/LT      R-- precancer cells     COLONOSCOPY       COLONOSCOPY N/A 2020    Procedure: COLONOSCOPY, WITH POLYPECTOMY AND BIOPSY;  Surgeon: Aparna Soriano MD;  [facility]     EXPLORE NECK N/A 2014    Procedure: EXPLORE NECK;  Surgeon: Alea Jacobs MD;  [facility]     EXTRACORPOREAL SHOCK WAVE LITHOTRIPSY (ESWL)  2014    Procedure: EXTRACORPOREAL SHOCK WAVE LITHOTRIPSY (ESWL);  Left Kidney Extracorporeal Shockwave Lithotripsy;  Surgeon: Sabas Choi MD;  [facility]     HYSTERECTOMY TOTAL ABDOMINAL, BILATERAL SALPINGO-OOPHORECTOMY, COMBINED Bilateral 2018    Procedure: TOTAL  ABDOMINAL HYSTERECTOMY WITH BILATERAL SALPINGO-OOPHORECTOMY, appendectomy, pelvic washings;  Surgeon: Evelyne Yung MD;  [facility]     LUMPECTOMY BREAST  age 48    R -- margins clear -- declined tamoxofen     PARATHYROIDECTOMY N/A 8/19/2014    Procedure: PARATHYROIDECTOMY;  Surgeon: Alea Jacobs MD;  [facility]     STERILIZATION  1984    PP b/4 discharge      TUBAL LIGATION        Allergies   Allergen Reactions     Macrobid [Nitrofurantoin] Hives     Aspirin      Tight breathing     Macrobid [Nitrofuran Derivatives]       Social History     Tobacco Use     Smoking status: Never Smoker     Smokeless tobacco: Never Used   Substance Use Topics     Alcohol use: Yes     Comment: rare       Wt Readings from Last 1 Encounters:   01/28/20 66.7 kg (147 lb)        Anesthesia Evaluation   Pt has had prior anesthetic. Type: General.    History of anesthetic complications   slow to alec.    ROS/MED HX  ENT/Pulmonary: Comment: History of clicking TMJ and unilateral vocal fold paresis after parathyroidectomy now resolved.     (+) Intermittent, asthma Treatment: Inhaled steroids, Nebulizer prn and Inhaler prn,      Neurologic:  - neg neurologic ROS  (-) no seizures, no CVA and migraines   Cardiovascular:     (+) Dyslipidemia hypertension-----Previous cardiac testing   Echo: Date: Results:    Stress Test: Date: Results:    ECG Reviewed: Date: 12/27/18 Results:  Sinus rhythm with sinus arrhythmia, possible left atrial enlargement, non specific ST and T wave abnormality.   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    Other:  - neg other ROS   (-) Any chance pregnant          OUTSIDE LABS:  CBC:   Lab Results   Component Value Date    WBC 7.4 12/27/2018    WBC 10.5 01/14/2014    HGB 13.5 12/28/2018    HGB 14.7 12/27/2018    HCT 44.2 12/27/2018    HCT 42.8 01/14/2014     12/27/2018     01/14/2014     BMP:   Lab Results   Component Value Date     01/13/2021     01/24/2020    POTASSIUM 4.1 01/13/2021    POTASSIUM 3.7 01/24/2020    CHLORIDE 105 01/13/2021    CHLORIDE 103 01/24/2020    CO2 29 01/13/2021    CO2 30 01/24/2020    BUN 8 01/13/2021    BUN 8 01/24/2020    CR 0.62 01/13/2021    CR 0.55 01/24/2020    GLC 92 01/13/2021    GLC 97 01/24/2020     COAGS:   Lab Results   Component Value Date    PTT 24 01/14/2014    INR 1.01 01/14/2014     POC:   Lab Results   Component Value Date    BGM 87 12/29/2018     HEPATIC:   Lab Results   Component Value Date    ALBUMIN 3.7 01/08/2020    PROTTOTAL 6.9 01/08/2020    ALT 21 01/08/2020    AST 13 01/08/2020    ALKPHOS 57 01/08/2020    BILITOTAL 0.5 01/08/2020     OTHER:   Lab Results   Component Value Date    PH 7.43 06/02/2005    LACT 1.8 01/14/2014    CARLY 8.9 01/13/2021    PHOS 3.4 04/22/2014    MAG 1.8 12/27/2006    LIPASE 58 01/14/2014    TSH 1.67 11/27/2013             PAC Discussion and Assessment    ASA Classification: 2    Anesthetic techniques and relevant risks discussed: MAC with GA as backup (topical )                  PAC Resident/NP Anesthesia Assessment: Yolande is a 70 year old woman who is scheduled for LEFT EYE CATARACT REMOVAL WITH INTRAOCULAR LENS IMPLANT on 3/19/21 by Dr. Horn in treatment of Combined forms of age-related cataract of both eyes.  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  Cardiac:  Functional status- METS >4, the patient is active walking, taking stairs and exercising. She denies any cardiac symptoms.  Low risk surgery with 0.4% (RCRI #) risk of major adverse cardiac event.  The patient had EKG on 12/27/18 with Sinus rhythm with sinus arrhythmia, possible left atrial enlargement, non specific ST and T wave abnormality. No further testing for low risk procedure.   ~ HTN - continue Cozaar and HCTZ given MAC   ~ HLD - diet controlled.      2.  Pulm:  Airway feasible.  HYUN risk: Low (age, HTN)   ~ Asthma - well controlled. Continue Advair Diskus and PRN albuterol inhaler and nebulizer. She denies any recent excerbations      3. ENT: TMJ - now resolved.   ~ Patient reports after parathyroidectomy she had unilateral vocal fold paresis and lost her voice for 3 months. After speech therapy this resolved and has not had any further issues - the patient had ETT in 2018 with easy intubation with CMAC.      4. Endo: s/p parathyroidectomy     5. GI:  Risk of PONV score = 2  If > 2, anti-emetic intervention recommended.   ~ History of low grade appendiceal mucinous neoplasm with acellular mucin outside the appendix - s/p reaction on 12/27/18 at [facility]. The patient had recent follow up with her team on 11/30/20 by Dr. Francisco and remains disease free. Plan for follow up every 12 months     6. : history of nephrolithiasis  - no current symptoms.   ~ History of breast atypical ductal hyperplasia - s/p lumpectomy     VTE risk: 0.5%     **Please refer to the physical examination documented by the anesthesiologist in the anesthesia record on the day of  surgery**     Patient is optimized and is acceptable candidate for the proposed procedure.  No further diagnostic evaluation is needed.      For further details of assessment, testing, and physical exam please see H and P completed on same date     Dianna Orozco PA-C       Mid-Level Provider/Resident: Dianna Orozco PA-C  Date: 3/15/21                                 Wolfgang Lozano MD

## 2021-03-15 NOTE — PATIENT INSTRUCTIONS
Preparing for Your Surgery      Name:  Yolande Hussein   MRN:  [id number]   :  1950   Today's Date:  3/15/2021       Arriving for surgery:  Surgery date: 3/19/21  Arrival time:  07:45 am    Restrictions due to COVID 19:  One consistent visitor per patient is allowed  No ill visitors  All visitors must wear face mask     parking is available for anyone with mobility limitations or disabilities.  ([place]  24 hours/ 7 days a week; [facility]  7 am- 3:30 pm, Mon- Fri)    Please come to:   [facility] [address] [address]  -  Proceed to the 5th floor to check into the Ambulatory Surgery Center.              >> There will be patient concierges on the 1st and 5th floor, for assistance or an escort, if you would like.              >> Please call [phone] with any questions.    What can I eat or drink?  -  You may eat and drink normally for up to 8 hours before your surgery.  -  You may have clear liquids until 4 hours before surgery.     Examples of clear liquids:  Water  Clear broth  Juices (apple, white grape, white cranberry  and cider) without pulp  Noncarbonated, powder based beverages  (lemonade and Angel-Aid)  Sodas (Sprite, 7-Up, ginger ale and seltzer)  Coffee or tea (without milk or cream)  Gatorade    -  No Alcohol for at least 24 hours before surgery     Which medicines can I take?    Hold Aspirin for 7 days before surgery.   Hold Multivitamins for 7 days before surgery.  Hold Supplements (fish oil) for 7 days before surgery.  Hold Ibuprofen (Advil, Motrin) for 1 day before surgery--unless otherwise directed by surgeon.  Hold Naproxen (Aleve) for 4 days before surgery    -  PLEASE TAKE these medications the day of surgery:  Tylenol if needed; take morning medications.    How do I prepare myself?  - Please take 2 showers before surgery using Scrubcare or Hibiclens soap.    Use this soap only from the neck to your  toes.     Leave the soap on your skin for one minute--then rinse thoroughly.      You may use your own shampoo and conditioner; no other hair products.   - Please remove all jewelry and body piercings.  - No lotions, deodorants or fragrance.  - No makeup or fingernail polish.   - Bring your ID and insurance card.    - All patients are required to have a Covid-19 test within 4 days of surgery/procedure.      -Patients will be contacted by the [facility] scheduling team within 1 week of surgery to make an appointment.      - Patients may call the Scheduling team at [phone] if they have not been scheduled within 4 days of  surgery.      ALL PATIENTS GOING HOME THE SAME DAY OF SURGERY ARE REQUIRED TO HAVE A RESPONSIBLE ADULT TO DRIVE AND BE IN ATTENDANCE WITH THEM FOR 24 HOURS FOLLOWING SURGERY     Questions or Concerns:    - For any questions regarding the day of surgery or your hospital stay, please contact the Pre Admission Nursing Office at [phone].       - If you have health changes between today and your surgery please call your surgeon.       For questions after surgery please call your surgeons office.

## 2021-03-15 NOTE — PROGRESS NOTES
Yolande is a 70 year old who is being evaluated via a billable video visit.      How would you like to obtain your AVS? MyChart    Will anyone else be joining your video visit? No    HPI           Review of Systems       Physical Exam     MALCOLM Jeffers LPN

## 2021-03-16 DIAGNOSIS — Z11.59 ENCOUNTER FOR SCREENING FOR OTHER VIRAL DISEASES: ICD-10-CM

## 2021-03-16 LAB
SARS-COV-2 RNA RESP QL NAA+PROBE: NORMAL
SPECIMEN SOURCE: NORMAL

## 2021-03-16 PROCEDURE — U0003 INFECTIOUS AGENT DETECTION BY NUCLEIC ACID (DNA OR RNA); SEVERE ACUTE RESPIRATORY SYNDROME CORONAVIRUS 2 (SARS-COV-2) (CORONAVIRUS DISEASE [COVID-19]), AMPLIFIED PROBE TECHNIQUE, MAKING USE OF HIGH THROUGHPUT TECHNOLOGIES AS DESCRIBED BY CMS-2020-01-R: HCPCS | Performed by: OPHTHALMOLOGY

## 2021-03-16 PROCEDURE — U0005 INFEC AGEN DETEC AMPLI PROBE: HCPCS | Performed by: OPHTHALMOLOGY

## 2021-03-17 LAB
LABORATORY COMMENT REPORT: NORMAL
SARS-COV-2 RNA RESP QL NAA+PROBE: NEGATIVE
SPECIMEN SOURCE: NORMAL

## 2021-03-18 ENCOUNTER — ANESTHESIA EVENT (OUTPATIENT)
Dept: SURGERY | Facility: AMBULATORY SURGERY CENTER | Age: 71
End: 2021-03-18

## 2021-03-19 ENCOUNTER — OFFICE VISIT (OUTPATIENT)
Dept: OPHTHALMOLOGY | Facility: CLINIC | Age: 71
End: 2021-03-19
Payer: COMMERCIAL

## 2021-03-19 ENCOUNTER — ANESTHESIA (OUTPATIENT)
Dept: SURGERY | Facility: AMBULATORY SURGERY CENTER | Age: 71
End: 2021-03-19

## 2021-03-19 ENCOUNTER — HOSPITAL ENCOUNTER (OUTPATIENT)
Facility: AMBULATORY SURGERY CENTER | Age: 71
Discharge: HOME | End: 2021-03-19
Attending: OPHTHALMOLOGY | Admitting: OPHTHALMOLOGY
Payer: COMMERCIAL

## 2021-03-19 VITALS
OXYGEN SATURATION: 98 % | HEIGHT: 65 IN | TEMPERATURE: 97.3 F | WEIGHT: 149 LBS | HEART RATE: 80 BPM | RESPIRATION RATE: 16 BRPM | DIASTOLIC BLOOD PRESSURE: 58 MMHG | BODY MASS INDEX: 24.83 KG/M2 | SYSTOLIC BLOOD PRESSURE: 132 MMHG

## 2021-03-19 DIAGNOSIS — Z11.59 ENCOUNTER FOR SCREENING FOR OTHER VIRAL DISEASES: Primary | ICD-10-CM

## 2021-03-19 DIAGNOSIS — Z96.1 PSEUDOPHAKIA, LEFT EYE: Primary | ICD-10-CM

## 2021-03-19 DIAGNOSIS — H25.812 COMBINED FORM OF AGE-RELATED CATARACT, LEFT EYE: Primary | ICD-10-CM

## 2021-03-19 DIAGNOSIS — H25.813 COMBINED FORMS OF AGE-RELATED CATARACT OF BOTH EYES: ICD-10-CM

## 2021-03-19 PROCEDURE — 99024 POSTOP FOLLOW-UP VISIT: CPT | Performed by: OPHTHALMOLOGY

## 2021-03-19 PROCEDURE — 66984 XCAPSL CTRC RMVL W/O ECP: CPT | Mod: LT

## 2021-03-19 DEVICE — EYE IMP IOL AMO PCL TECNIS ZCB00 15.5: Type: IMPLANTABLE DEVICE | Site: EYE | Status: FUNCTIONAL

## 2021-03-19 RX ORDER — TETRACAINE HYDROCHLORIDE 5 MG/ML
SOLUTION OPHTHALMIC PRN
Status: DISCONTINUED | OUTPATIENT
Start: 2021-03-19 | End: 2021-03-19 | Stop reason: HOSPADM

## 2021-03-19 RX ORDER — NALOXONE HYDROCHLORIDE 0.4 MG/ML
0.2 INJECTION, SOLUTION INTRAMUSCULAR; INTRAVENOUS; SUBCUTANEOUS
Status: DISCONTINUED | OUTPATIENT
Start: 2021-03-19 | End: 2021-03-20 | Stop reason: HOSPADM

## 2021-03-19 RX ORDER — MOXIFLOXACIN IN NACL,ISO-OS/PF 0.3MG/0.3
SYRINGE (ML) INTRAOCULAR PRN
Status: DISCONTINUED | OUTPATIENT
Start: 2021-03-19 | End: 2021-03-19 | Stop reason: HOSPADM

## 2021-03-19 RX ORDER — ONDANSETRON 2 MG/ML
4 INJECTION INTRAMUSCULAR; INTRAVENOUS EVERY 30 MIN PRN
Status: DISCONTINUED | OUTPATIENT
Start: 2021-03-19 | End: 2021-03-20 | Stop reason: HOSPADM

## 2021-03-19 RX ORDER — PROPARACAINE HYDROCHLORIDE 5 MG/ML
1 SOLUTION/ DROPS OPHTHALMIC ONCE
Status: COMPLETED | OUTPATIENT
Start: 2021-03-19 | End: 2021-03-19

## 2021-03-19 RX ORDER — NALOXONE HYDROCHLORIDE 0.4 MG/ML
0.4 INJECTION, SOLUTION INTRAMUSCULAR; INTRAVENOUS; SUBCUTANEOUS
Status: DISCONTINUED | OUTPATIENT
Start: 2021-03-19 | End: 2021-03-20 | Stop reason: HOSPADM

## 2021-03-19 RX ORDER — FENTANYL CITRATE 50 UG/ML
INJECTION, SOLUTION INTRAMUSCULAR; INTRAVENOUS PRN
Status: DISCONTINUED | OUTPATIENT
Start: 2021-03-19 | End: 2021-03-19

## 2021-03-19 RX ORDER — CYCLOPENTOLAT/TROPIC/PHENYLEPH 1%-1%-2.5%
1 DROPS (EA) OPHTHALMIC (EYE)
Status: COMPLETED | OUTPATIENT
Start: 2021-03-19 | End: 2021-03-19

## 2021-03-19 RX ORDER — LIDOCAINE HYDROCHLORIDE 10 MG/ML
INJECTION, SOLUTION EPIDURAL; INFILTRATION; INTRACAUDAL; PERINEURAL PRN
Status: DISCONTINUED | OUTPATIENT
Start: 2021-03-19 | End: 2021-03-19 | Stop reason: HOSPADM

## 2021-03-19 RX ORDER — OXYCODONE HYDROCHLORIDE 5 MG/1
5 TABLET ORAL EVERY 4 HOURS PRN
Status: DISCONTINUED | OUTPATIENT
Start: 2021-03-19 | End: 2021-03-20 | Stop reason: HOSPADM

## 2021-03-19 RX ORDER — MEPERIDINE HYDROCHLORIDE 25 MG/ML
12.5 INJECTION INTRAMUSCULAR; INTRAVENOUS; SUBCUTANEOUS
Status: DISCONTINUED | OUTPATIENT
Start: 2021-03-19 | End: 2021-03-20 | Stop reason: HOSPADM

## 2021-03-19 RX ORDER — BALANCED SALT SOLUTION 6.4; .75; .48; .3; 3.9; 1.7 MG/ML; MG/ML; MG/ML; MG/ML; MG/ML; MG/ML
SOLUTION OPHTHALMIC PRN
Status: DISCONTINUED | OUTPATIENT
Start: 2021-03-19 | End: 2021-03-19 | Stop reason: HOSPADM

## 2021-03-19 RX ORDER — LIDOCAINE 40 MG/G
CREAM TOPICAL
Status: DISCONTINUED | OUTPATIENT
Start: 2021-03-19 | End: 2021-03-19 | Stop reason: HOSPADM

## 2021-03-19 RX ORDER — OFLOXACIN 3 MG/ML
1 SOLUTION/ DROPS OPHTHALMIC
Status: COMPLETED | OUTPATIENT
Start: 2021-03-19 | End: 2021-03-19

## 2021-03-19 RX ORDER — SODIUM CHLORIDE, SODIUM LACTATE, POTASSIUM CHLORIDE, CALCIUM CHLORIDE 600; 310; 30; 20 MG/100ML; MG/100ML; MG/100ML; MG/100ML
500 INJECTION, SOLUTION INTRAVENOUS CONTINUOUS
Status: DISCONTINUED | OUTPATIENT
Start: 2021-03-19 | End: 2021-03-19 | Stop reason: HOSPADM

## 2021-03-19 RX ORDER — SODIUM CHLORIDE, SODIUM LACTATE, POTASSIUM CHLORIDE, CALCIUM CHLORIDE 600; 310; 30; 20 MG/100ML; MG/100ML; MG/100ML; MG/100ML
INJECTION, SOLUTION INTRAVENOUS CONTINUOUS
Status: DISCONTINUED | OUTPATIENT
Start: 2021-03-19 | End: 2021-03-20 | Stop reason: HOSPADM

## 2021-03-19 RX ORDER — FENTANYL CITRATE 50 UG/ML
25-50 INJECTION, SOLUTION INTRAMUSCULAR; INTRAVENOUS
Status: DISCONTINUED | OUTPATIENT
Start: 2021-03-19 | End: 2021-03-20 | Stop reason: HOSPADM

## 2021-03-19 RX ORDER — ONDANSETRON 4 MG/1
4 TABLET, ORALLY DISINTEGRATING ORAL EVERY 30 MIN PRN
Status: DISCONTINUED | OUTPATIENT
Start: 2021-03-19 | End: 2021-03-20 | Stop reason: HOSPADM

## 2021-03-19 RX ADMIN — FENTANYL CITRATE 25 MCG: 50 INJECTION, SOLUTION INTRAMUSCULAR; INTRAVENOUS at 08:56

## 2021-03-19 RX ADMIN — Medication 1 DROP: at 08:15

## 2021-03-19 RX ADMIN — OFLOXACIN 1 DROP: 3 SOLUTION/ DROPS OPHTHALMIC at 08:28

## 2021-03-19 RX ADMIN — OFLOXACIN 1 DROP: 3 SOLUTION/ DROPS OPHTHALMIC at 08:15

## 2021-03-19 RX ADMIN — Medication 1 DROP: at 08:28

## 2021-03-19 RX ADMIN — Medication 1 DROP: at 08:20

## 2021-03-19 RX ADMIN — SODIUM CHLORIDE, SODIUM LACTATE, POTASSIUM CHLORIDE, CALCIUM CHLORIDE: 600; 310; 30; 20 INJECTION, SOLUTION INTRAVENOUS at 08:25

## 2021-03-19 RX ADMIN — OFLOXACIN 1 DROP: 3 SOLUTION/ DROPS OPHTHALMIC at 08:20

## 2021-03-19 RX ADMIN — PROPARACAINE HYDROCHLORIDE 1 DROP: 5 SOLUTION/ DROPS OPHTHALMIC at 08:15

## 2021-03-19 ASSESSMENT — TONOMETRY
IOP_METHOD: TONOPEN
OS_IOP_MMHG: 12

## 2021-03-19 ASSESSMENT — MIFFLIN-ST. JEOR: SCORE: 1183.56

## 2021-03-19 ASSESSMENT — VISUAL ACUITY
METHOD: SNELLEN - LINEAR
OS_SC: 20/50

## 2021-03-19 ASSESSMENT — EXTERNAL EXAM - LEFT EYE: OS_EXAM: NORMAL

## 2021-03-19 ASSESSMENT — SLIT LAMP EXAM - LIDS: COMMENTS: NORMAL

## 2021-03-19 NOTE — ANESTHESIA CARE TRANSFER NOTE
Patient: Yolande Hussein    Procedure(s):  LEFT EYE CATARACT REMOVAL WITH INTRAOCULAR LENS IMPLANT    Diagnosis: Combined forms of age-related cataract of both eyes [H25.813]  Diagnosis Additional Information: No value filed.    Anesthesia Type:   No value filed.     Note:    Oropharynx: spontaneously breathing  Level of Consciousness: awake  Oxygen Supplementation: room air    Independent Airway: airway patency satisfactory and stable  Dentition: dentition unchanged  Vital Signs Stable: post-procedure vital signs reviewed and stable    Patient transferred to: Phase II    Handoff Report: Identifed the Patient, Identified the Reponsible Provider, Reviewed the pertinent medical history, Discussed the surgical course, Reviewed Intra-OP anesthesia mangement and issues during anesthesia, Set expectations for post-procedure period and Allowed opportunity for questions and acknowledgement of understanding      Vitals: (Last set prior to Anesthesia Care Transfer)  CRNA VITALS  3/19/2021 0849 - 3/19/2021 0923      3/19/2021             Resp Rate (set):  10        Electronically Signed By: ESTRADA Wright CRNA  March 19, 2021  9:23 AM

## 2021-03-19 NOTE — ANESTHESIA POSTPROCEDURE EVALUATION
Patient: Yolande Hussein    Procedure(s):  LEFT EYE CATARACT REMOVAL WITH INTRAOCULAR LENS IMPLANT    Diagnosis:Combined forms of age-related cataract of both eyes [H25.813]  Diagnosis Additional Information: No value filed.    Anesthesia Type:  MAC    Note:  Disposition: Outpatient   Postop Pain Control: Uneventful            Sign Out: Well controlled pain   PONV: No   Neuro/Psych: Uneventful            Sign Out: Acceptable/Baseline neuro status   Airway/Respiratory: Uneventful            Sign Out: Acceptable/Baseline resp. status   CV/Hemodynamics: Uneventful            Sign Out: Acceptable CV status   Other NRE: NONE   DID A NON-ROUTINE EVENT OCCUR? No         Last vitals:  Vitals:    03/19/21 0757 03/19/21 0921 03/19/21 0932   BP: 131/67 (!) 118/36 132/58   Pulse: 71 66 80   Resp: 16 14 16   Temp: 36.2  C (97.1  F) 36.6  C (97.9  F) 36.3  C (97.3  F)   SpO2: 99% 99% 98%       Last vitals prior to Anesthesia Care Transfer:  CRNA VITALS  3/19/2021 0849 - 3/19/2021 0949      3/19/2021             Resp Rate (set):  10          Electronically Signed By: Mio Noel MD  March 19, 2021  11:49 AM

## 2021-03-19 NOTE — PROGRESS NOTES
POD#0, status post cataract surgery, left eye    No complaints.  Denies eye pain.    Impression/Plan:  Pseudophakia, OS: POD0, good post-operative appearance. IOP reasonable.    - Fluoroquinolone antibiotic 4x daily in the surgical eye for 1 week  - Prednisolone 4x daily in the surgical eye for 1 week, then weekly taper      Eye protection at all times and eye shield at night for 1 week.    Limited activities with no exercise or heavy lifting for 1 week.    Instructed patient to contact us for decreasing vision, eye pain, new floaters or flashes of light or other concerning symptoms.    Written instructions given    Return to clinic for 2nd eye already scheduled.    Teaching statement:  Complete documentation of historical and exam elements from today's encounter can be found in the full encounter summary report (not reduplicated in this progress note). I personally obtained the chief complaint(s) and history of present illness.  I confirmed and edited as necessary the review of systems, past medical/surgical history, family history, social history, and examination findings as documented by others; and I examined the patient myself. I personally reviewed the relevant tests, images, and reports as documented above.     I formulated and edited as necessary the assessment and plan and discussed the findings and management plan with the patient and family.    Bing Horn MD  Comprehensive Ophthalmology & Ocular Pathology  Department of Ophthalmology and Visual Neurosciences  [email or web address]  Pager [phone]

## 2021-03-19 NOTE — DISCHARGE INSTRUCTIONS
[facility]  Home Care Following Anesthesia  For 24 hours after surgery:  1. Get plenty of rest.  A responsible adult must stay with you for at least 24 hours after you leave the surgery center.  2. Do not drive or use heavy equipment.  If you have weakness or tingling, don't drive or use heavy equipment until this feeling goes away.   3. Do not drink alcohol.   4. Avoid strenuous or risky activities.  Ask for help when climbing stairs.  5. You may feel lightheaded.  IF so, sit for a few minutes before standing.  Have someone help you get up.   6. If you have nausea (feel sick to your stomach): Drink only clear liquids such as apple juice, ginger ale, broth or 7-Up.  Rest may also help.  Be sure to drink enough fluids.  Move to a regular diet as you feel able.   7. You may have a slight fever.  Call the doctor if your fever is over 100 F (37.7 C) (taken under the tongue) or lasts longer than 24 hours.  8. You may have a dry mouth, a sore throat, muscle aches or trouble sleeping. These should go away after 24 hours.  9. Do not make important or legal decisions.               Tips for taking pain medications  To get the best pain relief possible, remember these points:    Take pain medications as directed, before pain becomes severe.    Pain medication can upset your stomach: taking it with food may help.    Constipation is a common side effect of pain medication. Drink plenty of  fluids.    Eat foods high in fiber. Take a stool softener if recommended by your doctor or pharmacist.    Do not drink alcohol, drive or operate machinery while taking pain medications.    Ask about other ways to control pain, such as with heat, ice or relaxation.    Tylenol/Acetaminophen Consumption  To help encourage the safe use of acetaminophen, the makers of TYLENOL  have lowered the maximum daily dose for single-ingredient Extra Strength TYLENOL  (acetaminophen) products sold in the [place] from 8  pills per day (4,000 mg) to 6 pills per day (3,000 mg). The dosing interval has also changed from 2 pills every 4-6 hours to 2 pills every 6 hours.    If you feel your pain relief is insufficient, you may take Tylenol/Acetaminophen in addition to your narcotic pain medication.     Be careful not to exceed 3,000 mg of Tylenol/Acetaminophen in a 24 hour period from all sources.    If you are taking extra strength Tylenol/acetaminophen (500 mg), the maximum dose is 6 tablets in 24 hours.    If you are taking regular strength acetaminophen (325 mg), the maximum dose is 9 tablets in 24 hours.    Call a doctor for any of the followin. Signs of infection (fever, growing tenderness at the surgery site, a large amount of drainage or bleeding, severe pain, foul-smelling drainage, redness, swelling).  2. It has been over 8 to 10 hours since surgery and you are still not able to urinate (pass water).  3. Headache for over 24 hours.  4. Numbness, tingling or weakness the day after surgery (if you had spinal anesthesia).  5. Signs of Covid-19 infection (temperature over 100 degrees, shortness of breath, cough, loss of taste/smell, generalized body aches, persistent headache, chills, sore throat, nausea/vomiting/diarrhea)  Your doctor is:       Dr. Bing Horn, Ophthalmology: [phone]               Or dial [phone] and ask for the resident on call for:  Ophthalmology  For emergency care, call the:  [facility]:  [phone] (TTY for hearing impaired: [phone])

## 2021-03-19 NOTE — ANESTHESIA PREPROCEDURE EVALUATION
Anesthesia Pre-Procedure Evaluation    Patient: Yolande Hussein   MRN: [id number] : 1950        Preoperative Diagnosis: Combined forms of age-related cataract of both eyes [H25.813]   Procedure : Procedure(s):  LEFT EYE CATARACT REMOVAL WITH INTRAOCULAR LENS IMPLANT     Past Medical History:   Diagnosis Date     Asthma      Breast atypical ductal hyperplasia-- Right 2002    Lumpectomy.  Problem list name updated by automated process. Provider to review and confirm     Dysphonia 2014     Eczema 10/15/2012     Gastro-oesophageal reflux disease      HLD (hyperlipidemia)      Hypertension      Kidney stone      Low grade mucinous neoplasm of appendix      Menopausal state -- age 49 2012    With NEGATIVE HR HPV neg on Pap today, there is NO further need for routine pap screens.        Neck stiffness 2014     Nephrolithiasis 2014     Nonsenile cataract      Shoulder pain 2009    Controlled with stretching, unless lapses or overworks 2012      Thoracalgia 2009     TMJ (temporomandibular joint syndrome) 2012    Jaw PT and Mouth guard helping! 2012      Unilateral vocal fold paresis 2014     Vaginal atrophy 2012    Estrogen loss with increasing sxs on Vagifem 10 mcg.  Increase from 2x/wk to 3xs/wk.       Past Surgical History:   Procedure Laterality Date     APPENDECTOMY OPEN N/A 2018    Procedure: Appendectomy open;  Surgeon: Evelyne Yung MD;  Location: [facility] OR     BREAST BIOPSY, CORE RT/LT      R-- precancer cells     COLONOSCOPY       COLONOSCOPY N/A 2020    Procedure: COLONOSCOPY, WITH POLYPECTOMY AND BIOPSY;  Surgeon: Aparna Soriano MD;  [facility]     EXPLORE NECK N/A 2014    Procedure: EXPLORE NECK;  Surgeon: Alea Jacobs MD;  [facility]     EXTRACORPOREAL SHOCK WAVE LITHOTRIPSY (ESWL)  2014    Procedure: EXTRACORPOREAL SHOCK WAVE LITHOTRIPSY (ESWL);  Left Kidney Extracorporeal  Shockwave Lithotripsy;  Surgeon: Sabas Choi MD;  Location: [facility] OR     HYSTERECTOMY TOTAL ABDOMINAL, BILATERAL SALPINGO-OOPHORECTOMY, COMBINED Bilateral 12/27/2018    Procedure: TOTAL ABDOMINAL HYSTERECTOMY WITH BILATERAL SALPINGO-OOPHORECTOMY, appendectomy, pelvic washings;  Surgeon: Evelyne Yung MD;  Location: [facility] OR     LUMPECTOMY BREAST  age 48    R -- margins clear -- declined tamoxofen     PARATHYROIDECTOMY N/A 8/19/2014    Procedure: PARATHYROIDECTOMY;  Surgeon: Alea Jacobs MD;  Location: [facility] OR     STERILIZATION  1984    PP b/4 discharge      TUBAL LIGATION        Allergies   Allergen Reactions     Macrobid [Nitrofurantoin] Hives     Aspirin      Tight breathing     Macrobid [Nitrofuran Derivatives]       Social History     Tobacco Use     Smoking status: Never Smoker     Smokeless tobacco: Never Used   Substance Use Topics     Alcohol use: Yes     Comment: rare       Wt Readings from Last 1 Encounters:   03/19/21 67.6 kg (149 lb)        Anesthesia Evaluation   Pt has had prior anesthetic.     No history of anesthetic complications       ROS/MED HX  ENT/Pulmonary:     (+) Mild Persistent, asthma Treatment: Inhaler prn and Inhaled steroids,      Neurologic:  - neg neurologic ROS     Cardiovascular:     (+) Dyslipidemia hypertension-----    METS/Exercise Tolerance: >4 METS    Hematologic:  - neg hematologic  ROS     Musculoskeletal:  - neg musculoskeletal ROS     GI/Hepatic:  - neg GI/hepatic ROS     Renal/Genitourinary:     (+) Nephrolithiasis ,     Endo:  - neg endo ROS     Psychiatric/Substance Use:  - neg psychiatric ROS     Infectious Disease:  - neg infectious disease ROS     Malignancy:  - neg malignancy ROS     Other:  - neg other ROS          Physical Exam    Airway  airway exam normal      Mallampati: II   TM distance: > 3 FB   Neck ROM: full   Mouth opening: > 3 cm    Respiratory Devices and Support         Dental  no notable dental history         Cardiovascular    cardiovascular exam normal          Pulmonary   pulmonary exam normal                OUTSIDE LABS:  CBC:   Lab Results   Component Value Date    WBC 7.4 12/27/2018    WBC 10.5 01/14/2014    HGB 13.5 12/28/2018    HGB 14.7 12/27/2018    HCT 44.2 12/27/2018    HCT 42.8 01/14/2014     12/27/2018     01/14/2014     BMP:   Lab Results   Component Value Date     01/13/2021     01/24/2020    POTASSIUM 4.1 01/13/2021    POTASSIUM 3.7 01/24/2020    CHLORIDE 105 01/13/2021    CHLORIDE 103 01/24/2020    CO2 29 01/13/2021    CO2 30 01/24/2020    BUN 8 01/13/2021    BUN 8 01/24/2020    CR 0.62 01/13/2021    CR 0.55 01/24/2020    GLC 92 01/13/2021    GLC 97 01/24/2020     COAGS:   Lab Results   Component Value Date    PTT 24 01/14/2014    INR 1.01 01/14/2014     POC:   Lab Results   Component Value Date    BGM 87 12/29/2018     HEPATIC:   Lab Results   Component Value Date    ALBUMIN 3.7 01/08/2020    PROTTOTAL 6.9 01/08/2020    ALT 21 01/08/2020    AST 13 01/08/2020    ALKPHOS 57 01/08/2020    BILITOTAL 0.5 01/08/2020     OTHER:   Lab Results   Component Value Date    PH 7.43 06/02/2005    LACT 1.8 01/14/2014    CARLY 8.9 01/13/2021    PHOS 3.4 04/22/2014    MAG 1.8 12/27/2006    LIPASE 58 01/14/2014    TSH 1.67 11/27/2013       Anesthesia Plan    ASA Status:  2   NPO Status:  NPO Appropriate    Anesthesia Type: MAC.     - Reason for MAC: straight local not clinically adequate              Consents    Anesthesia Plan(s) and associated risks, benefits, and realistic alternatives discussed. Questions answered and patient/representative(s) expressed understanding.     - Discussed with:  Patient         Postoperative Care    Pain management: Oral pain medications.   PONV prophylaxis: Ondansetron (or other 5HT-3)     Comments:                Mio Noel MD

## 2021-03-19 NOTE — PROCEDURES
Ophthalmology Post-op Procedure Note    Surgical Service:    Ophthalmology & Visual Sciences  Date Performed:      March 19, 2021  Location: [facility]      Pre-operative Diagnosis: Visually significant cataract, left eye  Post-operative Diagnosis:  Pseudophakia, left eye  Operative Procedure:  Phacoemulsification with intraocular lens implantation, left eye    Surgeon(s):  Fellow/Staff Surgeon:       Bing Horn MD  Resident Surgeon:            Malachi Amin MS3    Anesthesia:   Topical/MAC  Findings:  No unusual findings   Blood Loss:    Minimal  Implants:  ZCB00 15.5 intraocular lens  Specimens:  None     Complications:  The patient did not experience any complications.   Condition: Stable    Operative Report Completion:    Description of Operation/Procedure:    After appropriate informed consent was obtained, the patient was brought to the operating room. The appropriate cardiac and blood pressure monitors were placed. A final pause occurred just before the start of the procedure during which the entire procedure team actively confirmed the correct patient, procedure, site, special equipment and special requirements. The patient was prepped and draped in the usual sterile fashion using 5% povidone/iodine.     An eyelid speculum was placed to open the eyelids. A paracentesis port was placed approximately sixty degrees to the left of the planned temporal incision location using the sideport blade. Approximately 0.8 cc of 1% nonpreserved lidocaine was placed into the anterior chamber. The anterior chamber was filled with dispersive viscoelastic. A clear corneal temporal incision was made with a metal 2.6 mm keratome. A round continuous tear capsulorhexis was initiated with a cystotome and completed with the Utrata forceps. Balanced salt solution on a cannula was used to perform hydrodissection. The nucleus was removed using phacoemulsification with a chop technique. The remaining cortical material was  removed using the irrigation/aspiration handpiece. The capsular bag was filled with dispersive viscoelastic. A lens of the  model and power listed above was placed into the capsular bag using the cartridge injection system. The remaining viscoelastic was removed using the irrigation aspiration handpiece. The paracentesis and temporal wounds were hydrated with balanced salt solution. Intracameral moxifloxacin was administered. At the conclusion of the case, the wounds were felt to be watertight, the pupil was round, the lens was centered, and the anterior chamber was deep. A few drops of antibiotic and prednisolone were given to the operative eye. The eyelid speculum was removed. A shield was placed over the operative eye.    Attending Attestation:  I was present for and performed the entire procedure.  Bing Horn MD

## 2021-03-19 NOTE — INTERVAL H&P NOTE
I have reviewed the surgical (or preoperative) H&P that is linked to this encounter, and examined the patient. There are no significant changes except as noted in pre-op assessment.

## 2021-03-29 DIAGNOSIS — Z11.59 ENCOUNTER FOR SCREENING FOR OTHER VIRAL DISEASES: ICD-10-CM

## 2021-03-29 LAB
SARS-COV-2 RNA RESP QL NAA+PROBE: NORMAL
SPECIMEN SOURCE: NORMAL

## 2021-03-29 PROCEDURE — U0005 INFEC AGEN DETEC AMPLI PROBE: HCPCS | Performed by: OPHTHALMOLOGY

## 2021-03-29 PROCEDURE — U0003 INFECTIOUS AGENT DETECTION BY NUCLEIC ACID (DNA OR RNA); SEVERE ACUTE RESPIRATORY SYNDROME CORONAVIRUS 2 (SARS-COV-2) (CORONAVIRUS DISEASE [COVID-19]), AMPLIFIED PROBE TECHNIQUE, MAKING USE OF HIGH THROUGHPUT TECHNOLOGIES AS DESCRIBED BY CMS-2020-01-R: HCPCS | Performed by: OPHTHALMOLOGY

## 2021-03-30 DIAGNOSIS — H25.811 COMBINED FORM OF AGE-RELATED CATARACT, RIGHT EYE: Primary | ICD-10-CM

## 2021-03-30 RX ORDER — PREDNISOLONE ACETATE 10 MG/ML
SUSPENSION/ DROPS OPHTHALMIC
Qty: 5 ML | Refills: 1 | Status: SHIPPED | OUTPATIENT
Start: 2021-03-30 | End: 2021-04-26

## 2021-03-30 RX ORDER — OFLOXACIN 3 MG/ML
SOLUTION/ DROPS OPHTHALMIC
Qty: 5 ML | Refills: 0 | Status: SHIPPED | OUTPATIENT
Start: 2021-03-30 | End: 2021-04-26

## 2021-04-01 ENCOUNTER — ANESTHESIA EVENT (OUTPATIENT)
Dept: SURGERY | Facility: AMBULATORY SURGERY CENTER | Age: 71
End: 2021-04-01

## 2021-04-02 ENCOUNTER — ANESTHESIA (OUTPATIENT)
Dept: SURGERY | Facility: AMBULATORY SURGERY CENTER | Age: 71
End: 2021-04-02

## 2021-04-02 ENCOUNTER — HOSPITAL ENCOUNTER (OUTPATIENT)
Facility: AMBULATORY SURGERY CENTER | Age: 71
Discharge: HOME | End: 2021-04-02
Attending: OPHTHALMOLOGY | Admitting: OPHTHALMOLOGY
Payer: COMMERCIAL

## 2021-04-02 ENCOUNTER — OFFICE VISIT (OUTPATIENT)
Dept: OPHTHALMOLOGY | Facility: CLINIC | Age: 71
End: 2021-04-02
Payer: COMMERCIAL

## 2021-04-02 VITALS
TEMPERATURE: 97 F | BODY MASS INDEX: 25.44 KG/M2 | OXYGEN SATURATION: 98 % | HEIGHT: 64 IN | DIASTOLIC BLOOD PRESSURE: 57 MMHG | HEART RATE: 71 BPM | SYSTOLIC BLOOD PRESSURE: 125 MMHG | RESPIRATION RATE: 16 BRPM | WEIGHT: 149 LBS

## 2021-04-02 DIAGNOSIS — H25.813 COMBINED FORMS OF AGE-RELATED CATARACT OF BOTH EYES: ICD-10-CM

## 2021-04-02 DIAGNOSIS — H25.811 COMBINED FORM OF AGE-RELATED CATARACT, RIGHT EYE: Primary | ICD-10-CM

## 2021-04-02 DIAGNOSIS — Z96.1 PSEUDOPHAKIA OF BOTH EYES: ICD-10-CM

## 2021-04-02 DIAGNOSIS — Z98.890 POSTOPERATIVE EYE STATE: Primary | ICD-10-CM

## 2021-04-02 PROCEDURE — 66984 XCAPSL CTRC RMVL W/O ECP: CPT | Mod: RT

## 2021-04-02 PROCEDURE — 99024 POSTOP FOLLOW-UP VISIT: CPT | Mod: GC | Performed by: OPHTHALMOLOGY

## 2021-04-02 DEVICE — EYE IMP IOL AMO PCL TECNIS ZCB00 15.0: Type: IMPLANTABLE DEVICE | Site: EYE | Status: FUNCTIONAL

## 2021-04-02 RX ORDER — LIDOCAINE 40 MG/G
CREAM TOPICAL
Status: DISCONTINUED | OUTPATIENT
Start: 2021-04-02 | End: 2021-04-02 | Stop reason: HOSPADM

## 2021-04-02 RX ORDER — OXYCODONE HYDROCHLORIDE 5 MG/1
5 TABLET ORAL EVERY 4 HOURS PRN
Status: DISCONTINUED | OUTPATIENT
Start: 2021-04-02 | End: 2021-04-03 | Stop reason: HOSPADM

## 2021-04-02 RX ORDER — ONDANSETRON 2 MG/ML
INJECTION INTRAMUSCULAR; INTRAVENOUS PRN
Status: DISCONTINUED | OUTPATIENT
Start: 2021-04-02 | End: 2021-04-02

## 2021-04-02 RX ORDER — SODIUM CHLORIDE, SODIUM LACTATE, POTASSIUM CHLORIDE, CALCIUM CHLORIDE 600; 310; 30; 20 MG/100ML; MG/100ML; MG/100ML; MG/100ML
INJECTION, SOLUTION INTRAVENOUS CONTINUOUS
Status: DISCONTINUED | OUTPATIENT
Start: 2021-04-02 | End: 2021-04-03 | Stop reason: HOSPADM

## 2021-04-02 RX ORDER — ACETAMINOPHEN 325 MG/1
975 TABLET ORAL ONCE
Status: DISCONTINUED | OUTPATIENT
Start: 2021-04-02 | End: 2021-04-02 | Stop reason: HOSPADM

## 2021-04-02 RX ORDER — NALOXONE HYDROCHLORIDE 0.4 MG/ML
0.2 INJECTION, SOLUTION INTRAMUSCULAR; INTRAVENOUS; SUBCUTANEOUS
Status: DISCONTINUED | OUTPATIENT
Start: 2021-04-02 | End: 2021-04-03 | Stop reason: HOSPADM

## 2021-04-02 RX ORDER — FENTANYL CITRATE 50 UG/ML
INJECTION, SOLUTION INTRAMUSCULAR; INTRAVENOUS PRN
Status: DISCONTINUED | OUTPATIENT
Start: 2021-04-02 | End: 2021-04-02

## 2021-04-02 RX ORDER — ONDANSETRON 2 MG/ML
4 INJECTION INTRAMUSCULAR; INTRAVENOUS EVERY 30 MIN PRN
Status: DISCONTINUED | OUTPATIENT
Start: 2021-04-02 | End: 2021-04-03 | Stop reason: HOSPADM

## 2021-04-02 RX ORDER — ONDANSETRON 4 MG/1
4 TABLET, ORALLY DISINTEGRATING ORAL EVERY 30 MIN PRN
Status: DISCONTINUED | OUTPATIENT
Start: 2021-04-02 | End: 2021-04-03 | Stop reason: HOSPADM

## 2021-04-02 RX ORDER — BALANCED SALT SOLUTION 6.4; .75; .48; .3; 3.9; 1.7 MG/ML; MG/ML; MG/ML; MG/ML; MG/ML; MG/ML
SOLUTION OPHTHALMIC PRN
Status: DISCONTINUED | OUTPATIENT
Start: 2021-04-02 | End: 2021-04-02 | Stop reason: HOSPADM

## 2021-04-02 RX ORDER — PREDNISOLONE ACETATE 1 %
SUSPENSION, DROPS(FINAL DOSAGE FORM)(ML) OPHTHALMIC (EYE) PRN
Status: DISCONTINUED | OUTPATIENT
Start: 2021-04-02 | End: 2021-04-02 | Stop reason: HOSPADM

## 2021-04-02 RX ORDER — TETRACAINE HYDROCHLORIDE 5 MG/ML
SOLUTION OPHTHALMIC PRN
Status: DISCONTINUED | OUTPATIENT
Start: 2021-04-02 | End: 2021-04-02 | Stop reason: HOSPADM

## 2021-04-02 RX ORDER — OFLOXACIN 3 MG/ML
1 SOLUTION/ DROPS OPHTHALMIC
Status: COMPLETED | OUTPATIENT
Start: 2021-04-02 | End: 2021-04-02

## 2021-04-02 RX ORDER — PREDNISOLONE ACETATE 10 MG/ML
SUSPENSION/ DROPS OPHTHALMIC
Qty: 5 ML | Refills: 1 | Status: SHIPPED | OUTPATIENT
Start: 2021-04-02 | End: 2023-02-27

## 2021-04-02 RX ORDER — CYCLOPENTOLAT/TROPIC/PHENYLEPH 1%-1%-2.5%
1 DROPS (EA) OPHTHALMIC (EYE)
Status: COMPLETED | OUTPATIENT
Start: 2021-04-02 | End: 2021-04-02

## 2021-04-02 RX ORDER — LIDOCAINE HYDROCHLORIDE 10 MG/ML
INJECTION, SOLUTION EPIDURAL; INFILTRATION; INTRACAUDAL; PERINEURAL PRN
Status: DISCONTINUED | OUTPATIENT
Start: 2021-04-02 | End: 2021-04-02 | Stop reason: HOSPADM

## 2021-04-02 RX ORDER — MOXIFLOXACIN IN NACL,ISO-OS/PF 0.3MG/0.3
SYRINGE (ML) INTRAOCULAR PRN
Status: DISCONTINUED | OUTPATIENT
Start: 2021-04-02 | End: 2021-04-02 | Stop reason: HOSPADM

## 2021-04-02 RX ORDER — PROPARACAINE HYDROCHLORIDE 5 MG/ML
1 SOLUTION/ DROPS OPHTHALMIC ONCE
Status: COMPLETED | OUTPATIENT
Start: 2021-04-02 | End: 2021-04-02

## 2021-04-02 RX ORDER — NALOXONE HYDROCHLORIDE 0.4 MG/ML
0.4 INJECTION, SOLUTION INTRAMUSCULAR; INTRAVENOUS; SUBCUTANEOUS
Status: DISCONTINUED | OUTPATIENT
Start: 2021-04-02 | End: 2021-04-03 | Stop reason: HOSPADM

## 2021-04-02 RX ORDER — MEPERIDINE HYDROCHLORIDE 25 MG/ML
12.5 INJECTION INTRAMUSCULAR; INTRAVENOUS; SUBCUTANEOUS
Status: DISCONTINUED | OUTPATIENT
Start: 2021-04-02 | End: 2021-04-03 | Stop reason: HOSPADM

## 2021-04-02 RX ORDER — SODIUM CHLORIDE, SODIUM LACTATE, POTASSIUM CHLORIDE, CALCIUM CHLORIDE 600; 310; 30; 20 MG/100ML; MG/100ML; MG/100ML; MG/100ML
500 INJECTION, SOLUTION INTRAVENOUS CONTINUOUS
Status: DISCONTINUED | OUTPATIENT
Start: 2021-04-02 | End: 2021-04-02 | Stop reason: HOSPADM

## 2021-04-02 RX ORDER — FENTANYL CITRATE 50 UG/ML
25-50 INJECTION, SOLUTION INTRAMUSCULAR; INTRAVENOUS
Status: DISCONTINUED | OUTPATIENT
Start: 2021-04-02 | End: 2021-04-02 | Stop reason: HOSPADM

## 2021-04-02 RX ADMIN — OFLOXACIN 1 DROP: 3 SOLUTION/ DROPS OPHTHALMIC at 06:49

## 2021-04-02 RX ADMIN — FENTANYL CITRATE 50 MCG: 50 INJECTION, SOLUTION INTRAMUSCULAR; INTRAVENOUS at 07:49

## 2021-04-02 RX ADMIN — OFLOXACIN 1 DROP: 3 SOLUTION/ DROPS OPHTHALMIC at 07:00

## 2021-04-02 RX ADMIN — PROPARACAINE HYDROCHLORIDE 1 DROP: 5 SOLUTION/ DROPS OPHTHALMIC at 06:48

## 2021-04-02 RX ADMIN — OFLOXACIN 1 DROP: 3 SOLUTION/ DROPS OPHTHALMIC at 07:07

## 2021-04-02 RX ADMIN — FENTANYL CITRATE 25 MCG: 50 INJECTION, SOLUTION INTRAMUSCULAR; INTRAVENOUS at 08:00

## 2021-04-02 RX ADMIN — Medication 1 DROP: at 06:50

## 2021-04-02 RX ADMIN — ONDANSETRON 4 MG: 2 INJECTION INTRAMUSCULAR; INTRAVENOUS at 07:49

## 2021-04-02 RX ADMIN — Medication 1 DROP: at 07:07

## 2021-04-02 RX ADMIN — Medication 1 DROP: at 07:00

## 2021-04-02 RX ADMIN — SODIUM CHLORIDE, SODIUM LACTATE, POTASSIUM CHLORIDE, CALCIUM CHLORIDE 500 ML: 600; 310; 30; 20 INJECTION, SOLUTION INTRAVENOUS at 07:06

## 2021-04-02 ASSESSMENT — VISUAL ACUITY
OD_SC: 20/40+
METHOD: SNELLEN - LINEAR

## 2021-04-02 ASSESSMENT — MIFFLIN-ST. JEOR: SCORE: 1170.86

## 2021-04-02 ASSESSMENT — SLIT LAMP EXAM - LIDS: COMMENTS: MGD

## 2021-04-02 ASSESSMENT — TONOMETRY
IOP_METHOD: TONOPEN
OD_IOP_MMHG: 19

## 2021-04-02 NOTE — ANESTHESIA POSTPROCEDURE EVALUATION
Patient: Yolande Hussein    Procedure(s):  RIGHT EYE CATARACT REMOVAL WITH INTRAOCULAR LENS IMPLANT    Diagnosis:Combined forms of age-related cataract of both eyes [H25.813]  Diagnosis Additional Information: No value filed.    Anesthesia Type:  MAC    Note:  Disposition: Outpatient   Postop Pain Control: Uneventful            Sign Out: Well controlled pain   PONV: No   Neuro/Psych: Uneventful            Sign Out: Acceptable/Baseline neuro status   Airway/Respiratory: Uneventful            Sign Out: Acceptable/Baseline resp. status   CV/Hemodynamics: Uneventful            Sign Out: Acceptable CV status   Other NRE: NONE   DID A NON-ROUTINE EVENT OCCUR? No         Last vitals:  Vitals:    04/02/21 0634 04/02/21 0832 04/02/21 0856   BP: (!) 144/66 121/58 125/57   Pulse: 73 75 71   Resp: 18 16 16   Temp: 36.4  C (97.5  F) 36  C (96.8  F) 36.1  C (97  F)   SpO2: 98% 99% 98%       Last vitals prior to Anesthesia Care Transfer:  CRNA VITALS  4/2/2021 0759 - 4/2/2021 0859      4/2/2021             Resp Rate (set):  10    EKG:  Sinus rhythm          Electronically Signed By: Lucas Zuniga MD, MD  April 2, 2021  9:18 AM

## 2021-04-02 NOTE — DISCHARGE INSTRUCTIONS
[facility]  Home Care Following Anesthesia  For 24 hours after surgery:  1. Get plenty of rest.  A responsible adult must stay with you for at least 24 hours after you leave the surgery center.  2. Do not drive or use heavy equipment.  If you have weakness or tingling, don't drive or use heavy equipment until this feeling goes away.   3. Do not drink alcohol.   4. Avoid strenuous or risky activities.  Ask for help when climbing stairs.  5. You may feel lightheaded.  IF so, sit for a few minutes before standing.  Have someone help you get up.   6. If you have nausea (feel sick to your stomach): Drink only clear liquids such as apple juice, ginger ale, broth or 7-Up.  Rest may also help.  Be sure to drink enough fluids.  Move to a regular diet as you feel able.   7. You may have a slight fever.  Call the doctor if your fever is over 100 F (37.7 C) (taken under the tongue) or lasts longer than 24 hours.  8. You may have a dry mouth, a sore throat, muscle aches or trouble sleeping. These should go away after 24 hours.  9. Do not make important or legal decisions.               Tips for taking pain medications  To get the best pain relief possible, remember these points:    Take pain medications as directed, before pain becomes severe.    Pain medication can upset your stomach: taking it with food may help.    Constipation is a common side effect of pain medication. Drink plenty of  fluids.    Eat foods high in fiber. Take a stool softener if recommended by your doctor or pharmacist.    Do not drink alcohol, drive or operate machinery while taking pain medications.    Ask about other ways to control pain, such as with heat, ice or relaxation.    Tylenol/Acetaminophen Consumption  To help encourage the safe use of acetaminophen, the makers of TYLENOL  have lowered the maximum daily dose for single-ingredient Extra Strength TYLENOL  (acetaminophen) products sold in the [place] from 8  pills per day (4,000 mg) to 6 pills per day (3,000 mg). The dosing interval has also changed from 2 pills every 4-6 hours to 2 pills every 6 hours.    If you feel your pain relief is insufficient, you may take Tylenol/Acetaminophen in addition to your narcotic pain medication.     Be careful not to exceed 3,000 mg of Tylenol/Acetaminophen in a 24 hour period from all sources.    If you are taking extra strength Tylenol/acetaminophen (500 mg), the maximum dose is 6 tablets in 24 hours.    If you are taking regular strength acetaminophen (325 mg), the maximum dose is 9 tablets in 24 hours.    Call a doctor for any of the followin. Signs of infection (fever, growing tenderness at the surgery site, a large amount of drainage or bleeding, severe pain, foul-smelling drainage, redness, swelling).  2. It has been over 8 to 10 hours since surgery and you are still not able to urinate (pass water).  3. Headache for over 24 hours.  4. Numbness, tingling or weakness the day after surgery (if you had spinal anesthesia).  5. Signs of Covid-19 infection (temperature over 100 degrees, shortness of breath, cough, loss of taste/smell, generalized body aches, persistent headache, chills, sore throat, nausea/vomiting/diarrhea)  Your doctor is:       Dr. Bing Horn, Ophthalmology: [phone]               Or dial [phone] and ask for the resident on call for:  Ophthalmology  For emergency care, call the:  [facility]: [phone] (TTY for hearing impaired: [phone])

## 2021-04-02 NOTE — ANESTHESIA CARE TRANSFER NOTE
Patient: Yolande Hussein    Procedure(s):  RIGHT EYE CATARACT REMOVAL WITH INTRAOCULAR LENS IMPLANT    Diagnosis: Combined forms of age-related cataract of both eyes [H25.813]  Diagnosis Additional Information: No value filed.    Anesthesia Type:   MAC     Note:    Oropharynx: oropharynx clear of all foreign objects and spontaneously breathing  Level of Consciousness: awake  Oxygen Supplementation: room air    Independent Airway: airway patency satisfactory and stable  Dentition: dentition unchanged  Vital Signs Stable: post-procedure vital signs reviewed and stable  Report to RN Given: handoff report given  Patient transferred to: Phase II    Handoff Report: Identifed the Patient, Identified the Reponsible Provider, Reviewed the pertinent medical history, Discussed the surgical course, Reviewed Intra-OP anesthesia mangement and issues during anesthesia, Set expectations for post-procedure period and Allowed opportunity for questions and acknowledgement of understanding      Vitals: (Last set prior to Anesthesia Care Transfer)  CRNA VITALS  4/2/2021 0759 - 4/2/2021 0831      4/2/2021             Resp Rate (set):  10    EKG:  Sinus rhythm        Electronically Signed By: ESTRADA De La Vega CRNA  April 2, 2021  8:31 AM

## 2021-04-02 NOTE — PROCEDURES
Ophthalmology Post-op Procedure Note    Surgical Service:    Ophthalmology & Visual Sciences  Date Performed:      April 2, 2021  Location: [facility]      Pre-operative Diagnosis: Visually significant cataract, right eye  Post-operative Diagnosis:  Pseudophakia, right eye  Operative Procedure:  Phacoemulsification with intraocular lens implantation, right eye      Surgeon(s):  Fellow/Staff Surgeon:       Bing Horn MD  Resident Surgeon:            ASHLEY Baig MD    Anesthesia:   Topical/MAC  Findings:  No unusual findings   Blood Loss:    Minimal  Implants:  ZCB00 15.0 intraocular lens  Specimens:  None     Complications:  The patient did not experience any complications.   Condition: Stable    Operative Report Completion:    Description of Operation/Procedure:  After appropriate informed consent was obtained, the patient was brought to the operating room. The appropriate cardiac and blood pressure monitors were placed. A final pause occurred just before the start of the procedure during which the entire procedure team actively confirmed the correct patient, procedure, site, special equipment and special requirements. The patient was prepped and draped in the usual sterile fashion using 5% povidone/iodine.     An eyelid speculum was placed to open the eyelids. A paracentesis port was placed approximately sixty degrees to the left of the planned temporal incision location using the sideport blade. Approximately 0.8 cc of 1% nonpreserved lidocaine was placed into the anterior chamber. The anterior chamber was filled with dispersive viscoelastic. A clear corneal temporal incision was made with a metal 2.6 mm keratome. A round continuous tear capsulorhexis was initiated with a cystotome and completed with the Utrata forceps. Balanced salt solution on a cannula was used to perform hydrodissection. The nucleus was removed using phacoemulsification with a chop technique. The remaining cortical material was removed  using the irrigation/aspiration handpiece. The capsular bag was filled with dispersive viscoelastic. A lens of the  model and power listed above was placed into the capsular bag using the cartridge injection system. The remaining viscoelastic was removed using the irrigation aspiration handpiece. The paracentesis and temporal wounds were hydrated with balanced salt solution. Intracameral moxifloxacin was administered. At the conclusion of the case, the wounds were felt to be watertight, the pupil was round, the lens was centered, and the anterior chamber was deep. A few drops of antibiotic and prednisolone were given to the operative eye. The eyelid speculum was removed. A shield was placed over the operative eye.      Attending Attestation:  I was present for the entire procedure.  Bing Horn MD

## 2021-04-02 NOTE — PROGRESS NOTES
Assessment & Plan      Yolande Hussein is a 70 year old female with the following diagnoses:   1. Postoperative eye state    2. Pseudophakia of both eyes       S/p CE/IOL right eye, day 0    Doing well  Keep patch in place at night for 7 days  Start post-operative drops and taper according to instructions  Post-operative do's and don'ts reviewed, questions answered    Recheck 2-3 weeks with refraction as scheduled    Chandu Baig MD  Ophthalmology Resident, PGY-4         Teaching statement:  Complete documentation of historical and exam elements from today's encounter can be found in the full encounter summary report (not reduplicated in this progress note). I personally obtained the chief complaint(s) and history of present illness.  I confirmed and edited as necessary the review of systems, past medical/surgical history, family history, social history, and examination findings as documented by others; and I examined the patient myself. I personally reviewed the relevant tests, images, and reports as documented above.     I formulated and edited as necessary the assessment and plan and discussed the findings and management plan with the patient and family.    Bing Horn MD  Comprehensive Ophthalmology & Ocular Pathology  Department of Ophthalmology and Visual Neurosciences  [email or web address]  Pager [phone]

## 2021-04-02 NOTE — ANESTHESIA PREPROCEDURE EVALUATION
Anesthesia Pre-Procedure Evaluation    Patient: Yolande Hussein   MRN: [id number] : 1950        Preoperative Diagnosis: Combined forms of age-related cataract of both eyes [H25.813]   Procedure : Procedure(s):  LEFT EYE CATARACT REMOVAL WITH INTRAOCULAR LENS IMPLANT     Past Medical History:   Diagnosis Date     Asthma      Breast atypical ductal hyperplasia-- Right 2002    Lumpectomy.  Problem list name updated by automated process. Provider to review and confirm     Dysphonia 2014     Eczema 10/15/2012     Gastro-oesophageal reflux disease      HLD (hyperlipidemia)      Hypertension      Kidney stone      Low grade mucinous neoplasm of appendix      Menopausal state -- age 49 2012    With NEGATIVE HR HPV neg on Pap today, there is NO further need for routine pap screens.        Neck stiffness 2014     Nephrolithiasis 2014     Nonsenile cataract      Shoulder pain 2009    Controlled with stretching, unless lapses or overworks 2012      Thoracalgia 2009     TMJ (temporomandibular joint syndrome) 2012    Jaw PT and Mouth guard helping! 2012      Unilateral vocal fold paresis 2014     Vaginal atrophy 2012    Estrogen loss with increasing sxs on Vagifem 10 mcg.  Increase from 2x/wk to 3xs/wk.       Past Surgical History:   Procedure Laterality Date     APPENDECTOMY OPEN N/A 2018    Procedure: Appendectomy open;  Surgeon: Evelyne Yung MD;  Location: [facility] OR     BREAST BIOPSY, CORE RT/LT      R-- precancer cells     COLONOSCOPY       COLONOSCOPY N/A 2020    Procedure: COLONOSCOPY, WITH POLYPECTOMY AND BIOPSY;  Surgeon: Aparna Soriano MD;  [facility]     EXPLORE NECK N/A 2014    Procedure: EXPLORE NECK;  Surgeon: Alea Jacobs MD;  [facility]: [facility]     EXTRACORPOREAL SHOCK WAVE LITHOTRIPSY (ESWL)  2014    Procedure: EXTRACORPOREAL SHOCK WAVE LITHOTRIPSY (ESWL);  Left Kidney Extracorporeal  Shockwave Lithotripsy;  Surgeon: Sabas Choi MD;  Location: [facility]     HYSTERECTOMY TOTAL ABDOMINAL, BILATERAL SALPINGO-OOPHORECTOMY, COMBINED Bilateral 12/27/2018    Procedure: TOTAL ABDOMINAL HYSTERECTOMY WITH BILATERAL SALPINGO-OOPHORECTOMY, appendectomy, pelvic washings;  Surgeon: Evelyne Yung MD;  Location: [facility]     LUMPECTOMY BREAST  age 48    R -- margins clear -- declined tamoxofen     PARATHYROIDECTOMY N/A 8/19/2014    Procedure: PARATHYROIDECTOMY;  Surgeon: Alea Jacobs MD;  Location: [facility]     PHACOEMULSIFICATION CLEAR CORNEA WITH STANDARD INTRAOCULAR LENS IMPLANT Left 3/19/2021    Procedure: LEFT EYE CATARACT REMOVAL WITH INTRAOCULAR LENS IMPLANT;  Surgeon: Bing Horn MD;  Location: [facility]     STERILIZATION  1984    PP b/4 discharge      TUBAL LIGATION        Allergies   Allergen Reactions     Macrobid [Nitrofurantoin] Hives     Aspirin      Tight breathing     Macrobid [Nitrofuran Derivatives]       Social History     Tobacco Use     Smoking status: Never Smoker     Smokeless tobacco: Never Used   Substance Use Topics     Alcohol use: Yes     Comment: rare       Wt Readings from Last 1 Encounters:   04/02/21 67.6 kg (149 lb)        Anesthesia Evaluation   Pt has had prior anesthetic.     No history of anesthetic complications       ROS/MED HX  ENT/Pulmonary:     (+) Mild Persistent, asthma Treatment: Inhaler prn and Inhaled steroids,      Neurologic:  - neg neurologic ROS     Cardiovascular:     (+) Dyslipidemia hypertension-----    METS/Exercise Tolerance: >4 METS    Hematologic:  - neg hematologic  ROS     Musculoskeletal:  - neg musculoskeletal ROS     GI/Hepatic:  - neg GI/hepatic ROS     Renal/Genitourinary:     (+) Nephrolithiasis ,     Endo:  - neg endo ROS     Psychiatric/Substance Use:  - neg psychiatric ROS     Infectious Disease:  - neg infectious disease ROS     Malignancy:  - neg malignancy ROS     Other:  - neg other ROS          Physical Exam    Airway   airway exam normal      Mallampati: II   TM distance: > 3 FB   Neck ROM: full   Mouth opening: > 3 cm    Respiratory Devices and Support         Dental  no notable dental history         Cardiovascular   cardiovascular exam normal          Pulmonary   pulmonary exam normal                OUTSIDE LABS:  CBC:   Lab Results   Component Value Date    WBC 7.4 12/27/2018    WBC 10.5 01/14/2014    HGB 13.5 12/28/2018    HGB 14.7 12/27/2018    HCT 44.2 12/27/2018    HCT 42.8 01/14/2014     12/27/2018     01/14/2014     BMP:   Lab Results   Component Value Date     01/13/2021     01/24/2020    POTASSIUM 4.1 01/13/2021    POTASSIUM 3.7 01/24/2020    CHLORIDE 105 01/13/2021    CHLORIDE 103 01/24/2020    CO2 29 01/13/2021    CO2 30 01/24/2020    BUN 8 01/13/2021    BUN 8 01/24/2020    CR 0.62 01/13/2021    CR 0.55 01/24/2020    GLC 92 01/13/2021    GLC 97 01/24/2020     COAGS:   Lab Results   Component Value Date    PTT 24 01/14/2014    INR 1.01 01/14/2014     POC:   Lab Results   Component Value Date    BGM 87 12/29/2018     HEPATIC:   Lab Results   Component Value Date    ALBUMIN 3.7 01/08/2020    PROTTOTAL 6.9 01/08/2020    ALT 21 01/08/2020    AST 13 01/08/2020    ALKPHOS 57 01/08/2020    BILITOTAL 0.5 01/08/2020     OTHER:   Lab Results   Component Value Date    PH 7.43 06/02/2005    LACT 1.8 01/14/2014    CARLY 8.9 01/13/2021    PHOS 3.4 04/22/2014    MAG 1.8 12/27/2006    LIPASE 58 01/14/2014    TSH 1.67 11/27/2013       Anesthesia Plan    ASA Status:  2   NPO Status:  NPO Appropriate    Anesthesia Type: MAC.     - Reason for MAC: straight local not clinically adequate              Consents    Anesthesia Plan(s) and associated risks, benefits, and realistic alternatives discussed. Questions answered and patient/representative(s) expressed understanding.     - Discussed with:  Patient         Postoperative Care    Pain management: Oral pain medications.   PONV prophylaxis: Ondansetron (or other  5HT-3)     Comments:                    Lucas Zuniga MD, MD

## 2021-04-26 ENCOUNTER — OFFICE VISIT (OUTPATIENT)
Dept: OPHTHALMOLOGY | Facility: CLINIC | Age: 71
End: 2021-04-26
Attending: OPHTHALMOLOGY
Payer: COMMERCIAL

## 2021-04-26 DIAGNOSIS — H01.02B SQUAMOUS BLEPHARITIS OF UPPER AND LOWER EYELIDS OF BOTH EYES: ICD-10-CM

## 2021-04-26 DIAGNOSIS — H52.13 MYOPIA OF BOTH EYES WITH ASTIGMATISM AND PRESBYOPIA: ICD-10-CM

## 2021-04-26 DIAGNOSIS — Z96.1 PSEUDOPHAKIA, BOTH EYES: Primary | ICD-10-CM

## 2021-04-26 DIAGNOSIS — H01.02A SQUAMOUS BLEPHARITIS OF UPPER AND LOWER EYELIDS OF BOTH EYES: ICD-10-CM

## 2021-04-26 DIAGNOSIS — H52.203 MYOPIA OF BOTH EYES WITH ASTIGMATISM AND PRESBYOPIA: ICD-10-CM

## 2021-04-26 DIAGNOSIS — H52.4 MYOPIA OF BOTH EYES WITH ASTIGMATISM AND PRESBYOPIA: ICD-10-CM

## 2021-04-26 DIAGNOSIS — Z83.518 FAMILY HISTORY OF MACULAR DEGENERATION: ICD-10-CM

## 2021-04-26 PROCEDURE — 99024 POSTOP FOLLOW-UP VISIT: CPT | Performed by: OPHTHALMOLOGY

## 2021-04-26 PROCEDURE — G0463 HOSPITAL OUTPT CLINIC VISIT: HCPCS

## 2021-04-26 ASSESSMENT — VISUAL ACUITY
OS_SC+: -1
OS_SC: 20/20
METHOD: SNELLEN - LINEAR
OD_SC: 20/25

## 2021-04-26 ASSESSMENT — REFRACTION_MANIFEST
OS_CYLINDER: +1.00
OS_SPHERE: -0.50
OD_CYLINDER: +0.75
OS_AXIS: 015
OS_ADD: +2.50
OD_ADD: +2.50
OD_AXIS: 150
OD_SPHERE: PLANO

## 2021-04-26 ASSESSMENT — EXTERNAL EXAM - LEFT EYE: OS_EXAM: NORMAL

## 2021-04-26 ASSESSMENT — SLIT LAMP EXAM - LIDS
COMMENTS: NORMAL
COMMENTS: NORMAL

## 2021-04-26 ASSESSMENT — EXTERNAL EXAM - RIGHT EYE: OD_EXAM: NORMAL

## 2021-04-26 ASSESSMENT — CUP TO DISC RATIO
OS_RATIO: 0.1
OD_RATIO: 0.1

## 2021-04-26 ASSESSMENT — TONOMETRY
OD_IOP_MMHG: 15
OS_IOP_MMHG: 14
IOP_METHOD: TONOPEN

## 2021-04-26 NOTE — PROGRESS NOTES
HPI  Yolande Hussein is a 70 year old female here for follow-up after CE/IOL both eyes. She is overall pleased with her vision, but notes that the right eye is not quite as clear as the left. She is also tired of taking OTC readers off and on, and she is interested in Rx glasses. She is using her prednisolone daily right eye.     She is taking lutein vitamins daily.    POH: S/p CE/IOL both eyes  PMH: HTN, no diabetes  FH: Mother and sibling with macular degeneration  SH: Non-smoker    Assessment & Plan    (Z96.1) Pseudophakia, both eyes  (primary encounter diagnosis)  Comment: Good post-operative appearance. UCVA of 20/25 right eye and 20/20 left eye, BCVA 20/20 each eye. Mild wrinkle in right PC.  Plan: Compete drop taper. Given updated glasses Rx. If right eye remains bothersome, then can consider YAG cap. Would wait until 3 months post-op (July 2021)    (Z83.518) Family history of macular degeneration  Comment: Mother and sibling. Very mild ERM, but no evidence of macular degeneration on exam or prior macula OCT.  Plan: Observe.    (H01.02A,  H01.02B) Squamous blepharitis of upper and lower eyelids of both eyes  Comment: Mild MGD. Ocular surface looks fairly healthy.  Plan: Continue AT BID and WC BID  -----------------------------------------------------------------------------------    Return in about 1 year (around 4/26/2022) for or sooner as needed for YAG capsulotomy.     Teaching statement:  Complete documentation of historical and exam elements from today's encounter can be found in the full encounter summary report (not reduplicated in this progress note). I personally obtained the chief complaint(s) and history of present illness.  I confirmed and edited as necessary the review of systems, past medical/surgical history, family history, social history, and examination findings as documented by others; and I examined the patient myself. I personally reviewed the relevant tests, images, and reports as  documented above.     I formulated and edited as necessary the assessment and plan and discussed the findings and management plan with the patient and family.    Bing Horn MD  Comprehensive Ophthalmology & Ocular Pathology  Department of Ophthalmology and Visual Neurosciences  [email or web address]  Pager [phone]

## 2021-04-26 NOTE — NURSING NOTE
Chief Complaints and History of Present Illnesses   Patient presents with     Post Op (Ophthalmology) Both Eyes     Cataract extraction with IOL in the right eye on 04/02/2021  Cataract extraction with IOL in the left eye on 03/19/2021     Chief Complaint(s) and History of Present Illness(es)     Post Op (Ophthalmology) Both Eyes     Laterality: both eyes    Course: gradually improving    Associated symptoms: dryness and headache.  Negative for eye pain and redness    Treatments tried: eye drops    Pain scale: 0/10    Comments: Cataract extraction with IOL in the right eye on 04/02/2021  Cataract extraction with IOL in the left eye on 03/19/2021              Comments     She states that the vision in the left eye is clearer than her right eye.  She has some dry eye symptoms if she reads for an extended period of time and then also often develops a headache when reading (with OTC glasses).  She notes some distortion with lights.    TATI Gomez 10:26 AM  April 26, 2021

## 2021-05-24 ENCOUNTER — OFFICE VISIT (OUTPATIENT)
Dept: INTERNAL MEDICINE | Facility: CLINIC | Age: 71
End: 2021-05-24
Attending: INTERNAL MEDICINE
Payer: COMMERCIAL

## 2021-05-24 VITALS
HEIGHT: 64 IN | HEART RATE: 80 BPM | SYSTOLIC BLOOD PRESSURE: 116 MMHG | BODY MASS INDEX: 25.95 KG/M2 | DIASTOLIC BLOOD PRESSURE: 70 MMHG | WEIGHT: 152 LBS

## 2021-05-24 DIAGNOSIS — J45.40 MODERATE PERSISTENT ASTHMA WITHOUT COMPLICATION: ICD-10-CM

## 2021-05-24 DIAGNOSIS — Z00.00 ENCOUNTER FOR MEDICARE ANNUAL WELLNESS EXAM: ICD-10-CM

## 2021-05-24 DIAGNOSIS — L98.9 SKIN LESION: Primary | ICD-10-CM

## 2021-05-24 DIAGNOSIS — Z12.31 ENCOUNTER FOR SCREENING MAMMOGRAM FOR MALIGNANT NEOPLASM OF BREAST: ICD-10-CM

## 2021-05-24 DIAGNOSIS — Z78.0 MENOPAUSE PRESENT: ICD-10-CM

## 2021-05-24 DIAGNOSIS — I10 BENIGN ESSENTIAL HYPERTENSION: ICD-10-CM

## 2021-05-24 PROCEDURE — G0463 HOSPITAL OUTPT CLINIC VISIT: HCPCS

## 2021-05-24 PROCEDURE — G0439 PPPS, SUBSEQ VISIT: HCPCS | Performed by: INTERNAL MEDICINE

## 2021-05-24 ASSESSMENT — MIFFLIN-ST. JEOR: SCORE: 1184.72

## 2021-05-24 ASSESSMENT — PAIN SCALES - GENERAL: PAINLEVEL: NO PAIN (0)

## 2021-05-24 NOTE — LETTER
"5/24/2021       RE: Yolande Hussein  [address] [facility] [address]     Dear Colleague,    Thank you for referring your patient, Yolande Hussein, to the [facility] [place] [facility]. Please see a copy of my visit note below.    SUBJECTIVE:   Yolande Hussein is a 70 year old female who presents for Preventive Visit.      Patient has been advised of split billing requirements and indicates understanding: Yes  Are you in the first 12 months of your Medicare Part B coverage?  No    Physical Health:    In general, how would you rate your overall physical health? good    Outside of work, how many days during the week do you exercise? 6-7 days/week    Outside of work, approximately how many minutes a day do you exercise?30-45 minutes    If you drink alcohol do you typically have >3 drinks per day or >7 drinks per week? No    Do you usually eat at least 4 servings of fruit and vegetables a day, include whole grains & fiber and avoid regularly eating high fat or \"junk\" foods? Yes    Do you have any problems taking medications regularly?  No    Do you have any side effects from medications? none    Needs assistance for the following daily activities: no assistance needed    Which of the following safety concerns are present in your home?  none identified     Hearing impairment: No    In the past 6 months, have you been bothered by leaking of urine? no    Mental Health:    In general, how would you rate your overall mental or emotional health? excellent  PHQ-2 Score:      Do you feel safe in your environment? Yes    Have you ever done Advance Care Planning? (For example, a Health Directive, POLST, or a discussion with a medical provider or your loved ones about your wishes): Yes, advance care planning is on file.    Additional concerns to address?  No    Fall risk:  Fallen 2 or more times in the past year?: No  Any " fall with injury in the past year?: No    Cognitive Screenin) Repeat 3 items (Leader, Season, Table)    2) Clock draw: NORMAL  3) 3 item recall: Recalls 3 objects  Results: 3 items recalled: COGNITIVE IMPAIRMENT LESS LIKELY    Mini-CogTM Copyright YOLIE Foreman. Licensed by the author for use in [place] [employer]; reprinted with permission ([email or web address]). All rights reserved.      Do you have sleep apnea, excessive snoring or daytime drowsiness?: no        -------------------------------------    Reviewed and updated as needed this visit by clinical staff  Tobacco  Allergies  Meds              Reviewed and updated as needed this visit by Provider                Social History     Tobacco Use     Smoking status: Never Smoker     Smokeless tobacco: Never Used   Substance Use Topics     Alcohol use: Yes     Comment: rare                            Current providers sharing in care for this patient include:   Patient Care Team:  Isabel Hicks MD as PCP - General (Internal Medicine)  Bing Horn MD as MD (Ophthalmology)  Favian Hoffmann MD as MD (Family Medicine - Sports Medicine)  Zeke Bosch MD as MD (Oncology)  Maribell Pastrana, RN as Nurse Coordinator (Oncology)  Isabel Hicks MD as Assigned PCP  Dianna Orozco PA-C as Assigned Surgical Provider    The following health maintenance items are reviewed in Epic and correct as of today:  Health Maintenance   Topic Date Due     ASTHMA ACTION PLAN  2019     ASTHMA CONTROL TEST  2020     MAMMO SCREENING  10/22/2020     MEDICARE ANNUAL WELLNESS VISIT  2020     FALL RISK ASSESSMENT  03/15/2022     DTAP/TDAP/TD IMMUNIZATION (3 - Td) 2022     ADVANCE CARE PLANNING  2024     LIPID  2026     COLORECTAL CANCER SCREENING  2030     DEXA  2033     HEPATITIS C SCREENING  Completed     PHQ-2  Completed     INFLUENZA VACCINE  Completed     Pneumococcal Vaccine: 65+ Years  Completed  "    ZOSTER IMMUNIZATION  Completed     COVID-19 Vaccine  Completed     IPV IMMUNIZATION  Aged Out     MENINGITIS IMMUNIZATION  Aged Out     HEPATITIS B IMMUNIZATION  Aged Out     Labs reviewed in [facility]  Mammogram Screening: Mammogram Screening: Recommended mammography every 1-2 years with patient discussion and risk factor consideration    ROS:  Constitutional, HEENT, cardiovascular, pulmonary, GI, , musculoskeletal, neuro, skin, endocrine and psych systems are negative, except as otherwise noted.    OBJECTIVE:   /70   Pulse 80   Ht 1.626 m (5' 4.02\")   Wt 68.9 kg (152 lb)   Breastfeeding No   BMI 26.08 kg/m   Estimated body mass index is 26.08 kg/m  as calculated from the following:    Height as of this encounter: 1.626 m (5' 4.02\").    Weight as of this encounter: 68.9 kg (152 lb).  EXAM:   GENERAL APPEARANCE: healthy, alert and no distress  EYES: Eyes grossly normal to inspection, PERRL and conjunctivae and sclerae normal  HENT: oral mucous membranes moist  NECK: no asymmetry, masses, or scars  RESP: lungs clear to auscultation - no rales, rhonchi or wheezes  CV: regular rate and rhythm, normal S1 S2, no S3 or S4, no murmur, click or rub, no peripheral edema and peripheral pulses strong  ABDOMEN: soft, nontender, no hepatosplenomegaly, no masses and bowel sounds normal  MS: no musculoskeletal defects are noted and gait is age appropriate without ataxia  SKIN: no suspicious lesions or rashes  NEURO: Normal strength and tone, sensory exam grossly normal, mentation intact and speech normal  PSYCH: mentation appears normal and affect normal/bright    Diagnostic Test Results:  Labs reviewed in Epic    ASSESSMENT / PLAN:   1. Moderate persistent asthma without complication  Patient reports good asthma control. Recommend obtaining PFTs. Patient was given refill for her usual inhaler. Vaccines are up to date.   - ADVAIR DISKUS 250-50 MCG/DOSE inhaler; Inhale 1 puff into the lungs 2 times daily  Dispense: 3 " "each; Refill: 3  - General PFT Lab (Please always keep checked); Future  - Pulmonary Function Test; Future    2. Skin lesion  Discussed possible causes, recommend Dermatology evaluation.   - DERMATOLOGY ADULT REFERRAL; Future    3. Benign essential hypertension  Blood pressure is within acceptable range. Patient will continue with current medical therapy.     4. Menopause present  DEXA scan and mammogram were ordered today.   - Dexa hip/pelvis/spine; Future  - MA Screening Digital Bilateral; Future    5. Encounter for screening mammogram for malignant neoplasm of breast     - MA Screening Digital Bilateral; Future    6. Encounter for Medicare annual wellness exam  Patient is up to date on screening.       Patient has been advised of split billing requirements and indicates understanding: Yes    COUNSELING:  Reviewed preventive health counseling, as reflected in patient instructions       Regular exercise       Healthy diet/nutrition       Vision screening    Estimated body mass index is 26.08 kg/m  as calculated from the following:    Height as of this encounter: 1.626 m (5' 4.02\").    Weight as of this encounter: 68.9 kg (152 lb).        She reports that she has never smoked. She has never used smokeless tobacco.    Appropriate preventive services were discussed with this patient, including applicable screening as appropriate for cardiovascular disease, diabetes, osteopenia/osteoporosis, and glaucoma.  As appropriate for age/gender, discussed screening for colorectal cancer, prostate cancer, breast cancer, and cervical cancer. Checklist reviewing preventive services available has been given to the patient.    Reviewed patients plan of care and provided an AVS. The Basic Care Plan (routine screening as documented in Health Maintenance) for Yolande meets the Care Plan requirement. This Care Plan has been established and reviewed with the Patient.    Counseling Resources:  ATP IV Guidelines  Pooled Cohorts Equation " Calculator  Breast Cancer Risk Calculator  BRCA-Related Cancer Risk Assessment: FHS-7 Tool  FRAX Risk Assessment  ICSI Preventive Guidelines  Dietary Guidelines for Americans, 2010  USDA's MyPlate  ASA Prophylaxis  Lung CA Screening    Isabel Hicks MD  [facility]

## 2021-05-24 NOTE — PROGRESS NOTES
"SUBJECTIVE:   Yolande Hussein is a 70 year old female who presents for Preventive Visit.      Patient has been advised of split billing requirements and indicates understanding: Yes  Are you in the first 12 months of your Medicare Part B coverage?  No    Physical Health:    In general, how would you rate your overall physical health? good    Outside of work, how many days during the week do you exercise? 6-7 days/week    Outside of work, approximately how many minutes a day do you exercise?30-45 minutes    If you drink alcohol do you typically have >3 drinks per day or >7 drinks per week? No    Do you usually eat at least 4 servings of fruit and vegetables a day, include whole grains & fiber and avoid regularly eating high fat or \"junk\" foods? Yes    Do you have any problems taking medications regularly?  No    Do you have any side effects from medications? none    Needs assistance for the following daily activities: no assistance needed    Which of the following safety concerns are present in your home?  none identified     Hearing impairment: No    In the past 6 months, have you been bothered by leaking of urine? no    Mental Health:    In general, how would you rate your overall mental or emotional health? excellent  PHQ-2 Score:      Do you feel safe in your environment? Yes    Have you ever done Advance Care Planning? (For example, a Health Directive, POLST, or a discussion with a medical provider or your loved ones about your wishes): Yes, advance care planning is on file.    Additional concerns to address?  No    Fall risk:  Fallen 2 or more times in the past year?: No  Any fall with injury in the past year?: No    Cognitive Screenin) Repeat 3 items (Leader, Season, Table)    2) Clock draw: NORMAL  3) 3 item recall: Recalls 3 objects  Results: 3 items recalled: COGNITIVE IMPAIRMENT LESS LIKELY    Mini-CogTM Copyright YOLIE Foreman. Licensed by the author for use in [facility]; reprinted " with permission ([email or web address]). All rights reserved.      Do you have sleep apnea, excessive snoring or daytime drowsiness?: no        -------------------------------------    Reviewed and updated as needed this visit by clinical staff  Tobacco  Allergies  Meds              Reviewed and updated as needed this visit by Provider                Social History     Tobacco Use     Smoking status: Never Smoker     Smokeless tobacco: Never Used   Substance Use Topics     Alcohol use: Yes     Comment: rare                            Current providers sharing in care for this patient include:   Patient Care Team:  Isabel Hicks MD as PCP - General (Internal Medicine)  Bing Horn MD as MD (Ophthalmology)  Favian Hoffmann MD as MD (Family Medicine - Sports Medicine)  Zeke Bosch MD as MD (Oncology)  Maribell Pastrana RN as Nurse Coordinator (Oncology)  Isabel Hicks MD as Assigned PCP  Dianna Orozco PA-C as Assigned Surgical Provider    The following health maintenance items are reviewed in Epic and correct as of today:  Health Maintenance   Topic Date Due     ASTHMA ACTION PLAN  11/27/2019     ASTHMA CONTROL TEST  06/16/2020     MAMMO SCREENING  10/22/2020     MEDICARE ANNUAL WELLNESS VISIT  12/16/2020     FALL RISK ASSESSMENT  03/15/2022     DTAP/TDAP/TD IMMUNIZATION (3 - Td) 11/12/2022     ADVANCE CARE PLANNING  12/16/2024     LIPID  01/13/2026     COLORECTAL CANCER SCREENING  01/28/2030     DEXA  11/30/2033     HEPATITIS C SCREENING  Completed     PHQ-2  Completed     INFLUENZA VACCINE  Completed     Pneumococcal Vaccine: 65+ Years  Completed     ZOSTER IMMUNIZATION  Completed     COVID-19 Vaccine  Completed     IPV IMMUNIZATION  Aged Out     MENINGITIS IMMUNIZATION  Aged Out     HEPATITIS B IMMUNIZATION  Aged Out     Labs reviewed in [facility]  Mammogram Screening: Mammogram Screening: Recommended mammography every 1-2 years with patient discussion and risk factor  "consideration    ROS:  Constitutional, HEENT, cardiovascular, pulmonary, GI, , musculoskeletal, neuro, skin, endocrine and psych systems are negative, except as otherwise noted.    OBJECTIVE:   /70   Pulse 80   Ht 1.626 m (5' 4.02\")   Wt 68.9 kg (152 lb)   Breastfeeding No   BMI 26.08 kg/m   Estimated body mass index is 26.08 kg/m  as calculated from the following:    Height as of this encounter: 1.626 m (5' 4.02\").    Weight as of this encounter: 68.9 kg (152 lb).  EXAM:   GENERAL APPEARANCE: healthy, alert and no distress  EYES: Eyes grossly normal to inspection, PERRL and conjunctivae and sclerae normal  HENT: oral mucous membranes moist  NECK: no asymmetry, masses, or scars  RESP: lungs clear to auscultation - no rales, rhonchi or wheezes  CV: regular rate and rhythm, normal S1 S2, no S3 or S4, no murmur, click or rub, no peripheral edema and peripheral pulses strong  ABDOMEN: soft, nontender, no hepatosplenomegaly, no masses and bowel sounds normal  MS: no musculoskeletal defects are noted and gait is age appropriate without ataxia  SKIN: no suspicious lesions or rashes  NEURO: Normal strength and tone, sensory exam grossly normal, mentation intact and speech normal  PSYCH: mentation appears normal and affect normal/bright    Diagnostic Test Results:  Labs reviewed in Epic    ASSESSMENT / PLAN:   1. Moderate persistent asthma without complication  Patient reports good asthma control. Recommend obtaining PFTs. Patient was given refill for her usual inhaler. Vaccines are up to date.   - ADVAIR DISKUS 250-50 MCG/DOSE inhaler; Inhale 1 puff into the lungs 2 times daily  Dispense: 3 each; Refill: 3  - General PFT Lab (Please always keep checked); Future  - Pulmonary Function Test; Future    2. Skin lesion  Discussed possible causes, recommend Dermatology evaluation.   - DERMATOLOGY ADULT REFERRAL; Future    3. Benign essential hypertension  Blood pressure is within acceptable range. Patient will " "continue with current medical therapy.     4. Menopause present  DEXA scan and mammogram were ordered today.   - Dexa hip/pelvis/spine; Future  - MA Screening Digital Bilateral; Future    5. Encounter for screening mammogram for malignant neoplasm of breast     - MA Screening Digital Bilateral; Future    6. Encounter for Medicare annual wellness exam  Patient is up to date on screening.       Patient has been advised of split billing requirements and indicates understanding: Yes    COUNSELING:  Reviewed preventive health counseling, as reflected in patient instructions       Regular exercise       Healthy diet/nutrition       Vision screening    Estimated body mass index is 26.08 kg/m  as calculated from the following:    Height as of this encounter: 1.626 m (5' 4.02\").    Weight as of this encounter: 68.9 kg (152 lb).        She reports that she has never smoked. She has never used smokeless tobacco.    Appropriate preventive services were discussed with this patient, including applicable screening as appropriate for cardiovascular disease, diabetes, osteopenia/osteoporosis, and glaucoma.  As appropriate for age/gender, discussed screening for colorectal cancer, prostate cancer, breast cancer, and cervical cancer. Checklist reviewing preventive services available has been given to the patient.    Reviewed patients plan of care and provided an AVS. The Basic Care Plan (routine screening as documented in Health Maintenance) for Yolande meets the Care Plan requirement. This Care Plan has been established and reviewed with the Patient.    Counseling Resources:  ATP IV Guidelines  Pooled Cohorts Equation Calculator  Breast Cancer Risk Calculator  BRCA-Related Cancer Risk Assessment: FHS-7 Tool  FRAX Risk Assessment  ICSI Preventive Guidelines  Dietary Guidelines for Americans, 2010  USDA's MyPlate  ASA Prophylaxis  Lung CA Screening    Isabel Hicks MD  [facility] [place]  "

## 2021-05-24 NOTE — PATIENT INSTRUCTIONS
Patient Education   Personalized Prevention Plan  You are due for the preventive services outlined below.  Your care team is available to assist you in scheduling these services.  If you have already completed any of these items, please share that information with your care team to update in your medical record.  Health Maintenance Due   Topic Date Due     Asthma Action Plan - yearly  11/27/2019     Asthma Control Test  06/16/2020     Mammogram  10/22/2020

## 2021-06-11 ENCOUNTER — ANCILLARY PROCEDURE (OUTPATIENT)
Dept: MAMMOGRAPHY | Facility: CLINIC | Age: 71
End: 2021-06-11
Attending: INTERNAL MEDICINE
Payer: COMMERCIAL

## 2021-06-11 ENCOUNTER — ANCILLARY PROCEDURE (OUTPATIENT)
Dept: BONE DENSITY | Facility: CLINIC | Age: 71
End: 2021-06-11
Attending: INTERNAL MEDICINE
Payer: COMMERCIAL

## 2021-06-11 DIAGNOSIS — Z12.31 ENCOUNTER FOR SCREENING MAMMOGRAM FOR BREAST CANCER: ICD-10-CM

## 2021-06-11 DIAGNOSIS — Z78.0 MENOPAUSE PRESENT: ICD-10-CM

## 2021-06-11 PROCEDURE — 77067 SCR MAMMO BI INCL CAD: CPT | Performed by: RADIOLOGY

## 2021-06-11 PROCEDURE — 77080 DXA BONE DENSITY AXIAL: CPT

## 2021-06-11 PROCEDURE — 77063 BREAST TOMOSYNTHESIS BI: CPT | Performed by: RADIOLOGY

## 2021-06-21 ENCOUNTER — OFFICE VISIT (OUTPATIENT)
Dept: DERMATOLOGY | Facility: CLINIC | Age: 71
End: 2021-06-21
Attending: INTERNAL MEDICINE
Payer: COMMERCIAL

## 2021-06-21 VITALS
HEIGHT: 64 IN | DIASTOLIC BLOOD PRESSURE: 83 MMHG | BODY MASS INDEX: 25.8 KG/M2 | WEIGHT: 151.1 LBS | SYSTOLIC BLOOD PRESSURE: 158 MMHG | HEART RATE: 86 BPM

## 2021-06-21 DIAGNOSIS — L68.9 EXCESS BODY AND FACIAL HAIR: Primary | ICD-10-CM

## 2021-06-21 DIAGNOSIS — L82.1 SEBORRHEIC KERATOSES: ICD-10-CM

## 2021-06-21 DIAGNOSIS — L72.0 MILIAL CYST: ICD-10-CM

## 2021-06-21 DIAGNOSIS — L64.9 ANDROGENETIC ALOPECIA: ICD-10-CM

## 2021-06-21 PROCEDURE — G0463 HOSPITAL OUTPT CLINIC VISIT: HCPCS

## 2021-06-21 PROCEDURE — 99203 OFFICE O/P NEW LOW 30 MIN: CPT | Mod: GC | Performed by: DERMATOLOGY

## 2021-06-21 RX ORDER — EFLORNITHINE HYDROCHLORIDE 139 MG/G
CREAM TOPICAL DAILY
Qty: 45 G | Refills: 2 | Status: SHIPPED | OUTPATIENT
Start: 2021-06-21 | End: 2022-06-14

## 2021-06-21 ASSESSMENT — MIFFLIN-ST. JEOR: SCORE: 1180.71

## 2021-06-21 NOTE — LETTER
6/21/2021      RE: Yolande Hussein  [address] [facility] [address]       [facility] Dermatology Note  Encounter Date: Jun 21, 2021  Office Visit     Dermatology Problem List:  # Milia cysts of the face  - adapalene 0.1% gel   # Androgenetic alopecia  - minoxidil 5% foam/solution  # Post-menopausal hirsutism  -  Vaniqa cream    ____________________________________________    Assessment & Plan:     # Milia cysts of the face  Previously well controlled with topical retinoids >10 years ago, now with recurrence of a few milia on the bilateral cheeks but not overly bothersome to the patient. Discussed with her that the adapalene she was previously prescribed is now available over the counter and can be used 2-3 times a week at bedtime and increased as tolerated to nightly.  - Start adapalene 0.1% gel at bedtime 2-3 times weekly    # Seborrheic keratoses  Discussed the natural history and benign nature of this lesion. Reassurance provided that no additional treatment is necessary.     # Post-menopausal facial hair growth  Discussed that this is a common finding with normal aging in women and that treatments vary, including tweezing, waxing, laser hair removal and topical therapies, although these will not be covered by insurance. Patient would like to try a topical treatment since it has worked well for her in the past and she will shop around to see which pharmacy is most affordable.  - Vaniqa 13.9% cream prescribed to be applied daily to the chin (printed script provided)    # 1. Androgenetic alopecia  Discussed natural history and etiology of this condition. Advised that treatment options include topical minoxidil, spironolactone, or finasteride. Discussed details and side effects of options, including initial increased shedding with initiation of minoxidil as well as loss of hair with cessation of use, and sexual side effects associated with spironolactone and finasteride.  "Patient defers systemic therapy at this time and will continue with topicals only.  - Continue minoxidil 5% foam or solution daily      Procedures Performed:   None    Follow-up: prn for new or changing lesions    Staff and Resident:     Sonia Ingram DO (PGY-2)  Dermatology Resident  [facility]    Staff: Dr. Kincaid    I have personally examined this patient and agree with Dr. Ingram' documentation and plan of care. I have reviewed and amended the resident's note above. The documentation accurately reflects my clinical observations, diagnoses, treatment and follow-up plans.     Tash Kincaid MD  Dermatology Staff    ____________________________________________    CC: New Patient (dry skin, break outs on face)    HPI:  Ms. Yolande Hussein is a(n) 70 year old female who presents today as a new patient for evaluation of several lesions of concern. She is concerned about hair loss and facial hair on the chin that has appeared ever since menopause. She is also concerned about several spots on the cheeks that were previously treated as \"adult acne\" with adapalene gel and this seemed to work well for her. She also complains of several dry bumps on the arms and breast. She does not wear sunscreen regularly.    Patient is otherwise feeling well, without additional skin concerns.    Labs Reviewed:  N/A    Physical Exam:  Vitals: BP (!) 158/83   Pulse 86   Ht 1.626 m (5' 4.02\")   Wt 68.5 kg (151 lb 1.6 oz)   BMI 25.92 kg/m    SKIN: Total skin excluding the undergarment areas was performed. The exam included the head/face, neck, both arms, chest, back, abdomen, both legs, digits and/or nails.   - Bilateral cheeks with a few small white firm papules and one similar papule on the right anterior axilla  - Few terminal hair fibers of the chin  - Decreased hair density of the central frontal scalp with no scaling or erythema of the scalp  - There are dome shaped bright red papules on the abdomen.   - " There are waxy stuck on tan to brown papules on the left arm and right breast.   - Skin colored pedunculated fleshy papule on the left back    - No other lesions of concern on areas examined.     Medications:  Current Outpatient Medications   Medication     eflornithine HCl (VANIQA) 13.9 % CREA     acetaminophen (ACETAMINOPHEN EXTRA STRENGTH) 500 MG tablet     adapalene (DIFFERIN) 0.1 % gel     ADVAIR DISKUS 250-50 MCG/DOSE inhaler     albuterol (PROAIR HFA/PROVENTIL HFA/VENTOLIN HFA) 108 (90 Base) MCG/ACT inhaler     albuterol (PROVENTIL) (2.5 MG/3ML) 0.083% neb solution     hydrochlorothiazide (HYDRODIURIL) 25 MG tablet     losartan (COZAAR) 50 MG tablet     minoxidil (ROGAINE) 2 % external solution     prednisoLONE acetate (PRED FORTE) 1 % ophthalmic suspension     Propylene Glycol-Glycerin (SOOTHE OP)     No current facility-administered medications for this visit.       Past Medical History:   Patient Active Problem List   Diagnosis     Thoracalgia     Shoulder pain     Hypertension     CARDIOVASCULAR SCREENING; LDL GOAL LESS THAN 130     Menopausal state -- age 49     TMJ (temporomandibular joint syndrome)     Vaginal atrophy     Breast atypical ductal hyperplasia-- Right     Eczema     Nephrolithiasis     Hypertension goal BP (blood pressure) < 140/90     Dysphonia     Neck stiffness     Unilateral vocal fold paresis     S/P abdominal hysterectomy     Combined forms of age-related cataract of both eyes     Past Medical History:   Diagnosis Date     Asthma      Breast atypical ductal hyperplasia-- Right 06/13/2002    Lumpectomy.  Problem list name updated by automated process. Provider to review and confirm     Dysphonia 09/08/2014     Eczema 10/15/2012     Gastro-oesophageal reflux disease      HLD (hyperlipidemia)      Hypertension      Kidney stone      Low grade mucinous neoplasm of appendix      Menopausal state -- age 49 08/13/2012    With NEGATIVE HR HPV neg on Pap today, there is NO further need for  routine pap screens.        Neck stiffness 11/03/2014     Nephrolithiasis 05/12/2014     Nonsenile cataract      Shoulder pain 03/30/2009    Controlled with stretching, unless lapses or overworks 8/2012      Thoracalgia 03/30/2009     TMJ (temporomandibular joint syndrome) 01/18/2012    Jaw PT and Mouth guard helping! 8/2012      Unilateral vocal fold paresis 11/03/2014     Vaginal atrophy 08/13/2012    Estrogen loss with increasing sxs on Vagifem 10 mcg.  Increase from 2x/wk to 3xs/wk.        CC No referring provider defined for this encounter. on close of this encounter.      Tash Kincaid MD

## 2021-06-21 NOTE — PATIENT INSTRUCTIONS
- You can purchase over the counter adapalene 0.1% gel (brand name is Differin) usually found in the acne aisle of [employer], [employer], [employer], "[employer] etc.    - Begin applying a pea sized amount at bedtime to the face, starting 2-3 times a week as it can be drying. If tolerating okay, can use every night.    - Recommend using sunscreen everyday for skin cancer prevention and anti aging properties also

## 2021-06-21 NOTE — PROGRESS NOTES
[facility] Dermatology Note  Encounter Date: Jun 21, 2021  Office Visit     Dermatology Problem List:  # Milia cysts of the face  - adapalene 0.1% gel   # Androgenetic alopecia  - minoxidil 5% foam/solution  # Post-menopausal hirsutism  -  Vaniqa cream    ____________________________________________    Assessment & Plan:     # Milia cysts of the face  Previously well controlled with topical retinoids >10 years ago, now with recurrence of a few milia on the bilateral cheeks but not overly bothersome to the patient. Discussed with her that the adapalene she was previously prescribed is now available over the counter and can be used 2-3 times a week at bedtime and increased as tolerated to nightly.  - Start adapalene 0.1% gel at bedtime 2-3 times weekly    # Seborrheic keratoses  Discussed the natural history and benign nature of this lesion. Reassurance provided that no additional treatment is necessary.     # Post-menopausal facial hair growth  Discussed that this is a common finding with normal aging in women and that treatments vary, including tweezing, waxing, laser hair removal and topical therapies, although these will not be covered by insurance. Patient would like to try a topical treatment since it has worked well for her in the past and she will shop around to see which pharmacy is most affordable.  - Vaniqa 13.9% cream prescribed to be applied daily to the chin (printed script provided)    # 1. Androgenetic alopecia  Discussed natural history and etiology of this condition. Advised that treatment options include topical minoxidil, spironolactone, or finasteride. Discussed details and side effects of options, including initial increased shedding with initiation of minoxidil as well as loss of hair with cessation of use, and sexual side effects associated with spironolactone and finasteride. Patient defers systemic therapy at this time and will continue with topicals only.  - Continue  "minoxidil 5% foam or solution daily      Procedures Performed:   None    Follow-up: prn for new or changing lesions    Staff and Resident:     Sonia Ingram DO (PGY-2)  Dermatology Resident  [facility]    Staff: Dr. Kincaid    I have personally examined this patient and agree with Dr. Ingram' documentation and plan of care. I have reviewed and amended the resident's note above. The documentation accurately reflects my clinical observations, diagnoses, treatment and follow-up plans.     Tash Kincaid MD  Dermatology Staff    ____________________________________________    CC: New Patient (dry skin, break outs on face)    HPI:  Ms. Yolande Hussein is a(n) 70 year old female who presents today as a new patient for evaluation of several lesions of concern. She is concerned about hair loss and facial hair on the chin that has appeared ever since menopause. She is also concerned about several spots on the cheeks that were previously treated as \"adult acne\" with adapalene gel and this seemed to work well for her. She also complains of several dry bumps on the arms and breast. She does not wear sunscreen regularly.    Patient is otherwise feeling well, without additional skin concerns.    Labs Reviewed:  N/A    Physical Exam:  Vitals: BP (!) 158/83   Pulse 86   Ht 1.626 m (5' 4.02\")   Wt 68.5 kg (151 lb 1.6 oz)   BMI 25.92 kg/m    SKIN: Total skin excluding the undergarment areas was performed. The exam included the head/face, neck, both arms, chest, back, abdomen, both legs, digits and/or nails.   - Bilateral cheeks with a few small white firm papules and one similar papule on the right anterior axilla  - Few terminal hair fibers of the chin  - Decreased hair density of the central frontal scalp with no scaling or erythema of the scalp  - There are dome shaped bright red papules on the abdomen.   - There are waxy stuck on tan to brown papules on the left arm and right breast.   - Skin colored " pedunculated fleshy papule on the left back    - No other lesions of concern on areas examined.     Medications:  Current Outpatient Medications   Medication     eflornithine HCl (VANIQA) 13.9 % CREA     acetaminophen (ACETAMINOPHEN EXTRA STRENGTH) 500 MG tablet     adapalene (DIFFERIN) 0.1 % gel     ADVAIR DISKUS 250-50 MCG/DOSE inhaler     albuterol (PROAIR HFA/PROVENTIL HFA/VENTOLIN HFA) 108 (90 Base) MCG/ACT inhaler     albuterol (PROVENTIL) (2.5 MG/3ML) 0.083% neb solution     hydrochlorothiazide (HYDRODIURIL) 25 MG tablet     losartan (COZAAR) 50 MG tablet     minoxidil (ROGAINE) 2 % external solution     prednisoLONE acetate (PRED FORTE) 1 % ophthalmic suspension     Propylene Glycol-Glycerin (SOOTHE OP)     No current facility-administered medications for this visit.       Past Medical History:   Patient Active Problem List   Diagnosis     Thoracalgia     Shoulder pain     Hypertension     CARDIOVASCULAR SCREENING; LDL GOAL LESS THAN 130     Menopausal state -- age 49     TMJ (temporomandibular joint syndrome)     Vaginal atrophy     Breast atypical ductal hyperplasia-- Right     Eczema     Nephrolithiasis     Hypertension goal BP (blood pressure) < 140/90     Dysphonia     Neck stiffness     Unilateral vocal fold paresis     S/P abdominal hysterectomy     Combined forms of age-related cataract of both eyes     Past Medical History:   Diagnosis Date     Asthma      Breast atypical ductal hyperplasia-- Right 06/13/2002    Lumpectomy.  Problem list name updated by automated process. Provider to review and confirm     Dysphonia 09/08/2014     Eczema 10/15/2012     Gastro-oesophageal reflux disease      HLD (hyperlipidemia)      Hypertension      Kidney stone      Low grade mucinous neoplasm of appendix      Menopausal state -- age 49 08/13/2012    With NEGATIVE HR HPV neg on Pap today, there is NO further need for routine pap screens.        Neck stiffness 11/03/2014     Nephrolithiasis 05/12/2014     Nonsenile  cataract      Shoulder pain 03/30/2009    Controlled with stretching, unless lapses or overworks 8/2012      Thoracalgia 03/30/2009     TMJ (temporomandibular joint syndrome) 01/18/2012    Jaw PT and Mouth guard helping! 8/2012      Unilateral vocal fold paresis 11/03/2014     Vaginal atrophy 08/13/2012    Estrogen loss with increasing sxs on Vagifem 10 mcg.  Increase from 2x/wk to 3xs/wk.        CC No referring provider defined for this encounter. on close of this encounter.

## 2021-06-21 NOTE — LETTER
6/21/2021      RE: Yolande Hussein  [address] [facility] [address]       [facility] Dermatology Note  Encounter Date: Jun 21, 2021  Office Visit     Dermatology Problem List:  # Milia cysts of the face  - adapalene 0.1% gel   # Androgenetic alopecia  - minoxidil 5% foam/solution  # Post-menopausal hirsutism  -  Vaniqa cream    ____________________________________________    Assessment & Plan:     # Milia cysts of the face  Previously well controlled with topical retinoids >10 years ago, now with recurrence of a few milia on the bilateral cheeks but not overly bothersome to the patient. Discussed with her that the adapalene she was previously prescribed is now available over the counter and can be used 2-3 times a week at bedtime and increased as tolerated to nightly.  - Start adapalene 0.1% gel at bedtime 2-3 times weekly    # Seborrheic keratoses  Discussed the natural history and benign nature of this lesion. Reassurance provided that no additional treatment is necessary.     # Post-menopausal facial hair growth  Discussed that this is a common finding with normal aging in women and that treatments vary, including tweezing, waxing, laser hair removal and topical therapies, although these will not be covered by insurance. Patient would like to try a topical treatment since it has worked well for her in the past and she will shop around to see which pharmacy is most affordable.  - Vaniqa 13.9% cream prescribed to be applied daily to the chin (printed script provided)    # 1. Androgenetic alopecia  Discussed natural history and etiology of this condition. Advised that treatment options include topical minoxidil, spironolactone, or finasteride. Discussed details and side effects of options, including initial increased shedding with initiation of minoxidil as well as loss of hair with cessation of use, and sexual side effects associated with spironolactone and finasteride.  "Patient defers systemic therapy at this time and will continue with topicals only.  - Continue minoxidil 5% foam or solution daily      Procedures Performed:   None    Follow-up: prn for new or changing lesions    Staff and Resident:     Sonia Ingram DO (PGY-2)  Dermatology Resident  [facility]    Staff: Dr. Kincaid    I have personally examined this patient and agree with Dr. Ingram' documentation and plan of care. I have reviewed and amended the resident's note above. The documentation accurately reflects my clinical observations, diagnoses, treatment and follow-up plans.     Tash Kincaid MD  Dermatology Staff    ____________________________________________    CC: New Patient (dry skin, break outs on face)    HPI:  Ms. Yolande Hussein is a(n) 70 year old female who presents today as a new patient for evaluation of several lesions of concern. She is concerned about hair loss and facial hair on the chin that has appeared ever since menopause. She is also concerned about several spots on the cheeks that were previously treated as \"adult acne\" with adapalene gel and this seemed to work well for her. She also complains of several dry bumps on the arms and breast. She does not wear sunscreen regularly.    Patient is otherwise feeling well, without additional skin concerns.    Labs Reviewed:  N/A    Physical Exam:  Vitals: BP (!) 158/83   Pulse 86   Ht 1.626 m (5' 4.02\")   Wt 68.5 kg (151 lb 1.6 oz)   BMI 25.92 kg/m    SKIN: Total skin excluding the undergarment areas was performed. The exam included the head/face, neck, both arms, chest, back, abdomen, both legs, digits and/or nails.   - Bilateral cheeks with a few small white firm papules and one similar papule on the right anterior axilla  - Few terminal hair fibers of the chin  - Decreased hair density of the central frontal scalp with no scaling or erythema of the scalp  - There are dome shaped bright red papules on the abdomen.   - " There are waxy stuck on tan to brown papules on the left arm and right breast.   - Skin colored pedunculated fleshy papule on the left back    - No other lesions of concern on areas examined.     Medications:  Current Outpatient Medications   Medication     eflornithine HCl (VANIQA) 13.9 % CREA     acetaminophen (ACETAMINOPHEN EXTRA STRENGTH) 500 MG tablet     adapalene (DIFFERIN) 0.1 % gel     ADVAIR DISKUS 250-50 MCG/DOSE inhaler     albuterol (PROAIR HFA/PROVENTIL HFA/VENTOLIN HFA) 108 (90 Base) MCG/ACT inhaler     albuterol (PROVENTIL) (2.5 MG/3ML) 0.083% neb solution     hydrochlorothiazide (HYDRODIURIL) 25 MG tablet     losartan (COZAAR) 50 MG tablet     minoxidil (ROGAINE) 2 % external solution     prednisoLONE acetate (PRED FORTE) 1 % ophthalmic suspension     Propylene Glycol-Glycerin (SOOTHE OP)     No current facility-administered medications for this visit.       Past Medical History:   Patient Active Problem List   Diagnosis     Thoracalgia     Shoulder pain     Hypertension     CARDIOVASCULAR SCREENING; LDL GOAL LESS THAN 130     Menopausal state -- age 49     TMJ (temporomandibular joint syndrome)     Vaginal atrophy     Breast atypical ductal hyperplasia-- Right     Eczema     Nephrolithiasis     Hypertension goal BP (blood pressure) < 140/90     Dysphonia     Neck stiffness     Unilateral vocal fold paresis     S/P abdominal hysterectomy     Combined forms of age-related cataract of both eyes     Past Medical History:   Diagnosis Date     Asthma      Breast atypical ductal hyperplasia-- Right 06/13/2002    Lumpectomy.  Problem list name updated by automated process. Provider to review and confirm     Dysphonia 09/08/2014     Eczema 10/15/2012     Gastro-oesophageal reflux disease      HLD (hyperlipidemia)      Hypertension      Kidney stone      Low grade mucinous neoplasm of appendix      Menopausal state -- age 49 08/13/2012    With NEGATIVE HR HPV neg on Pap today, there is NO further need for  routine pap screens.        Neck stiffness 11/03/2014     Nephrolithiasis 05/12/2014     Nonsenile cataract      Shoulder pain 03/30/2009    Controlled with stretching, unless lapses or overworks 8/2012      Thoracalgia 03/30/2009     TMJ (temporomandibular joint syndrome) 01/18/2012    Jaw PT and Mouth guard helping! 8/2012      Unilateral vocal fold paresis 11/03/2014     Vaginal atrophy 08/13/2012    Estrogen loss with increasing sxs on Vagifem 10 mcg.  Increase from 2x/wk to 3xs/wk.        CC No referring provider defined for this encounter. on close of this encounter.      Tash Kincaid MD

## 2021-09-12 ENCOUNTER — HEALTH MAINTENANCE LETTER (OUTPATIENT)
Age: 71
End: 2021-09-12

## 2021-10-27 ENCOUNTER — OFFICE VISIT (OUTPATIENT)
Dept: OPHTHALMOLOGY | Facility: CLINIC | Age: 71
End: 2021-10-27
Attending: OPHTHALMOLOGY
Payer: COMMERCIAL

## 2021-10-27 DIAGNOSIS — Z96.1 PSEUDOPHAKIA, BOTH EYES: Primary | ICD-10-CM

## 2021-10-27 DIAGNOSIS — H18.523 ANTERIOR BASEMENT MEMBRANE DYSTROPHY OF BOTH EYES: ICD-10-CM

## 2021-10-27 DIAGNOSIS — H26.491 POSTERIOR CAPSULAR OPACIFICATION VISUALLY SIGNIFICANT, RIGHT EYE: ICD-10-CM

## 2021-10-27 DIAGNOSIS — H01.02A SQUAMOUS BLEPHARITIS OF UPPER AND LOWER EYELIDS OF BOTH EYES: ICD-10-CM

## 2021-10-27 DIAGNOSIS — H01.02B SQUAMOUS BLEPHARITIS OF UPPER AND LOWER EYELIDS OF BOTH EYES: ICD-10-CM

## 2021-10-27 PROCEDURE — 66821 AFTER CATARACT LASER SURGERY: CPT | Mod: RT | Performed by: OPHTHALMOLOGY

## 2021-10-27 PROCEDURE — G0463 HOSPITAL OUTPT CLINIC VISIT: HCPCS | Mod: 25

## 2021-10-27 ASSESSMENT — REFRACTION_MANIFEST
OD_AXIS: 140
OD_CYLINDER: +0.75
OD_SPHERE: -0.25

## 2021-10-27 ASSESSMENT — TONOMETRY
OS_IOP_MMHG: 16
IOP_METHOD: TONOPEN
OD_IOP_MMHG: 16

## 2021-10-27 ASSESSMENT — VISUAL ACUITY
OD_CC: J2
OD_CC+: -3
METHOD: SNELLEN - LINEAR
OS_CC: J1+
OS_CC: 20/20
OD_CC: 20/25
CORRECTION_TYPE: GLASSES
OS_CC+: -1

## 2021-10-27 ASSESSMENT — REFRACTION_WEARINGRX
OS_AXIS: 015
OS_ADD: +2.50
OD_SPHERE: PLANO
OD_CYLINDER: +0.75
OD_AXIS: 150
OS_SPHERE: -0.50
OS_CYLINDER: +1.00
OD_ADD: +2.50

## 2021-10-27 ASSESSMENT — CUP TO DISC RATIO
OS_RATIO: 0.1
OD_RATIO: 0.1

## 2021-10-27 ASSESSMENT — SLIT LAMP EXAM - LIDS
COMMENTS: NORMAL
COMMENTS: NORMAL

## 2021-10-27 ASSESSMENT — CONF VISUAL FIELD
METHOD: COUNTING FINGERS
OS_NORMAL: 1
OD_NORMAL: 1

## 2021-10-27 ASSESSMENT — EXTERNAL EXAM - RIGHT EYE: OD_EXAM: NORMAL

## 2021-10-27 ASSESSMENT — EXTERNAL EXAM - LEFT EYE: OS_EXAM: NORMAL

## 2021-10-27 NOTE — PROGRESS NOTES
HPI  Yolande Hussein is a 71 year old female is here for blurry vision in right eye and monocular diplopia in right eye. The vision is duller in right eye than left and the diplopia is one image above another    Assessment & Plan    (H26.491) Posterior capsular opacification visually significant, right eye (primary encounter diagnosis)  (Z96.1) Pseudophakia, both eyes   Comment: Blurry vision in right eye could be secondary to PCO in right eye  Plan: R/B/A discussed. 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 Ocular surface looks fairly healthy.  Plan: Continue AT BID and WC BID    Patient disposition:   Return for recheck as needed if symptoms not improved. If doing well, recheck in 1 year.     Stefan Dover MD  Ophthalmology Resident, PGY-4    Teaching statement:  Complete documentation of historical and exam elements from today's encounter can be found in the full encounter summary report (not reduplicated in this progress note). I personally obtained the chief complaint(s) and history of present illness.  I confirmed and edited as necessary the review of systems, past medical/surgical history, family history, social history, and examination findings as documented by others; and I examined the patient myself. I personally reviewed the relevant tests, images, and reports as documented above.     I formulated and edited as necessary the assessment and plan and discussed the findings and management plan with the patient and family.    Bnig Horn MD  Comprehensive Ophthalmology & Ocular Pathology  Department of Ophthalmology and Visual Neurosciences  [email or web address]  Pager [phone]

## 2021-10-27 NOTE — NURSING NOTE
Chief Complaints and History of Present Illnesses   Patient presents with     Follow Up     Chief Complaint(s) and History of Present Illness(es)     Follow Up     Laterality: right eye    Quality: blurred vision    Context: near vision and distance vision    Associated symptoms: floaters.  Negative for flashes    Treatments tried: artificial tears    Pain scale: 0/10              Comments     Follow up for right eye blurred vision.    The patient notes she can't read with her right eye even with her eyeglasses on.  She is bothered by the right eye vision.  The patient notes monocular diplopia in the right eye.  Delmy Srivastava, COA, COA 1:31 PM 10/27/2021

## 2022-01-27 DIAGNOSIS — I10 BENIGN ESSENTIAL HYPERTENSION: ICD-10-CM

## 2022-02-01 NOTE — TELEPHONE ENCOUNTER
Losartan Potassium 50 MG Oral Tablet      Last Written Prescription Date:  11/17/20  Last Fill Quantity: 90,   # refills: 3  : hydroCHLOROthiazide 25 MG Oral Tablet  Last Written Prescription Date:  11/17/20  Last Fill Quantity: 90,   # refills: 3  Last Office Visit : 5/24/21  Future Office visit:  None    Routing refill request to provider for review/approval because:  Labs due per protocol: Cr, Na, K ( last done 1/13/21)  Elevated BP    06/21/21 (!) 158/83   05/24/21 116/70   04/02/21 125/57

## 2022-02-03 RX ORDER — LOSARTAN POTASSIUM 50 MG/1
TABLET ORAL
Qty: 90 TABLET | Refills: 0 | Status: SHIPPED | OUTPATIENT
Start: 2022-02-03 | End: 2022-05-16

## 2022-02-03 RX ORDER — HYDROCHLOROTHIAZIDE 25 MG/1
TABLET ORAL
Qty: 90 TABLET | Refills: 0 | Status: SHIPPED | OUTPATIENT
Start: 2022-02-03 | End: 2022-06-14

## 2022-05-16 DIAGNOSIS — I10 BENIGN ESSENTIAL HYPERTENSION: ICD-10-CM

## 2022-05-16 RX ORDER — LOSARTAN POTASSIUM 50 MG/1
50 TABLET ORAL DAILY
Qty: 30 TABLET | Refills: 0 | Status: SHIPPED | OUTPATIENT
Start: 2022-05-16 | End: 2022-06-14

## 2022-05-16 NOTE — TELEPHONE ENCOUNTER
Patient was scheduled to see Dr. Hicks 5/17, but had to reschedule to 6/13.    Refill requested to get patient to her appointment. Short supply sent per protocol.

## 2022-06-14 ENCOUNTER — OFFICE VISIT (OUTPATIENT)
Dept: FAMILY MEDICINE | Facility: CLINIC | Age: 72
End: 2022-06-14
Attending: FAMILY MEDICINE
Payer: COMMERCIAL

## 2022-06-14 VITALS
BODY MASS INDEX: 25.16 KG/M2 | SYSTOLIC BLOOD PRESSURE: 156 MMHG | HEIGHT: 65 IN | DIASTOLIC BLOOD PRESSURE: 77 MMHG | WEIGHT: 151 LBS | HEART RATE: 84 BPM

## 2022-06-14 DIAGNOSIS — J45.40 MODERATE PERSISTENT ASTHMA WITHOUT COMPLICATION: ICD-10-CM

## 2022-06-14 DIAGNOSIS — C78.6 PSEUDOMYXOMA PERITONEI (H): ICD-10-CM

## 2022-06-14 DIAGNOSIS — E78.5 DYSLIPIDEMIA: ICD-10-CM

## 2022-06-14 DIAGNOSIS — M81.0 AGE-RELATED OSTEOPOROSIS WITHOUT CURRENT PATHOLOGICAL FRACTURE: ICD-10-CM

## 2022-06-14 DIAGNOSIS — L57.0 ACTINIC KERATOSIS: ICD-10-CM

## 2022-06-14 DIAGNOSIS — Z00.00 ROUTINE GENERAL MEDICAL EXAMINATION AT A HEALTH CARE FACILITY: Primary | ICD-10-CM

## 2022-06-14 DIAGNOSIS — I10 BENIGN ESSENTIAL HYPERTENSION: ICD-10-CM

## 2022-06-14 DIAGNOSIS — Z12.31 ENCOUNTER FOR SCREENING MAMMOGRAM FOR BREAST CANCER: ICD-10-CM

## 2022-06-14 PROBLEM — C18.1 MALIGNANT NEOPLASM OF APPENDIX (H): Status: ACTIVE | Noted: 2019-03-05

## 2022-06-14 PROCEDURE — G0463 HOSPITAL OUTPT CLINIC VISIT: HCPCS

## 2022-06-14 PROCEDURE — 99397 PER PM REEVAL EST PAT 65+ YR: CPT | Mod: 25 | Performed by: FAMILY MEDICINE

## 2022-06-14 PROCEDURE — 17000 DESTRUCT PREMALG LESION: CPT | Performed by: FAMILY MEDICINE

## 2022-06-14 RX ORDER — ALBUTEROL SULFATE 0.83 MG/ML
2.5 SOLUTION RESPIRATORY (INHALATION) EVERY 6 HOURS PRN
Qty: 100 ML | Refills: 0 | Status: SHIPPED | OUTPATIENT
Start: 2022-06-14 | End: 2023-04-17

## 2022-06-14 RX ORDER — LOSARTAN POTASSIUM 50 MG/1
50 TABLET ORAL DAILY
Qty: 90 TABLET | Refills: 3 | Status: SHIPPED | OUTPATIENT
Start: 2022-06-14 | End: 2023-06-19

## 2022-06-14 RX ORDER — HYDROCHLOROTHIAZIDE 25 MG/1
25 TABLET ORAL DAILY
Qty: 90 TABLET | Refills: 3 | Status: SHIPPED | OUTPATIENT
Start: 2022-06-14 | End: 2023-06-19

## 2022-06-14 RX ORDER — FLUTICASONE PROPIONATE AND SALMETEROL 250; 50 UG/1; UG/1
1 POWDER RESPIRATORY (INHALATION) 2 TIMES DAILY
Qty: 3 EACH | Refills: 3 | Status: SHIPPED | OUTPATIENT
Start: 2022-06-14 | End: 2023-02-27

## 2022-06-14 RX ORDER — ALBUTEROL SULFATE 90 UG/1
AEROSOL, METERED RESPIRATORY (INHALATION)
Qty: 18 G | Refills: 3 | Status: SHIPPED | OUTPATIENT
Start: 2022-06-14 | End: 2023-04-17

## 2022-06-14 ASSESSMENT — ANXIETY QUESTIONNAIRES
1. FEELING NERVOUS, ANXIOUS, OR ON EDGE: SEVERAL DAYS
3. WORRYING TOO MUCH ABOUT DIFFERENT THINGS: SEVERAL DAYS
2. NOT BEING ABLE TO STOP OR CONTROL WORRYING: SEVERAL DAYS
GAD7 TOTAL SCORE: 4
GAD7 TOTAL SCORE: 4
7. FEELING AFRAID AS IF SOMETHING AWFUL MIGHT HAPPEN: SEVERAL DAYS
5. BEING SO RESTLESS THAT IT IS HARD TO SIT STILL: NOT AT ALL
6. BECOMING EASILY ANNOYED OR IRRITABLE: NOT AT ALL

## 2022-06-14 ASSESSMENT — PATIENT HEALTH QUESTIONNAIRE - PHQ9
5. POOR APPETITE OR OVEREATING: NOT AT ALL
SUM OF ALL RESPONSES TO PHQ QUESTIONS 1-9: 3

## 2022-06-14 NOTE — PATIENT INSTRUCTIONS
Return for fasting labs in the next 1-2 weeks.     Let me know if you would like to discuss osteoporosis medications    Preventive Health Recommendations    See your health care provider every year to  Review health changes.   Discuss preventive care.    Review your medicines if your doctor has prescribed any.    You no longer need a yearly Pap test unless you've had an abnormal Pap test in the past 10 years. If you have vaginal symptoms, such as bleeding or discharge, be sure to talk with your provider about a Pap test.    Every 1 to 2 years, have a mammogram.  If you are over 69, talk with your health care provider about whether or not you want to continue having screening mammograms.    Every 10 years, have a colonoscopy. Or, have a yearly FIT test (stool test). These exams will check for colon cancer.     Have a cholesterol test every 5 years, or more often if your doctor advises it.     Have a diabetes test (fasting glucose) every three years. If you are at risk for diabetes, you should have this test more often.     At age 65, have a bone density scan (DEXA) to check for osteoporosis (brittle bone disease).      Shots:  Get a flu shot each year.  Get a tetanus shot every 10 years.  Talk to your doctor about your pneumonia vaccines. There are now two you should receive - Pneumovax (PPSV 23) and Prevnar (PCV 13).  Talk to your pharmacist about the shingles vaccine.  Talk to your doctor about the hepatitis B vaccine.    Nutrition:   Eat at least 5 servings of fruits and vegetables each day.    Eat whole-grain bread, whole-wheat pasta and brown rice instead of white grains and rice.    Get adequate about Calcium and Vitamin D. Take Calcium 1200mg daily and 800 units vitamin D daily.     Lifestyle  Exercise at least 150 minutes a week (30 minutes a day, 5 days a week). This will help you control your weight and prevent disease.    Limit alcohol to one drink per day.    No smoking.     Wear sunscreen to prevent skin  cancer.     See your dentist twice a year for an exam and cleaning.    See your eye doctor every 1 to 2 years to screen for conditions such as glaucoma, macular degeneration, cataracts, etc.    Personalized Prevention Plan  You are due for the preventive services outlined below.  Your care team is available to assist you in scheduling these services.  If you have already completed any of these items, please share that information with your care team to update in your medical record.    Health Maintenance Due   Topic Date Due    Asthma Action Plan - yearly  11/27/2019    Asthma Control Test  06/16/2020    PHQ-2 (once per calendar year)  01/01/2022    FALL RISK ASSESSMENT  05/24/2022    Mammogram  06/11/2022

## 2022-06-14 NOTE — PROGRESS NOTES
CC: Annual Visit      SUBJECTIVE:  Yolande Hussein is a 71 year old female with HTN here for a physical exam. In addition to routine preventive health issues, she has the following concerns:  Pruritic skin lesion on the right side of the upper back, has been present recently and will itch on and off, has had lesions that she has needed frozen off in the past.     HTN. Long-standing diagnosis.   - Medications: Has been treating with losartan 50 mg daily & hydrochlorothiazide 25 mg daily. No adverse medication effects.   - Most recent surveillance labs reviewed today and are notable for stable creatinine, eGFR, and sodium/potassium.   - Patient denies chest pain, cough, palpitations, LE edema, PND, or orthopnea.    Asthma. Long-standing diagnosis.   - Identified Triggers: Humidity & exercise - does get SOB with hills and stairs.   - Controller Medications: Advair twice daily  - Rescue Inhaler Use Per Week: albuterol with triggers  - Medication Side Effects: None.    Has upcoming appointment at [place]. Had appendectomy & hysterectomy. Monitored every 6 months at [facility]. Will be getting repeat labs and scans at her next visit.      Her current problems were reviewed. The above nursing notes were also reviewed. The past medical history, allergies, family medical history, surgical history and social history were reviewed with her and updated as necessary.      Patient Active Problem List   Diagnosis     Menopausal state -- age 49     TMJ (temporomandibular joint syndrome)     Vaginal atrophy     Breast atypical ductal hyperplasia-- Right     Eczema     Nephrolithiasis     Hypertension goal BP (blood pressure) < 140/90     Dysphonia     Neck stiffness     Unilateral vocal fold paresis     S/P abdominal hysterectomy     Combined forms of age-related cataract of both eyes     Pseudomyxoma peritonei (H)     Malignant neoplasm of appendix (H)     Age-related osteoporosis without current pathological fracture  "      Prevention History:  - Advance directive - Has one at home.   - Hearing concerns - None.   - Fall Risk - No falls in the last year.   - Independent at home - Yes.   - Safe - Yes.   - Memory concerns - None.   - Vaccines -  UTD.  - Hepatitis C screening - negative   - Breast Screening - Last mammogram was 6/2021 and benign. History of right breast atypical ductal hyperplasia, status post excision. Next mammogram due 6/2022.  - Colorectal cancer screening - 1/2020 - polyps, repeat in 5-10 years.  - Hypertension - Patient is being treated.    - DM screening - 1/2021 - normal blood glucose.   - Hyperlipidemia screening - 1/2021 - , HDL 93, , TG 65.    - Nutrition/Exercise - Body mass index is 25.13 kg/m . Goes for walks. Nutrition could be better.   - Alcohol misuse - No concerns.  - Tobacco use - nonsmoker.   - Osteoporosis - 6/2021 - DEXA showed osteoporosis - in the past was on alendronate weekly for 10 years. Takes calcium 1200 mg daily and vitamin D but unsure dose.       ROS: All systems reviewed and negative except as noted.    OBJECTIVE:  BP (!) 156/77   Pulse 84   Ht 1.651 m (5' 5\")   Wt 68.5 kg (151 lb)   Breastfeeding No   BMI 25.13 kg/m     GENERAL:  Healthy, alert; in no distress  SKIN: Right upper back with slightly erythematous 0.5 cm lesion with overlying rough texture of the skin.   HEENT:  Normocephalic without masses, lesions, tenderness, or abnormalities;  TMs normal, Conjunctiva and sclera clear, PERRL.  NECK: Supple and free of adenopathy or masses, no thyroid enlargement or nodules.  BREASTS:  Breasts symmetric, no dominant or suspicious mass, no skin or nipple changes and no axillary adenopathy  CHEST: Clear to auscultation. Normal work of breathing.   CARDIAC: Regular rate and rhythm, normal S1 and S2,  no murmurs, rubs, clicks, or gallops  ABDOMEN: Soft, nontender; no masses or hepatosplenomegaly  EXTREMITIES:  No deformities, edema, skin discoloration, or lesions, " varicose vein LLE  NEUROLOGICAL:  CN II - XII intact  PSYCH: Mood and affect appropriate.     ASSESSMENT/PLAN:   Yolande was seen today for annual visit.  Routine general medical examination at a health care facility  -Future labs ordered - lipid, BMP, vitamin D level.   -Mammogram due.   -Has appt at [place] for Pseudomyxoma peritonei follow-up.   -HTN borderline, monitor at home. Refilled losartan & hydrochlorothiazide.   -Asthma stable. Refilled Advair & albuterol.   -Osteoporosis medication options discussed briefly. Offered option to meet with pharmacy for further discussion. She would like to think about it.   -AK on upper back - Verbal consent obtained for cryotherapy.  Cryotherapy Procedure Note  Pre-operative Diagnosis: AK  Post-operative Diagnosis: AK  Locations: upper back  Anesthesia: none needed  Patient informed of risks and benefits of the procedure and verbal informed consent obtained.  The areas are treated with liquid nitrogen therapy, frozen until ice ball extended 2 mm beyond lesion, allowed to thaw, and treated again. The patient tolerated procedure well. The patient was instructed on post-op care, warned that there may be blister formation, redness and pain. Recommend OTC analgesia as needed for pain.  Condition: Stable  Complications: none.      Return in about 1 year (around 6/14/2023) for physical.    Lynn Romero MD  Family Medicine

## 2022-06-21 ENCOUNTER — LAB (OUTPATIENT)
Dept: LAB | Facility: CLINIC | Age: 72
End: 2022-06-21
Payer: COMMERCIAL

## 2022-06-21 DIAGNOSIS — E78.5 DYSLIPIDEMIA: ICD-10-CM

## 2022-06-21 DIAGNOSIS — I10 BENIGN ESSENTIAL HYPERTENSION: ICD-10-CM

## 2022-06-21 DIAGNOSIS — M81.0 AGE-RELATED OSTEOPOROSIS WITHOUT CURRENT PATHOLOGICAL FRACTURE: ICD-10-CM

## 2022-06-21 PROCEDURE — 80061 LIPID PANEL: CPT

## 2022-06-21 PROCEDURE — 80048 BASIC METABOLIC PNL TOTAL CA: CPT

## 2022-06-21 PROCEDURE — 82306 VITAMIN D 25 HYDROXY: CPT

## 2022-06-21 PROCEDURE — 36415 COLL VENOUS BLD VENIPUNCTURE: CPT

## 2022-06-22 LAB
ANION GAP SERPL CALCULATED.3IONS-SCNC: 5 MMOL/L (ref 3–14)
BUN SERPL-MCNC: 9 MG/DL (ref 7–30)
CALCIUM SERPL-MCNC: 9.1 MG/DL (ref 8.5–10.1)
CHLORIDE BLD-SCNC: 105 MMOL/L (ref 94–109)
CHOLEST SERPL-MCNC: 213 MG/DL
CO2 SERPL-SCNC: 32 MMOL/L (ref 20–32)
CREAT SERPL-MCNC: 0.55 MG/DL (ref 0.52–1.04)
FASTING STATUS PATIENT QL REPORTED: YES
GFR SERPL CREATININE-BSD FRML MDRD: >90 ML/MIN/1.73M2
GLUCOSE BLD-MCNC: 93 MG/DL (ref 70–99)
HDLC SERPL-MCNC: 90 MG/DL
LDLC SERPL CALC-MCNC: 110 MG/DL
NONHDLC SERPL-MCNC: 123 MG/DL
POTASSIUM BLD-SCNC: 4.2 MMOL/L (ref 3.4–5.3)
SODIUM SERPL-SCNC: 142 MMOL/L (ref 133–144)
TRIGL SERPL-MCNC: 65 MG/DL

## 2022-06-27 ENCOUNTER — TELEPHONE (OUTPATIENT)
Dept: FAMILY MEDICINE | Facility: CLINIC | Age: 72
End: 2022-06-27

## 2022-06-27 NOTE — TELEPHONE ENCOUNTER
Prior Authorization Not Needed per Insurance    Medication: fluticasone-salmeterol (ADVAIR DISKUS) 250-50 MCG/ACT inhaler-PA NOT NEEDED   Insurance Company: [employer] - Phone [phone] Fax [phone]  Expected CoPay: $90    Pharmacy Filling the Rx: [facility] [address] [place] [address] [address] [facility]  Pharmacy Notified: Yes  Patient Notified: No    Called pharmacy and pharmacy stated that PA is Not Needed and Brand Advair Diskus is covered. **Instructed pharmacy to notify patient when script is ready to /ship.** Pharmacy stated that they have a paid claim on medication quantity 3 inhalers for 90 day supply and will notify the patient when medication is ready for .

## 2022-06-28 LAB
DEPRECATED CALCIDIOL+CALCIFEROL SERPL-MC: <55 UG/L (ref 20–75)
VITAMIN D2 SERPL-MCNC: <5 UG/L
VITAMIN D3 SERPL-MCNC: 50 UG/L

## 2022-08-14 ENCOUNTER — HEALTH MAINTENANCE LETTER (OUTPATIENT)
Age: 72
End: 2022-08-14

## 2022-10-24 ENCOUNTER — ANCILLARY PROCEDURE (OUTPATIENT)
Dept: MAMMOGRAPHY | Facility: CLINIC | Age: 72
End: 2022-10-24
Attending: FAMILY MEDICINE
Payer: COMMERCIAL

## 2022-10-24 DIAGNOSIS — Z12.31 ENCOUNTER FOR SCREENING MAMMOGRAM FOR BREAST CANCER: ICD-10-CM

## 2022-10-24 PROCEDURE — 77067 SCR MAMMO BI INCL CAD: CPT | Mod: GC | Performed by: RADIOLOGY

## 2022-10-24 PROCEDURE — 77063 BREAST TOMOSYNTHESIS BI: CPT | Mod: GC | Performed by: RADIOLOGY

## 2022-11-09 ENCOUNTER — TELEPHONE (OUTPATIENT)
Dept: OBGYN | Facility: CLINIC | Age: 72
End: 2022-11-09

## 2022-11-09 DIAGNOSIS — J45.40 MODERATE PERSISTENT ASTHMA WITHOUT COMPLICATION: Primary | ICD-10-CM

## 2022-11-09 NOTE — TELEPHONE ENCOUNTER
Patient calling reporting that on January 1st, [employer] insurance and Medicare will no longer cover her Advair 250-50. They instructed patient to speak with her doctor about an alternative or generic medication, or to get authorization from the provider if there is not an alternative available.     Routed to Dr. Hicks.

## 2022-11-10 RX ORDER — FLUTICASONE PROPIONATE AND SALMETEROL 113; 14 UG/1; UG/1
1 POWDER, METERED RESPIRATORY (INHALATION) 2 TIMES DAILY
Qty: 1 EACH | Refills: 11 | Status: SHIPPED | OUTPATIENT
Start: 2022-11-10 | End: 2023-02-27

## 2022-11-25 ENCOUNTER — VIRTUAL VISIT (OUTPATIENT)
Dept: INTERNAL MEDICINE | Facility: CLINIC | Age: 72
End: 2022-11-25
Payer: COMMERCIAL

## 2022-11-25 ENCOUNTER — TELEPHONE (OUTPATIENT)
Dept: INTERNAL MEDICINE | Facility: CLINIC | Age: 72
End: 2022-11-25

## 2022-11-25 DIAGNOSIS — U07.1 INFECTION DUE TO 2019 NOVEL CORONAVIRUS: ICD-10-CM

## 2022-11-25 DIAGNOSIS — J45.41 MODERATE PERSISTENT ASTHMA WITH EXACERBATION: Primary | ICD-10-CM

## 2022-11-25 PROCEDURE — 99214 OFFICE O/P EST MOD 30 MIN: CPT | Mod: 95 | Performed by: INTERNAL MEDICINE

## 2022-11-25 RX ORDER — FLUTICASONE PROPIONATE AND SALMETEROL 500; 50 UG/1; UG/1
1 POWDER RESPIRATORY (INHALATION) EVERY 12 HOURS
Qty: 1 EACH | Refills: 0 | Status: SHIPPED | OUTPATIENT
Start: 2022-11-25 | End: 2023-02-20

## 2022-11-25 RX ORDER — ALBUTEROL SULFATE 0.83 MG/ML
2.5 SOLUTION RESPIRATORY (INHALATION) EVERY 6 HOURS PRN
Qty: 90 ML | Refills: 3 | Status: SHIPPED | OUTPATIENT
Start: 2022-11-25 | End: 2023-04-17

## 2022-11-25 NOTE — PATIENT INSTRUCTIONS
"Based on your risks, I have prescribed an antiviral medication that may help to reduce your risk of hospitalization due to COVID-19.  This medication is called \"Paxlovid\" and is being prescribed to:    [facility]     Please call the pharmacy before going to verify that they have the medication on hand.  If they do not, they can tell you which pharmacy you may go to.      There are multiple potential drug interactions with Paxlovid. Your other medications may need to be adjusted or held in order to safely take Paxlovid. You can request to talk with the pharmacist about other potential drug interactions. Please have an updated list of medications you are taking.    "

## 2022-11-25 NOTE — PROGRESS NOTES
"Yolande is a 72 year old who is being evaluated via a billable video visit.      How would you like to obtain your AVS? MyChart  If the video visit is dropped, the invitation should be resent by: Text to cell phone: [phone]  Will anyone else be joining your video visit? No          Assessment & Plan     Moderate persistent asthma with exacerbation  Discussed management of asthma in the setting of COVID-19. Recommend switching to higher strength steroid inhaler. Prescription for Advair 500/50 was sent to the pharmacy. Patient was also given prescription for albuterol neb. Recommend urgent evaluaiton if respiraory status does not improve of worsen over 24-48 hours.   - fluticasone-salmeterol (ADVAIR) 500-50 MCG/ACT inhaler; Inhale 1 puff into the lungs every 12 hours  - albuterol (PROVENTIL) (2.5 MG/3ML) 0.083% neb solution; Take 1 vial (2.5 mg) by nebulization every 6 hours as needed for shortness of breath / dyspnea or wheezing    Infection due to 2019 novel coronavirus  Discussed treatment options, will start Paxlovid.  - nirmatrelvir and ritonavir (PAXLOVID) therapy pack; Take 3 tablets by mouth 2 times daily for 5 days (Take 2 Nirmatrelvir tablets and 1 Ritonavir tablet twice daily for 5 days)      I spent a total of 24 minutes on the day of the visit.   Time spent doing chart review, history and exam, documentation and further activities per the note       BMI:   Estimated body mass index is 25.13 kg/m  as calculated from the following:    Height as of 6/14/22: 1.651 m (5' 5\").    Weight as of 6/14/22: 68.5 kg (151 lb).           No follow-ups on file.    Isabel Hicks MD  [facility] INTERNAL MEDICINE [place]    Subjective   Yolande is a 72 year old, presenting for the following health issues:  Consult (Asking for covid medications, mild sore throat for 3 days, today malaise, green productive nasal drainage, difficulty breathing, positive at home covid test )      HPI       COVID-19 " Symptom Review  How many days ago did these symptoms start? 3 days    Are any of the following symptoms significant for you?  New or worsening difficulty breathing? Yes    Please describe what kind of difficulty you are having breathing:Mild dyspnea (able to do ADLs without difficulty, mild shortness of breath with activities such as climbing one or two flights of stairs or walking briskly)    Worsening cough? Yes, it's a dry cough.     Fever or chills? No    Headache: No    Sore throat: YES    Chest pain: No    Diarrhea: No    Body aches? No    What treatments has patient tried? Acetaminophen   Does patient live in a nursing home, group home, or shelter? No  Does patient have a way to get food/medications during quarantined? Yes, I have a friend or family member who can help me.          Patient reports that she has noticed worsening of asthma control. She has been using albuterol every 3-4 hours. She feels short of breath with activities, such as climbing up the stairs. She is using Advair discus as prescribed.     Review of Systems   Constitutional, HEENT, cardiovascular, pulmonary, GI, , musculoskeletal, neuro, skin, endocrine and psych systems are negative, except as otherwise noted.      Objective    Vitals - Patient Reported  Temperature (Patient Reported): 97.2  F (36.2  C)        Physical Exam   GENERAL: Healthy, alert and no distress  RESP: no wheezing, occasional cough  PSYCH: mentation appears normal and judgement and insight intact              Telephone call duration: 20 minutes    Video End Time:     Originating Location (pt. Location): Home        Distant Location (provider location):  On-site    Platform used for Video Visit: Unable to complete video visit

## 2022-11-25 NOTE — TELEPHONE ENCOUNTER
[facility] Call Center    Phone Message    May a detailed message be left on voicemail: yes     Reason for Call: Other: Yolande is calling wanting the medication for covid as she has asthma and wants it sent to [facility] - [place], [place] - [address] [place] [address] Please call with questions, thanks.     Action Taken: Message routed to:  Other: whs    Travel Screening: Not Applicable

## 2023-02-15 DIAGNOSIS — J45.41 MODERATE PERSISTENT ASTHMA WITH EXACERBATION: ICD-10-CM

## 2023-02-20 RX ORDER — FLUTICASONE PROPIONATE AND SALMETEROL 500; 50 UG/1; UG/1
1 POWDER RESPIRATORY (INHALATION) EVERY 12 HOURS
Qty: 1 EACH | Refills: 4 | Status: SHIPPED | OUTPATIENT
Start: 2023-02-20 | End: 2023-02-27

## 2023-02-20 NOTE — TELEPHONE ENCOUNTER
Fluticasone-Salmeterol 500-50 MCG/ACT Inhalation Aerosol Powder Breath Activated     Last Written Prescription Date:  11/25/22  Last Fill Quantity: 1,   # refills: 0  Last Office Visit : 11/25/22  Future Office visit:  none    Routing refill request to provider for review/approval because:  Ordered higher strength, limited refills on 11/25/22  Refill?          none

## 2023-02-20 NOTE — TELEPHONE ENCOUNTER
[employer] message sent to patient to schedule follow up per Dr. Hicks. Advair inhaler approved by Dr. Hicks, pt also alerted of this.     JANEL OROZCO RN on 2/20/2023 at 8:21 AM

## 2023-02-20 NOTE — TELEPHONE ENCOUNTER
"Pt was last seen in clinic on 11/25/2022. Pt last had fluticasone-salmeterol (ADVAIR) 500-50 MCG/ACT inhaler refilled on 11/25/2022 for a 90 day supply by PCP. Due for refill 2/23/23. Medication refill sent to pharmacy.     Per Dr. Hicks last note \"Patient reports that she has noticed worsening of asthma control. She has been using albuterol every 3-4 hours. She feels short of breath with activities, such as climbing up the stairs. She is using Advair discus as prescribed.\"  Routed to provider for approval, as pt was given short use for covid treatment.     JANEL OROZCO RN on 2/20/2023 at 7:51 AM    "

## 2023-02-27 ENCOUNTER — OFFICE VISIT (OUTPATIENT)
Dept: INTERNAL MEDICINE | Facility: CLINIC | Age: 73
End: 2023-02-27
Attending: INTERNAL MEDICINE
Payer: COMMERCIAL

## 2023-02-27 VITALS
SYSTOLIC BLOOD PRESSURE: 138 MMHG | BODY MASS INDEX: 25.63 KG/M2 | WEIGHT: 154 LBS | HEART RATE: 105 BPM | DIASTOLIC BLOOD PRESSURE: 72 MMHG

## 2023-02-27 DIAGNOSIS — L98.9 SKIN LESION: ICD-10-CM

## 2023-02-27 DIAGNOSIS — J01.90 ACUTE SINUSITIS WITH SYMPTOMS > 10 DAYS: Primary | ICD-10-CM

## 2023-02-27 DIAGNOSIS — J45.41 MODERATE PERSISTENT ASTHMA WITH EXACERBATION: ICD-10-CM

## 2023-02-27 PROCEDURE — G0463 HOSPITAL OUTPT CLINIC VISIT: HCPCS | Performed by: INTERNAL MEDICINE

## 2023-02-27 PROCEDURE — 99214 OFFICE O/P EST MOD 30 MIN: CPT | Performed by: INTERNAL MEDICINE

## 2023-02-27 ASSESSMENT — ANXIETY QUESTIONNAIRES
7. FEELING AFRAID AS IF SOMETHING AWFUL MIGHT HAPPEN: SEVERAL DAYS
5. BEING SO RESTLESS THAT IT IS HARD TO SIT STILL: NOT AT ALL
GAD7 TOTAL SCORE: 4
1. FEELING NERVOUS, ANXIOUS, OR ON EDGE: SEVERAL DAYS
6. BECOMING EASILY ANNOYED OR IRRITABLE: NOT AT ALL
2. NOT BEING ABLE TO STOP OR CONTROL WORRYING: NOT AT ALL
3. WORRYING TOO MUCH ABOUT DIFFERENT THINGS: SEVERAL DAYS
GAD7 TOTAL SCORE: 4

## 2023-02-27 ASSESSMENT — PATIENT HEALTH QUESTIONNAIRE - PHQ9
5. POOR APPETITE OR OVEREATING: SEVERAL DAYS
SUM OF ALL RESPONSES TO PHQ QUESTIONS 1-9: 1

## 2023-02-27 NOTE — PATIENT INSTRUCTIONS
Thank you for trusting us with your care!     If you need to contact us for questions about:  Symptoms, Scheduling & Medical Questions; Non-urgent (2-3 day response) Segundo message, Urgent (needing response today) [phone] (if after 3:30pm next day response)   Prescriptions: Please call your Pharmacy   Billing: Nakul [phone] or [facility]:[phone]

## 2023-02-27 NOTE — LETTER
"2/27/2023       RE: Yolande Hussein  [address] [facility] [address]     Dear Colleague,    Thank you for referring your patient, Yolande Hussein, to the [facility]. Please see a copy of my visit note below.    Chief Complaint   Patient presents with     RECHECK     Post covid symptoms   Kristin Mueller LPN      Assessment & Plan     Acute sinusitis with symptoms > 10 days  Discussed symptoms with patient.  Recommend treatment with antibiotic.  May consider imaging prior to additional courses of antibiotics.  Patient expressed understanding and agreement with the plan.  - amoxicillin-clavulanate (AUGMENTIN) 875-125 MG tablet; Take 1 tablet by mouth 2 times daily for 14 days    Moderate persistent asthma with exacerbation  Discussed options for management of asthma with patient.  Given that she has some discomfort with using her current inhaler, will switch to Dulera.  Instructions were provided to the patient.  She will follow-up in approximately 1 month to review progress and changes in asthma control.  - mometasone-formoterol (DULERA) 200-5 MCG/ACT inhaler; Inhale 2 puffs into the lungs 2 times daily    Skin lesion  Recommend dermatology evaluation for skin lesion.  - Adult Dermatology Referral; Future      I spent a total of 30 minutes on the day of the visit.   Time spent doing chart review, history and exam, documentation and further activities per the note       BMI:   Estimated body mass index is 25.63 kg/m  as calculated from the following:    Height as of 6/14/22: 1.651 m (5' 5\").    Weight as of this encounter: 69.9 kg (154 lb).           No follow-ups on file.    Isabel Hicks MD  [facility]    Rika Lanier is a 72 year old, presenting for the following health issues:  RECHECK (Post covid symptoms)      HPI     Patient reports that she " has been dealing with drainage and pain on the left side of her face. She states that she has been seen at [facility] for possible pink eye. She states that her eye symptoms have improved, however, she still has pressure, pain, and drainage on the left side of her face. She denies fever or chills.     Review of Systems   Constitutional, HEENT, cardiovascular, pulmonary, GI, , musculoskeletal, neuro, skin, endocrine and psych systems are negative, except as otherwise noted.     Objective    /72   Pulse 105   Wt 69.9 kg (154 lb)   BMI 25.63 kg/m    Body mass index is 25.63 kg/m .  Physical Exam   GENERAL: healthy, alert and no distress  EYES: Eyes grossly normal to inspection, PERRL and conjunctivae and sclerae normal  HENT: normal cephalic/atraumatic, ear canals and TM's normal, nasal mucosa edematous , oropharynx clear and oral mucous membranes moist  RESP: lungs clear to auscultation - no rales, rhonchi or wheezes  CV: regular rate and rhythm, normal S1 S2, no S3 or S4, no murmur, click or rub, no peripheral edema and peripheral pulses strong  ABDOMEN: soft, nontender, no hepatosplenomegaly, no masses and bowel sounds normal  MS: no gross musculoskeletal defects noted, no edema                       Again, thank you for allowing me to participate in the care of your patient.      Sincerely,    Isabel Hicks MD

## 2023-02-27 NOTE — PROGRESS NOTES
"  Assessment & Plan     Acute sinusitis with symptoms > 10 days  Discussed symptoms with patient.  Recommend treatment with antibiotic.  May consider imaging prior to additional courses of antibiotics.  Patient expressed understanding and agreement with the plan.  - amoxicillin-clavulanate (AUGMENTIN) 875-125 MG tablet; Take 1 tablet by mouth 2 times daily for 14 days    Moderate persistent asthma with exacerbation  Discussed options for management of asthma with patient.  Given that she has some discomfort with using her current inhaler, will switch to Dulera.  Instructions were provided to the patient.  She will follow-up in approximately 1 month to review progress and changes in asthma control.  - mometasone-formoterol (DULERA) 200-5 MCG/ACT inhaler; Inhale 2 puffs into the lungs 2 times daily    Skin lesion  Recommend dermatology evaluation for skin lesion.  - Adult Dermatology Referral; Future      I spent a total of 30 minutes on the day of the visit.   Time spent doing chart review, history and exam, documentation and further activities per the note       BMI:   Estimated body mass index is 25.63 kg/m  as calculated from the following:    Height as of 6/14/22: 1.651 m (5' 5\").    Weight as of this encounter: 69.9 kg (154 lb).           No follow-ups on file.    Isabel Hicks MD  [facility]    Rika Lanier is a 72 year old, presenting for the following health issues:  RECHECK (Post covid symptoms)      HPI     Patient reports that she has been dealing with drainage and pain on the left side of her face. She states that she has been seen at [facility] for possible pink eye. She states that her eye symptoms have improved, however, she still has pressure, pain, and drainage on the left side of her face. She denies fever or chills.     Review of Systems   Constitutional, HEENT, cardiovascular, pulmonary, GI, , musculoskeletal, neuro, skin, endocrine and psych " systems are negative, except as otherwise noted.      Objective    /72   Pulse 105   Wt 69.9 kg (154 lb)   BMI 25.63 kg/m    Body mass index is 25.63 kg/m .  Physical Exam   GENERAL: healthy, alert and no distress  EYES: Eyes grossly normal to inspection, PERRL and conjunctivae and sclerae normal  HENT: normal cephalic/atraumatic, ear canals and TM's normal, nasal mucosa edematous , oropharynx clear and oral mucous membranes moist  RESP: lungs clear to auscultation - no rales, rhonchi or wheezes  CV: regular rate and rhythm, normal S1 S2, no S3 or S4, no murmur, click or rub, no peripheral edema and peripheral pulses strong  ABDOMEN: soft, nontender, no hepatosplenomegaly, no masses and bowel sounds normal  MS: no gross musculoskeletal defects noted, no edema

## 2023-02-27 NOTE — LETTER
Date:February 28, 2023      Provider requested that no letter be sent. Do not send.       [facility]

## 2023-04-17 ENCOUNTER — OFFICE VISIT (OUTPATIENT)
Dept: INTERNAL MEDICINE | Facility: CLINIC | Age: 73
End: 2023-04-17
Attending: INTERNAL MEDICINE
Payer: COMMERCIAL

## 2023-04-17 VITALS
WEIGHT: 150 LBS | BODY MASS INDEX: 24.96 KG/M2 | DIASTOLIC BLOOD PRESSURE: 75 MMHG | OXYGEN SATURATION: 96 % | SYSTOLIC BLOOD PRESSURE: 124 MMHG | HEART RATE: 92 BPM

## 2023-04-17 DIAGNOSIS — J45.41 MODERATE PERSISTENT ASTHMA WITH EXACERBATION: Primary | ICD-10-CM

## 2023-04-17 PROCEDURE — G0463 HOSPITAL OUTPT CLINIC VISIT: HCPCS | Performed by: INTERNAL MEDICINE

## 2023-04-17 PROCEDURE — 99214 OFFICE O/P EST MOD 30 MIN: CPT | Performed by: INTERNAL MEDICINE

## 2023-04-17 RX ORDER — ALBUTEROL SULFATE 0.83 MG/ML
2.5 SOLUTION RESPIRATORY (INHALATION) EVERY 6 HOURS PRN
Qty: 90 ML | Refills: 3 | Status: SHIPPED | OUTPATIENT
Start: 2023-04-17 | End: 2024-05-06

## 2023-04-17 RX ORDER — PREDNISONE 20 MG/1
40 TABLET ORAL DAILY
Qty: 10 TABLET | Refills: 0 | Status: SHIPPED | OUTPATIENT
Start: 2023-04-17 | End: 2023-04-22

## 2023-04-17 ASSESSMENT — PAIN SCALES - GENERAL: PAINLEVEL: NO PAIN (0)

## 2023-04-17 NOTE — PATIENT INSTRUCTIONS
Thank you for trusting us with your care!     If you need to contact us for questions about:    Symptoms, Scheduling & Medical Questions: Non-urgent (2-3 day response) Segundo message, Urgent (needing response today) [phone] (if after 3:30pm next day response)   Prescriptions: Please call your Pharmacy   Billing: Nakul [phone] or [facility]:[phone]

## 2023-04-17 NOTE — PROGRESS NOTES
"  Assessment & Plan     Moderate persistent asthma with exacerbation  Discussed with management of asthma exacerbation with patient.  Recommend steroid burst.  Prescription was sent to patient's pharmacy.  Also refill of albuterol nebulizer solution was sent to the pharmacy.  Patient will contact the clinic if her symptoms are not improving.  She is in agreement with the plan.  We will follow-up in approximately 1 month.  - albuterol (PROVENTIL) (2.5 MG/3ML) 0.083% neb solution; Take 1 vial (2.5 mg) by nebulization every 6 hours as needed for shortness of breath or wheezing  - predniSONE (DELTASONE) 20 MG tablet; Take 2 tablets (40 mg) by mouth daily for 5 days      I spent a total of 30 minutes on the day of the visit.   Time spent by me doing chart review, history and exam, documentation and further activities per the note       BMI:   Estimated body mass index is 24.96 kg/m  as calculated from the following:    Height as of 6/14/22: 1.651 m (5' 5\").    Weight as of this encounter: 68 kg (150 lb).           Return in about 1 month (around 5/17/2023).    Isabel Hicks MD  [facility]    Rika Lanier is a 72 year old, presenting for the following health issues:  Follow Up (C/O Asthma issues)         View : No data to display.              HPI     Patient reports that she had worsening of asthma control since she developed a cold a week ago. She states that she was in [place] recently. She had nasal congestion, sore throat, ear pain. She tested for COVID at home which was negative.       Review of Systems   Constitutional, HEENT, cardiovascular, pulmonary, GI, , musculoskeletal, neuro, skin, endocrine and psych systems are negative, except as otherwise noted.      Objective    /75   Pulse 92   Wt 68 kg (150 lb)   SpO2 96%   BMI 24.96 kg/m    Body mass index is 24.96 kg/m .  Physical Exam   GENERAL: healthy, alert and no distress  EYES: Eyes grossly normal " to inspection, PERRL and conjunctivae and sclerae normal  HENT: normal cephalic/atraumatic, right ear: normal: no effusions, no erythema, normal landmarks, left ear: normal: no effusions, no erythema, normal landmarks, nose and mouth without ulcers or lesions, oropharynx clear and oral mucous membranes moist  RESP: lungs clear to auscultation - no rales, rhonchi or wheezes  CV: regular rate and rhythm, normal S1 S2, no S3 or S4, no murmur, click or rub, no peripheral edema and peripheral pulses strong  MS: no gross musculoskeletal defects noted, no edema

## 2023-04-17 NOTE — LETTER
"4/17/2023       RE: Yolande Hussein  [address] [facility] [address]     Dear Colleague,    Thank you for referring your patient, Yolande Hussein, to the [facility]. Please see a copy of my visit note below.    Chief Complaint   Patient presents with     Follow Up     C/O Asthma issues   Kristin Mueller LPN      Assessment & Plan     Moderate persistent asthma with exacerbation  Discussed with management of asthma exacerbation with patient.  Recommend steroid burst.  Prescription was sent to patient's pharmacy.  Also refill of albuterol nebulizer solution was sent to the pharmacy.  Patient will contact the clinic if her symptoms are not improving.  She is in agreement with the plan.  We will follow-up in approximately 1 month.  - albuterol (PROVENTIL) (2.5 MG/3ML) 0.083% neb solution; Take 1 vial (2.5 mg) by nebulization every 6 hours as needed for shortness of breath or wheezing  - predniSONE (DELTASONE) 20 MG tablet; Take 2 tablets (40 mg) by mouth daily for 5 days      I spent a total of 30 minutes on the day of the visit.   Time spent by me doing chart review, history and exam, documentation and further activities per the note       BMI:   Estimated body mass index is 24.96 kg/m  as calculated from the following:    Height as of 6/14/22: 1.651 m (5' 5\").    Weight as of this encounter: 68 kg (150 lb).           Return in about 1 month (around 5/17/2023).    Isabel Hicks MD  [facility]    Rika Lanier is a 72 year old, presenting for the following health issues:  Follow Up (C/O Asthma issues)         View : No data to display.              HPI     Patient reports that she had worsening of asthma control since she developed a cold a week ago. She states that she was in [place] recently. She had nasal congestion, sore throat, ear pain. She " tested for COVID at home which was negative.       Review of Systems   Constitutional, HEENT, cardiovascular, pulmonary, GI, , musculoskeletal, neuro, skin, endocrine and psych systems are negative, except as otherwise noted.     Objective    /75   Pulse 92   Wt 68 kg (150 lb)   SpO2 96%   BMI 24.96 kg/m    Body mass index is 24.96 kg/m .  Physical Exam   GENERAL: healthy, alert and no distress  EYES: Eyes grossly normal to inspection, PERRL and conjunctivae and sclerae normal  HENT: normal cephalic/atraumatic, right ear: normal: no effusions, no erythema, normal landmarks, left ear: normal: no effusions, no erythema, normal landmarks, nose and mouth without ulcers or lesions, oropharynx clear and oral mucous membranes moist  RESP: lungs clear to auscultation - no rales, rhonchi or wheezes  CV: regular rate and rhythm, normal S1 S2, no S3 or S4, no murmur, click or rub, no peripheral edema and peripheral pulses strong  MS: no gross musculoskeletal defects noted, no edema                       Again, thank you for allowing me to participate in the care of your patient.      Sincerely,    Isabel Hicks MD

## 2023-06-01 ENCOUNTER — TELEPHONE (OUTPATIENT)
Dept: TRANSPLANT | Facility: CLINIC | Age: 73
End: 2023-06-01

## 2023-06-01 ENCOUNTER — OFFICE VISIT (OUTPATIENT)
Dept: INTERNAL MEDICINE | Facility: CLINIC | Age: 73
End: 2023-06-01
Payer: COMMERCIAL

## 2023-06-01 VITALS
OXYGEN SATURATION: 95 % | WEIGHT: 150.1 LBS | SYSTOLIC BLOOD PRESSURE: 160 MMHG | BODY MASS INDEX: 24.98 KG/M2 | DIASTOLIC BLOOD PRESSURE: 85 MMHG | HEART RATE: 94 BPM

## 2023-06-01 DIAGNOSIS — J45.41 MODERATE PERSISTENT ASTHMA WITH EXACERBATION: Primary | ICD-10-CM

## 2023-06-01 PROCEDURE — 99214 OFFICE O/P EST MOD 30 MIN: CPT | Mod: GC

## 2023-06-01 RX ORDER — PREDNISONE 20 MG/1
40 TABLET ORAL DAILY
Qty: 10 TABLET | Refills: 0 | Status: SHIPPED | OUTPATIENT
Start: 2023-06-01 | End: 2023-06-06

## 2023-06-01 NOTE — PROGRESS NOTES
Primary Care Center  Internal Medicine    Chief Complaint   Patient presents with     Asthma     Pt here to discuss recent asthma concerns       Primary Care Provider: Isabel Hicks MD    Subjective:    HPI:  Yolande Hussein is a/an 72 year old female with a past medical history as noted below, who presents today with above chief complaint. The patient reports that she has been dealing with an asthma exacerbation over the last 5-6 days. She states she previously had an exacerbation last month after a trip to [facility] which resolved with prednisone. She reports having more dyspnea with moving air in and out. She has been using her nebulizer every 5 hours but not overnight. She also reports a productive cough of yellow phlegm mostly in the AM and nasal drainage. She has been using a spirometer and has not noticed any decrease in air flow. She denies any fevers, diarrhea, nausea, abdominal pain, chest pain, swelling, sick contacts, recent travel or rashes. She also reports she has been using over the counter fluticasone which has provided some relief. She additionally has an upcoming appointment with Dr. Hicks in the middle of June. She has no further concerns or questions at this time.     Review of systems:  See HPI    Patient Active Problem List   Diagnosis     Menopausal state -- age 49     TMJ (temporomandibular joint syndrome)     Vaginal atrophy     Breast atypical ductal hyperplasia-- Right     Eczema     Nephrolithiasis     Hypertension goal BP (blood pressure) < 140/90     Dysphonia     Neck stiffness     Unilateral vocal fold paresis     S/P abdominal hysterectomy     Combined forms of age-related cataract of both eyes     Pseudomyxoma peritonei (H)     Malignant neoplasm of appendix (H)     Age-related osteoporosis without current pathological fracture     Past Medical History:   Diagnosis Date     Asthma      Breast atypical ductal hyperplasia-- Right 06/13/2002    Lumpectomy.  Problem list  name updated by automated process. Provider to review and confirm     Dysphonia 09/08/2014     Eczema 10/15/2012     Gastro-oesophageal reflux disease      HLD (hyperlipidemia)      Hypertension      Kidney stone      Low grade mucinous neoplasm of appendix      Menopausal state -- age 49 08/13/2012    With NEGATIVE HR HPV neg on Pap today, there is NO further need for routine pap screens.        Neck stiffness 11/03/2014     Nephrolithiasis 05/12/2014     Nonsenile cataract      Shoulder pain 03/30/2009    Controlled with stretching, unless lapses or overworks 8/2012      Thoracalgia 03/30/2009     TMJ (temporomandibular joint syndrome) 01/18/2012    Jaw PT and Mouth guard helping! 8/2012      Unilateral vocal fold paresis 11/03/2014     Vaginal atrophy 08/13/2012    Estrogen loss with increasing sxs on Vagifem 10 mcg.  Increase from 2x/wk to 3xs/wk.      Past Surgical History:   Procedure Laterality Date     APPENDECTOMY OPEN N/A 12/27/2018    Procedure: Appendectomy open;  Surgeon: Evelyne Yung MD;  Location: [facility]     BREAST BIOPSY, CORE RT/LT      R-- precancer cells     COLONOSCOPY       COLONOSCOPY N/A 01/28/2020    Procedure: COLONOSCOPY, WITH POLYPECTOMY AND BIOPSY;  Surgeon: Aparna Soriano MD;  [facility]     EXPLORE NECK N/A 08/19/2014    Procedure: EXPLORE NECK;  Surgeon: Alea Jacobs MD;  [facility]     EXTRACORPOREAL SHOCK WAVE LITHOTRIPSY (ESWL)  02/26/2014    Procedure: EXTRACORPOREAL SHOCK WAVE LITHOTRIPSY (ESWL);  Left Kidney Extracorporeal Shockwave Lithotripsy;  Surgeon: Sabas Choi MD;  [facility]     HYSTERECTOMY TOTAL ABDOMINAL, BILATERAL SALPINGO-OOPHORECTOMY, COMBINED Bilateral 12/27/2018    Procedure: TOTAL ABDOMINAL HYSTERECTOMY WITH BILATERAL SALPINGO-OOPHORECTOMY, appendectomy, pelvic washings;  Surgeon: Evelyne Yung MD;  [facility]: [facility]     LUMPECTOMY BREAST  age 48    R -- margins clear -- declined tamoxofen      PARATHYROIDECTOMY N/A 08/19/2014    Procedure: PARATHYROIDECTOMY;  Surgeon: Alea Jacobs MD;  [facility]     PHACOEMULSIFICATION CLEAR CORNEA WITH STANDARD INTRAOCULAR LENS IMPLANT Left 03/19/2021    Procedure: LEFT EYE CATARACT REMOVAL WITH INTRAOCULAR LENS IMPLANT;  Surgeon: Bing Horn MD;  [facility]     PHACOEMULSIFICATION CLEAR CORNEA WITH STANDARD INTRAOCULAR LENS IMPLANT Right 04/02/2021    Procedure: RIGHT EYE CATARACT REMOVAL WITH INTRAOCULAR LENS IMPLANT;  Surgeon: Bing Horn MD;  Location: [facility] OR     STERILIZATION  1984    PP b/4 discharge      TUBAL LIGATION       Current Outpatient Medications   Medication Sig Dispense Refill     acetaminophen (TYLENOL) 500 MG tablet Take 500-1,000 mg by mouth every 6 hours as needed for mild pain       albuterol (PROVENTIL) (2.5 MG/3ML) 0.083% neb solution Take 1 vial (2.5 mg) by nebulization every 6 hours as needed for shortness of breath or wheezing 90 mL 3     hydrochlorothiazide (HYDRODIURIL) 25 MG tablet Take 1 tablet (25 mg) by mouth daily 90 tablet 3     losartan (COZAAR) 50 MG tablet Take 1 tablet (50 mg) by mouth daily 90 tablet 3     minoxidil (ROGAINE) 2 % external solution Apply topically 2 times daily       mometasone-formoterol (DULERA) 200-5 MCG/ACT inhaler Inhale 2 puffs into the lungs 2 times daily 13 g 4     predniSONE (DELTASONE) 20 MG tablet Take 2 tablets (40 mg) by mouth daily for 5 days 10 tablet 0     Allergies   Allergen Reactions     Macrobid [Nitrofurantoin] Hives     Aspirin      Tight breathing     Macrobid [Nitrofuran Derivatives]      Social History     Tobacco Use     Smoking status: Never     Smokeless tobacco: Never   Substance Use Topics     Alcohol use: Yes     Comment: rare      Drug use: No     Family History   Problem Relation Age of Onset     Asthma Mother      Diabetes Mother 80        diet and pill tx     Hypertension Mother      Macular Degeneration Mother      Abdominal Aortic Aneurysm  "Mother      Heart Failure Mother 70        CHF     Asthma Father      Hypertension Father      Alzheimer Disease Father 70     Asthma Maternal Grandfather      Asthma Daughter      Heart Failure Sister 60        CHF - mild     Hypertension Sister      Macular Degeneration Sister      Glaucoma No family hx of      Cancer No family hx of      Amblyopia No family hx of      Retinal detachment No family hx of        Objective:  BP Readings from Last 6 Encounters:   06/01/23 (!) 160/85   04/17/23 124/75   02/27/23 138/72   06/14/22 (!) 156/77   06/21/21 (!) 158/83   05/24/21 116/70     Wt Readings from Last 5 Encounters:   06/01/23 68.1 kg (150 lb 1.6 oz)   04/17/23 68 kg (150 lb)   02/27/23 69.9 kg (154 lb)   06/14/22 68.5 kg (151 lb)   06/21/21 68.5 kg (151 lb 1.6 oz)     Estimated body mass index is 24.98 kg/m  as calculated from the following:    Height as of 6/14/22: 1.651 m (5' 5\").    Weight as of this encounter: 68.1 kg (150 lb 1.6 oz).  BP (!) 160/85 (BP Location: Right arm, Patient Position: Sitting, Cuff Size: Adult Regular)   Pulse 94   Wt 68.1 kg (150 lb 1.6 oz)   SpO2 95%   BMI 24.98 kg/m      Physical Exam:    General: Alert and oriented without distress  HEENT: Normocephalic/atraumatic  Respiratory: CTAB without increased respiratory effort or accessory muscle use  Cardiovascular: RRR without murmurs, rubs or gallop. S1, S2 intact.  Skin: No overt lesions, bruises, rashes or bleeding on exposed skin surfaces.  Neuro: CN II-XII grossly intact.    Assessment and Plan:    Yolande was seen today for asthma.    Diagnoses and all orders for this visit:    Moderate persistent asthma with exacerbation  Patient reporting increased asthma symptoms at home with dyspnea consistent with an asthma exacerbation. Exacerbation likely caused by viral illness, allergies or environmental irritant given runny nose and productive cough. Exam in clinic was largely unremarkable and she is without fevers or hypoxemia. Of " note, patient reports she typically has an exacerbation every few years and it is atypical for her to have multiple exacerbations back to back.   -     predniSONE (DELTASONE) 20 MG tablet; Take 2 tablets (40 mg) by mouth daily for 5 days  - Consider imaging and further labs if symptoms do not improve, worsen or she has another exacerbation.     Patient seen and case dicussed with Dr. Hicks who agrees with the above assessment and plan    Siddharth Haddad, DO  PGY-2, Internal Medicine  [facility]      Patients: if you have questions or concerns about this progress note, please discuss them with the provider at a future office visit.

## 2023-06-01 NOTE — NURSING NOTE
Yolande Hussein is a 72 year old female that presents in clinic today for the following:     Chief Complaint   Patient presents with     Asthma     Pt here to discuss recent asthma concerns       The patient's allergies and medications were reviewed. The patient's vitals were obtained without incident. The patient does not have any other questions for the provider.     Antoine Alvarez, EMT at 1:43 PM on 6/1/2023.  Primary Care Clinic: [phone]

## 2023-06-01 NOTE — TELEPHONE ENCOUNTER
Patient called again, she is asking for prednisone, she is having a hard time breathing with her asthma in this weather, please call, patient states this is very important that she gets on prednisone ASAP.

## 2023-06-01 NOTE — TELEPHONE ENCOUNTER
RN called patient who states that she is having some difficulty breathing with her asthma in this weather. RN offered appointments for patient to be seen in the [facility] today. Pt was scheduled for 1:30pm with Dr. Haddad.     JANEL OROZCO RN on 6/1/2023 at 11:47 AM

## 2023-06-01 NOTE — TELEPHONE ENCOUNTER
[facility] Call Center    Phone Message    May a detailed message be left on voicemail: yes     Reason for Call: Other: pt calling, her asthmas has flarred up, writer called [facility], got transferred to nurse line which brought me back to scheduling, she is thinking she needs some prednazone, please advise with her     Action Taken: Other: women    Travel Screening: Not Applicable

## 2023-06-19 ENCOUNTER — OFFICE VISIT (OUTPATIENT)
Dept: INTERNAL MEDICINE | Facility: CLINIC | Age: 73
End: 2023-06-19
Attending: INTERNAL MEDICINE
Payer: COMMERCIAL

## 2023-06-19 VITALS — WEIGHT: 152 LBS | BODY MASS INDEX: 25.29 KG/M2

## 2023-06-19 DIAGNOSIS — J45.40 MODERATE PERSISTENT ASTHMA WITHOUT COMPLICATION: ICD-10-CM

## 2023-06-19 DIAGNOSIS — Z00.00 ENCOUNTER FOR MEDICARE ANNUAL WELLNESS EXAM: Primary | ICD-10-CM

## 2023-06-19 DIAGNOSIS — Z78.0 POSTMENOPAUSAL STATUS: ICD-10-CM

## 2023-06-19 DIAGNOSIS — I10 BENIGN ESSENTIAL HYPERTENSION: ICD-10-CM

## 2023-06-19 PROCEDURE — G0463 HOSPITAL OUTPT CLINIC VISIT: HCPCS | Performed by: INTERNAL MEDICINE

## 2023-06-19 PROCEDURE — G0439 PPPS, SUBSEQ VISIT: HCPCS | Performed by: INTERNAL MEDICINE

## 2023-06-19 RX ORDER — HYDROCHLOROTHIAZIDE 25 MG/1
25 TABLET ORAL DAILY
Qty: 90 TABLET | Refills: 3 | Status: SHIPPED | OUTPATIENT
Start: 2023-06-19 | End: 2024-07-01

## 2023-06-19 RX ORDER — LOSARTAN POTASSIUM 50 MG/1
50 TABLET ORAL DAILY
Qty: 90 TABLET | Refills: 3 | Status: SHIPPED | OUTPATIENT
Start: 2023-06-19 | End: 2024-07-01

## 2023-06-19 NOTE — LETTER
6/19/2023       RE: Yolande Hussein  [address]  [facility] [address]     Dear Colleague,    Thank you for referring your patient, Yolande Hussein, to the [facility] [place] at [facility]. Please see a copy of my visit note below.    Chief Complaint   Patient presents with    Follow Up     C/O asthma issues   Kristin Mueller LPN    SUBJECTIVE:   Yolande is a 72 year old who presents for Preventive Visit.       View : No data to display.              Are you in the first 12 months of your Medicare coverage?  No    HPI      Have you ever done Advance Care Planning? (For example, a Health Directive, POLST, or a discussion with a medical provider or your loved ones about your wishes): Yes, advance care planning is on file.       Fall risk  Fallen 2 or more times in the past year?: No  Any fall with injury in the past year?: No    Cognitive Screening   1) Repeat 3 items (Leader, Season, Table)    2) Clock draw: NORMAL  3) 3 item recall: Recalls 3 objects  Results: 3 items recalled: COGNITIVE IMPAIRMENT LESS LIKELY    Mini-CogTM Copyright S Kellen. Licensed by the author for use in [facility]; reprinted with permission (soob@.[facility]). All rights reserved.      Do you have sleep apnea, excessive snoring or daytime drowsiness?: no    Reviewed and updated as needed this visit by clinical staff   Tobacco  Allergies  Meds              Reviewed and updated as needed this visit by Provider   Tobacco     Med Hx  Surg Hx  Fam Hx         Social History     Tobacco Use    Smoking status: Never    Smokeless tobacco: Never   Vaping Use    Vaping status: Not on file   Substance Use Topics    Alcohol use: Yes     Comment: rare               View : No data to display.              Do you have a current opioid prescription? No  Do you use any other controlled substances or medications that are not prescribed by a  "provider? Alcohol      -------------------------------------    Current providers sharing in care for this patient include:   Patient Care Team:  Isabel Hicks MD as PCP - General (Internal Medicine)  Bing Horn MD as MD (Ophthalmology)  Favian Hoffmann MD as MD (Family Medicine - Sports Medicine)  Zeke Bosch MD as MD (Oncology)  Maribell Pastrana RN as Nurse Coordinator (Oncology)  Isabel Hicks MD as Assigned PCP  Zenaida Chinchilla APRN CNP as Nurse Practitioner (Dermatology)    The following health maintenance items are reviewed in Epic and correct as of today:  Health Maintenance   Topic Date Due    ASTHMA ACTION PLAN  11/27/2019    ASTHMA CONTROL TEST  06/16/2020    FALL RISK ASSESSMENT  05/24/2022    COVID-19 Vaccine (6 - Pfizer series) 02/03/2023    MEDICARE ANNUAL WELLNESS VISIT  06/14/2023    MAMMO SCREENING  10/24/2023    ADVANCE CARE PLANNING  05/24/2026    LIPID  06/21/2027    COLORECTAL CANCER SCREENING  01/28/2030    DTAP/TDAP/TD IMMUNIZATION (5 - Td or Tdap) 03/23/2033    DEXA  06/11/2036    HEPATITIS C SCREENING  Completed    PHQ-2 (once per calendar year)  Completed    INFLUENZA VACCINE  Completed    Pneumococcal Vaccine: 65+ Years  Completed    ZOSTER IMMUNIZATION  Completed    IPV IMMUNIZATION  Aged Out    MENINGITIS IMMUNIZATION  Aged Out     Labs reviewed in [facility]  Mammogram Screening: Mammogram Screening: Recommended mammography every 1-2 years with patient discussion and risk factor consideration        Review of Systems  Constitutional, HEENT, cardiovascular, pulmonary, GI, , musculoskeletal, neuro, skin, endocrine and psych systems are negative, except as otherwise noted.    OBJECTIVE:   Wt 68.9 kg (152 lb)   BMI 25.29 kg/m   Estimated body mass index is 25.29 kg/m  as calculated from the following:    Height as of 6/14/22: 1.651 m (5' 5\").    Weight as of this encounter: 68.9 kg (152 lb).  Physical Exam  GENERAL: healthy, alert and no " distress  EYES: Eyes grossly normal to inspection, PERRL and conjunctivae and sclerae normal  RESP: lungs clear to auscultation - no rales, rhonchi or wheezes  CV: regular rate and rhythm, normal S1 S2, no S3 or S4, no murmur, click or rub, no peripheral edema and peripheral pulses strong  ABDOMEN: soft, nontender and bowel sounds normal  MS: no gross musculoskeletal defects noted, no edema  SKIN: no suspicious lesions or rashes  PSYCH: mentation appears normal, affect normal/bright    Diagnostic Test Results:  Labs reviewed in Epic    ASSESSMENT / PLAN:       ICD-10-CM    1. Encounter for Medicare annual wellness exam  Z00.00       2. Benign essential hypertension  I10 hydrochlorothiazide (HYDRODIURIL) 25 MG tablet     losartan (COZAAR) 50 MG tablet     Lipid Profile     Basic Metabolic Panel      3. Moderate persistent asthma without complication  J45.40 General PFT Lab (Please always keep checked)     Adult Pulmonary Medicine Referral      4. Postmenopausal status  Z78.0 Dexa hip/pelvis/spine                COUNSELING:  Reviewed preventive health counseling, as reflected in patient instructions       Regular exercise       Healthy diet/nutrition        She reports that she has never smoked. She has never used smokeless tobacco.      Appropriate preventive services were discussed with this patient, including applicable screening as appropriate for cardiovascular disease, diabetes, osteopenia/osteoporosis, and glaucoma.  As appropriate for age/gender, discussed screening for colorectal cancer, prostate cancer, breast cancer, and cervical cancer. Checklist reviewing preventive services available has been given to the patient.    Reviewed patients plan of care and provided an AVS. The Basic Care Plan (routine screening as documented in Health Maintenance) for Yolande meets the Care Plan requirement. This Care Plan has been established and reviewed with the Patient.      Isabel Hicks MD  [facility]  [facility] [place]    Identified Health Risks:

## 2023-06-19 NOTE — PROGRESS NOTES
SUBJECTIVE:   Yolande is a 72 year old who presents for Preventive Visit.       View : No data to display.              Are you in the first 12 months of your Medicare coverage?  No    HPI      Have you ever done Advance Care Planning? (For example, a Health Directive, POLST, or a discussion with a medical provider or your loved ones about your wishes): Yes, advance care planning is on file.       Fall risk  Fallen 2 or more times in the past year?: No  Any fall with injury in the past year?: No    Cognitive Screening   1) Repeat 3 items (Leader, Season, Table)    2) Clock draw: NORMAL  3) 3 item recall: Recalls 3 objects  Results: 3 items recalled: COGNITIVE IMPAIRMENT LESS LIKELY    Mini-CogTM Copyright S Kellen. Licensed by the author for use in [employer]; reprinted with permission (hanh@[facility]). All rights reserved.      Do you have sleep apnea, excessive snoring or daytime drowsiness?: no    Reviewed and updated as needed this visit by clinical staff   Tobacco  Allergies  Meds              Reviewed and updated as needed this visit by Provider   Tobacco     Med Hx  Surg Hx  Fam Hx         Social History     Tobacco Use     Smoking status: Never     Smokeless tobacco: Never   Vaping Use     Vaping status: Not on file   Substance Use Topics     Alcohol use: Yes     Comment: rare               View : No data to display.              Do you have a current opioid prescription? No  Do you use any other controlled substances or medications that are not prescribed by a provider? Alcohol      -------------------------------------    Current providers sharing in care for this patient include:   Patient Care Team:  Isabel Hicks MD as PCP - General (Internal Medicine)  Bing Horn MD as MD (Ophthalmology)  Favian Hoffmann MD as MD (Family Medicine - Sports Medicine)  Zeke Bosch MD as MD (Oncology)  Maribell Pastrana, RN as Nurse Coordinator (Oncology)  Isabel Hicks  "MD Hortensia as Assigned PCP  Zenaida Chinchilla APRN CNP as Nurse Practitioner (Dermatology)    The following health maintenance items are reviewed in Epic and correct as of today:  Health Maintenance   Topic Date Due     ASTHMA ACTION PLAN  11/27/2019     ASTHMA CONTROL TEST  06/16/2020     FALL RISK ASSESSMENT  05/24/2022     COVID-19 Vaccine (6 - Pfizer series) 02/03/2023     MEDICARE ANNUAL WELLNESS VISIT  06/14/2023     MAMMO SCREENING  10/24/2023     ADVANCE CARE PLANNING  05/24/2026     LIPID  06/21/2027     COLORECTAL CANCER SCREENING  01/28/2030     DTAP/TDAP/TD IMMUNIZATION (5 - Td or Tdap) 03/23/2033     DEXA  06/11/2036     HEPATITIS C SCREENING  Completed     PHQ-2 (once per calendar year)  Completed     INFLUENZA VACCINE  Completed     Pneumococcal Vaccine: 65+ Years  Completed     ZOSTER IMMUNIZATION  Completed     IPV IMMUNIZATION  Aged Out     MENINGITIS IMMUNIZATION  Aged Out     Labs reviewed in [facility]  Mammogram Screening: Mammogram Screening: Recommended mammography every 1-2 years with patient discussion and risk factor consideration        Review of Systems  Constitutional, HEENT, cardiovascular, pulmonary, GI, , musculoskeletal, neuro, skin, endocrine and psych systems are negative, except as otherwise noted.    OBJECTIVE:   Wt 68.9 kg (152 lb)   BMI 25.29 kg/m   Estimated body mass index is 25.29 kg/m  as calculated from the following:    Height as of 6/14/22: 1.651 m (5' 5\").    Weight as of this encounter: 68.9 kg (152 lb).  Physical Exam  GENERAL: healthy, alert and no distress  EYES: Eyes grossly normal to inspection, PERRL and conjunctivae and sclerae normal  RESP: lungs clear to auscultation - no rales, rhonchi or wheezes  CV: regular rate and rhythm, normal S1 S2, no S3 or S4, no murmur, click or rub, no peripheral edema and peripheral pulses strong  ABDOMEN: soft, nontender and bowel sounds normal  MS: no gross musculoskeletal defects noted, no edema  SKIN: no suspicious lesions or " rashes  PSYCH: mentation appears normal, affect normal/bright    Diagnostic Test Results:  Labs reviewed in Epic    ASSESSMENT / PLAN:       ICD-10-CM    1. Encounter for Medicare annual wellness exam  Z00.00       2. Benign essential hypertension  I10 hydrochlorothiazide (HYDRODIURIL) 25 MG tablet     losartan (COZAAR) 50 MG tablet     Lipid Profile     Basic Metabolic Panel      3. Moderate persistent asthma without complication  J45.40 General PFT Lab (Please always keep checked)     Adult Pulmonary Medicine Referral      4. Postmenopausal status  Z78.0 Dexa hip/pelvis/spine                COUNSELING:  Reviewed preventive health counseling, as reflected in patient instructions       Regular exercise       Healthy diet/nutrition        She reports that she has never smoked. She has never used smokeless tobacco.      Appropriate preventive services were discussed with this patient, including applicable screening as appropriate for cardiovascular disease, diabetes, osteopenia/osteoporosis, and glaucoma.  As appropriate for age/gender, discussed screening for colorectal cancer, prostate cancer, breast cancer, and cervical cancer. Checklist reviewing preventive services available has been given to the patient.    Reviewed patients plan of care and provided an AVS. The Basic Care Plan (routine screening as documented in Health Maintenance) for Yolande meets the Care Plan requirement. This Care Plan has been established and reviewed with the Patient.      Isabel Hicks MD  [facility]    Identified Health Risks:

## 2023-06-19 NOTE — PATIENT INSTRUCTIONS
Thank you for trusting us with your care!     If you need to contact us for questions about:    Symptoms, Scheduling & Medical Questions: Non-urgent (2-3 day response) Timmyt message, Urgent (needing response today) [phone] (if after 3:30pm next day response)   Prescriptions: Please call your Pharmacy   Billing: [place] [phone] or  Physicians:[phone]   Patient Education   Personalized Prevention Plan  You are due for the preventive services outlined below.  Your care team is available to assist you in scheduling these services.  If you have already completed any of these items, please share that information with your care team to update in your medical record.  Health Maintenance Due   Topic Date Due     Asthma Action Plan - yearly  11/27/2019     Asthma Control Test  06/16/2020     COVID-19 Vaccine (6 - Pfizer series) 02/03/2023     Annual Wellness Visit  06/14/2023

## 2023-06-21 DIAGNOSIS — J45.40 MODERATE PERSISTENT ASTHMA: Primary | ICD-10-CM

## 2023-06-22 ENCOUNTER — TELEPHONE (OUTPATIENT)
Dept: PULMONOLOGY | Facility: CLINIC | Age: 73
End: 2023-06-22
Payer: COMMERCIAL

## 2023-06-22 NOTE — TELEPHONE ENCOUNTER
Orders were placed for patient to have CXR with follow up visit. Patient is already scheduled to have CXR with 07/2023 visit at [facility] and doesn't want CXR with 09/27/2023 with Dr. Dawkins, due to insurance purposes.

## 2023-07-03 ENCOUNTER — LAB (OUTPATIENT)
Dept: LAB | Facility: CLINIC | Age: 73
End: 2023-07-03
Payer: COMMERCIAL

## 2023-07-03 DIAGNOSIS — I10 BENIGN ESSENTIAL HYPERTENSION: ICD-10-CM

## 2023-07-03 LAB
ANION GAP SERPL CALCULATED.3IONS-SCNC: 12 MMOL/L (ref 7–15)
BUN SERPL-MCNC: 11.5 MG/DL (ref 8–23)
CALCIUM SERPL-MCNC: 9.6 MG/DL (ref 8.8–10.2)
CHLORIDE SERPL-SCNC: 103 MMOL/L (ref 98–107)
CHOLEST SERPL-MCNC: 217 MG/DL
CREAT SERPL-MCNC: 0.58 MG/DL (ref 0.51–0.95)
DEPRECATED HCO3 PLAS-SCNC: 28 MMOL/L (ref 22–29)
GFR SERPL CREATININE-BSD FRML MDRD: >90 ML/MIN/1.73M2
GLUCOSE SERPL-MCNC: 93 MG/DL (ref 70–99)
HDLC SERPL-MCNC: 82 MG/DL
LDLC SERPL CALC-MCNC: 123 MG/DL
NONHDLC SERPL-MCNC: 135 MG/DL
POTASSIUM SERPL-SCNC: 4.1 MMOL/L (ref 3.4–5.3)
SODIUM SERPL-SCNC: 143 MMOL/L (ref 136–145)
TRIGL SERPL-MCNC: 60 MG/DL

## 2023-07-03 PROCEDURE — 80061 LIPID PANEL: CPT

## 2023-07-03 PROCEDURE — 80048 BASIC METABOLIC PNL TOTAL CA: CPT

## 2023-07-03 PROCEDURE — 36415 COLL VENOUS BLD VENIPUNCTURE: CPT

## 2023-07-18 NOTE — CONFIDENTIAL NOTE
RECORDS RECEIVED FROM: internal /ce   DATE RECEIVED: 9.27.23    NOTES STATUS DETAILS   OFFICE NOTE from referring provider internal  Isabel Hicks MDv   OFFICE NOTE from other specialist internal  6.1.23 Boo  6.14.22 Heather    DISCHARGE SUMMARY from hospital     DISCHARGE REPORT from the ER     MEDICATION LIST internal     IMAGING  (NEED IMAGES AND REPORTS)     CT SCAN CE [facility]- 7/14/23     CHEST XRAY (CXR)     TESTS     PULMONARY FUNCTION TESTING (PFT) internal  Scheduled 9.27.23        Action 7/18/23 sv    Action Taken Image request sent to [facility] for   CT chest-  7/14/23  --received --

## 2023-07-21 ENCOUNTER — OFFICE VISIT (OUTPATIENT)
Dept: DERMATOLOGY | Facility: CLINIC | Age: 73
End: 2023-07-21
Attending: INTERNAL MEDICINE
Payer: COMMERCIAL

## 2023-07-21 DIAGNOSIS — L30.9 DERMATITIS: Primary | ICD-10-CM

## 2023-07-21 DIAGNOSIS — L82.0 INFLAMED SEBORRHEIC KERATOSIS: ICD-10-CM

## 2023-07-21 PROCEDURE — 99202 OFFICE O/P NEW SF 15 MIN: CPT | Mod: 25 | Performed by: NURSE PRACTITIONER

## 2023-07-21 PROCEDURE — 17110 DESTRUCTION B9 LES UP TO 14: CPT | Performed by: NURSE PRACTITIONER

## 2023-07-21 NOTE — LETTER
"    7/21/2023         RE: Yolande Hussein  [address]  [place] [facility] [address]        Dear Colleague,    Thank you for referring your patient, Yolande Hussein, to the [facility]. Please see a copy of my visit note below.    [facility] Dermatology Note  Encounter Date: Jul 21, 2023  Office Visit     Reviewed patients past medical history and pertinent chart review prior to patients visit today.     Dermatology Problem List:  ISK, cryo 7/21/2023     Patient denies personal history of skin cancer or dysplastic nevi.    Father she thinks had skin cancer, type unknown.     ____________________________________________    Assessment & Plan:     # Irritated and inflamed Seborrheic Keratosis. Discussed treatment options with patient including no treatment, cryotherapy, and shave removal. Patient prefers cryotherapy today due to irritation and itching. After verbal consent and discussion of risks and benefits including but no limited to dyspigmentation/scar, blister, and pain, 5 was(were) treated with 1-2mm freeze border for 2 cycles with liquid nitrogen. Post cryotherapy instructions were provided.       # healing dermatitis. Continue moisturizing well but no other intervention needed at this point.     Pati Chinchilla, CNP  Dermatology    _______________________________________    CC: Derm Problem (1 year ago mole on back with itching and PCP froze and area returned with itching. Pt cant see but feels its raised. /Lesion on right hand that is changing in color and size and \"age spots\" on arms/Outbreak on hands 2 weeks ago with raised bumps that is now peeling, ?oxiclean or weed, some itching, tx'ing with aquaphor)    HPI:  Ms. Yolande Hussein is a(n) 73 year old female who presents today as a new patient for spots of concern on her back, arms and hands that have been irritated and itchy. She also got a blistering itchy rash on her plams about 2 weeks ago she " thinks from a reaction to oxyclean. It is no longer red and itchy but is peeling still and she wonders if she should be doing something to it.     Patient is otherwise feeling well, without additional skin concerns.      Physical Exam:  SKIN: Waist-up skin, which includes the head/face, neck, both arms, chest, back, abdomen, digits and/or nails was examined.  -waxy, stuck on tan/brown papules and patches on the upper extremities and trunk   -desquamation of skin on bilateral palms without erythema    - No other lesions of concern on areas examined.     Medications:  Current Outpatient Medications   Medication     acetaminophen (TYLENOL) 500 MG tablet     albuterol (PROVENTIL) (2.5 MG/3ML) 0.083% neb solution     hydrochlorothiazide (HYDRODIURIL) 25 MG tablet     losartan (COZAAR) 50 MG tablet     minoxidil (ROGAINE) 2 % external solution     mometasone-formoterol (DULERA) 200-5 MCG/ACT inhaler     No current facility-administered medications for this visit.      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  Problem list name updated by automated process. Provider to review and confirm     Dysphonia 09/08/2014     Eczema 10/15/2012     Gastro-oesophageal reflux disease      HLD (hyperlipidemia)      Hypertension      Kidney stone      Low grade mucinous neoplasm of  appendix      Menopausal state -- age 49 08/13/2012    With NEGATIVE HR HPV neg on Pap today, there is NO further need for routine pap screens.        Neck stiffness 11/03/2014     Nephrolithiasis 05/12/2014     Nonsenile cataract      Shoulder pain 03/30/2009    Controlled with stretching, unless lapses or overworks 8/2012      Thoracalgia 03/30/2009     TMJ (temporomandibular joint syndrome) 01/18/2012    Jaw PT and Mouth guard helping! 8/2012      Unilateral vocal fold paresis 11/03/2014     Vaginal atrophy 08/13/2012    Estrogen loss with increasing sxs on Vagifem 10 mcg.  Increase from 2x/wk to 3xs/wk.        CC Isabel Hicks MD  [address] [place] [phone] on close of this encounter.       Again, thank you for allowing me to participate in the care of your patient.        Sincerely,        ESTRADA Lauren CNP

## 2023-07-21 NOTE — PROGRESS NOTES
"[facility] Dermatology Note  Encounter Date: Jul 21, 2023  Office Visit     Reviewed patients past medical history and pertinent chart review prior to patients visit today.     Dermatology Problem List:  ISK, cryo 7/21/2023     Patient denies personal history of skin cancer or dysplastic nevi.    Father she thinks had skin cancer, type unknown.     ____________________________________________    Assessment & Plan:     # Irritated and inflamed Seborrheic Keratosis. Discussed treatment options with patient including no treatment, cryotherapy, and shave removal. Patient prefers cryotherapy today due to irritation and itching. After verbal consent and discussion of risks and benefits including but no limited to dyspigmentation/scar, blister, and pain, 5 was(were) treated with 1-2mm freeze border for 2 cycles with liquid nitrogen. Post cryotherapy instructions were provided.       # healing dermatitis. Continue moisturizing well but no other intervention needed at this point.     Pati Chinchilla, CNP  Dermatology    _______________________________________    CC: Derm Problem (1 year ago mole on back with itching and PCP froze and area returned with itching. Pt cant see but feels its raised. /Lesion on right hand that is changing in color and size and \"age spots\" on arms/Outbreak on hands 2 weeks ago with raised bumps that is now peeling, ?oxiclean or weed, some itching, tx'ing with aquaphor)    HPI:  Ms. Yolande Hussein is a(n) 73 year old female who presents today as a new patient for spots of concern on her back, arms and hands that have been irritated and itchy. She also got a blistering itchy rash on her plams about 2 weeks ago she thinks from a reaction to oxyclean. It is no longer red and itchy but is peeling still and she wonders if she should be doing something to it.     Patient is otherwise feeling well, without additional skin concerns.      Physical Exam:  SKIN: Waist-up skin, which " includes the head/face, neck, both arms, chest, back, abdomen, digits and/or nails was examined.  -waxy, stuck on tan/brown papules and patches on the upper extremities and trunk   -desquamation of skin on bilateral palms without erythema    - No other lesions of concern on areas examined.     Medications:  Current Outpatient Medications   Medication     acetaminophen (TYLENOL) 500 MG tablet     albuterol (PROVENTIL) (2.5 MG/3ML) 0.083% neb solution     hydrochlorothiazide (HYDRODIURIL) 25 MG tablet     losartan (COZAAR) 50 MG tablet     minoxidil (ROGAINE) 2 % external solution     mometasone-formoterol (DULERA) 200-5 MCG/ACT inhaler     No current facility-administered medications for this visit.      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  Problem list name updated by automated process. Provider to review and confirm     Dysphonia 09/08/2014     Eczema 10/15/2012     Gastro-oesophageal reflux disease      HLD (hyperlipidemia)      Hypertension      Kidney stone      Low grade mucinous neoplasm of appendix      Menopausal state -- age 49 08/13/2012    With NEGATIVE HR HPV neg on Pap today, there is NO further need for routine pap screens.        Neck stiffness 11/03/2014     Nephrolithiasis 05/12/2014     Nonsenile cataract      Shoulder pain 03/30/2009     Controlled with stretching, unless lapses or overworks 8/2012      Thoracalgia 03/30/2009     TMJ (temporomandibular joint syndrome) 01/18/2012    Jaw PT and Mouth guard helping! 8/2012      Unilateral vocal fold paresis 11/03/2014     Vaginal atrophy 08/13/2012    Estrogen loss with increasing sxs on Vagifem 10 mcg.  Increase from 2x/wk to 3xs/wk.        CC Isabel Hicks MD  [address] [place] [phone] on close of this encounter.

## 2023-08-10 DIAGNOSIS — J45.41 MODERATE PERSISTENT ASTHMA WITH EXACERBATION: ICD-10-CM

## 2023-08-11 ENCOUNTER — TELEPHONE (OUTPATIENT)
Dept: PULMONOLOGY | Facility: CLINIC | Age: 73
End: 2023-08-11
Payer: COMMERCIAL

## 2023-08-11 NOTE — TELEPHONE ENCOUNTER
Left Voicemail (1st Attempt) for the patient to call back and schedule the following:    Appointment type: NEW  Provider: MICHELLE  Return date: 09/27/23  Specialty phone number: [phone]  Additional appointment(s) needed: FPFT  Additonal Notes: N/A

## 2023-08-15 DIAGNOSIS — J45.41 MODERATE PERSISTENT ASTHMA WITH EXACERBATION: ICD-10-CM

## 2023-08-15 NOTE — TELEPHONE ENCOUNTER
Patient called through the triage line and is in need of a refill on her Dulera inhaler.    Patient does see Dr. Hicks will pend this for approval.

## 2023-08-16 RX ORDER — MOMETASONE FUROATE AND FORMOTEROL FUMARATE DIHYDRATE 200; 5 UG/1; UG/1
2 AEROSOL RESPIRATORY (INHALATION) 2 TIMES DAILY
Qty: 13 G | Refills: 0 | OUTPATIENT
Start: 2023-08-16

## 2023-08-22 NOTE — CONFIDENTIAL NOTE
RECORDS RECEIVED FROM: internal /ce   DATE RECEIVED: 9.11.23    NOTES STATUS DETAILS   OFFICE NOTE from referring provider internal  Isabel Hicks MDv   OFFICE NOTE from other specialist internal  6.1.23 Boo  6.14.22 Heather    DISCHARGE SUMMARY from hospital       DISCHARGE REPORT from the ER       MEDICATION LIST internal      IMAGING  (NEED IMAGES AND REPORTS)       CT SCAN [facility]- 7/14/23     CHEST XRAY (CXR)       TESTS       PULMONARY FUNCTION TESTING (PFT) internal  Scheduled 9.27.23        Action 7/18/23 sv    Action Taken Image request sent to [facility] for   CT chest-  7/14/23  --received image--

## 2023-08-23 ENCOUNTER — HOSPITAL ENCOUNTER (EMERGENCY)
Facility: CLINIC | Age: 73
Discharge: HOME | End: 2023-08-23
Attending: FAMILY MEDICINE | Admitting: FAMILY MEDICINE
Payer: COMMERCIAL

## 2023-08-23 VITALS
SYSTOLIC BLOOD PRESSURE: 151 MMHG | OXYGEN SATURATION: 95 % | HEART RATE: 78 BPM | DIASTOLIC BLOOD PRESSURE: 64 MMHG | BODY MASS INDEX: 25.33 KG/M2 | RESPIRATION RATE: 18 BRPM | TEMPERATURE: 98 F | HEIGHT: 65 IN | WEIGHT: 152 LBS

## 2023-08-23 DIAGNOSIS — R19.7 DIARRHEA, UNSPECIFIED TYPE: ICD-10-CM

## 2023-08-23 DIAGNOSIS — R53.1 WEAKNESS GENERALIZED: ICD-10-CM

## 2023-08-23 LAB
ABO/RH(D): NORMAL
ADV 40+41 DNA STL QL NAA+NON-PROBE: NEGATIVE
ALBUMIN SERPL BCG-MCNC: 4.7 G/DL (ref 3.5–5.2)
ALBUMIN UR-MCNC: NEGATIVE MG/DL
ALP SERPL-CCNC: 74 U/L (ref 35–104)
ALT SERPL W P-5'-P-CCNC: 45 U/L (ref 0–50)
ANION GAP SERPL CALCULATED.3IONS-SCNC: 13 MMOL/L (ref 7–15)
ANTIBODY SCREEN: NEGATIVE
APPEARANCE UR: CLEAR
APTT PPP: 24 SECONDS (ref 22–38)
AST SERPL W P-5'-P-CCNC: 27 U/L (ref 0–45)
ASTRO TYP 1-8 RNA STL QL NAA+NON-PROBE: NEGATIVE
BASOPHILS # BLD AUTO: 0 10E3/UL (ref 0–0.2)
BASOPHILS NFR BLD AUTO: 0 %
BILIRUB SERPL-MCNC: 0.6 MG/DL
BILIRUB UR QL STRIP: NEGATIVE
BUN SERPL-MCNC: 6.9 MG/DL (ref 8–23)
C CAYETANENSIS DNA STL QL NAA+NON-PROBE: NEGATIVE
C DIFF TOX B STL QL: NEGATIVE
CALCIUM SERPL-MCNC: 9.7 MG/DL (ref 8.8–10.2)
CAMPYLOBACTER DNA SPEC NAA+PROBE: NEGATIVE
CHLORIDE SERPL-SCNC: 102 MMOL/L (ref 98–107)
COLOR UR AUTO: ABNORMAL
CREAT SERPL-MCNC: 0.52 MG/DL (ref 0.51–0.95)
CRYPTOSP DNA STL QL NAA+NON-PROBE: NEGATIVE
DEPRECATED HCO3 PLAS-SCNC: 27 MMOL/L (ref 22–29)
E COLI O157 DNA STL QL NAA+NON-PROBE: NORMAL
E HISTOLYT DNA STL QL NAA+NON-PROBE: NEGATIVE
EAEC ASTA GENE ISLT QL NAA+PROBE: NEGATIVE
EC STX1+STX2 GENES STL QL NAA+NON-PROBE: NEGATIVE
EOSINOPHIL # BLD AUTO: 0.1 10E3/UL (ref 0–0.7)
EOSINOPHIL NFR BLD AUTO: 1 %
EPEC EAE GENE STL QL NAA+NON-PROBE: NEGATIVE
ERYTHROCYTE [DISTWIDTH] IN BLOOD BY AUTOMATED COUNT: 14.6 % (ref 10–15)
ETEC LTA+ST1A+ST1B TOX ST NAA+NON-PROBE: NEGATIVE
G LAMBLIA DNA STL QL NAA+NON-PROBE: NEGATIVE
GFR SERPL CREATININE-BSD FRML MDRD: >90 ML/MIN/1.73M2
GLUCOSE SERPL-MCNC: 106 MG/DL (ref 70–99)
GLUCOSE UR STRIP-MCNC: NEGATIVE MG/DL
HCT VFR BLD AUTO: 45.5 % (ref 35–47)
HGB BLD-MCNC: 15.3 G/DL (ref 11.7–15.7)
HGB UR QL STRIP: ABNORMAL
IMM GRANULOCYTES # BLD: 0 10E3/UL
IMM GRANULOCYTES NFR BLD: 1 %
INR PPP: 0.99 (ref 0.85–1.15)
KETONES UR STRIP-MCNC: 10 MG/DL
LACTATE SERPL-SCNC: 1.3 MMOL/L (ref 0.7–2)
LEUKOCYTE ESTERASE UR QL STRIP: NEGATIVE
LYMPHOCYTES # BLD AUTO: 0.8 10E3/UL (ref 0.8–5.3)
LYMPHOCYTES NFR BLD AUTO: 11 %
MCH RBC QN AUTO: 28.8 PG (ref 26.5–33)
MCHC RBC AUTO-ENTMCNC: 33.6 G/DL (ref 31.5–36.5)
MCV RBC AUTO: 86 FL (ref 78–100)
MONOCYTES # BLD AUTO: 0.4 10E3/UL (ref 0–1.3)
MONOCYTES NFR BLD AUTO: 5 %
MUCOUS THREADS #/AREA URNS LPF: PRESENT /LPF
NEUTROPHILS # BLD AUTO: 6 10E3/UL (ref 1.6–8.3)
NEUTROPHILS NFR BLD AUTO: 82 %
NITRATE UR QL: NEGATIVE
NOROVIRUS GI+II RNA STL QL NAA+NON-PROBE: NEGATIVE
NRBC # BLD AUTO: 0 10E3/UL
NRBC BLD AUTO-RTO: 0 /100
P SHIGELLOIDES DNA STL QL NAA+NON-PROBE: NEGATIVE
PH UR STRIP: 6.5 [PH] (ref 5–7)
PLATELET # BLD AUTO: 236 10E3/UL (ref 150–450)
POTASSIUM SERPL-SCNC: 3.3 MMOL/L (ref 3.4–5.3)
PROT SERPL-MCNC: 7.2 G/DL (ref 6.4–8.3)
RBC # BLD AUTO: 5.32 10E6/UL (ref 3.8–5.2)
RBC URINE: 1 /HPF
RVA RNA STL QL NAA+NON-PROBE: NEGATIVE
SALMONELLA SP RPOD STL QL NAA+PROBE: NEGATIVE
SAPO I+II+IV+V RNA STL QL NAA+NON-PROBE: NEGATIVE
SHIGELLA SP+EIEC IPAH ST NAA+NON-PROBE: NEGATIVE
SODIUM SERPL-SCNC: 142 MMOL/L (ref 136–145)
SP GR UR STRIP: 1.01 (ref 1–1.03)
SPECIMEN EXPIRATION DATE: NORMAL
SQUAMOUS EPITHELIAL: <1 /HPF
UROBILINOGEN UR STRIP-MCNC: NORMAL MG/DL
V CHOLERAE DNA SPEC QL NAA+PROBE: NEGATIVE
VIBRIO DNA SPEC NAA+PROBE: NEGATIVE
WBC # BLD AUTO: 7.4 10E3/UL (ref 4–11)
WBC URINE: 1 /HPF
Y ENTEROCOL DNA STL QL NAA+PROBE: NEGATIVE

## 2023-08-23 PROCEDURE — 87493 C DIFF AMPLIFIED PROBE: CPT | Performed by: FAMILY MEDICINE

## 2023-08-23 PROCEDURE — C9113 INJ PANTOPRAZOLE SODIUM, VIA: HCPCS | Mod: JZ | Performed by: FAMILY MEDICINE

## 2023-08-23 PROCEDURE — 83605 ASSAY OF LACTIC ACID: CPT | Performed by: FAMILY MEDICINE

## 2023-08-23 PROCEDURE — 99284 EMERGENCY DEPT VISIT MOD MDM: CPT | Mod: 25

## 2023-08-23 PROCEDURE — 80053 COMPREHEN METABOLIC PANEL: CPT | Performed by: FAMILY MEDICINE

## 2023-08-23 PROCEDURE — 85610 PROTHROMBIN TIME: CPT | Performed by: FAMILY MEDICINE

## 2023-08-23 PROCEDURE — 85730 THROMBOPLASTIN TIME PARTIAL: CPT | Performed by: FAMILY MEDICINE

## 2023-08-23 PROCEDURE — 81001 URINALYSIS AUTO W/SCOPE: CPT | Performed by: FAMILY MEDICINE

## 2023-08-23 PROCEDURE — 258N000003 HC RX IP 258 OP 636

## 2023-08-23 PROCEDURE — 36415 COLL VENOUS BLD VENIPUNCTURE: CPT | Performed by: FAMILY MEDICINE

## 2023-08-23 PROCEDURE — 250N000011 HC RX IP 250 OP 636: Mod: JZ | Performed by: FAMILY MEDICINE

## 2023-08-23 PROCEDURE — 96374 THER/PROPH/DIAG INJ IV PUSH: CPT

## 2023-08-23 PROCEDURE — 96361 HYDRATE IV INFUSION ADD-ON: CPT

## 2023-08-23 PROCEDURE — 99284 EMERGENCY DEPT VISIT MOD MDM: CPT | Performed by: FAMILY MEDICINE

## 2023-08-23 PROCEDURE — 87507 IADNA-DNA/RNA PROBE TQ 12-25: CPT | Performed by: FAMILY MEDICINE

## 2023-08-23 PROCEDURE — 86901 BLOOD TYPING SEROLOGIC RH(D): CPT | Performed by: FAMILY MEDICINE

## 2023-08-23 PROCEDURE — 85025 COMPLETE CBC W/AUTO DIFF WBC: CPT | Performed by: FAMILY MEDICINE

## 2023-08-23 PROCEDURE — 96375 TX/PRO/DX INJ NEW DRUG ADDON: CPT

## 2023-08-23 RX ORDER — LOPERAMIDE HYDROCHLORIDE 2 MG/1
2 TABLET ORAL 4 TIMES DAILY PRN
Qty: 15 TABLET | Refills: 0 | Status: SHIPPED | OUTPATIENT
Start: 2023-08-23 | End: 2024-02-23

## 2023-08-23 RX ORDER — SODIUM CHLORIDE 9 MG/ML
INJECTION, SOLUTION INTRAVENOUS CONTINUOUS
Status: DISCONTINUED | OUTPATIENT
Start: 2023-08-23 | End: 2023-08-23 | Stop reason: HOSPADM

## 2023-08-23 RX ORDER — SODIUM CHLORIDE 9 MG/ML
INJECTION, SOLUTION INTRAVENOUS
Status: COMPLETED
Start: 2023-08-23 | End: 2023-08-23

## 2023-08-23 RX ORDER — ONDANSETRON 4 MG/1
8 TABLET, ORALLY DISINTEGRATING ORAL EVERY 8 HOURS PRN
Qty: 10 TABLET | Refills: 0 | Status: SHIPPED | OUTPATIENT
Start: 2023-08-23 | End: 2023-08-26

## 2023-08-23 RX ORDER — ONDANSETRON 2 MG/ML
4 INJECTION INTRAMUSCULAR; INTRAVENOUS EVERY 30 MIN PRN
Status: DISCONTINUED | OUTPATIENT
Start: 2023-08-23 | End: 2023-08-23 | Stop reason: HOSPADM

## 2023-08-23 RX ADMIN — PANTOPRAZOLE SODIUM 40 MG: 40 INJECTION, POWDER, FOR SOLUTION INTRAVENOUS at 10:32

## 2023-08-23 RX ADMIN — ONDANSETRON 4 MG: 2 INJECTION INTRAMUSCULAR; INTRAVENOUS at 14:44

## 2023-08-23 RX ADMIN — SODIUM CHLORIDE: 9 INJECTION, SOLUTION INTRAVENOUS at 10:32

## 2023-08-23 ASSESSMENT — ACTIVITIES OF DAILY LIVING (ADL)
ADLS_ACUITY_SCORE: 35

## 2023-08-23 NOTE — DISCHARGE INSTRUCTIONS
Thank you for choosing [facility].     Please closely monitor for further symptoms. Return to the Emergency Department if you develop any new or worsening signs or symptoms.    If you received any opiate pain medications or sedatives during your visit, please do not drive for at least 8 hours.     Labs, cultures or final xray interpretations may still need to be reviewed.  We will call you if your plan of care needs to be changed.    Please follow up with your other outpatient providers for any ongoing concerns.  If you develop a fever, increasing abdominal pain, are unable to take food or fluids, or other new concerning symptoms, return for further evaluation.

## 2023-08-23 NOTE — ED NOTES
Pt reports eating 80% of 6in subway sandwich. Pt reporting nausea and one bout of loose stool approximately 30min after intake. 4mg IV zofran administered. Care team updated.

## 2023-08-23 NOTE — ED PROVIDER NOTES
[facility] EMERGENCY DEPARTMENT ([facility])    8/23/23      ED PROVIDER NOTE    History     Chief Complaint   Patient presents with    Melena     Laparoscopy last Thursday at [place]. now dark black stool since Saturday. Approx 5 stools a day     HPI  Yolande Hussein is a 73 year old female with past medical history of hypertension, osteoporosis, s/p abdominal hysterectomy (2018), and pseudomyxoma peritonei who presents to the emergency department for evaluation of melena. Patient states that she had a laparoscopy at [facility] on 8/17. 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 Patient states that she goes to  [facility] every 6 months for scans and blood work. She reports that the cancer markers had been going up but that her scans were clear. Patient states that the laparoscopy ruled out any cancer cells and that she had fat and scar tissue removed. Since then, patient is concerned as she developed diarrhea with black colored stools. Patient describes the stools as loose, coffee ground like stools. She has never had stools like this before. Patient states that she had talked to her oncology provider about her stool changes and was told to monitor and come in for evaluation if they persisted. She had five episodes of diarrhea yesterday and two episodes today prior to arrival. Patient has also noted fatigue and dizziness. She states that she has not taken her blood pressure medication since the procedure. She does not have any new abdominal pain since the surgery. Patient states that she had colonoscopy prep the night before her surgery but that she did not have any colon evaluation during the procedure. She states that she has been taking Tylenol every 4-6 hours along with the rest of her discharge medications. She was given antibiotics. She has been eating normally. No emesis. No other symptoms noted.     Past Medical History  Past Medical History:    Diagnosis Date    Asthma     Breast atypical ductal hyperplasia-- Right 06/13/2002    Lumpectomy.  Problem list name updated by automated process. Provider to review and confirm    Dysphonia 09/08/2014    Eczema 10/15/2012    Gastro-oesophageal reflux disease     HLD (hyperlipidemia)     Hypertension     Kidney stone     Low grade mucinous neoplasm of appendix     Menopausal state -- age 49 08/13/2012    With NEGATIVE HR HPV neg on Pap today, there is NO further need for routine pap screens.       Neck stiffness 11/03/2014    Nephrolithiasis 05/12/2014    Nonsenile cataract     Shoulder pain 03/30/2009    Controlled with stretching, unless lapses or overworks 8/2012     Thoracalgia 03/30/2009    TMJ (temporomandibular joint syndrome) 01/18/2012    Jaw PT and Mouth guard helping! 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  Increase from 2x/wk to 3xs/wk.      Past Surgical History:   Procedure Laterality Date    APPENDECTOMY OPEN N/A 12/27/2018    Procedure: Appendectomy open;  Surgeon: Evelyne Yung MD;  Location: [facility] OR    BREAST BIOPSY, CORE RT/LT      R-- precancer cells    COLONOSCOPY      COLONOSCOPY N/A 01/28/2020    Procedure: COLONOSCOPY, WITH POLYPECTOMY AND BIOPSY;  Surgeon: Aparna Soriano MD;  [facility]    EXPLORE NECK N/A 08/19/2014    Procedure: EXPLORE NECK;  Surgeon: Alea Jacobs MD;  [facility] OR    EXTRACORPOREAL SHOCK WAVE LITHOTRIPSY (ESWL)  02/26/2014    Procedure: EXTRACORPOREAL SHOCK WAVE LITHOTRIPSY (ESWL);  Left Kidney Extracorporeal Shockwave Lithotripsy;  Surgeon: Sabas Choi MD;  Location: [facility] OR    HYSTERECTOMY TOTAL ABDOMINAL, BILATERAL SALPINGO-OOPHORECTOMY, COMBINED Bilateral 12/27/2018    Procedure: TOTAL ABDOMINAL HYSTERECTOMY WITH BILATERAL SALPINGO-OOPHORECTOMY, appendectomy, pelvic washings;  Surgeon: Evelyne Yung MD;  Location: [facility]  OR    LUMPECTOMY BREAST  age 48    R -- margins clear -- declined tamoxofen    PARATHYROIDECTOMY N/A 08/19/2014    Procedure: PARATHYROIDECTOMY;  Surgeon: Alea Jacobs MD;  [facility] OR    PHACOEMULSIFICATION CLEAR CORNEA WITH STANDARD INTRAOCULAR LENS IMPLANT Left 03/19/2021    Procedure: LEFT EYE CATARACT REMOVAL WITH INTRAOCULAR LENS IMPLANT;  Surgeon: Bing Horn MD;  [facility]    PHACOEMULSIFICATION CLEAR CORNEA WITH STANDARD INTRAOCULAR LENS IMPLANT Right 04/02/2021    Procedure: RIGHT EYE CATARACT REMOVAL WITH INTRAOCULAR LENS IMPLANT;  Surgeon: Bing Horn MD;  Location: [facility] OR    STERILIZATION  1984    PP b/4 discharge     TUBAL LIGATION       loperamide (IMODIUM A-D) 2 MG tablet  ondansetron (ZOFRAN ODT) 4 MG ODT tab  acetaminophen (TYLENOL) 500 MG tablet  albuterol (PROVENTIL) (2.5 MG/3ML) 0.083% neb solution  hydrochlorothiazide (HYDRODIURIL) 25 MG tablet  losartan (COZAAR) 50 MG tablet  minoxidil (ROGAINE) 2 % external solution  mometasone-formoterol (DULERA) 200-5 MCG/ACT inhaler      Allergies   Allergen Reactions    Macrobid [Nitrofurantoin] Hives    Aspirin      Tight breathing    Macrobid [Nitrofuran Derivatives]      Family History  Family History   Problem Relation Age of Onset    Asthma Mother     Diabetes Mother 80        diet and pill tx    Hypertension Mother     Macular Degeneration Mother     Abdominal Aortic Aneurysm Mother     Heart Failure Mother 70        CHF    Asthma Father     Hypertension Father     Alzheimer Disease Father 70    Skin Cancer Father     Heart Failure Sister 60        CHF - mild    Hypertension Sister     Macular Degeneration Sister     Asthma Maternal Grandfather     Asthma Daughter     Glaucoma No family hx of     Cancer No family hx of     Amblyopia No family hx of     Retinal detachment No family hx of      Social History   Social History     Tobacco Use    Smoking status: Never    Smokeless tobacco: Never   Substance Use Topics     "Alcohol use: Yes     Comment: rare     Drug use: No      Past medical history, past surgical history, medications, allergies, family history, and social history were reviewed with the patient. No additional pertinent items.      A medically appropriate review of systems was performed with pertinent positives and negatives noted in the HPI, and all other systems negative.    Physical Exam     BP: (!) 161/87 (did not take BP meds today)  Pulse: 81  Temp: 98  F (36.7  C)  Resp: 18  Height: 165.1 cm (5' 5\")  Weight: 68.9 kg (152 lb)  SpO2: 97 %    Physical Exam  Vitals and nursing note reviewed. Exam conducted with a chaperone present.   Constitutional:       General: She is not in acute distress.     Appearance: Normal appearance. She is not diaphoretic.   HENT:      Head: Atraumatic.      Mouth/Throat:      Mouth: Mucous membranes are moist.   Eyes:      General: No scleral icterus.     Conjunctiva/sclera: Conjunctivae normal.   Cardiovascular:      Rate and Rhythm: Normal rate.      Heart sounds: Normal heart sounds.   Pulmonary:      Effort: No respiratory distress.      Breath sounds: Normal breath sounds.   Abdominal:      General: Abdomen is flat.      Tenderness: There is generalized abdominal tenderness. There is no guarding or rebound.      Comments: Patient has bruising near the incisions on her abdomen but no drainage or signs of wound infection.   Genitourinary:     Exam position: Knee-chest position.      Rectum: Guaiac result negative. No mass, tenderness, anal fissure or internal hemorrhoid.   Musculoskeletal:      Cervical back: Neck supple.   Skin:     General: Skin is warm.      Findings: No rash.   Neurological:      Mental Status: She is alert.           ED Course, Procedures, & Data      Procedures                 Results for orders placed or performed during the hospital encounter of 08/23/23   [place] Draw *Canceled*     Status: None ()    Narrative    The following orders were created for panel " order [place] Draw.  Procedure                               Abnormality         Status                     ---------                               -----------         ------                       Please view results for these tests on the individual orders.   INR     Status: Normal   Result Value Ref Range    INR 0.99 0.85 - 1.15   Partial thromboplastin time     Status: Normal   Result Value Ref Range    aPTT 24 22 - 38 Seconds   Comprehensive metabolic panel     Status: Abnormal   Result Value Ref Range    Sodium 142 136 - 145 mmol/L    Potassium 3.3 (L) 3.4 - 5.3 mmol/L    Chloride 102 98 - 107 mmol/L    Carbon Dioxide (CO2) 27 22 - 29 mmol/L    Anion Gap 13 7 - 15 mmol/L    Urea Nitrogen 6.9 (L) 8.0 - 23.0 mg/dL    Creatinine 0.52 0.51 - 0.95 mg/dL    Calcium 9.7 8.8 - 10.2 mg/dL    Glucose 106 (H) 70 - 99 mg/dL    Alkaline Phosphatase 74 35 - 104 U/L    AST 27 0 - 45 U/L    ALT 45 0 - 50 U/L    Protein Total 7.2 6.4 - 8.3 g/dL    Albumin 4.7 3.5 - 5.2 g/dL    Bilirubin Total 0.6 <=1.2 mg/dL    GFR Estimate >90 >60 mL/min/1.73m2   Lactic acid whole blood     Status: Normal   Result Value Ref Range    Lactic Acid 1.3 0.7 - 2.0 mmol/L   UA with Microscopic reflex to Culture     Status: Abnormal    Specimen: Urine, Midstream   Result Value Ref Range    Color Urine Light Yellow Colorless, Straw, Light Yellow, Yellow    Appearance Urine Clear Clear    Glucose Urine Negative Negative mg/dL    Bilirubin Urine Negative Negative    Ketones Urine 10 (A) Negative mg/dL    Specific Gravity Urine 1.013 1.003 - 1.035    Blood Urine Trace (A) Negative    pH Urine 6.5 5.0 - 7.0    Protein Albumin Urine Negative Negative mg/dL    Urobilinogen Urine Normal Normal, 2.0 mg/dL    Nitrite Urine Negative Negative    Leukocyte Esterase Urine Negative Negative    Mucus Urine Present (A) None Seen /LPF    RBC Urine 1 <=2 /HPF    WBC Urine 1 <=5 /HPF    Squamous Epithelials Urine <1 <=1 /HPF    Narrative    Urine Culture not indicated    CBC with platelets and differential     Status: Abnormal   Result Value Ref Range    WBC Count 7.4 4.0 - 11.0 10e3/uL    RBC Count 5.32 (H) 3.80 - 5.20 10e6/uL    Hemoglobin 15.3 11.7 - 15.7 g/dL    Hematocrit 45.5 35.0 - 47.0 %    MCV 86 78 - 100 fL    MCH 28.8 26.5 - 33.0 pg    MCHC 33.6 31.5 - 36.5 g/dL    RDW 14.6 10.0 - 15.0 %    Platelet Count 236 150 - 450 10e3/uL    % Neutrophils 82 %    % Lymphocytes 11 %    % Monocytes 5 %    % Eosinophils 1 %    % Basophils 0 %    % Immature Granulocytes 1 %    NRBCs per 100 WBC 0 <1 /100    Absolute Neutrophils 6.0 1.6 - 8.3 10e3/uL    Absolute Lymphocytes 0.8 0.8 - 5.3 10e3/uL    Absolute Monocytes 0.4 0.0 - 1.3 10e3/uL    Absolute Eosinophils 0.1 0.0 - 0.7 10e3/uL    Absolute Basophils 0.0 0.0 - 0.2 10e3/uL    Absolute Immature Granulocytes 0.0 <=0.4 10e3/uL    Absolute NRBCs 0.0 10e3/uL   Adult Type and Screen     Status: None   Result Value Ref Range    ABO/RH(D) O POS     Antibody Screen Negative Negative    SPECIMEN EXPIRATION DATE [id number]    C. difficile Toxin B PCR with reflex to C. difficile Antigen and Toxins A/B EIA     Status: Normal    Specimen: Per Rectum; Stool   Result Value Ref Range    C Difficile Toxin B by PCR Negative Negative    Narrative    The [employer] Xpert C. difficile Assay, performed on the [employer]  Instrument Systems, is a qualitative in vitro diagnostic test for rapid detection of toxin B gene sequences from unformed (liquid or soft) stool specimens collected from patients suspected of having Clostridioides difficile infection (CDI). The test utilizes automated real-time polymerase chain reaction (PCR) to detect toxin gene sequences associated with toxin producing C. difficile. The Xpert C. difficile Assay is intended as an aid in the diagnosis of CDI.   Enteric Bacteria and Virus Panel PCR     Status: Normal    Specimen: Per Rectum; Stool   Result Value Ref Range    Campylobacter species Negative Negative    Salmonella  species Negative Negative    Vibrio species Negative Negative    Vibrio cholerae Negative Negative    Yersinia enterocolitica Negative Negative    Enteropathogenic E. coli (EPEC) Negative Negative, NA    Shiga-like toxin-producing E. coli (STEC) Negative Negative    Shigella/Enteroinvasive E. coli (EIEC) Negative Negative    Cryptosporidium species Negative Negative    Giardia lamblia Negative Negative    Norovirus Gl/Gll Negative Negative    Rotavirus A Negative Negative    Plesiomonas shigelloides Negative Negative    Enteroaggregative E. coli (EAEC) Negative Negative    Enterotoxigenic E. coli (ETEC) Negative Negative    E. coli O157 NA Negative, NA    Cyclospora cayetanensis Negative Negative    Entamoeba histolytica Negative Negative    Adenovirus F40/41 Negative Negative    Astrovirus Negative Negative    Sapovirus Negative Negative    Narrative    Assay performed using the FDA-cleared [employer] Cloud GI Panel from [employer].  A negative result should not rule out infection in patients with a probability for gastrointestinal infection. The assay does not test for all potential infectious agents of diarrheal disease.  Positive results do not distinguish between a viable or replicating organism and the presence of a nonviable organism or nucleic acid, nor do they exclude the possibility of coinfection by organisms not in the panel.  Results are intended to aid in the diagnosis of illness and are meant to be used in conjunction with other clinical findings.  This test has been verified and is performed by the Infect[facility] Diagnostic Laboratory at [facility]. This laboratory is certified under the Clinical Laboratory Improvement Amendments of 1988 (CLIA-88) as qualified to perform high complexity clinical laboratory testing.   CBC with platelets differential     Status: Abnormal    Narrative    The following orders were created for panel order CBC with platelets differential.  Procedure                                Abnormality         Status                     ---------                               -----------         ------                     CBC with platelets and d...[[id number]]  Abnormal            Final result                 Please view results for these tests on the individual orders.   ABO/Rh type and screen     Status: None    Narrative    The following orders were created for panel order ABO/Rh type and screen.  Procedure                               Abnormality         Status                     ---------                               -----------         ------                     Adult Type and Screen[[id number]]                            Final result                 Please view results for these tests on the individual orders.     Medications   sodium chloride 0.9% infusion ( Intravenous $New Bag 8/23/23 1032)   ondansetron (ZOFRAN) injection 4 mg (4 mg Intravenous $Given 8/23/23 1444)   pantoprazole (PROTONIX) IV push injection 40 mg (40 mg Intravenous $Given 8/23/23 1032)     Labs Ordered and Resulted from Time of ED Arrival to Time of ED Departure   COMPREHENSIVE METABOLIC PANEL - Abnormal       Result Value    Sodium 142      Potassium 3.3 (*)     Chloride 102      Carbon Dioxide (CO2) 27      Anion Gap 13      Urea Nitrogen 6.9 (*)     Creatinine 0.52      Calcium 9.7      Glucose 106 (*)     Alkaline Phosphatase 74      AST 27      ALT 45      Protein Total 7.2      Albumin 4.7      Bilirubin Total 0.6      GFR Estimate >90     ROUTINE UA WITH MICROSCOPIC REFLEX TO CULTURE - Abnormal    Color Urine Light Yellow      Appearance Urine Clear      Glucose Urine Negative      Bilirubin Urine Negative      Ketones Urine 10 (*)     Specific Gravity Urine 1.013      Blood Urine Trace (*)     pH Urine 6.5      Protein Albumin Urine Negative      Urobilinogen Urine Normal      Nitrite Urine Negative      Leukocyte Esterase Urine Negative      Mucus Urine Present (*)     RBC Urine 1       WBC Urine 1      Squamous Epithelials Urine <1     CBC WITH PLATELETS AND DIFFERENTIAL - Abnormal    WBC Count 7.4      RBC Count 5.32 (*)     Hemoglobin 15.3      Hematocrit 45.5      MCV 86      MCH 28.8      MCHC 33.6      RDW 14.6      Platelet Count 236      % Neutrophils 82      % Lymphocytes 11      % Monocytes 5      % Eosinophils 1      % Basophils 0      % Immature Granulocytes 1      NRBCs per 100 WBC 0      Absolute Neutrophils 6.0      Absolute Lymphocytes 0.8      Absolute Monocytes 0.4      Absolute Eosinophils 0.1      Absolute Basophils 0.0      Absolute Immature Granulocytes 0.0      Absolute NRBCs 0.0     INR - Normal    INR 0.99     PARTIAL THROMBOPLASTIN TIME - Normal    aPTT 24     LACTIC ACID WHOLE BLOOD - Normal    Lactic Acid 1.3     C. DIFFICILE TOXIN B PCR WITH REFLEX TO C. DIFFICILE ANTIGEN AND TOXINS A/B EIA - Normal    C Difficile Toxin B by PCR Negative     ENTERIC BACTERIA AND VIRUS PANEL BY PCR - Normal    Campylobacter species Negative      Salmonella species Negative      Vibrio species Negative      Vibrio cholerae Negative      Yersinia enterocolitica Negative      Enteropathogenic E. coli (EPEC) Negative      Shiga-like toxin-producing E. coli (STEC) Negative      Shigella/Enteroinvasive E. coli (EIEC) Negative      Cryptosporidium species Negative      Giardia lamblia Negative      Norovirus Gl/Gll Negative      Rotavirus A Negative      Plesiomonas shigelloides Negative      Enteroaggregative E. coli (EAEC) Negative      Enterotoxigenic E. coli (ETEC) Negative      E. coli O157 NA      Cyclospora cayetanensis Negative      Entamoeba histolytica Negative      Adenovirus F40/41 Negative      Astrovirus Negative      Sapovirus Negative     TYPE AND SCREEN, ADULT    ABO/RH(D) O POS      Antibody Screen Negative      SPECIMEN EXPIRATION DATE [id number]     ABO/RH TYPE AND SCREEN     No orders to display          Critical care was not performed.     Medical Decision Making  The  patient's presentation was of moderate complexity (an acute illness with systemic symptoms).    The patient's evaluation involved:  review of external note(s) from 1 sources (reviewed documentation from recent visits and surgery at the [facility])  ordering and/or review of 3+ test(s) in this encounter (see separate area of note for details)  discussion of management or test interpretation with another health professional (discussed with surgery department at [place])    The patient's management necessitated moderate risk (prescription drug management including medications given in the ED) and high risk (a decision regarding hospitalization).    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  Differential diagnosis includes upper GI bleed related to gastritis, ulcer disease, duodenitis, esophagitis, swallowed blood, malignancy, also includes various causes of diarrhea including C. difficile colitis or other infectious gastroenteritis, diverticulitis, AV malformation, other colitis, ischemia.  On exam the patient is slightly hypertensive with otherwise unremarkable vitals.  She is pale but in no other distress.  Her cardiovascular exam is normal.  She has healing incisions on her abdomen with some bruising around the incisions which appear normal postoperative appearance.  She has mild generalized tenderness of the abdomen consistent with being postoperative.  Her rectal exam there is no obvious fissures or hemorrhoids, no stool in the rectal vault, there is some mucus which is Hemoccult negative.  Currently produced a loose black stool which was Hemoccult negative.  This was sent for C. difficile and enteric pathogen's both which are negative.  Serial exam continues to be nonsurgical, however she  attempted to eat and developed nausea malaise and had another loose stool.  I discussed and reviewed her labs with the surgical team at [place], no new recommendations made, they defer to my clinical judgment here.  Offered the patient admission to the observation unit, she would prefer a trial at home of symptomatic treatment and close follow-up.  This is clinically reasonable, will provide Imodium and Zofran as needed as well as close indications for return provided..    I have reviewed the nursing notes. I have reviewed the findings, diagnosis, plan and need for follow up with the patient.    New Prescriptions    LOPERAMIDE (IMODIUM A-D) 2 MG TABLET    Take 1 tablet (2 mg) by mouth 4 times daily as needed for diarrhea    ONDANSETRON (ZOFRAN ODT) 4 MG ODT TAB    Take 2 tablets (8 mg) by mouth every 8 hours as needed for nausea       Final diagnoses:   Diarrhea, unspecified type   Weakness generalized     I, Sapphire Beltran, am serving as a trained medical scribe to document services personally performed by Kilo Aguilar MD based on the provider's statements to me on August 23, 2023.  This document has been checked and approved by the attending provider.    I, Kilo Aguilar MD, was physically present and have reviewed and verified the accuracy of this note documented by Sapphire Beltran, medical scribe.      Kilo Aguilar MD  [facility] EMERGENCY DEPARTMENT  8/23/2023     Kilo Aguilar MD  08/23/23 [id number]     normal...

## 2023-08-23 NOTE — ED TRIAGE NOTES
Laparoscopy last Thursday at [place]. now dark black stool since Saturday.     Triage Assessment       Row Name 08/23/23 0947       Triage Assessment (Adult)    Airway WDL WDL       Respiratory WDL    Respiratory WDL WDL       Skin Circulation/Temperature WDL    Skin Circulation/Temperature WDL WDL       Cardiac WDL    Cardiac WDL WDL       Peripheral/Neurovascular WDL    Peripheral Neurovascular WDL WDL       Cognitive/Neuro/Behavioral WDL    Cognitive/Neuro/Behavioral WDL WDL

## 2023-08-24 ENCOUNTER — TELEPHONE (OUTPATIENT)
Dept: PULMONOLOGY | Facility: CLINIC | Age: 73
End: 2023-08-24
Payer: COMMERCIAL

## 2023-09-11 ENCOUNTER — PRE VISIT (OUTPATIENT)
Dept: PULMONOLOGY | Facility: CLINIC | Age: 73
End: 2023-09-11

## 2023-09-11 ENCOUNTER — OFFICE VISIT (OUTPATIENT)
Dept: PULMONOLOGY | Facility: CLINIC | Age: 73
End: 2023-09-11
Attending: INTERNAL MEDICINE
Payer: COMMERCIAL

## 2023-09-11 VITALS
DIASTOLIC BLOOD PRESSURE: 78 MMHG | SYSTOLIC BLOOD PRESSURE: 152 MMHG | WEIGHT: 149 LBS | HEIGHT: 65 IN | HEART RATE: 95 BPM | BODY MASS INDEX: 24.83 KG/M2 | RESPIRATION RATE: 16 BRPM | OXYGEN SATURATION: 96 %

## 2023-09-11 DIAGNOSIS — R91.8 PULMONARY NODULES: Primary | ICD-10-CM

## 2023-09-11 DIAGNOSIS — J45.40 MODERATE PERSISTENT ASTHMA: ICD-10-CM

## 2023-09-11 DIAGNOSIS — J45.40 MODERATE PERSISTENT ASTHMA WITHOUT COMPLICATION: ICD-10-CM

## 2023-09-11 PROCEDURE — 94729 DIFFUSING CAPACITY: CPT | Performed by: INTERNAL MEDICINE

## 2023-09-11 PROCEDURE — G0463 HOSPITAL OUTPT CLINIC VISIT: HCPCS | Performed by: INTERNAL MEDICINE

## 2023-09-11 PROCEDURE — 94726 PLETHYSMOGRAPHY LUNG VOLUMES: CPT | Performed by: INTERNAL MEDICINE

## 2023-09-11 PROCEDURE — 99204 OFFICE O/P NEW MOD 45 MIN: CPT | Mod: 25 | Performed by: INTERNAL MEDICINE

## 2023-09-11 PROCEDURE — 94060 EVALUATION OF WHEEZING: CPT | Performed by: INTERNAL MEDICINE

## 2023-09-11 ASSESSMENT — PAIN SCALES - GENERAL: PAINLEVEL: NO PAIN (0)
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
## 2023-09-12 LAB
DLCOUNC-%PRED-PRE: 114 %
DLCOUNC-PRE: 21.68 ML/MIN/MMHG
DLCOUNC-PRED: 18.87 ML/MIN/MMHG
ERV-%PRED-PRE: 54 %
ERV-PRE: 0.38 L
ERV-PRED: 0.69 L
EXPTIME-PRE: 9.45 SEC
FEF2575-%PRED-POST: 61 %
FEF2575-%PRED-PRE: 60 %
FEF2575-POST: 1 L/SEC
FEF2575-PRE: 0.99 L/SEC
FEF2575-PRED: 1.63 L/SEC
FEFMAX-%PRED-PRE: 100 %
FEFMAX-PRE: 5.37 L/SEC
FEFMAX-PRED: 5.32 L/SEC
FEV1-%PRED-PRE: 78 %
FEV1-PRE: 1.53 L
FEV1FEV6-PRE: 71 %
FEV1FEV6-PRED: 78 %
FEV1FVC-PRE: 70 %
FEV1FVC-PRED: 78 %
FEV1SVC-PRE: 64 %
FEV1SVC-PRED: 69 %
FIFMAX-PRE: 4.99 L/SEC
FRCPLETH-%PRED-PRE: 75 %
FRCPLETH-PRE: 2.17 L
FRCPLETH-PRED: 2.88 L
FVC-%PRED-PRE: 87 %
FVC-PRE: 2.2 L
FVC-PRED: 2.52 L
IC-%PRED-PRE: 96 %
IC-PRE: 2.01 L
IC-PRED: 2.1 L
RVPLETH-%PRED-PRE: 85 %
RVPLETH-PRE: 1.79 L
RVPLETH-PRED: 2.1 L
TLCPLETH-%PRED-PRE: 83 %
TLCPLETH-PRE: 4.19 L
TLCPLETH-PRED: 5.01 L
VA-%PRED-PRE: 75 %
VA-PRE: 3.46 L
VC-%PRED-PRE: 84 %
VC-PRE: 2.4 L
VC-PRED: 2.84 L

## 2023-09-14 ENCOUNTER — TELEPHONE (OUTPATIENT)
Dept: PULMONOLOGY | Facility: CLINIC | Age: 73
End: 2023-09-14
Payer: COMMERCIAL

## 2023-09-14 NOTE — TELEPHONE ENCOUNTER
Patient Contacted for the patient to call back and schedule the following:    Appointment type: RTN  Provider: KATIE  Return date: 09/09/24  Specialty phone number: [phone]  Additional appointment(s) needed: N/A   Additonal Notes: N/A

## 2023-09-27 ENCOUNTER — PRE VISIT (OUTPATIENT)
Dept: PULMONOLOGY | Facility: CLINIC | Age: 73
End: 2023-09-27

## 2023-10-03 ENCOUNTER — E-VISIT (OUTPATIENT)
Dept: URGENT CARE | Facility: CLINIC | Age: 73
End: 2023-10-03
Payer: COMMERCIAL

## 2023-10-03 ENCOUNTER — NURSE TRIAGE (OUTPATIENT)
Dept: TRANSPLANT | Facility: CLINIC | Age: 73
End: 2023-10-03

## 2023-10-03 DIAGNOSIS — U07.1 COVID: Primary | ICD-10-CM

## 2023-10-03 DIAGNOSIS — U07.1 INFECTION DUE TO 2019 NOVEL CORONAVIRUS: Primary | ICD-10-CM

## 2023-10-03 PROCEDURE — 99207 PR NON-BILLABLE SERV PER CHARTING: CPT | Performed by: EMERGENCY MEDICINE

## 2023-10-03 NOTE — TELEPHONE ENCOUNTER
[facility] R[facility],  Patient calling  Requesting paxlovid treatment  Positive Covid test today, symptoms began Sunday evening  Pulse oximeter reading 94-95%  Currently taking asthma medications to assist with symptoms   Denies severe SOB/difficulty breathing at this time  Will call back if new or worsening symptoms in the mean time  Brisa Solano RN      Reason for Disposition   [1] COVID-19 diagnosed by positive rapid or PCR lab test AND [2] mild symptoms (cough, fever or others) AND [3] no complications or SOB    Additional Information   Negative: Severe difficulty breathing (struggling for each breath, unable to speak or cry, making grunting noises with each breath, severe retractions) (Triage tip: Listen to the child's breathing.)   Negative: Slow, shallow, weak breathing   Negative: [1] Bluish (or gray) lips or face now AND [2] persists when not coughing   Negative: Difficult to awaken or not alert when awake (confusion)   Negative: Very weak (doesn't move or make eye contact)   Negative: Sounds like a life-threatening emergency to the triager   Negative: [1] Had lab test confirmed COVID-19 infection within last 3 months AND [2] new-onset of COVID-19 possible symptoms AND [3] no NEW variant strains in community   Negative: [1] Stridor (harsh, raspy sound heard with breathing in) AND [2] confirmed by triager   Negative: Runny nose from nasal allergies   Negative: [1] Headache is isolated symptom (no fever) AND [2] no known COVID-19 close contact   Negative: [1] Vomiting is isolated symptom (no fever) AND [2] no known COVID-19 close contact   Negative: [1] Diarrhea is isolated symptom (no fever) AND [2] no known COVID-19 close contact   Negative: [1] COVID-19 exposure AND [2] NO symptoms   Negative: [1] COVID-19 vaccine general reaction (fever, headache, muscle aches, fatigue) AND [2] starts within 48 hours of shot (Note: vaccine does not cause respiratory symptoms. Stay here for those symptoms.)   Negative:  COVID-19 vaccine, questions about   Negative: [1] Diagnosed with influenza within the last 2 weeks by a HCP AND [2] follow-up call   Negative: [1] Household exposure to known influenza (flu test positive) AND [2] child with influenza-like symptoms   Negative: [1] Difficulty breathing confirmed by triager BUT [2] not severe (Triage tip: Listen to the child's breathing.)   Negative: Ribs are pulling in with each breath (retractions)   Negative: [1] Age < 12 weeks AND [2] fever 100.4 F (38.0 C) or higher rectally   Negative: SEVERE chest pain or pressure (excruciating)   Negative: [1] Oxygen level <92% (<90% if altitude > 5000 feet) AND [2] any trouble breathing   Negative: [1] Stridor (harsh sound with breathing in) AND [2] doesn't respond to 20 minutes of warm mist OR has occurred 2 or more times   Negative: Rapid breathing (Breaths/min > 60 if < 2 mo; > 50 if 2-12 mo; > 40 if 1-5 years; > 30 if 6-11 years; > 20 if > 12 years)   Negative: [1] MODERATE chest pain or pressure (by caller's report) AND [2] can't take a deep breath   Negative: [1] Fever AND [2] > 105 F (40.6 C) by any route OR axillary > 104 F (40 C)   Negative: [1] Shaking chills (shivering) AND [2] present constantly > 30 minutes   Negative: [1] Sore throat AND [2] complication suspected (refuses to drink, can't swallow fluids, new-onset drooling, can't move neck normally or other serious symptom)   Negative: [1] Muscle or body pains AND [2] complication suspected (can't stand, can't walk, can barely walk, can't move arm or hand normally or other serious symptom)   Negative: [1] Headache AND [2] complication suspected (stiff neck, incapacitated by pain, worst headache ever, confused, weakness or other serious symptom)   Negative: [1] Dehydration suspected AND [2] age < 1 year (signs: no urine > 8 hours AND very dry mouth, no  tears, ill-appearing, etc.)   Negative: [1] Dehydration suspected AND [2] age > 1 year (signs: no urine > 12 hours AND very dry  mouth, no tears, ill-appearing, etc.)   Negative: Child sounds very sick or weak to the triager   Negative: [1] Wheezing confirmed by triager AND [2] no trouble breathing (Exception: known asthmatic)   Negative: [1] Lips or face have turned bluish BUT [2] only during coughing fits   Negative: [1] Age < 3 months AND [2] lots of coughing   Negative: [1] Crying continuously AND [2] cannot be comforted AND [3] present > 2 hours   Negative: [1] Oxygen level <92% (90% if altitude > 5000 feet) AND [2] no trouble breathing   Negative: [1] SEVERE RISK patient (e.g., immuno-compromised, serious lung disease, on oxygen, heart disease, bedridden, etc) AND [2] suspected COVID-19 with mild symptoms (Exception: Already seen by PCP and no new or worsening symptoms.)   Negative: [1] Age less than 12 weeks AND [2] suspected COVID-19 with mild symptoms   Negative: Multisystem Inflammatory Syndrome (MIS-C) suspected (Fever AND 2 or more of the following:  widespread red rash, red eyes, red lips, red palms/soles, swollen hands/feet, abdominal pain, vomiting, diarrhea)   Negative: [1] Stridor (harsh sound with breathing in) occurred once BUT [2] not present now   Negative: [1] Continuous coughing keeps from playing or sleeping AND [2] no improvement using cough treatment per guideline   Negative: Earache or ear discharge also present   Negative: Strep throat infection suspected by triager   Negative: [1] Age 3-6 months AND [2] fever present > 24 hours AND [3] without other symptoms (no cold, cough, diarrhea, etc.)   Negative: [1] Age 6 - 24 months AND [2] fever present > 24 hours AND [3] without other symptoms (no cold, diarrhea, etc.) AND [4] fever > 102 F (39 C) by any route OR axillary > 101 F (38.3 C)   Negative: [1] Fever returns after gone for over 24 hours AND [2] symptoms worse or not improved   Negative: Fever present > 3 days (72 hours)   Negative: [1] Age > 5 years AND [2] sinus pain around cheekbone or eye (not just  congestion) AND [3] fever   Negative: [1] Influenza also widespread in the community AND [2] mild flu-like symptoms WITH FEVER AND [3] HIGH-RISK patient for complications with Flu  (See that CDC List)   Negative: [1] Age 12 and above AND [2] COVID-19 lab test positive AND [3] HIGH-RISK patient for complications with COVID-19  (See that CDC List)   Negative: [1] COVID-19 rapid test result was negative AND [2] mild symptoms (cough, fever, or others) continue   Negative: [1] COVID-19 diagnosed by positive rapid or PCR lab test AND [2] NO symptoms    Protocols used: Coronavirus (COVID-19) Diagnosed or [id number]

## 2023-10-03 NOTE — TELEPHONE ENCOUNTER
RN COVID TREATMENT VISIT  10/03/23      The patient has been triaged and does not require a higher level of care.    Yolande Hussein  73 year old  Current weight? 149 lbs    Has the patient been seen by a primary care provider at an [facility] or [facility] within the past two years? Yes.   Have you been in close proximity to/do you have a known exposure to a person with a confirmed case of influenza? No.     General treatment eligibility:  Date of positive COVID test (PCR or at home)?  10/3/23    Are you or have you been hospitalized for this COVID-19 infection? No.   Have you received monoclonal antibodies or antiviral treatment for COVID-19 since this positive test? No.   Do you have any of the following conditions that place you at risk of being very sick from COVID-19?   - Age 50 years or older  Yes, patient has at least one high risk condition as noted above.     Current COVID symptoms:   - cough  - sore throat  - congestion or runny nose  Yes. Patient has at least one symptom as selected.     How many days since symptoms started? 5 days or less. Established patient, 12 years or older weighing at least 88.2 lbs, who has symptoms that started in the past 5 days, has not been hospitalized nor received treatment already, and is at risk for being very sick from COVID-19.     Treatment eligibility by RN:  Are you currently pregnant or nursing? No  Do you have a clinically significant hypersensitivity to nirmatrelvir or ritonavir, or toxic epidermal necrolysis (TEN) or Bueno-Michael Syndrome? No  Do you have a history of hepatitis, any hepatic impairment on the Problem List (such as Child-Hernandez Class C, cirrhosis, fatty liver disease, alcoholic liver disease), or was the last liver lab (hepatic panel, ALT, AST, ALK Phos, bilirubin) elevated in the past 6 months? No  Do you have any history of severe renal impairment (eGFR < 30mL/min)? No    Is patient eligible to continue? Yes,  patient meets all eligibility requirements for the RN COVID treatment (as denoted by all no responses above).     Current Outpatient Medications   Medication Sig Dispense Refill    acetaminophen (TYLENOL) 500 MG tablet Take 500-1,000 mg by mouth every 6 hours as needed for mild pain      albuterol (PROVENTIL) (2.5 MG/3ML) 0.083% neb solution Take 1 vial (2.5 mg) by nebulization every 6 hours as needed for shortness of breath or wheezing 90 mL 3    hydrochlorothiazide (HYDRODIURIL) 25 MG tablet Take 1 tablet (25 mg) by mouth daily 90 tablet 3    loperamide (IMODIUM A-D) 2 MG tablet Take 1 tablet (2 mg) by mouth 4 times daily as needed for diarrhea (Patient not taking: Reported on 9/11/2023) 15 tablet 0    losartan (COZAAR) 50 MG tablet Take 1 tablet (50 mg) by mouth daily 90 tablet 3    minoxidil (ROGAINE) 2 % external solution Apply topically 2 times daily      mometasone-formoterol (DULERA) 200-5 MCG/ACT inhaler Inhale 2 puffs into the lungs 2 times daily 13 g 4       Medications from List 1 of the standing order (on medications that exclude the use of Paxlovid) that patient is taking: NONE. Is patient taking Marquez's Wort? No  Is patient taking Marquez's Wort or any meds from List 1? No.   Medications from List 2 of the standing order (on meds that provider needs to adjust) that patient is taking: NONE. Is patient on any of the meds from List 2? No.   Medications from List 3 of standing order (on meds that a RN needs to adjust) that patient is taking: NONE. Is patient on any meds from List 3? No.     Paxlovid has an approximate 90% reduction in hospitalization. Paxlovid can possibly cause altered sense of taste, diarrhea (loose, watery stools), high blood pressure, muscle aches.     Would patient like a Paxlovid prescription?   Yes.   Lab Results   Component Value Date    GFRESTIMATED >90 08/23/2023       Was last eGFR reduced? No, eGFR 60 or greater/ No Result on record. Patient can receive the normal renal  function dose. Paxlovid Rx sent to [place] pharmacy   preferred    Temporary change to home medications: None    All medication adjustments (holds, etc) were discussed with the patient and patient was asked to repeat back (teachback) their med adjustment.  Did patient understand med adjustment? No medication adjustments needed.         Reviewed the following instructions with the patient:    Paxlovid (nimatrelvir and ritonavir)    How it works  Two medicines (nirmatrelvir and ritonavir) are taken together. They stop the virus from growing. Less amount of virus is easier for your body to fight.    How to take  Medicine comes in a daily container with both medicine tablets. Take by mouth twice daily (once in the morning, once at night) for 5 days.  The number of tablets to take varies by patient.  Don't chew or break capsules. Swallow whole.    When to take  Take as soon as possible after positive COVID-19 test result, and within 5 days of your first symptoms.    Possible side effects  Can cause altered sense of taste, diarrhea (loose, watery stools), high blood pressure, muscle aches.    Alice Childers RN

## 2023-11-06 ENCOUNTER — OFFICE VISIT (OUTPATIENT)
Dept: FAMILY MEDICINE | Facility: CLINIC | Age: 73
End: 2023-11-06
Payer: COMMERCIAL

## 2023-11-06 VITALS
WEIGHT: 154.8 LBS | RESPIRATION RATE: 19 BRPM | BODY MASS INDEX: 25.79 KG/M2 | SYSTOLIC BLOOD PRESSURE: 149 MMHG | OXYGEN SATURATION: 96 % | HEIGHT: 65 IN | DIASTOLIC BLOOD PRESSURE: 76 MMHG | TEMPERATURE: 98.1 F | HEART RATE: 95 BPM

## 2023-11-06 DIAGNOSIS — J01.00 SUBACUTE MAXILLARY SINUSITIS: Primary | ICD-10-CM

## 2023-11-06 LAB
DEPRECATED S PYO AG THROAT QL EIA: NEGATIVE
GROUP A STREP BY PCR: NOT DETECTED

## 2023-11-06 PROCEDURE — 87651 STREP A DNA AMP PROBE: CPT | Mod: ORL | Performed by: NURSE PRACTITIONER

## 2023-11-06 ASSESSMENT — ENCOUNTER SYMPTOMS
SHORTNESS OF BREATH: 1
ABDOMINAL PAIN: 0
CHEST TIGHTNESS: 0
HEADACHES: 1
FATIGUE: 0
NAUSEA: 0
FEVER: 0
COUGH: 1
DIARRHEA: 0
SINUS PRESSURE: 1
DIZZINESS: 0
SINUS PAIN: 1
EYE DISCHARGE: 1
MYALGIAS: 0
WHEEZING: 0
CHILLS: 0
RHINORRHEA: 1
VOMITING: 0
SORE THROAT: 1
PALPITATIONS: 0

## 2023-11-06 ASSESSMENT — PAIN SCALES - GENERAL: PAINLEVEL: NO PAIN (0)

## 2023-11-06 NOTE — NURSING NOTE
"ROOM:4  MARY HERNANDEZ    Preferred Name: Yolande     How did you hear about us?  Other - Referred by another clinic    73 year old  Chief Complaint   Patient presents with     Covid Concern     Tested positive for COVID on Oct 3, has been testing regularly, has recently tested negative   Was on paxlovid, that seemed to help   Now thinks possible sinus infection  Symptoms are headache above eye, drainage, mild sore throat from drainage, has asthma so moving air takes more work        Blood pressure (!) 149/76, pulse 95, temperature 98.1  F (36.7  C), temperature source Oral, resp. rate 19, height 1.651 m (5' 5\"), weight 70.2 kg (154 lb 12.8 oz), SpO2 96%, not currently breastfeeding. Body mass index is 25.76 kg/m .  BP completed using cuff size:        Patient Active Problem List   Diagnosis     Menopausal state -- age 49     TMJ (temporomandibular joint syndrome)     Vaginal atrophy     Breast atypical ductal hyperplasia-- Right     Eczema     Nephrolithiasis     Hypertension goal BP (blood pressure) < 140/90     Dysphonia     Neck stiffness     Unilateral vocal fold paresis     S/P abdominal hysterectomy     Combined forms of age-related cataract of both eyes     Pseudomyxoma peritonei (H)     Malignant neoplasm of appendix (H)     Age-related osteoporosis without current pathological fracture       Wt Readings from Last 2 Encounters:   11/06/23 70.2 kg (154 lb 12.8 oz)   09/11/23 67.6 kg (149 lb)     BP Readings from Last 3 Encounters:   11/06/23 (!) 149/76   09/11/23 (!) 152/78   08/23/23 (!) 151/64       Allergies   Allergen Reactions     Macrobid [Nitrofurantoin] Hives     Aspirin      Tight breathing     Macrobid [Nitrofuran Derivatives]        Current Outpatient Medications   Medication     acetaminophen (TYLENOL) 500 MG tablet     albuterol (PROVENTIL) (2.5 MG/3ML) 0.083% neb solution     hydrochlorothiazide (HYDRODIURIL) 25 MG tablet     losartan (COZAAR) 50 MG tablet     minoxidil (ROGAINE) 2 % external " solution     mometasone-formoterol (DULERA) 200-5 MCG/ACT inhaler     loperamide (IMODIUM A-D) 2 MG tablet     No current facility-administered medications for this visit.       Social History     Tobacco Use     Smoking status: Never     Smokeless tobacco: Never   Substance Use Topics     Alcohol use: Yes     Comment: rare      Drug use: No       Honoring Choices - Health Care Directive Guide offered to patient at time of visit.    Health Maintenance Due   Topic Date Due     RSV VACCINE (Pregnancy & 60+) (1 - 1-dose 60+ series) Never done     ASTHMA ACTION PLAN  11/27/2019     ASTHMA CONTROL TEST  06/16/2020     MAMMO SCREENING  10/24/2023       Immunization History   Administered Date(s) Administered     COVID-19 12+ (2023-24) (Pfizer) 09/27/2023     COVID-19 Bivalent 12+ (Pfizer) 10/03/2022     COVID-19 Bivalent 18+ (Moderna) 06/21/2023     COVID-19 MONOVALENT 12+ (Pfizer) 01/29/2021, 02/18/2021, 09/23/2021     COVID-19 Monovalent 12+ (Pfizer 2022) 04/08/2022     Flu 65+ Years 10/10/2019     Influenza (H1N1) 12/04/2009     Influenza (High Dose) 3 valent vaccine 10/26/2015, 08/24/2017     Influenza (IIV3) PF 10/24/2003, 10/11/2004, 10/13/2005, 10/16/2013     Influenza Vaccine >6 months (Alfuria,Fluzone) 09/30/2020     Pneumo Conj 13-V (2010&after) 08/21/2015     Pneumococcal 23 valent 11/27/2013     TDAP Vaccine (Adacel) 11/18/2009, 11/12/2012     TDAP Vaccine (Boostrix) 11/12/2012     Zoster recombinant adjuvanted (SHINGRIX) 10/29/2020, 12/29/2020     Zoster vaccine, live 10/16/2013       Lab Results   Component Value Date    PAP NIL 08/13/2012       Recent Labs   Lab Test 08/23/23  1005 07/03/23  0756 06/21/22  0929 06/21/22  0929 01/13/21  0819 01/24/20  0911 01/08/20  0751   LDL  --  123*  --  110* 123*  --  133*   HDL  --  82  --  90 93  --  89   TRIG  --  60  --  65 62  --  66   ALT 45  --   --   --   --   --  21   CR 0.52 0.58   < > 0.55 0.62 0.55 0.56   GFRESTIMATED >90 >90   < > >90 90 >90 >90    GFRESTBLACK  --   --   --   --  >90 >90 >90   ALBUMIN 4.7  --   --   --   --   --  3.7   POTASSIUM 3.3* 4.1  --  4.2 4.1 3.7 3.3*    < > = values in this interval not displayed.           11/6/2023     3:33 PM 2/27/2023     9:55 AM   PHQ-2 ( 1999 Pfizer)   Q1: Little interest or pleasure in doing things 1 0   Q2: Feeling down, depressed or hopeless 0 0   PHQ-2 Score 1 0           11/26/2018    11:40 AM 12/16/2019     9:52 AM 6/14/2022    10:03 AM 2/27/2023     9:55 AM   PHQ-9 SCORE   PHQ-9 Total Score 1 1 3 1           12/16/2019     9:52 AM 6/14/2022    10:03 AM 2/27/2023     9:55 AM   ANNE-7 SCORE   Total Score 2 4 4           11/20/2017    10:00 AM 11/26/2018    11:00 AM 12/16/2019    10:00 AM   ACT Total Scores   ACT TOTAL SCORE (Goal Greater than or Equal to 20) 21 21 22   In the past 12 months, how many times did you visit the emergency room for your asthma without being admitted to the hospital? 0 0 0   In the past 12 months, how many times were you hospitalized overnight because of your asthma? 0 0 0       Helen Lawrence, EMT    November 6, 2023 3:36 PM

## 2023-11-06 NOTE — PROGRESS NOTES
Today's Date: Nov 6, 2023     Patient Yolande Hussein 1950 presents to the clinic today to address   Chief Complaint   Patient presents with    Covid Concern     Tested positive for COVID on Oct 3, has been testing regularly, has recently tested negative   Was on paxlovid, that seemed to help   Now thinks possible sinus infection  Symptoms are headache above eye, drainage, mild sore throat from drainage, has asthma so moving air takes more work              SUBJECTIVE     History of Present Illness:    Yolande is a 73 year old female with a past medical history significant for asthma, HTN, osteoporosis, appendix cancer, and pseudomyxoma peritonei presents with a possible sinus infection. The patient was diagnosed with Covid on October 3, 2023. The patient was prescribed Paxlovid which helped with the severity of her symptoms, but her symptoms never fully went away. Today she presents with sinus congestion, sinus pressure/pain, sinus headaches, productive cough with yellow sputum, increased work of breathing with activity, and intermittent wheezing. She denies any fevers, chills, or fatigue. The patient intermittently uses nebulizers and her inhaler which improve her respiratory symptoms. She has also tried using Sudafed and Flonase for the past few weeks but found little relief in her symptoms. The patient recently tested negative on a home covid test. She denies being around any sick contacts. No other acute concerns at this time.         Review of Systems   Constitutional:  Negative for chills, fatigue and fever.   HENT:  Positive for congestion, rhinorrhea, sinus pressure, sinus pain and sore throat.    Eyes:  Positive for discharge (clear).   Respiratory:  Positive for cough and shortness of breath (on exertion- improved by nebulizer). Negative for chest tightness and wheezing.    Cardiovascular:  Negative for chest pain and palpitations.   Gastrointestinal:  Negative for abdominal pain, diarrhea, nausea  and vomiting.   Musculoskeletal:  Negative for myalgias.   Neurological:  Positive for headaches. Negative for dizziness.   All other systems reviewed and are negative.        Allergies   Allergen Reactions    Macrobid [Nitrofurantoin] Hives    Aspirin      Tight breathing    Macrobid [Nitrofuran Derivatives]         Current Outpatient Medications   Medication Instructions    acetaminophen (TYLENOL) 500-1,000 mg, Oral, EVERY 6 HOURS PRN    albuterol (PROVENTIL) 2.5 mg, Nebulization, EVERY 6 HOURS PRN    hydrochlorothiazide (HYDRODIURIL) 25 mg, Oral, DAILY    loperamide (IMODIUM A-D) 2 mg, Oral, 4 TIMES DAILY PRN    losartan (COZAAR) 50 mg, Oral, DAILY    minoxidil (ROGAINE) 2 % external solution Topical, 2 TIMES DAILY    mometasone-formoterol (DULERA) 200-5 MCG/ACT inhaler 2 puffs, Inhalation, 2 TIMES DAILY       Past Medical History:   Diagnosis Date    Asthma     Breast atypical ductal hyperplasia-- Right 06/13/2002    Lumpectomy.  Problem list name updated by automated process. Provider to review and confirm    Dysphonia 09/08/2014    Eczema 10/15/2012    Gastro-oesophageal reflux disease     HLD (hyperlipidemia)     Hypertension     Kidney stone     Low grade mucinous neoplasm of appendix     Menopausal state -- age 49 08/13/2012    With NEGATIVE HR HPV neg on Pap today, there is NO further need for routine pap screens.       Neck stiffness 11/03/2014    Nephrolithiasis 05/12/2014    Nonsenile cataract     Shoulder pain 03/30/2009    Controlled with stretching, unless lapses or overworks 8/2012     Thoracalgia 03/30/2009    TMJ (temporomandibular joint syndrome) 01/18/2012    Jaw PT and Mouth guard helping! 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  Increase from 2x/wk to 3xs/wk.         Family History   Problem Relation Age of Onset    Asthma Mother     Diabetes Mother 80        diet and pill tx    Hypertension  Mother     Macular Degeneration Mother     Abdominal Aortic Aneurysm Mother     Heart Failure Mother 70        CHF    Asthma Father     Hypertension Father     Alzheimer Disease Father 70    Skin Cancer Father     Heart Failure Sister 60        CHF - mild    Hypertension Sister     Macular Degeneration Sister     Asthma Maternal Grandfather     Asthma Daughter     Glaucoma No family hx of     Cancer No family hx of     Amblyopia No family hx of     Retinal detachment No family hx of         Social History     Tobacco Use    Smoking status: Never    Smokeless tobacco: Never   Substance Use Topics    Alcohol use: Yes     Comment: rare     Drug use: No        History   Sexual Activity    Sexual activity: Yes    Partners: Male    Birth control/ protection: Post-menopausal     Comment: age 49            12/16/2019     9:52 AM 6/14/2022    10:03 AM 2/27/2023     9:55 AM   PHQ   PHQ-9 Total Score 1 3 1   Q9: Thoughts of better off dead/self-harm past 2 weeks Not at all Not at all Not at all        Immunization History   Administered Date(s) Administered    COVID-19 12+ (2023-24) (Pfizer) 09/27/2023    COVID-19 Bivalent 12+ (Pfizer) 10/03/2022    COVID-19 Bivalent 18+ (Moderna) 06/21/2023    COVID-19 MONOVALENT 12+ (Pfizer) 01/29/2021, 02/18/2021, 09/23/2021    COVID-19 Monovalent 12+ (Pfizer 2022) 04/08/2022    Flu 65+ Years 10/10/2019    Influenza (H1N1) 12/04/2009    Influenza (High Dose) 3 valent vaccine 10/26/2015, 08/24/2017    Influenza (IIV3) PF 10/24/2003, 10/11/2004, 10/13/2005, 10/16/2013    Influenza Vaccine >6 months (Alfuria,Fluzone) 09/30/2020    Pneumo Conj 13-V (2010&after) 08/21/2015    Pneumococcal 23 valent 11/27/2013    TDAP Vaccine (Adacel) 11/18/2009, 11/12/2012    TDAP Vaccine (Boostrix) 11/12/2012    Zoster recombinant adjuvanted (SHINGRIX) 10/29/2020, 12/29/2020    Zoster vaccine, live 10/16/2013                 OBJECTIVE     BP (!) 149/76 (BP Location: Left arm, Patient Position: Sitting, Cuff  "Size: Adult Regular)   Pulse 95   Temp 98.1  F (36.7  C) (Oral)   Resp 19   Ht 1.651 m (5' 5\")   Wt 70.2 kg (154 lb 12.8 oz)   SpO2 96%   BMI 25.76 kg/m       Labs:  Lab Results   Component Value Date    WBC 7.4 08/23/2023    HGB 15.3 08/23/2023    HCT 45.5 08/23/2023     08/23/2023    CHOL 217 (H) 07/03/2023    TRIG 60 07/03/2023    HDL 82 07/03/2023    ALT 45 08/23/2023    AST 27 08/23/2023     08/23/2023    BUN 6.9 (L) 08/23/2023    CO2 27 08/23/2023    TSH 1.67 11/27/2013    INR 0.99 08/23/2023        Physical Exam  Constitutional:       General: She is not in acute distress.     Appearance: She is ill-appearing.   HENT:      Head: Normocephalic.      Right Ear: No tenderness. A middle ear effusion (Nonsuppurative) is present. Tympanic membrane is not erythematous or bulging.      Left Ear: No tenderness. A middle ear effusion (Nonsuppurative) is present. Tympanic membrane is not erythematous or bulging.      Nose: Congestion and rhinorrhea present.      Right Turbinates: Swollen.      Left Turbinates: Swollen.      Right Sinus: Maxillary sinus tenderness and frontal sinus tenderness present.      Left Sinus: Maxillary sinus tenderness and frontal sinus tenderness present.      Mouth/Throat:      Mouth: Mucous membranes are moist.      Pharynx: Oropharynx is clear. No oropharyngeal exudate or posterior oropharyngeal erythema.   Eyes:      Conjunctiva/sclera: Conjunctivae normal.      Pupils: Pupils are equal, round, and reactive to light.   Cardiovascular:      Rate and Rhythm: Normal rate.      Pulses: Normal pulses.      Heart sounds: Murmur (loudest at RUSB) heard.      Systolic murmur is present with a grade of 1/6.   Pulmonary:      Effort: Pulmonary effort is normal. No respiratory distress.      Breath sounds: Normal breath sounds. No wheezing, rhonchi or rales.   Musculoskeletal:         General: Normal range of motion.      Cervical back: Normal range of motion and neck supple.      " Right lower leg: No edema.      Left lower leg: No edema.   Skin:     General: Skin is warm and dry.   Neurological:      General: No focal deficit present.      Mental Status: She is alert and oriented to person, place, and time. Mental status is at baseline.   Psychiatric:         Mood and Affect: Mood normal.         Behavior: Behavior normal.         Thought Content: Thought content normal.         Judgment: Judgment normal.               ASSESSMENT/PLAN     1. Subacute maxillary sinusitis  Patient presents with sinus congestion, sinus pressure/pain, sinus headaches, productive cough with yellow sputum since October/2023 and have been gradually worsening. On exam the patient has nasal congestion and edema, bilateral mid ear effusions, and severe maxillary and frontal sinus tenderness. Lungs are clear without wheezes. Rapid strep and rapid flu are negative. Patient's recent home covid test was negative. Given the length of time this patient has been experiencing sinusitis symptoms and her comorbidities, will start her on a 10 day course of Augmentin. Encouraged patient to continue using nebulizers and her inhaler at home to relieve respiratory symptoms of wheezing and dyspnea.     Systolic murmur noted loudest at RUSB, patient reports that she has had a murmur since she was a child. Offered ECHO, patient declined today.   - Streptococcus A Rapid Screen w/Reflex to PCR - Clinic Collect  - Influenza A/B antigen  - Group A Streptococcus PCR Throat Swab  - amoxicillin-clavulanate (AUGMENTIN) 875-125 MG tablet; Take 1 tablet by mouth 2 times daily  Dispense: 20 tablet; Refill: 0    Education provided on at-home supportive measures including rest.    Follow-Up:  - Follow up in 2 days if symptoms worsen or fail to improve.     Options for treatment and follow-up care were reviewed with the patient. Patient engaged in the decision making process and verbalized understanding of the options discussed and agreed with the  final plan.  AVS printed and given to patient.    Note by Michael Lopez RN, DNP Student     [facility]STR[facility]  Nurse Practitioners [address] [id number] [phone]        Note: Chart documentation was done in part with Dragon Voice Recognition software.  Although reviewed after completion, some word and grammatical errors may remain. Please contact author for any clarification or concerns.

## 2023-11-07 LAB
FLUAV AG SPEC QL IA: NEGATIVE
FLUBV AG SPEC QL IA: NEGATIVE

## 2024-01-10 DIAGNOSIS — J45.41 MODERATE PERSISTENT ASTHMA WITH EXACERBATION: ICD-10-CM

## 2024-01-11 ENCOUNTER — ANCILLARY PROCEDURE (OUTPATIENT)
Dept: CT IMAGING | Facility: CLINIC | Age: 74
End: 2024-01-11
Attending: INTERNAL MEDICINE
Payer: COMMERCIAL

## 2024-01-11 DIAGNOSIS — R91.8 PULMONARY NODULES: ICD-10-CM

## 2024-01-11 PROCEDURE — 71250 CT THORAX DX C-: CPT | Mod: GC | Performed by: RADIOLOGY

## 2024-01-15 DIAGNOSIS — J45.41 MODERATE PERSISTENT ASTHMA WITH EXACERBATION: ICD-10-CM

## 2024-01-15 RX ORDER — MOMETASONE FUROATE AND FORMOTEROL FUMARATE DIHYDRATE 200; 5 UG/1; UG/1
2 AEROSOL RESPIRATORY (INHALATION) 2 TIMES DAILY
Qty: 13 G | Refills: 0 | OUTPATIENT
Start: 2024-01-15

## 2024-01-15 NOTE — TELEPHONE ENCOUNTER
mometasone-formoterol (DULERA) 200-5 MCG/ACT inhaler   13 g 4 8/15/2023     6/19/2023  [facility]    Isabel Hicks MD  Internal Medicine    Nv: 2/19/24      Routed because: per pt call request. ACT past due.

## 2024-01-15 NOTE — TELEPHONE ENCOUNTER
[facility] Call Center    Phone Message    May a detailed message be left on voicemail: yes     Reason for Call: Medication Refill Request    Has the patient contacted the pharmacy for the refill? Yes   Name of medication being requested: mometasone-formoterol (DULERA) 200-5 MCG/ACT inhaler   Provider who prescribed the medication:   Pharmacy:   [facility] [address] [place] [address], [place]     Date medication is needed: ASAP     Per pt is about to be out of the inhaler. Per pt would need the medication fill before she sees  in FEB.     Action Taken: Message routed to:  [facility]    Travel Screening: Not Applicable

## 2024-01-24 ENCOUNTER — PATIENT OUTREACH (OUTPATIENT)
Dept: GASTROENTEROLOGY | Facility: CLINIC | Age: 74
End: 2024-01-24
Payer: COMMERCIAL

## 2024-01-27 ENCOUNTER — HEALTH MAINTENANCE LETTER (OUTPATIENT)
Age: 74
End: 2024-01-27

## 2024-02-06 ENCOUNTER — TELEPHONE (OUTPATIENT)
Dept: PULMONOLOGY | Facility: CLINIC | Age: 74
End: 2024-02-06
Payer: COMMERCIAL

## 2024-02-06 NOTE — RESULT ENCOUNTER NOTE
Your CT scan does not show any new nodule or increase in the size of the previous one  The previous left nodule has resolved.

## 2024-02-06 NOTE — TELEPHONE ENCOUNTER
Contacted pt with Chest CT results per Dr Daniel - CT scan shows that the nodule seen on previous Chest CT is now resolved.  Pt verbalized understanding and stated that she had read this on the [employer] message from Dr Daniel.

## 2024-02-19 ENCOUNTER — HOSPITAL ENCOUNTER (OUTPATIENT)
Dept: GENERAL RADIOLOGY | Facility: CLINIC | Age: 74
Discharge: HOME | End: 2024-02-19
Attending: INTERNAL MEDICINE
Payer: COMMERCIAL

## 2024-02-19 ENCOUNTER — LAB (OUTPATIENT)
Dept: LAB | Facility: CLINIC | Age: 74
End: 2024-02-19
Attending: INTERNAL MEDICINE
Payer: COMMERCIAL

## 2024-02-19 ENCOUNTER — OFFICE VISIT (OUTPATIENT)
Dept: INTERNAL MEDICINE | Facility: CLINIC | Age: 74
End: 2024-02-19
Attending: INTERNAL MEDICINE
Payer: COMMERCIAL

## 2024-02-19 VITALS
SYSTOLIC BLOOD PRESSURE: 152 MMHG | WEIGHT: 153 LBS | DIASTOLIC BLOOD PRESSURE: 79 MMHG | HEART RATE: 84 BPM | BODY MASS INDEX: 25.49 KG/M2 | HEIGHT: 65 IN

## 2024-02-19 DIAGNOSIS — J45.41 MODERATE PERSISTENT ASTHMA WITH EXACERBATION: ICD-10-CM

## 2024-02-19 DIAGNOSIS — M25.562 CHRONIC PAIN OF LEFT KNEE: Primary | ICD-10-CM

## 2024-02-19 DIAGNOSIS — G89.29 CHRONIC PAIN OF LEFT KNEE: Primary | ICD-10-CM

## 2024-02-19 DIAGNOSIS — G89.29 CHRONIC PAIN OF LEFT KNEE: ICD-10-CM

## 2024-02-19 DIAGNOSIS — M25.562 CHRONIC PAIN OF LEFT KNEE: ICD-10-CM

## 2024-02-19 LAB
ANION GAP SERPL CALCULATED.3IONS-SCNC: 13 MMOL/L (ref 7–15)
BUN SERPL-MCNC: 9.7 MG/DL (ref 8–23)
CALCIUM SERPL-MCNC: 9.1 MG/DL (ref 8.8–10.2)
CHLORIDE SERPL-SCNC: 97 MMOL/L (ref 98–107)
CREAT SERPL-MCNC: 0.62 MG/DL (ref 0.51–0.95)
DEPRECATED HCO3 PLAS-SCNC: 28 MMOL/L (ref 22–29)
EGFRCR SERPLBLD CKD-EPI 2021: >90 ML/MIN/1.73M2
GLUCOSE SERPL-MCNC: 88 MG/DL (ref 70–99)
POTASSIUM SERPL-SCNC: 3.9 MMOL/L (ref 3.4–5.3)
SODIUM SERPL-SCNC: 138 MMOL/L (ref 135–145)

## 2024-02-19 PROCEDURE — 80048 BASIC METABOLIC PNL TOTAL CA: CPT

## 2024-02-19 PROCEDURE — 36415 COLL VENOUS BLD VENIPUNCTURE: CPT

## 2024-02-19 PROCEDURE — 99214 OFFICE O/P EST MOD 30 MIN: CPT | Performed by: INTERNAL MEDICINE

## 2024-02-19 PROCEDURE — G0463 HOSPITAL OUTPT CLINIC VISIT: HCPCS | Mod: 25 | Performed by: INTERNAL MEDICINE

## 2024-02-19 PROCEDURE — 73560 X-RAY EXAM OF KNEE 1 OR 2: CPT | Mod: LT

## 2024-02-19 RX ORDER — MULTIVIT WITH MINERALS/LUTEIN
1 TABLET ORAL DAILY
COMMUNITY

## 2024-02-19 RX ORDER — OMEGA-3 FATTY ACIDS/FISH OIL 300-1000MG
1 CAPSULE ORAL DAILY
COMMUNITY

## 2024-02-19 ASSESSMENT — PAIN SCALES - GENERAL: PAINLEVEL: SEVERE PAIN (7)

## 2024-02-19 NOTE — LETTER
"2/19/2024       RE: Yolande Hussein  [address]  [facility] [address]     Dear Colleague,    Thank you for referring your patient, Yolande Hussein, to the [facility] [place] at [facility]. Please see a copy of my visit note below.      Assessment & Plan     Chronic pain of left knee  Reviewed possible causes, recommend further evaluation with X-ray. Referral to Sports Medicine was also given today.   - XR Knee Standing Left 2 Views; Future  - Orthopedic  Referral; Future  - Basic metabolic panel  (Ca, Cl, CO2, Creat, Gluc, K, Na, BUN); Future    Moderate persistent asthma with exacerbation  Patient reports good control of asthma. Refill for Dulera was provided today.   - mometasone-formoterol (DULERA) 200-5 MCG/ACT inhaler; Inhale 2 puffs into the lungs 2 times daily      I spent a total of 20 minutes on the day of the visit.   Time spent by me doing chart review, history and exam, documentation and further activities per the note      BMI  Estimated body mass index is 25.46 kg/m  as calculated from the following:    Height as of this encounter: 1.651 m (5' 5\").    Weight as of this encounter: 69.4 kg (153 lb).             No follow-ups on file.      Rika Lanier is a 73 year old, presenting for the following health issues:  Follow Up (Med check and knee issues)    HPI     Patient is here for evaluation of left knee pain. Pain started around November. She states that it has been popping and has not been feeling stable at time. She has been taking both Tylenol and ibuprofen for pain.   Patient states that her asthma has been under good control. She denies cough or shortness of breath. She has not been using a rescue inhaler.       Review of Systems  Constitutional, neuro, ENT, endocrine, pulmonary, cardiac, gastrointestinal, genitourinary, musculoskeletal, integument and psychiatric systems are negative, except " "as otherwise noted.     Objective    BP (!) 152/79   Pulse 84   Ht 1.651 m (5' 5\")   Wt 69.4 kg (153 lb)   BMI 25.46 kg/m    Body mass index is 25.46 kg/m .  Physical Exam   GENERAL: alert and no distress  EYES: Eyes grossly normal to inspection, PERRL and conjunctivae and sclerae normal  RESP: lungs clear to auscultation - no rales, rhonchi or wheezes  CV: regular rate and rhythm, normal S1 S2, no S3 or S4, no murmur, click or rub, no peripheral edema  ABDOMEN: soft, nontender and bowel sounds normal  SKIN: no suspicious lesions or rashes  NEURO: Normal strength and tone, mentation intact and speech normal           Signed Electronically by: Isabel Hicks MD    "

## 2024-02-19 NOTE — PROGRESS NOTES
"  Assessment & Plan     Chronic pain of left knee  Reviewed possible causes, recommend further evaluation with X-ray. Referral to Sports Medicine was also given today.   - XR Knee Standing Left 2 Views; Future  - Orthopedic  Referral; Future  - Basic metabolic panel  (Ca, Cl, CO2, Creat, Gluc, K, Na, BUN); Future    Moderate persistent asthma with exacerbation  Patient reports good control of asthma. Refill for Dulera was provided today.   - mometasone-formoterol (DULERA) 200-5 MCG/ACT inhaler; Inhale 2 puffs into the lungs 2 times daily      I spent a total of 20 minutes on the day of the visit.   Time spent by me doing chart review, history and exam, documentation and further activities per the note      BMI  Estimated body mass index is 25.46 kg/m  as calculated from the following:    Height as of this encounter: 1.651 m (5' 5\").    Weight as of this encounter: 69.4 kg (153 lb).             No follow-ups on file.      Rika Lanier is a 73 year old, presenting for the following health issues:  Follow Up (Med check and knee issues)    HPI     Patient is here for evaluation of left knee pain. Pain started around November. She states that it has been popping and has not been feeling stable at time. She has been taking both Tylenol and ibuprofen for pain.   Patient states that her asthma has been under good control. She denies cough or shortness of breath. She has not been using a rescue inhaler.       Review of Systems  Constitutional, neuro, ENT, endocrine, pulmonary, cardiac, gastrointestinal, genitourinary, musculoskeletal, integument and psychiatric systems are negative, except as otherwise noted.      Objective    BP (!) 152/79   Pulse 84   Ht 1.651 m (5' 5\")   Wt 69.4 kg (153 lb)   BMI 25.46 kg/m    Body mass index is 25.46 kg/m .  Physical Exam   GENERAL: alert and no distress  EYES: Eyes grossly normal to inspection, PERRL and conjunctivae and sclerae normal  RESP: lungs clear to " auscultation - no rales, rhonchi or wheezes  CV: regular rate and rhythm, normal S1 S2, no S3 or S4, no murmur, click or rub, no peripheral edema  ABDOMEN: soft, nontender and bowel sounds normal  SKIN: no suspicious lesions or rashes  NEURO: Normal strength and tone, mentation intact and speech normal            Signed Electronically by: Isabel Hicks MD

## 2024-02-23 ENCOUNTER — OFFICE VISIT (OUTPATIENT)
Dept: ORTHOPEDICS | Facility: CLINIC | Age: 74
End: 2024-02-23
Attending: INTERNAL MEDICINE
Payer: COMMERCIAL

## 2024-02-23 VITALS — WEIGHT: 153 LBS | HEIGHT: 65 IN | BODY MASS INDEX: 25.49 KG/M2

## 2024-02-23 DIAGNOSIS — M25.562 LEFT KNEE PAIN, UNSPECIFIED CHRONICITY: Primary | ICD-10-CM

## 2024-02-23 PROCEDURE — 20610 DRAIN/INJ JOINT/BURSA W/O US: CPT | Mod: LT | Performed by: PEDIATRICS

## 2024-02-23 PROCEDURE — 99204 OFFICE O/P NEW MOD 45 MIN: CPT | Mod: 25 | Performed by: PEDIATRICS

## 2024-02-23 RX ORDER — LIDOCAINE HYDROCHLORIDE 10 MG/ML
2 INJECTION, SOLUTION INFILTRATION; PERINEURAL
Status: SHIPPED | OUTPATIENT
Start: 2024-02-23

## 2024-02-23 RX ORDER — TRIAMCINOLONE ACETONIDE 40 MG/ML
40 INJECTION, SUSPENSION INTRA-ARTICULAR; INTRAMUSCULAR
Status: SHIPPED | OUTPATIENT
Start: 2024-02-23

## 2024-02-23 RX ADMIN — TRIAMCINOLONE ACETONIDE 40 MG: 40 INJECTION, SUSPENSION INTRA-ARTICULAR; INTRAMUSCULAR at 09:57

## 2024-02-23 RX ADMIN — LIDOCAINE HYDROCHLORIDE 2 ML: 10 INJECTION, SOLUTION INFILTRATION; PERINEURAL at 09:57

## 2024-02-23 NOTE — LETTER
2/23/2024         RE: Yolande Hussein  [address]  [facility] [address]        Dear Colleague,    Thank you for referring your patient, Yolande Hussein, to the [facility] [place]. Please see a copy of my visit note below.    ASSESSMENT & PLAN    Yolande was seen today for pain.    Diagnoses and all orders for this visit:    Left knee pain, unspecified chronicity  -     Orthopedic  Referral  -     Large Joint Injection/Arthocentesis: L knee joint      Favor this from the knee joint, rather than soft tissue at the medial knee.  We discussed potential for mild underlying arthritis, less likely medial meniscus; we also discussed potential for some pes anserine bursal, distal hamstring component.  However, with knee joint effusion, favor joint as the source.  Reviewed options as noted below, landed on doing steroid injection to start.  Likely consider MRI if not improving.  Questions answered. Discussed signs and symptoms that may indicate more serious issues; the patient was instructed to seek appropriate care if noted. Yolande indicates understanding of these issues and agrees with the plan.    See Patient Instructions  Patient Instructions   Reviewed nature of the left medial knee pain.  While x-rays do not really significant for any local finding, there does appear to be some joint effusion present, and clinically we discussed potential for some mild medial compartment arthritis.  Additional consideration may be meniscus pathology, given localized medial joint line tenderness, though no significant pain with provocative testing today.  We discussed a few considerations, including ischial imaging of the knee with MRI, referral to physical therapy, also addition of steroid injection.  Following discussion, left knee steroid injection done today, goal of more immediate pain relief.  Plan to monitor course up to 2 weeks to begin.  If not getting desired relief  during that time, or for any other further questions or concerns in the meantime, contact clinic.  Likely reconsider MRI if not getting desired benefit.    If you have any further questions for your physician or physician s care team you can contact them thru [employer] or by calling [phone].          Injection Discharge Instructions    You may shower, however avoid swimming, tub baths or hot tubs for 24 hours following your procedure  You may have a mild to moderate increase in pain for several days following the injection.  It may take up to 14 days for the steroid medication to start working although you may feel the effect as early as a few days after the procedure.  You may use ice packs for 10-15 minutes, 3 to 4 times a day at the injection site for comfort  You may use anti-inflammatory medications (such as Ibuprofen or Aleve or Advil) if you are able to take safely, or acetaminophen (Tylenol) for pain control if necessary  If you were fasting, you may resume your normal diet and medications after the procedure  If you have diabetes, check your blood sugar more frequently than usual as your blood sugar may be higher than normal for 10-14 days following a steroid injection. Contact your doctor who manages your diabetes if your blood sugar is higher than usual    If you experience any of the following, call the clinic @ [phone]  - Fever over 100 degree F  - Swelling, bleeding, redness, drainage, warmth at the injection site  - New or worsening pain      Maurice Toledo [facility] [place]      CC: Isabel Hicks      -----  Chief Complaint   Patient presents with     Left Knee - Pain       SUBJECTIVE  Yolande Hussein is a/an 73 year old female who is seen in consultation at the request of  Isabel Hicks M.D. for evaluation of left knee.     The patient is seen by themselves.    Onset: 3 month(s) ago. Reports insidious onset without acute precipitating  "event.  Location of Pain: left knee, medial jointline, feeling of swelling  Worsened by: walking, noting an increase in \"cramping feeling\" with a longer walk, sit to stand and first couple of steps  Better with:  Treatments tried: heat, Tylenol and ibuprofen  Associated symptoms: swelling Popping and a feeling of instability    Orthopedic/Surgical history: NO  Social History/Occupation: Retired    **  Above information per rooming staff.  Additional history:  Pain primarily medial left knee. With a lot of walking, can get some diffuse left LE cramping, including in foot, also thigh, to hip area.  Locally tender medial left knee.  Mild swelling.  Frequent popping sensation, may be getting a little better. Previously noted consistently on stairs, now not as much. Popping not necessarily painful.          REVIEW OF SYSTEMS:  Review of Systems    OBJECTIVE:  Ht 1.651 m (5' 5\")   Wt 69.4 kg (153 lb)   BMI 25.46 kg/m           Left Knee exam    Inspection:   Trace-mild effusion   no ecchymosis    ROM:      Full active and passive ROM with flexion and extension    Patellar Motion:      Normal patellar tracking noted through range of motion    Tender:      medial joint line  Mild pes anserine bursa area    Non Tender:       remainder of knee area    Special Tests:      no change with Leslie       neg (-) Lachman       neg (-) anterior drawer       neg (-) posterior drawer       neg (-) varus        neg (-) valgus        No change in pain with forced extension        RADIOLOGY:  Final results and radiologist's interpretation, available in the [facility] health record.  Images were reviewed with the patient in the office today.  My personal interpretation of the performed imaging: there appears to be slight medial compartment narrowing. No acute bony abnormality noted. Mild effusion noted on lateral view.        EXAM: XR KNEE STANDING LEFT 2 VIEWS  LOCATION: [facility]  DATE: " 2/19/2024     INDICATION:  Chronic pain of left knee, Chronic pain of left knee  COMPARISON: None.                                                                      IMPRESSION: Normal joint spaces and alignment. No acute fracture. Small joint effusion.            Large Joint Injection/Arthocentesis: L knee joint    Date/Time: 2/23/2024 9:57 AM    Performed by: Maurice Toledo DO  Authorized by: Maurice Toledo DO    Indications:  Pain and osteoarthritis  Needle Size:  25 G  Guidance: landmark guided    Approach:  Anteromedial  Location:  Knee      Medications:  2 mL lidocaine 1 %; 40 mg triamcinolone 40 MG/ML  Outcome:  Tolerated well, no immediate complications  Procedure discussed: discussed risks, benefits, and alternatives    Consent Given by:  Patient  Timeout: timeout called immediately prior to procedure    Prep: patient was prepped and draped in usual sterile fashion          Review of prior external note(s) from - referring  Review of the result(s) of each unique test - imaging  Independent interpretation of a test performed by another physician/other qualified health care professional (not separately reported) - imaging             Again, thank you for allowing me to participate in the care of your patient.        Sincerely,        Maurice Toledo DO

## 2024-02-23 NOTE — PATIENT INSTRUCTIONS
Reviewed nature of the left medial knee pain.  While x-rays do not really significant for any local finding, there does appear to be some joint effusion present, and clinically we discussed potential for some mild medial compartment arthritis.  Additional consideration may be meniscus pathology, given localized medial joint line tenderness, though no significant pain with provocative testing today.  We discussed a few considerations, including ischial imaging of the knee with MRI, referral to physical therapy, also addition of steroid injection.  Following discussion, left knee steroid injection done today, goal of more immediate pain relief.  Plan to monitor course up to 2 weeks to begin.  If not getting desired relief during that time, or for any other further questions or concerns in the meantime, contact clinic.  Likely reconsider MRI if not getting desired benefit.    If you have any further questions for your physician or physician s care team you can contact them thru [employer] or by calling [phone].          Injection Discharge Instructions    You may shower, however avoid swimming, tub baths or hot tubs for 24 hours following your procedure  You may have a mild to moderate increase in pain for several days following the injection.  It may take up to 14 days for the steroid medication to start working although you may feel the effect as early as a few days after the procedure.  You may use ice packs for 10-15 minutes, 3 to 4 times a day at the injection site for comfort  You may use anti-inflammatory medications (such as Ibuprofen or Aleve or Advil) if you are able to take safely, or acetaminophen (Tylenol) for pain control if necessary  If you were fasting, you may resume your normal diet and medications after the procedure  If you have diabetes, check your blood sugar more frequently than usual as your blood sugar may be higher than normal for 10-14 days following a steroid injection. Contact your  doctor who manages your diabetes if your blood sugar is higher than usual    If you experience any of the following, call the clinic @ [phone]  - Fever over 100 degree F  - Swelling, bleeding, redness, drainage, warmth at the injection site  - New or worsening pain

## 2024-02-23 NOTE — PROGRESS NOTES
ASSESSMENT & PLAN    Yolande was seen today for pain.    Diagnoses and all orders for this visit:    Left knee pain, unspecified chronicity  -     Orthopedic  Referral  -     Large Joint Injection/Arthocentesis: L knee joint      Favor this from the knee joint, rather than soft tissue at the medial knee.  We discussed potential for mild underlying arthritis, less likely medial meniscus; we also discussed potential for some pes anserine bursal, distal hamstring component.  However, with knee joint effusion, favor joint as the source.  Reviewed options as noted below, landed on doing steroid injection to start.  Likely consider MRI if not improving.  Questions answered. Discussed signs and symptoms that may indicate more serious issues; the patient was instructed to seek appropriate care if noted. Yolande indicates understanding of these issues and agrees with the plan.    See Patient Instructions  Patient Instructions   Reviewed nature of the left medial knee pain.  While x-rays do not really significant for any local finding, there does appear to be some joint effusion present, and clinically we discussed potential for some mild medial compartment arthritis.  Additional consideration may be meniscus pathology, given localized medial joint line tenderness, though no significant pain with provocative testing today.  We discussed a few considerations, including ischial imaging of the knee with MRI, referral to physical therapy, also addition of steroid injection.  Following discussion, left knee steroid injection done today, goal of more immediate pain relief.  Plan to monitor course up to 2 weeks to begin.  If not getting desired relief during that time, or for any other further questions or concerns in the meantime, contact clinic.  Likely reconsider MRI if not getting desired benefit.    If you have any further questions for your physician or physician s care team you can contact them thru MyChart or by  "calling [phone].          Injection Discharge Instructions    You may shower, however avoid swimming, tub baths or hot tubs for 24 hours following your procedure  You may have a mild to moderate increase in pain for several days following the injection.  It may take up to 14 days for the steroid medication to start working although you may feel the effect as early as a few days after the procedure.  You may use ice packs for 10-15 minutes, 3 to 4 times a day at the injection site for comfort  You may use anti-inflammatory medications (such as Ibuprofen or Aleve or Advil) if you are able to take safely, or acetaminophen (Tylenol) for pain control if necessary  If you were fasting, you may resume your normal diet and medications after the procedure  If you have diabetes, check your blood sugar more frequently than usual as your blood sugar may be higher than normal for 10-14 days following a steroid injection. Contact your doctor who manages your diabetes if your blood sugar is higher than usual    If you experience any of the following, call the clinic @ [phone]  - Fever over 100 degree F  - Swelling, bleeding, redness, drainage, warmth at the injection site  - New or worsening pain      [facility]      CC: Isabel Hicks      -----  Chief Complaint   Patient presents with    Left Knee - Pain       SUBJECTIVE  Yolande Hussein is a/an 73 year old female who is seen in consultation at the request of  Isabel Hicks M.D. for evaluation of left knee.     The patient is seen by themselves.    Onset: 3 month(s) ago. Reports insidious onset without acute precipitating event.  Location of Pain: left knee, medial jointline, feeling of swelling  Worsened by: walking, noting an increase in \"cramping feeling\" with a longer walk, sit to stand and first couple of steps  Better with:  Treatments tried: heat, Tylenol and ibuprofen  Associated " "symptoms: swelling Popping and a feeling of instability    Orthopedic/Surgical history: NO  Social History/Occupation: Retired    **  Above information per rooming staff.  Additional history:  Pain primarily medial left knee. With a lot of walking, can get some diffuse left LE cramping, including in foot, also thigh, to hip area.  Locally tender medial left knee.  Mild swelling.  Frequent popping sensation, may be getting a little better. Previously noted consistently on stairs, now not as much. Popping not necessarily painful.          REVIEW OF SYSTEMS:  Review of Systems    OBJECTIVE:  Ht 1.651 m (5' 5\")   Wt 69.4 kg (153 lb)   BMI 25.46 kg/m           Left Knee exam    Inspection:   Trace-mild effusion   no ecchymosis    ROM:      Full active and passive ROM with flexion and extension    Patellar Motion:      Normal patellar tracking noted through range of motion    Tender:      medial joint line  Mild pes anserine bursa area    Non Tender:       remainder of knee area    Special Tests:      no change with Leslie       neg (-) Lachman       neg (-) anterior drawer       neg (-) posterior drawer       neg (-) varus        neg (-) valgus        No change in pain with forced extension        RADIOLOGY:  Final results and radiologist's interpretation, available in the [facility] health record.  Images were reviewed with the patient in the office today.  My personal interpretation of the performed imaging: there appears to be slight medial compartment narrowing. No acute bony abnormality noted. Mild effusion noted on lateral view.        EXAM: XR KNEE STANDING LEFT 2 VIEWS  LOCATION: [facility]  DATE: 2/19/2024     INDICATION:  Chronic pain of left knee, Chronic pain of left knee  COMPARISON: None.                                                                      IMPRESSION: Normal joint spaces and alignment. No acute fracture. Small joint effusion.            Large " Joint Injection/Arthocentesis: L knee joint    Date/Time: 2/23/2024 9:57 AM    Performed by: Maurice Toledo DO  Authorized by: Maurice Toledo DO    Indications:  Pain and osteoarthritis  Needle Size:  25 G  Guidance: landmark guided    Approach:  Anteromedial  Location:  Knee      Medications:  2 mL lidocaine 1 %; 40 mg triamcinolone 40 MG/ML  Outcome:  Tolerated well, no immediate complications  Procedure discussed: discussed risks, benefits, and alternatives    Consent Given by:  Patient  Timeout: timeout called immediately prior to procedure    Prep: patient was prepped and draped in usual sterile fashion          Review of prior external note(s) from - referring  Review of the result(s) of each unique test - imaging  Independent interpretation of a test performed by another physician/other qualified health care professional (not separately reported) - imaging

## 2024-03-21 NOTE — PROGRESS NOTES
ASSESSMENT & PLAN    Yolande was seen today for follow up.    Diagnoses and all orders for this visit:    Left knee pain, unspecified chronicity  -     Physical Therapy  Referral; Future      Benefit from knee steroid injection indicates knee joint involvement, though symptoms persist; now with more pain/tenderness medial knee, near adductor tubercle.  Will try PT next, and consider MRI if not getting desired improvement. We discussed consideration for other injection as well, await response to PT first and likely MRI after that prior to another injection.  Briefly discussed compression, has tried, no benefit.  See below.  Questions answered. Discussed signs and symptoms that may indicate more serious issues; the patient was instructed to seek appropriate care if noted. Yolande indicates understanding of these issues and agrees with the plan.    See Patient Instructions  Patient Instructions   Good to hear of the improvement that you had from the knee steroid injection.  This would indicate that the knee joint was at least some component of the pain.  However, with some persistent medial pain and tenderness, we discussed some other considerations.  The more focal tenderness is more at the medial femoral condyle, may be related to soft tissue rather than the joint.  There is also some mild tenderness in the area of the pes anserine bursa, as we discussed at last visit as well.  Reviewed number of considerations, including potential for continued focus on the knee (repeat knee joint steroid injection, versus trial of Visco supplement injection); alternative injection, such as pes anserine bursa; additional imaging with MRI; referral to physical therapy.  Following discussion, plan PT next, as this appears to be more related to soft tissue.  Monitor 1 month with PT to begin.  If not improving, likely consider MRI next for further evaluation.    If you have any further questions for your physician or  physician s care team you can contact them thru MyChart or by calling [phone].      Maurice Toledo [facility]    SUBJECTIVE- Interim History March 21, 2024    No chief complaint on file.      Yolande Hussein is a 73 year old female who is seen in f/u up for Data Unavailable. Since last visit on 2/23/24 patient has felt swelling and pain in the medial jointline, especially in the evening after increased activity.    Left knee steroid injection done on 2/23/24 OV provided relief for 1 month, noting that she is still having relief.    The patient is seen by themselves.    Orthopedic/Surgical history: NO  Social History/Occupation: Retired    **  Above information per rooming staff.  Additional history:  Always feels left knee swollen when on stairs, more in evening. Also more after activities, can note pain more in evening.  Popping has improved, nearly gone; may have occasional pop in morning when getting up.  Not really limping or slowed most of the time.          REVIEW OF SYSTEMS:  Review of Systems    OBJECTIVE:  There were no vitals taken for this visit.       Left Knee exam    Inspection:   No significant effusion   no ecchymosis    ROM:      grossly full active and passive ROM with flexion and extension    Tender:      medial femoral epicondyle area, proximal to medial joint line  Mild pes anserine bursa area    Non Tender:       remainder of knee area, including along medial joint line    Special Tests:      no change with Leslie       neg (-) Lachman       neg (-) anterior drawer       neg (-) posterior drawer       neg (-) varus        neg (-) valgus, no pain       No change in pain with forced extension          RADIOLOGY:  None new. See previous notes.        25 minutes spent by me on the date of the encounter doing chart review, history and exam, documentation and further activities per the note

## 2024-03-22 ENCOUNTER — OFFICE VISIT (OUTPATIENT)
Dept: ORTHOPEDICS | Facility: CLINIC | Age: 74
End: 2024-03-22
Payer: COMMERCIAL

## 2024-03-22 VITALS — HEIGHT: 65 IN | WEIGHT: 153 LBS | BODY MASS INDEX: 25.49 KG/M2

## 2024-03-22 DIAGNOSIS — M25.562 LEFT KNEE PAIN, UNSPECIFIED CHRONICITY: Primary | ICD-10-CM

## 2024-03-22 PROCEDURE — 99213 OFFICE O/P EST LOW 20 MIN: CPT | Performed by: PEDIATRICS

## 2024-03-22 NOTE — PATIENT INSTRUCTIONS
Good to hear of the improvement that you had from the knee steroid injection.  This would indicate that the knee joint was at least some component of the pain.  However, with some persistent medial pain and tenderness, we discussed some other considerations.  The more focal tenderness is more at the medial femoral condyle, may be related to soft tissue rather than the joint.  There is also some mild tenderness in the area of the pes anserine bursa, as we discussed at last visit as well.  Reviewed number of considerations, including potential for continued focus on the knee (repeat knee joint steroid injection, versus trial of Visco supplement injection); alternative injection, such as pes anserine bursa; additional imaging with MRI; referral to physical therapy.  Following discussion, plan PT next, as this appears to be more related to soft tissue.  Monitor 1 month with PT to begin.  If not improving, likely consider MRI next for further evaluation.    If you have any further questions for your physician or physician s care team you can contact them thru [employer] or by calling [phone].

## 2024-03-22 NOTE — LETTER
3/22/2024         RE: Yolande Hussein  [address]  [place] [id number]        Dear Colleague,    Thank you for referring your patient, Yolande Hussein, to the [facility]. Please see a copy of my visit note below.    ASSESSMENT & PLAN    Yolande was seen today for follow up.    Diagnoses and all orders for this visit:    Left knee pain, unspecified chronicity  -     Physical Therapy  Referral; Future      Benefit from knee steroid injection indicates knee joint involvement, though symptoms persist; now with more pain/tenderness medial knee, near adductor tubercle.  Will try PT next, and consider MRI if not getting desired improvement. We discussed consideration for other injection as well, await response to PT first and likely MRI after that prior to another injection.  Briefly discussed compression, has tried, no benefit.  See below.  Questions answered. Discussed signs and symptoms that may indicate more serious issues; the patient was instructed to seek appropriate care if noted. Yolande indicates understanding of these issues and agrees with the plan.    See Patient Instructions  Patient Instructions   Good to hear of the improvement that you had from the knee steroid injection.  This would indicate that the knee joint was at least some component of the pain.  However, with some persistent medial pain and tenderness, we discussed some other considerations.  The more focal tenderness is more at the medial femoral condyle, may be related to soft tissue rather than the joint.  There is also some mild tenderness in the area of the pes anserine bursa, as we discussed at last visit as well.  Reviewed number of considerations, including potential for continued focus on the knee (repeat knee joint steroid injection, versus trial of Visco supplement injection); alternative injection, such as pes anserine bursa; additional imaging with MRI; referral to  physical therapy.  Following discussion, plan PT next, as this appears to be more related to soft tissue.  Monitor 1 month with PT to begin.  If not improving, likely consider MRI next for further evaluation.    If you have any further questions for your physician or physician s care team you can contact them thru MyChart or by calling [phone].      Maurice Toledo [facility]    SUBJECTIVE- Interim History March 21, 2024    No chief complaint on file.      Yolande Hussein is a 73 year old female who is seen in f/u up for Data Unavailable. Since last visit on 2/23/24 patient has felt swelling and pain in the medial jointline, especially in the evening after increased activity.    Left knee steroid injection done on 2/23/24 OV provided relief for 1 month, noting that she is still having relief.    The patient is seen by themselves.    Orthopedic/Surgical history: NO  Social History/Occupation: Retired    **  Above information per rooming staff.  Additional history:  Always feels left knee swollen when on stairs, more in evening. Also more after activities, can note pain more in evening.  Popping has improved, nearly gone; may have occasional pop in morning when getting up.  Not really limping or slowed most of the time.          REVIEW OF SYSTEMS:  Review of Systems    OBJECTIVE:  There were no vitals taken for this visit.       Left Knee exam    Inspection:   No significant effusion   no ecchymosis    ROM:      grossly full active and passive ROM with flexion and extension    Tender:      medial femoral epicondyle area, proximal to medial joint line  Mild pes anserine bursa area    Non Tender:       remainder of knee area, including along medial joint line    Special Tests:      no change with Leslie       neg (-) Lachman       neg (-) anterior drawer       neg (-) posterior drawer       neg (-) varus        neg (-) valgus, no pain       No change in pain with  forced extension          RADIOLOGY:  None new. See previous notes.        25 minutes spent by me on the date of the encounter doing chart review, history and exam, documentation and further activities per the note           Again, thank you for allowing me to participate in the care of your patient.        Sincerely,        Maurice Toledo, DO

## 2024-04-02 ENCOUNTER — THERAPY VISIT (OUTPATIENT)
Dept: PHYSICAL THERAPY | Facility: CLINIC | Age: 74
End: 2024-04-02
Attending: PEDIATRICS
Payer: COMMERCIAL

## 2024-04-02 DIAGNOSIS — M25.562 LEFT KNEE PAIN, UNSPECIFIED CHRONICITY: Primary | ICD-10-CM

## 2024-04-02 PROCEDURE — 97110 THERAPEUTIC EXERCISES: CPT | Mod: GP

## 2024-04-02 PROCEDURE — 97161 PT EVAL LOW COMPLEX 20 MIN: CPT | Mod: GP

## 2024-04-02 ASSESSMENT — ACTIVITIES OF DAILY LIVING (ADL)
PAIN: THE SYMPTOM AFFECTS MY ACTIVITY MODERATELY
WEAKNESS: I DO NOT HAVE THE SYMPTOM
HOW_WOULD_YOU_RATE_THE_CURRENT_FUNCTION_OF_YOUR_KNEE_DURING_YOUR_USUAL_DAILY_ACTIVITIES_ON_A_SCALE_FROM_0_TO_100_WITH_100_BEING_YOUR_LEVEL_OF_KNEE_FUNCTION_PRIOR_TO_YOUR_INJURY_AND_0_BEING_THE_INABILITY_TO_PERFORM_ANY_OF_YOUR_USUAL_DAILY_ACTIVITIES?: 40
LIMPING: THE SYMPTOM AFFECTS MY ACTIVITY SLIGHTLY
HOW_WOULD_YOU_RATE_THE_OVERALL_FUNCTION_OF_YOUR_KNEE_DURING_YOUR_USUAL_DAILY_ACTIVITIES?: ABNORMAL
WALK: ACTIVITY IS MINIMALLY DIFFICULT
GIVING WAY, BUCKLING OR SHIFTING OF KNEE: I DO NOT HAVE THE SYMPTOM
KNEEL ON THE FRONT OF YOUR KNEE: ACTIVITY IS SOMEWHAT DIFFICULT
KNEE_ACTIVITY_OF_DAILY_LIVING_SUM: 49
AS_A_RESULT_OF_YOUR_KNEE_INJURY,_HOW_WOULD_YOU_RATE_YOUR_CURRENT_LEVEL_OF_DAILY_ACTIVITY?: ABNORMAL
GO UP STAIRS: ACTIVITY IS SOMEWHAT DIFFICULT
SQUAT: ACTIVITY IS SOMEWHAT DIFFICULT
SWELLING: THE SYMPTOM AFFECTS MY ACTIVITY MODERATELY
GO DOWN STAIRS: ACTIVITY IS SOMEWHAT DIFFICULT
RAW_SCORE: 49
KNEE_ACTIVITY_OF_DAILY_LIVING_SCORE: 70
RISE FROM A CHAIR: ACTIVITY IS MINIMALLY DIFFICULT
SIT WITH YOUR KNEE BENT: ACTIVITY IS NOT DIFFICULT
STIFFNESS: THE SYMPTOM AFFECTS MY ACTIVITY MODERATELY
STAND: ACTIVITY IS NOT DIFFICULT

## 2024-04-02 NOTE — PROGRESS NOTES
"PHYSICAL THERAPY EVALUATION  Type of Visit: Evaluation    No inciting incident that caused her L knee pain. It started a little bit after she had COVID. It started without pain but had popping with every step she took especially with stairs. Popping has decreased. She had feeling like her knee was giving out mostly when standing up but that has gotten better. Had really bad leg cramps with prolonged walking. If she walks a lot during the day she gets leg cramps at night In calf, lateral calf and posterior thigh. Reports this feels like tightening and she just has to sit there until it lets go. Has constant pain in her knee and sometimes get a throbbing. Knee also feels swollen.     See electronic medical record for Abuse and Falls Screening details.    Subjective       Presenting condition or subjective complaint: painful knee  Date of onset: 12/01/23    Relevant medical history: Asthma; Cancer; High blood pressure; Menopause   Dates & types of surgery:      Prior diagnostic imaging/testing results: X-ray   - see EMR. \"there appears to be slight medial compartment narrowing. No acute bony abnormality noted. Mild effusion noted on lateral view.\"  Prior therapy history for the same diagnosis, illness or injury: No      Prior Level of Function  Transfers:   Ambulation:   ADL:   IADL:     Living Environment  Social support: With a significant other or spouse   Type of home: 2-story; House   Stairs to enter the home: Yes 3     Ramp: No   Stairs inside the home: Yes 0 (3 sets) Is there a railing: Yes   Help at home: None  Equipment owned:       Employment: Not Applicable    Hobbies/Interests: reading, walking, crafts    Patient goals for therapy: walk at least 1 mile without discomfort    Pain assessment:   Pain is located in the medial knee.  Pain is worse with walking, stairs.   Pain is better with rest, advil, more supportive shoe that supports the ankle a little bit  Ice and heat have not seemed to help.    "   Objective   KNEE EVALUATION  PAIN: see above  INTEGUMENTARY (edema, incisions): Sweep Test: negative, slight increase in swelling along medial knee  POSTURE:   GAIT:  Weightbearing Status:   Assistive Device(s):   Gait Deviations:   BALANCE/PROPRIOCEPTION:   WEIGHTBEARING ALIGNMENT:   NON-WEIGHTBEARING ALIGNMENT:   ROM:     STRENGTH:   Pain: - none + mild ++ moderate +++ severe  Strength Scale: 0-5/5 Left Right   Knee Flexion 5 5   Knee Extension 3 5   Quad Set     Hip ABD: L 3+5 R 4/5  Hip extension: L 3+/5 R 4/5  Hip flexion: L 3+/5 with pain R 5/5  No pain with hip ADD bilaterally    FLEXIBILITY:   Hamstring and quad tightness bilaterally  SPECIAL TESTS:   SLR: positive on the L - leg tension  Slump: positive on the L - leg tension  FUNCTIONAL TESTS:   PALPATION:   tenderness in middle of the medial joint line superior to medial joint line and pes anserine insertion on the L  JOINT MOBILITY:     Assessment & Plan   CLINICAL IMPRESSIONS  Medical Diagnosis: L knee pain    Treatment Diagnosis: L knee pain   Impression/Assessment: Patient is a 73 year old female with L knee pain complaints.  The following significant findings have been identified: Pain, Decreased ROM/flexibility, Decreased strength, and Edema. These impairments interfere with their ability to perform recreational activities, household mobility, and community mobility as compared to previous level of function.     Differentials: pes anserine bursitis vs joint vs LBP with sciatica     Clinical Decision Making (Complexity):  Clinical Presentation: Stable/Uncomplicated  Clinical Presentation Rationale: based on medical and personal factors listed in PT evaluation  Clinical Decision Making (Complexity): Low complexity    PLAN OF CARE  Treatment Interventions:  Interventions: Gait Training, Manual Therapy, Neuromuscular Re-education, Therapeutic Activity, Therapeutic Exercise    Long Term Goals     PT Goal 1  Goal Identifier: ambulation  Goal  Description: pt will be able to ambulate 1 mile with 0/10 knee pain during and after the walk  Rationale: to maximize safety and independence with performance of ADLs and functional tasks  Goal Progress: currently has pain with ambulating 1 mile  Target Date: 05/28/24  PT Goal 2  Goal Identifier: stairs  Goal Description: pt will be able to ambulate 3 sets of stairs with 0/10 knee pain  Rationale: to maximize safety and independence within the home  Goal Progress: pt currently reports difficulty due to pain with stair navigation  Target Date: 05/28/24      Frequency of Treatment: 1x/week for 2 weeks; 1x/every other week for 6 weeks  Duration of Treatment: 8 weeks    Recommended Referrals to Other Professionals:  none  Education Assessment:        Risks and benefits of evaluation/treatment have been explained.   Patient/Family/caregiver agrees with Plan of Care.     Evaluation Time:     PT Eval, Low Complexity Minutes (05301): 20       Signing Clinician: Niyah Dave PT      [facility]                                                                                   OUTPATIENT PHYSICAL THERAPY      PLAN OF TREATMENT FOR OUTPATIENT REHABILITATION   Patient's Last Name, First Name, Yolande Perez YOB: 1950   Provider's Name   [facility]   Medical Record No.  [id number]     Onset Date: 12/01/23  Start of Care Date: 04/02/24     Medical Diagnosis:  L knee pain      PT Treatment Diagnosis:  L knee pain Plan of Treatment  Frequency/Duration: 1x/week for 2 weeks; 1x/every other week for 6 weeks/ 8 weeks    Certification date from 04/02/24 to 05/28/24         See note for plan of treatment details and functional goals     Niyah Dave PT                         I CERTIFY THE NEED FOR THESE SERVICES FURNISHED UNDER        THIS PLAN OF TREATMENT AND WHILE UNDER MY CARE .             Physician Signature                Date    X_____________________________________________________                  Referring Provider:  Maurice Toledo    Initial Assessment  See Epic Evaluation- Start of Care Date: 04/02/24

## 2024-04-04 ENCOUNTER — TELEPHONE (OUTPATIENT)
Dept: PULMONOLOGY | Facility: CLINIC | Age: 74
End: 2024-04-04
Payer: COMMERCIAL

## 2024-04-04 NOTE — TELEPHONE ENCOUNTER
Left Voicemail (1st Attempt) for the patient to call back and schedule the following:    Appointment type: RTA  Provider: NUBIA  Return date: 09/09/24  Specialty phone number: [phone]  Additional appointment(s) needed: N/A   Additonal Notes: SCHEDULE NEXT AVAILABLE

## 2024-04-09 NOTE — TELEPHONE ENCOUNTER
Patient confirmed scheduled appointment:  Date: 09/10/24  Time: 11AM  Visit type: AARON RINCON  Provider: KATIE  Location: [facility]  Testing/imaging: N/A   Additional notes: N/A

## 2024-04-12 ENCOUNTER — THERAPY VISIT (OUTPATIENT)
Dept: PHYSICAL THERAPY | Facility: CLINIC | Age: 74
End: 2024-04-12
Payer: COMMERCIAL

## 2024-04-12 DIAGNOSIS — M25.562 LEFT KNEE PAIN, UNSPECIFIED CHRONICITY: Primary | ICD-10-CM

## 2024-04-12 PROCEDURE — 97112 NEUROMUSCULAR REEDUCATION: CPT | Mod: GP

## 2024-04-12 PROCEDURE — 97110 THERAPEUTIC EXERCISES: CPT | Mod: GP

## 2024-04-22 ENCOUNTER — OFFICE VISIT (OUTPATIENT)
Dept: INTERNAL MEDICINE | Facility: CLINIC | Age: 74
End: 2024-04-22
Attending: INTERNAL MEDICINE
Payer: COMMERCIAL

## 2024-04-22 VITALS
SYSTOLIC BLOOD PRESSURE: 157 MMHG | DIASTOLIC BLOOD PRESSURE: 78 MMHG | HEART RATE: 99 BPM | BODY MASS INDEX: 25.81 KG/M2 | WEIGHT: 154.9 LBS | HEIGHT: 65 IN

## 2024-04-22 DIAGNOSIS — J45.41 MODERATE PERSISTENT ASTHMA WITH EXACERBATION: Primary | ICD-10-CM

## 2024-04-22 PROCEDURE — G0463 HOSPITAL OUTPT CLINIC VISIT: HCPCS | Performed by: INTERNAL MEDICINE

## 2024-04-22 PROCEDURE — 99214 OFFICE O/P EST MOD 30 MIN: CPT | Performed by: INTERNAL MEDICINE

## 2024-04-22 RX ORDER — PREDNISONE 20 MG/1
40 TABLET ORAL DAILY
Qty: 10 TABLET | Refills: 0 | Status: SHIPPED | OUTPATIENT
Start: 2024-04-22

## 2024-04-22 NOTE — PATIENT INSTRUCTIONS
Thank you for trusting us with your care!     If you need to contact us for questions about:  Symptoms, Scheduling & Medical Questions; Non-urgent (2-3 day response) Segundo message, Urgent (needing response today) [phone] (if after 3:30pm next day response)   Prescriptions: Please call your Pharmacy   Billing: [place]ul [phone] or [facility]:[phone]

## 2024-04-22 NOTE — LETTER
"4/22/2024       RE: Yolande Hussein  [address]  [place] [address]     Dear Colleague,    Thank you for referring your patient, Yolande Hussein, to the [facility] [place] at [facility]. Please see a copy of my visit note below.      Assessment & Plan     Moderate persistent asthma with exacerbation  Reviewed management of asthma exacerbation with patient.  Recommend to continue with current inhalers.  Prednisone burst was provided to the patient for 5 days.  Recommend follow-up in clinic if her symptoms do not improve.  Patient is in agreement with the plan.  - predniSONE (DELTASONE) 20 MG tablet; Take 2 tablets (40 mg) by mouth daily      I spent a total of 20 minutes on the day of the visit.   Time spent by me doing chart review, history and exam, documentation and further activities per the note            No follow-ups on file.      Rika Lanier is a 73 year old, presenting for the following health issues:  Follow Up    HPI     Patient reports that 2 days ago she started to feel shortness of breath as well as developed cough.  She states that she has nasal congestion.  She tested negative for COVID at home today.  She has been trying to use inhalers, however, she has noticed that her breathing continues to get worse.  She denies significant wheezing.  She is wondering if she needs a course of prednisone to get her asthma under better control.      Review of Systems  Constitutional, HEENT, cardiovascular, pulmonary, gi and gu systems are negative, except as otherwise noted.     Objective    BP (!) 157/78   Pulse 99   Ht 1.651 m (5' 5\")   Wt 70.3 kg (154 lb 14.4 oz)   BMI 25.78 kg/m    Body mass index is 25.78 kg/m .  Physical Exam   GENERAL: alert and no distress  EYES: Eyes grossly normal to inspection, PERRL and conjunctivae and sclerae normal  RESP: lungs clear to auscultation - no rales, rhonchi or " wheezes  CV: regular rate and rhythm, normal S1 S2, no S3 or S4, no murmur, click or rub, no peripheral edema  MS: no gross musculoskeletal defects noted, no edema  SKIN: no suspicious lesions or rashes  NEURO: Normal strength and tone, mentation intact and speech normal  PSYCH: mentation appears normal, affect normal/bright           Signed Electronically by: Isabel Hicks MD

## 2024-04-23 NOTE — PROGRESS NOTES
"  Assessment & Plan     Moderate persistent asthma with exacerbation  Reviewed management of asthma exacerbation with patient.  Recommend to continue with current inhalers.  Prednisone burst was provided to the patient for 5 days.  Recommend follow-up in clinic if her symptoms do not improve.  Patient is in agreement with the plan.  - predniSONE (DELTASONE) 20 MG tablet; Take 2 tablets (40 mg) by mouth daily      I spent a total of 20 minutes on the day of the visit.   Time spent by me doing chart review, history and exam, documentation and further activities per the note            No follow-ups on file.      Rika Lanier is a 73 year old, presenting for the following health issues:  Follow Up    HPI     Patient reports that 2 days ago she started to feel shortness of breath as well as developed cough.  She states that she has nasal congestion.  She tested negative for COVID at home today.  She has been trying to use inhalers, however, she has noticed that her breathing continues to get worse.  She denies significant wheezing.  She is wondering if she needs a course of prednisone to get her asthma under better control.      Review of Systems  Constitutional, HEENT, cardiovascular, pulmonary, gi and gu systems are negative, except as otherwise noted.      Objective    BP (!) 157/78   Pulse 99   Ht 1.651 m (5' 5\")   Wt 70.3 kg (154 lb 14.4 oz)   BMI 25.78 kg/m    Body mass index is 25.78 kg/m .  Physical Exam   GENERAL: alert and no distress  EYES: Eyes grossly normal to inspection, PERRL and conjunctivae and sclerae normal  RESP: lungs clear to auscultation - no rales, rhonchi or wheezes  CV: regular rate and rhythm, normal S1 S2, no S3 or S4, no murmur, click or rub, no peripheral edema  MS: no gross musculoskeletal defects noted, no edema  SKIN: no suspicious lesions or rashes  NEURO: Normal strength and tone, mentation intact and speech normal  PSYCH: mentation appears normal, affect " normal/bright            Signed Electronically by: Isabel Hicks MD

## 2024-05-01 DIAGNOSIS — J45.41 MODERATE PERSISTENT ASTHMA WITH EXACERBATION: ICD-10-CM

## 2024-05-06 ENCOUNTER — TELEPHONE (OUTPATIENT)
Dept: INTERNAL MEDICINE | Facility: CLINIC | Age: 74
End: 2024-05-06
Payer: COMMERCIAL

## 2024-05-06 RX ORDER — ALBUTEROL SULFATE 0.83 MG/ML
SOLUTION RESPIRATORY (INHALATION)
Qty: 75 ML | Refills: 0 | Status: SHIPPED | OUTPATIENT
Start: 2024-05-06

## 2024-05-06 NOTE — TELEPHONE ENCOUNTER
[facility] Call Center    Phone Message    May a detailed message be left on voicemail: yes     Reason for Call: Medication Refill Request    Has the patient contacted the pharmacy for the refill? Yes   Name of medication being requested:    albuterol (PROVENTIL) (2.5 MG/3ML) 0.083% neb solution    predniSONE (DELTASONE) 20 MG tablet      Provider who prescribed the medication: Isabel Hicks MD  Pharmacy: [facility] [address] [place] [address], [place]  Date medication is needed: Asap    Patient still not feeling well. Seen provider last week. A refill was sent from the pharmacy on 05/01 for the Albuterol. Please contact patient in regards to this message. Thank you       Action Taken: Other: Women's    Travel Screening: Not Applicable

## 2024-05-06 NOTE — TELEPHONE ENCOUNTER
Sent refill for albuterol (PROVENTIL) (2.5 MG/3ML) 0.083% neb  solution to [facility] pharmacy.     Spoke to patient she saw Dr. Hicks on 4/22/24 for asthma with exacerbation, cough and nasal congestion.  She was prescribed predniSONE (DELTASONE) 20 MG tablet for 5 days and to continue with current inhalers.    It was recommended patient follow up in clinic if symptoms did not improve.  Per patient she has improved a little but still coughing up yellow phlegm and nasal congestion.  States her blow meter and O2 is still WNL but is getting tired of coughing.  Scheduled an appointment with Dr. Aguayo tomorrow 5/7/24 @ 7:30 am.  For follow up. Informed patient if she develops any SOB she should be see urgently in the ED.  Patient acknowledged understanding.     Marisa Strong RN on 5/6/2024 at 3:03 PM

## 2024-05-07 ENCOUNTER — OFFICE VISIT (OUTPATIENT)
Dept: INTERNAL MEDICINE | Facility: CLINIC | Age: 74
End: 2024-05-07
Payer: COMMERCIAL

## 2024-05-07 VITALS
BODY MASS INDEX: 25.89 KG/M2 | OXYGEN SATURATION: 96 % | TEMPERATURE: 98.5 F | HEART RATE: 109 BPM | SYSTOLIC BLOOD PRESSURE: 144 MMHG | HEIGHT: 65 IN | WEIGHT: 155.4 LBS | DIASTOLIC BLOOD PRESSURE: 71 MMHG

## 2024-05-07 DIAGNOSIS — J06.9 UPPER RESPIRATORY TRACT INFECTION, UNSPECIFIED TYPE: ICD-10-CM

## 2024-05-07 DIAGNOSIS — J01.01 ACUTE RECURRENT MAXILLARY SINUSITIS: Primary | ICD-10-CM

## 2024-05-07 PROCEDURE — 99213 OFFICE O/P EST LOW 20 MIN: CPT | Mod: GC

## 2024-05-07 RX ORDER — FLUTICASONE PROPIONATE 50 MCG
1 SPRAY, SUSPENSION (ML) NASAL DAILY
Qty: 9.9 ML | Refills: 0 | Status: SHIPPED | OUTPATIENT
Start: 2024-05-07 | End: 2024-05-21

## 2024-05-07 ASSESSMENT — ENCOUNTER SYMPTOMS: COUGH: 1

## 2024-05-07 ASSESSMENT — ASTHMA QUESTIONNAIRES
QUESTION_1 LAST FOUR WEEKS HOW MUCH OF THE TIME DID YOUR ASTHMA KEEP YOU FROM GETTING AS MUCH DONE AT WORK, SCHOOL OR AT HOME: SOME OF THE TIME

## 2024-05-07 NOTE — PROGRESS NOTES
Assessment & Plan     (J01.01) Acute recurrent maxillary sinusitis  (primary encounter diagnosis)  (J06.9) Upper respiratory tract infection, unspecified type  It seems she probably had a URI which triggered an episode of maxillary sinusitis given maxillary sinus tenderness and congestion. Has history of subacute/chronic maxillary sinusitis that has responded to augmentin in the past, so will try this again. Does not appear to currently have an ongoing asthma exacerbation given good air exchange and lack of wheezing. Suspect her SOB and cough are likely postviral.   - augmentin 875-125 BID x10 days. Return to clinic if symptoms not improving after 1 week of therapy  - continue current inhalers for asthma  - flonase daily       No follow-ups on file.    Rika Lanier is a 73 year old, presenting for the following health issues:  Cough (Pt has cough and cold. Onset: 04/19)      5/7/2024     7:16 AM   Additional Questions   Roomed by BRISSA SALDANA     Alexandra Lanier is a 73 year old female with hx asthma, HTN, appendix cancer, and osteoporosis who presents for follow up of persistent cough and and congestion. She saw Dr. Hicks on 4/22/24- had 3 days of URI symptoms before that. Was given a 5 day burst of prednisone for asthma exacerbation at that time. She thinks the prednisone may have helped a little bit, but is still having symptoms. Takes dulera inhaler for asthma. Has been getting some relief from this but has also been having to use an albuterol nebulizer 3 times daily. Has hx recurrent episodes of prolonged maxillary sinusitis which has responded well to augmentin in the past. Has been using tylenol and an antihistamine nasal spray for this current episode.    No fevers. Feels like her chest is tight when she is breathing. Nose feels stuffy, ears feel plugged, has had a temporal headache. No nausea, vomiting, or diarrhea. Reports she has had 3 negative COVID tests since the onset of symptoms (not  "available in Epic). Has had some wheezing on and off over this time but has been able to move air without issue. Climbing a flight of stairs is difficult. Has been coughing up lots of phlegm this entire time and gets short of breath with activity. Currently the phlegm is yellow and it started out more of a green color. Has been able to eat and drink without issues. Never smoker. Had a sore throat toward the beginning of this episode, but not presently. Felt like her tonsils were swollen at that time.         Objective    BP (!) 144/71 (BP Location: Right arm, Patient Position: Sitting, Cuff Size: Adult Regular)   Pulse 109   Temp 98.5  F (36.9  C) (Oral)   Ht 1.651 m (5' 5\")   Wt 70.5 kg (155 lb 6.4 oz)   SpO2 96%   BMI 25.86 kg/m    Body mass index is 25.86 kg/m .  Physical Exam   GENERAL: alert and no distress  HEENT: no adenopathy, no asymmetry, oropharynx clear, maxillary sinus tenderness present  RESP: lungs clear to auscultation - no rales, rhonchi or wheezes, good air exchange bilaterally  CV: regular rate and rhythm, no murmur  MS: no gross musculoskeletal defects noted  Neuro: no focal deficits        Patient discussed with Dr. Loyd Awan MD  Internal Medicine, PGY1    Signed Electronically by: Olivier Awan MD    "

## 2024-05-14 ENCOUNTER — ANCILLARY PROCEDURE (OUTPATIENT)
Dept: MRI IMAGING | Facility: CLINIC | Age: 74
End: 2024-05-14
Attending: PEDIATRICS
Payer: COMMERCIAL

## 2024-05-14 DIAGNOSIS — M25.562 LEFT KNEE PAIN, UNSPECIFIED CHRONICITY: ICD-10-CM

## 2024-05-14 LAB — RADIOLOGIST FLAGS: NORMAL

## 2024-05-14 PROCEDURE — 73721 MRI JNT OF LWR EXTRE W/O DYE: CPT | Mod: LT | Performed by: RADIOLOGY

## 2024-05-16 ENCOUNTER — TELEPHONE (OUTPATIENT)
Dept: ORTHOPEDICS | Facility: CLINIC | Age: 74
End: 2024-05-16
Payer: COMMERCIAL

## 2024-05-16 NOTE — TELEPHONE ENCOUNTER
Talked with patient,  Advised of MRI results and advised of limiting weightbearing.  Advised use of cane or walker to help.  Advised rest and not going for longer walks.  Yolande will keep follow up appointment and was grateful for the call.  THONY Cardona, ATC

## 2024-05-16 NOTE — TELEPHONE ENCOUNTER
Please contact pt, advise of insufficiency fracture medial femoral condyle noted on MRI (was not noted on x-ray).  Can keep follow-up appointment as scheduled and will discuss in more detail at that time.  Advise limiting WB through the left LE as able until next visit and can discuss further at that time.  Thanks.  Maurcie Toledo, , CAQ

## 2024-05-16 NOTE — TELEPHONE ENCOUNTER
Imaging called the clinic, advising of the insufficiency fracture on impression 2.  Routing to provider.  THONY Cardona, [facility]    MRI below:    EXAMINATION: MRI of the left knee without contrast     DATE:  5/14/2024     HISTORY: Knee pain     TECHNIQUE: Multiplanar multisequence MR imaging of the left knee was  obtained using standard sequences in 3 orthogonal planes without the  use of intravenous or intra-articular gadolinium contrast.     Comparison: Comparison radiographs dated 2/19/2024 were reviewed,  which demonstrated no acute bone abnormality.     FINDINGS:     In the medial compartment, there is a radial tear involving the  posterior horn extending to the posterior root ligament, with a  horizontal tear involving the body and posterior horn. There are  scattered areas of high-grade to full-thickness cartilage loss along  both the medial femoral condyle condyle and medial tibial plateau with  extensive subchondral bone marrow in the medial femoral condyle and  medial tibial plateau. Thickened low signal subchondral bone along the  weightbearing surface of the medial femoral condyle, sagittal series 6  image 8, findings most consistent with an insufficiency fracture.      In the lateral compartment, there is minimal superior surface  irregularity along the body of the lateral meniscus without displaced  fragment. There is a focal area of at least moderate grade cartilage  loss along the lateral tibial plateau without subchondral edema.     In the patellofemoral compartment, there is no high-grade or  full-thickness cartilage loss or subchondral edema.     The anterior and posterior cruciate ligaments are intact.     The tibial collateral ligament is intact.      The anterior iliotibial band, fibular collateral ligament, biceps  femoris tendon, and popliteus tendons are intact.     There is a moderate size joint effusion. No popliteal cyst. No joint  bodies.     The extensor mechanism is intact and normal  in appearance.                                                                       IMPRESSION:  1. Moderate size left knee joint effusion.      2. Extensive bone marrow edema centered at the medial femorotibial  joint compartment. Low signal thickening of the subchondral bone along  the weightbearing surface of the medial femoral condyle, findings most  consistent with an insufficiency fracture.     3. Large radial tear involving the posterior horn and posterior root  ligament of the medial meniscus with horizontal tear involving the  body and posterior horn.     4. Osteoarthrosis in the left knee, most significant within the medial  femorotibial joint compartment. There are large areas of  full-thickness cartilage loss.     5. The anterior and posterior cruciate ligaments, and medial and  lateral supporting structures are intact.     [Consider Follow Up: Likely insufficiency fracture involving the  medial femoral condyle, as above]     This report will be copied to the [facility] to ensure a  provider acknowledges the finding. [facility] is available Monday  through Friday 8am-3:30 pm.     SULEIMAN JONES MD

## 2024-05-21 ENCOUNTER — OFFICE VISIT (OUTPATIENT)
Dept: ORTHOPEDICS | Facility: CLINIC | Age: 74
End: 2024-05-21
Payer: COMMERCIAL

## 2024-05-21 VITALS — HEIGHT: 65 IN | BODY MASS INDEX: 25.83 KG/M2 | WEIGHT: 155 LBS

## 2024-05-21 DIAGNOSIS — M17.12 PRIMARY OSTEOARTHRITIS OF LEFT KNEE: ICD-10-CM

## 2024-05-21 DIAGNOSIS — M84.453A INSUFFICIENCY FRACTURE OF MEDIAL FEMORAL CONDYLE (H): Primary | ICD-10-CM

## 2024-05-21 PROCEDURE — 99213 OFFICE O/P EST LOW 20 MIN: CPT | Performed by: PEDIATRICS

## 2024-05-21 NOTE — PATIENT INSTRUCTIONS
Reviewed the left knee MRI findings, including medial femoral condyle insufficiency fracture, as well as medial meniscus tearing and medial compartment arthritic change.  Good to hear of some overall improvement that you have had compared to last visit.  With the MRI findings, we discussed a few considerations:  1) continued monitoring, with potential for symptoms to improve with time  2) trial of  brace through physical therapy  3) future repeat injection (if/when turning attention from the insufficiency fracture)  4) referral to orthopedic surgery (if the fracture not considered to have healed, or if exhausting other management for the knee cartilage issues    Next plan to trial  brace (we discussed potential [employer] brand) through physical therapy. Referral placed.    Plan to monitor ~4-6 weeks with use of brace to begin, and recheck around that time, sooner if needed.    If you have any further questions for your physician or physician s care team you can contact them thru [employer] or by calling [phone].

## 2024-05-21 NOTE — PROGRESS NOTES
ASSESSMENT & PLAN    Yolande was seen today for follow up.    Diagnoses and all orders for this visit:    Insufficiency fracture of medial femoral condyle (H)  -     Physical Therapy  Referral; Future    Primary osteoarthritis of left knee  -     Physical Therapy  Referral; Future      Some improvement by history.  Discussed continued focus on fracture for now, later transition to focus on cartilage issues assuming fracture heals.  Prefers to avoid surgery.  See below.  Questions answered. Discussed signs and symptoms that may indicate more serious issues; the patient was instructed to seek appropriate care if noted. Yolande indicates understanding of these issues and agrees with the plan.      See Patient Instructions  Patient Instructions   Reviewed the left knee MRI findings, including medial femoral condyle insufficiency fracture, as well as medial meniscus tearing and medial compartment arthritic change.  Good to hear of some overall improvement that you have had compared to last visit.  With the MRI findings, we discussed a few considerations:  1) continued monitoring, with potential for symptoms to improve with time  2) trial of  brace through physical therapy  3) future repeat injection (if/when turning attention from the insufficiency fracture)  4) referral to orthopedic surgery (if the fracture not considered to have healed, or if exhausting other management for the knee cartilage issues    Next plan to trial  brace (we discussed potential Ossur brand) through physical therapy. Referral placed.    Plan to monitor ~4-6 weeks with use of brace to begin, and recheck around that time, sooner if needed.    If you have any further questions for your physician or physician s care team you can contact them thru MyChart or by calling [phone].      Maurice Toledo [facility]    SUBJECTIVE- Interim History May 21, 2024    No chief  "complaint on file.      Yolande Hussein is a 73 year old female who is seen in f/u up for Data Unavailable. Since last visit on 3/22/24 patient has continued with rest, noting that she has been limiting weightbearing. Reports that she is primarily resting, does note that she was given a walker by her neighbor. Notes that she feel like that the knee is getting better, but longer distances are more painful.  - Now ~ 6 month(s) from initial onset    The patient is seen by themselves.    Orthopedic/Surgical history: NO  Social History/Occupation: Retired    **  Above information per rooming staff.  Additional history:  Overall symptoms a bit better than previously.  If has been resting more regularly, can get more comfortable, and move around more comfortably.  If walking too much, then more aching.        REVIEW OF SYSTEMS:  Review of Systems    OBJECTIVE:  Ht 1.651 m (5' 5\")   Wt 70.3 kg (155 lb)   BMI 25.79 kg/m         Left knee:  More comfortable with AROM compared to previous visits, also with WB      RADIOLOGY:  Final results and radiologist's interpretation, available in the [facility] health record.  Images were reviewed with the patient in the office today.  My personal interpretation of the performed imaging: insufficiency fracture medial femoral condyle, also medial meniscus tearing, and medial compartment arthrosis      EXAMINATION: MRI of the left knee without contrast     DATE:  5/14/2024     HISTORY: Knee pain     TECHNIQUE: Multiplanar multisequence MR imaging of the left knee was  obtained using standard sequences in 3 orthogonal planes without the  use of intravenous or intra-articular gadolinium contrast.     Comparison: Comparison radiographs dated 2/19/2024 were reviewed,  which demonstrated no acute bone abnormality.     FINDINGS:     In the medial compartment, there is a radial tear involving the  posterior horn extending to the posterior root ligament, with a  horizontal tear involving the body " and posterior horn. There are  scattered areas of high-grade to full-thickness cartilage loss along  both the medial femoral condyle condyle and medial tibial plateau with  extensive subchondral bone marrow in the medial femoral condyle and  medial tibial plateau. Thickened low signal subchondral bone along the  weightbearing surface of the medial femoral condyle, sagittal series 6  image 8, findings most consistent with an insufficiency fracture.      In the lateral compartment, there is minimal superior surface  irregularity along the body of the lateral meniscus without displaced  fragment. There is a focal area of at least moderate grade cartilage  loss along the lateral tibial plateau without subchondral edema.     In the patellofemoral compartment, there is no high-grade or  full-thickness cartilage loss or subchondral edema.     The anterior and posterior cruciate ligaments are intact.     The tibial collateral ligament is intact.      The anterior iliotibial band, fibular collateral ligament, biceps  femoris tendon, and popliteus tendons are intact.     There is a moderate size joint effusion. No popliteal cyst. No joint  bodies.     The extensor mechanism is intact and normal in appearance.                                                                       IMPRESSION:  1. Moderate size left knee joint effusion.      2. Extensive bone marrow edema centered at the medial femorotibial  joint compartment. Low signal thickening of the subchondral bone along  the weightbearing surface of the medial femoral condyle, findings most  consistent with an insufficiency fracture.     3. Large radial tear involving the posterior horn and posterior root  ligament of the medial meniscus with horizontal tear involving the  body and posterior horn.     4. Osteoarthrosis in the left knee, most significant within the medial  femorotibial joint compartment. There are large areas of  full-thickness cartilage loss.     5. The  anterior and posterior cruciate ligaments, and medial and  lateral supporting structures are intact.     [Consider Follow Up: Likely insufficiency fracture involving the  medial femoral condyle, as above]     This report will be copied to the [facility] to ensure a  provider acknowledges the finding. Access Center is available Monday  through Friday 8am-3:30 pm.     SULEIMAN JONES MD       25 minutes spent by me on the date of the encounter doing chart review, history and exam, documentation and further activities per the note

## 2024-05-21 NOTE — LETTER
5/21/2024         RE: Yolande Hussein  [address] [facility] [address]        Dear Colleague,    Thank you for referring your patient, Yolande Hussein, to the [facility]. Please see a copy of my visit note below.    ASSESSMENT & PLAN    Yolande was seen today for follow up.    Diagnoses and all orders for this visit:    Insufficiency fracture of medial femoral condyle (H)  -     Physical Therapy  Referral; Future    Primary osteoarthritis of left knee  -     Physical Therapy  Referral; Future      Some improvement by history.  Discussed continued focus on fracture for now, later transition to focus on cartilage issues assuming fracture heals.  Prefers to avoid surgery.  See below.  Questions answered. Discussed signs and symptoms that may indicate more serious issues; the patient was instructed to seek appropriate care if noted. Yolande indicates understanding of these issues and agrees with the plan.      See Patient Instructions  Patient Instructions   Reviewed the left knee MRI findings, including medial femoral condyle insufficiency fracture, as well as medial meniscus tearing and medial compartment arthritic change.  Good to hear of some overall improvement that you have had compared to last visit.  With the MRI findings, we discussed a few considerations:  1) continued monitoring, with potential for symptoms to improve with time  2) trial of  brace through physical therapy  3) future repeat injection (if/when turning attention from the insufficiency fracture)  4) referral to orthopedic surgery (if the fracture not considered to have healed, or if exhausting other management for the knee cartilage issues    Next plan to trial  brace (we discussed potential Ossur brand) through physical therapy. Referral placed.    Plan to monitor ~4-6 weeks with use of brace to begin, and recheck around that time, sooner if  "needed.    If you have any further questions for your physician or physician s care team you can contact them thru [facility] or by calling [phone].      Maurice Toledo [facility]    SUBJECTIVE- Interim History May 21, 2024    No chief complaint on file.      Yolande Hussein is a 73 year old female who is seen in f/u up for Data Unavailable. Since last visit on 3/22/24 patient has continued with rest, noting that she has been limiting weightbearing. Reports that she is primarily resting, does note that she was given a walker by her neighbor. Notes that she feel like that the knee is getting better, but longer distances are more painful.  - Now ~ 6 month(s) from initial onset    The patient is seen by themselves.    Orthopedic/Surgical history: NO  Social History/Occupation: Retired    **  Above information per rooming staff.  Additional history:  Overall symptoms a bit better than previously.  If has been resting more regularly, can get more comfortable, and move around more comfortably.  If walking too much, then more aching.        REVIEW OF SYSTEMS:  Review of Systems    OBJECTIVE:  Ht 1.651 m (5' 5\")   Wt 70.3 kg (155 lb)   BMI 25.79 kg/m         Left knee:  More comfortable with AROM compared to previous visits, also with WB      RADIOLOGY:  Final results and radiologist's interpretation, available in the [facility] health record.  Images were reviewed with the patient in the office today.  My personal interpretation of the performed imaging: insufficiency fracture medial femoral condyle, also medial meniscus tearing, and medial compartment arthrosis      EXAMINATION: MRI of the left knee without contrast     DATE:  5/14/2024     HISTORY: Knee pain     TECHNIQUE: Multiplanar multisequence MR imaging of the left knee was  obtained using standard sequences in 3 orthogonal planes without the  use of intravenous or intra-articular gadolinium contrast.   "   Comparison: Comparison radiographs dated 2/19/2024 were reviewed,  which demonstrated no acute bone abnormality.     FINDINGS:     In the medial compartment, there is a radial tear involving the  posterior horn extending to the posterior root ligament, with a  horizontal tear involving the body and posterior horn. There are  scattered areas of high-grade to full-thickness cartilage loss along  both the medial femoral condyle condyle and medial tibial plateau with  extensive subchondral bone marrow in the medial femoral condyle and  medial tibial plateau. Thickened low signal subchondral bone along the  weightbearing surface of the medial femoral condyle, sagittal series 6  image 8, findings most consistent with an insufficiency fracture.      In the lateral compartment, there is minimal superior surface  irregularity along the body of the lateral meniscus without displaced  fragment. There is a focal area of at least moderate grade cartilage  loss along the lateral tibial plateau without subchondral edema.     In the patellofemoral compartment, there is no high-grade or  full-thickness cartilage loss or subchondral edema.     The anterior and posterior cruciate ligaments are intact.     The tibial collateral ligament is intact.      The anterior iliotibial band, fibular collateral ligament, biceps  femoris tendon, and popliteus tendons are intact.     There is a moderate size joint effusion. No popliteal cyst. No joint  bodies.     The extensor mechanism is intact and normal in appearance.                                                                       IMPRESSION:  1. Moderate size left knee joint effusion.      2. Extensive bone marrow edema centered at the medial femorotibial  joint compartment. Low signal thickening of the subchondral bone along  the weightbearing surface of the medial femoral condyle, findings most  consistent with an insufficiency fracture.     3. Large radial tear involving the  posterior horn and posterior root  ligament of the medial meniscus with horizontal tear involving the  body and posterior horn.     4. Osteoarthrosis in the left knee, most significant within the medial  femorotibial joint compartment. There are large areas of  full-thickness cartilage loss.     5. The anterior and posterior cruciate ligaments, and medial and  lateral supporting structures are intact.     [Consider Follow Up: Likely insufficiency fracture involving the  medial femoral condyle, as above]     This report will be copied to the [facility] to ensure a  provider acknowledges the finding. Access Center is available Monday  through Friday 8am-3:30 pm.     SULEIMAN JONES MD       25 minutes spent by me on the date of the encounter doing chart review, history and exam, documentation and further activities per the note           Again, thank you for allowing me to participate in the care of your patient.        Sincerely,        Maurice Toledo, DO

## 2024-05-24 ENCOUNTER — THERAPY VISIT (OUTPATIENT)
Dept: PHYSICAL THERAPY | Facility: CLINIC | Age: 74
End: 2024-05-24
Payer: COMMERCIAL

## 2024-05-24 DIAGNOSIS — M25.562 LEFT KNEE PAIN, UNSPECIFIED CHRONICITY: Primary | ICD-10-CM

## 2024-05-24 DIAGNOSIS — M84.453A INSUFFICIENCY FRACTURE OF MEDIAL FEMORAL CONDYLE (H): ICD-10-CM

## 2024-05-24 DIAGNOSIS — M17.12 PRIMARY OSTEOARTHRITIS OF LEFT KNEE: ICD-10-CM

## 2024-05-24 PROCEDURE — 97530 THERAPEUTIC ACTIVITIES: CPT | Mod: GP | Performed by: PHYSICAL THERAPIST

## 2024-05-24 NOTE — PROGRESS NOTES
Ossur Knee  Brace Trial:     Pain Rating    Affected Joint: Left Knee    Without  brace:    At rest 0/10  Walking: 3/10    Squattin/10   Stairs: 4/10      Wearing  brace:    At rest 0/10  Walkin/10    Squattin/10   Stairs: 2/10      Brace and Measurements:    Brace trialed:     Type - Ossur  One  Size - Medium  Side - Medial  Affected Knee - Left Knee    Circumference 6 inches below mid patella: 13.25 inches (Ossur )    Other Comments:  See PT evaluation in Epic for any additional information including joint special testing/ligament testing    +valugs stress test on left knee, calf pad was a little loose so may benefit from small brace but did not have for patient to trial.

## 2024-06-05 DIAGNOSIS — J45.40 MODERATE PERSISTENT ASTHMA WITHOUT COMPLICATION: ICD-10-CM

## 2024-06-10 RX ORDER — ALBUTEROL SULFATE 90 UG/1
2 AEROSOL, METERED RESPIRATORY (INHALATION) EVERY 6 HOURS PRN
Qty: 18 G | Refills: 4 | Status: SHIPPED | OUTPATIENT
Start: 2024-06-10

## 2024-06-10 NOTE — TELEPHONE ENCOUNTER
Patient is requesting a refill on her Albuterol inhaler.     Patient was last seen on 4-22-24 will pend a refill to Dr. Hicks

## 2024-06-10 NOTE — TELEPHONE ENCOUNTER
Health Call Center    Phone Message    May a detailed message be left on voicemail: yes     Reason for Call: Medication Refill Request    Has the patient contacted the pharmacy for the refill? Yes   Name of medication being requested: albuterol (PROAIR HFA/PROVENTIL HFA/VENTOLIN HFA) 108 (90 Base) MCG/ACT inhaler (Discontinued)     Provider who prescribed the medication: Dr GISELA Hicks  Pharmacy: [facility] [address] [place] [address], [place]    Date medication is needed: ASAP - pt states that she has been using an  version of this albuterol inhaler on top of her dulera medication. Pt wondering if she can get a new Rx/ refill on above inhaler asap.   Please contact pt with any questions.       Action Taken: Other: [facility]    Travel Screening: Not Applicable     Date of Service:

## 2024-06-11 ENCOUNTER — TELEPHONE (OUTPATIENT)
Dept: PULMONOLOGY | Facility: CLINIC | Age: 74
End: 2024-06-11
Payer: COMMERCIAL

## 2024-06-11 NOTE — TELEPHONE ENCOUNTER
Patient confirmed scheduled appointment:  Date: 09/03/2024  Time: 1:00PM  Visit type: RTA  Provider: KATIE  Location: [facility]  Testing/imaging: N/A  Additional notes: N/A

## 2024-07-01 DIAGNOSIS — I10 BENIGN ESSENTIAL HYPERTENSION: ICD-10-CM

## 2024-07-01 RX ORDER — HYDROCHLOROTHIAZIDE 25 MG/1
25 TABLET ORAL DAILY
Qty: 90 TABLET | Refills: 3 | Status: SHIPPED | OUTPATIENT
Start: 2024-07-01

## 2024-07-01 RX ORDER — LOSARTAN POTASSIUM 50 MG/1
50 TABLET ORAL DAILY
Qty: 90 TABLET | Refills: 3 | Status: SHIPPED | OUTPATIENT
Start: 2024-07-01

## 2024-07-01 NOTE — TELEPHONE ENCOUNTER
[facility] Call Center    Phone Message    May a detailed message be left on voicemail: yes     Reason for Call: Medication Refill Request    Has the patient contacted the pharmacy for the refill? Yes   Name of medication being requested: hydrochlorothiazide (HYDRODIURIL) 25 MG tablet and losartan (COZAAR) 50 MG tablet  Provider who prescribed the medication:   Pharmacy: [facility] [address] [place] [address], [place]  Date medication is needed: pt has 2 weeks of meds left, but will need a refill prior to her annual on 7/22/24, thank you       Action Taken: Message routed to:  Other: obgyn    Travel Screening: Not Applicable     Date of Service:

## 2024-07-10 NOTE — PROGRESS NOTES
ASSESSMENT & PLAN    Yolande was seen today for follow up.    Diagnoses and all orders for this visit:    Insufficiency fracture of medial femoral condyle (H)    Primary osteoarthritis of left knee      With improvement, and with desire to limit more tests, elected to defer additional x-rays today.  Previous imaging reviewed.  With continued improvement, we discussed continued focus on bracing, and monitoring with underlying OA and presumed healing of the insufficiency fracture.  Eventually may turn attention more to OA, if ongoing symptoms.  See below.  Questions answered. Discussed signs and symptoms that may indicate more serious issues; the patient was instructed to seek appropriate care if noted. Yolande indicates understanding of these issues and agrees with the plan.      See Patient Instructions  Patient Instructions   Good to hear of the improvement that you have had.  With this improvement, we deferred any additional imaging today, though likely will reconsider updating x-ray in the future.  Reviewed use of the  brace, plan to continue with that.  Tubigrip or similar thin material under the brace is ok, to avoid skin irritation.  Plan to continue with monitoring for now, and we can recheck in 2-3 months, clinical exam first, sooner if needed.    If you have any further questions for your physician or physician s care team you can contact them thru MyChart or by calling [phone].      Maurice Toledo, [facility]    SUBJECTIVE- Interim History July 10, 2024    Chief Complaint   Patient presents with    Left Knee - Follow Up       Yolande Hussein is a 74 year old female who is seen in f/u up for    Insufficiency fracture of medial femoral condyle (H)  Primary osteoarthritis of left knee. Since last visit on 5/21/24 patient has some soreness at the end of the day, noting some throbbing at night.  - Now ~ 8 months from initial onset    PT and  " brace, notes good benefit from the  brace, reporting decrease worry with getting the feeling of the knee giving away    The patient is seen by themselves.    Orthopedic/Surgical history: NO  Social History/Occupation: Retired      REVIEW OF SYSTEMS:  Review of Systems    OBJECTIVE:  Ht 1.651 m (5' 5\")   Wt 70.3 kg (155 lb)   BMI 25.79 kg/m       Left knee  brace fitting well, reviewed some ability to adjust more proximal aspect; also reviewed how can perhaps use thin sleeve or Tubigrip under brace      RADIOLOGY:  None new today; see plan above.            "

## 2024-07-12 ENCOUNTER — OFFICE VISIT (OUTPATIENT)
Dept: ORTHOPEDICS | Facility: CLINIC | Age: 74
End: 2024-07-12
Payer: COMMERCIAL

## 2024-07-12 VITALS — HEIGHT: 65 IN | BODY MASS INDEX: 25.83 KG/M2 | WEIGHT: 155 LBS

## 2024-07-12 DIAGNOSIS — M84.453A INSUFFICIENCY FRACTURE OF MEDIAL FEMORAL CONDYLE (H): Primary | ICD-10-CM

## 2024-07-12 DIAGNOSIS — M17.12 PRIMARY OSTEOARTHRITIS OF LEFT KNEE: ICD-10-CM

## 2024-07-12 PROCEDURE — 99213 OFFICE O/P EST LOW 20 MIN: CPT | Performed by: PEDIATRICS

## 2024-07-12 NOTE — LETTER
7/12/2024      Yolande Hussein  [address] [facility] [address]      Dear Colleague,    Thank you for referring your patient, Yolande Hussein, to the [facility]. Please see a copy of my visit note below.    ASSESSMENT & PLAN    Yolande was seen today for follow up.    Diagnoses and all orders for this visit:    Insufficiency fracture of medial femoral condyle (H)    Primary osteoarthritis of left knee      With improvement, and with desire to limit more tests, elected to defer additional x-rays today.  Previous imaging reviewed.  With continued improvement, we discussed continued focus on bracing, and monitoring with underlying OA and presumed healing of the insufficiency fracture.  Eventually may turn attention more to OA, if ongoing symptoms.  See below.  Questions answered. Discussed signs and symptoms that may indicate more serious issues; the patient was instructed to seek appropriate care if noted. Yolande indicates understanding of these issues and agrees with the plan.      See Patient Instructions  Patient Instructions   Good to hear of the improvement that you have had.  With this improvement, we deferred any additional imaging today, though likely will reconsider updating x-ray in the future.  Reviewed use of the  brace, plan to continue with that.  Tubigrip or similar thin material under the brace is ok, to avoid skin irritation.  Plan to continue with monitoring for now, and we can recheck in 2-3 months, clinical exam first, sooner if needed.    If you have any further questions for your physician or physician s care team you can contact them thru MyChart or by calling [phone].      Maurice Toledo DO  [facility]    SUBJECTIVE- Interim History July 10, 2024    Chief Complaint   Patient presents with     Left Knee - Follow Up       Yolande Hussein is a 74 year old female who is seen in f/u  "up for    Insufficiency fracture of medial femoral condyle (H)  Primary osteoarthritis of left knee. Since last visit on 5/21/24 patient has some soreness at the end of the day, noting some throbbing at night.  - Now ~ 8 months from initial onset    PT and  brace, notes good benefit from the  brace, reporting decrease worry with getting the feeling of the knee giving away    The patient is seen by themselves.    Orthopedic/Surgical history: NO  Social History/Occupation: Retired      REVIEW OF SYSTEMS:  Review of Systems    OBJECTIVE:  Ht 1.651 m (5' 5\")   Wt 70.3 kg (155 lb)   BMI 25.79 kg/m       Left knee  brace fitting well, reviewed some ability to adjust more proximal aspect; also reviewed how can perhaps use thin sleeve or Tubigrip under brace      RADIOLOGY:  None new today; see plan above.              Again, thank you for allowing me to participate in the care of your patient.        Sincerely,        Maurice Toledo, DO  "

## 2024-07-12 NOTE — PATIENT INSTRUCTIONS
Good to hear of the improvement that you have had.  With this improvement, we deferred any additional imaging today, though likely will reconsider updating x-ray in the future.  Reviewed use of the  brace, plan to continue with that.  Tubigrip or similar thin material under the brace is ok, to avoid skin irritation.  Plan to continue with monitoring for now, and we can recheck in 2-3 months, clinical exam first, sooner if needed.    If you have any further questions for your physician or physician s care team you can contact them thru [facility] or by calling [phone].

## 2024-07-22 ENCOUNTER — OFFICE VISIT (OUTPATIENT)
Dept: INTERNAL MEDICINE | Facility: CLINIC | Age: 74
End: 2024-07-22
Attending: INTERNAL MEDICINE
Payer: COMMERCIAL

## 2024-07-22 VITALS
WEIGHT: 152.6 LBS | DIASTOLIC BLOOD PRESSURE: 72 MMHG | HEART RATE: 76 BPM | SYSTOLIC BLOOD PRESSURE: 137 MMHG | BODY MASS INDEX: 25.43 KG/M2 | HEIGHT: 65 IN

## 2024-07-22 DIAGNOSIS — Z00.00 MEDICARE ANNUAL WELLNESS VISIT, SUBSEQUENT: Primary | ICD-10-CM

## 2024-07-22 DIAGNOSIS — Z12.31 ENCOUNTER FOR SCREENING MAMMOGRAM FOR BREAST CANCER: ICD-10-CM

## 2024-07-22 DIAGNOSIS — I10 BENIGN ESSENTIAL HYPERTENSION: ICD-10-CM

## 2024-07-22 PROCEDURE — G0463 HOSPITAL OUTPT CLINIC VISIT: HCPCS | Performed by: INTERNAL MEDICINE

## 2024-07-22 PROCEDURE — G0439 PPPS, SUBSEQ VISIT: HCPCS | Performed by: INTERNAL MEDICINE

## 2024-07-22 SDOH — HEALTH STABILITY: PHYSICAL HEALTH: ON AVERAGE, HOW MANY DAYS PER WEEK DO YOU ENGAGE IN MODERATE TO STRENUOUS EXERCISE (LIKE A BRISK WALK)?: 4 DAYS

## 2024-07-22 ASSESSMENT — ANXIETY QUESTIONNAIRES
7. FEELING AFRAID AS IF SOMETHING AWFUL MIGHT HAPPEN: SEVERAL DAYS
8. IF YOU CHECKED OFF ANY PROBLEMS, HOW DIFFICULT HAVE THESE MADE IT FOR YOU TO DO YOUR WORK, TAKE CARE OF THINGS AT HOME, OR GET ALONG WITH OTHER PEOPLE?: NOT DIFFICULT AT ALL
7. FEELING AFRAID AS IF SOMETHING AWFUL MIGHT HAPPEN: SEVERAL DAYS
IF YOU CHECKED OFF ANY PROBLEMS ON THIS QUESTIONNAIRE, HOW DIFFICULT HAVE THESE PROBLEMS MADE IT FOR YOU TO DO YOUR WORK, TAKE CARE OF THINGS AT HOME, OR GET ALONG WITH OTHER PEOPLE: NOT DIFFICULT AT ALL
1. FEELING NERVOUS, ANXIOUS, OR ON EDGE: SEVERAL DAYS
5. BEING SO RESTLESS THAT IT IS HARD TO SIT STILL: NOT AT ALL
4. TROUBLE RELAXING: NOT AT ALL
2. NOT BEING ABLE TO STOP OR CONTROL WORRYING: SEVERAL DAYS
GAD7 TOTAL SCORE: 3
6. BECOMING EASILY ANNOYED OR IRRITABLE: NOT AT ALL
GAD7 TOTAL SCORE: 3
3. WORRYING TOO MUCH ABOUT DIFFERENT THINGS: NOT AT ALL

## 2024-07-22 ASSESSMENT — SOCIAL DETERMINANTS OF HEALTH (SDOH): HOW OFTEN DO YOU GET TOGETHER WITH FRIENDS OR RELATIVES?: THREE TIMES A WEEK

## 2024-07-22 NOTE — LETTER
7/22/2024       RE: Yolande Hussein  [address]  [facility] [address]     Dear Colleague,    Thank you for referring your patient, Yolande Hussein, to the [facility] [place] at [facility]. Please see a copy of my visit note below.    Preventive Care Visit  [facility]  Isabel Hicks MD, Internal Medicine  Jul 22, 2024      Assessment & Plan    Medicare annual wellness visit, subsequent  Reviewed preventive healthcare needs with patient.  Advised on screening and vaccinations.    Encounter for screening mammogram for breast cancer  Discussed mammograms with patient.  Patient wishes to continue with screening.  - MA Screen Bilateral w/Juancarlos; Future    Benign essential hypertension  Blood pressure is within acceptable range.  Patient denies significant medication related side effects.  Recommend to continue with current treatment without changes.  Will check chemistry panel as well as lipid panel today.  - Lipid panel reflex to direct LDL Fasting; Future  - Basic metabolic panel  (Ca, Cl, CO2, Creat, Gluc, K, Na, BUN); Future  - TSH with free T4 reflex; Future            Counseling  Appropriate preventive services were addressed with this patient via screening, questionnaire, or discussion as appropriate for fall prevention, nutrition, physical activity, Tobacco-use cessation, weight loss and cognition.  Checklist reviewing preventive services available has been given to the patient.  Reviewed patient's diet, addressing concerns and/or questions.   She is at risk for psychosocial distress and has been provided with information to reduce risk.           No follow-ups on file.    Rika Lanier is a 74 year old, presenting for the following:  Physical        7/22/2024     1:00 PM   Additional Questions   Roomed by [facility] Directive  Patient does not have a  Health Care Directive or Living Will: Advance Directive received and scanned. Click on Code in the patient header to view.    HPI  Patient is here for a preventive annual visit.  She reports that overall she has been doing well.  She is concerned about her asthma inhaler as she has difficulties with administering albuterol due to the canister high-profile.  She is planning to discussed the issue with the pharmacist and potentially switch to a different  for the same medication.  Otherwise she denies any significant medication related side effects.            7/22/2024   General Health   How would you rate your overall physical health? Good   Feel stress (tense, anxious, or unable to sleep) Only a little      (!) STRESS CONCERN      7/22/2024   Nutrition   Diet: Regular (no restrictions)            7/22/2024   Exercise   Days per week of moderate/strenous exercise 4 days            7/22/2024   Social Factors   Frequency of gathering with friends or relatives Three times a week   Worry food won't last until get money to buy more No   Food not last or not have enough money for food? No   Do you have housing? (Housing is defined as stable permanent housing and does not include staying ouside in a car, in a tent, in an abandoned building, in an overnight shelter, or couch-surfing.) Yes   Are you worried about losing your housing? No   Lack of transportation? No   Unable to get utilities (heat,electricity)? No            7/22/2024   Fall Risk   Fallen 2 or more times in the past year? No   Trouble with walking or balance? No             7/22/2024   Activities of Daily Living- Home Safety   Needs help with the following daily activites None of the above   Safety concerns in the home None of the above            7/22/2024   Dental   Dentist two times every year? Yes            7/22/2024   Hearing Screening   Hearing concerns? None of the above            7/22/2024   Driving Risk Screening   Patient/family  members have concerns about driving No            7/22/2024   General Alertness/Fatigue Screening   Have you been more tired than usual lately? No            7/22/2024   Urinary Incontinence Screening   Bothered by leaking urine in past 6 months No            7/22/2024   TB Screening   Were you born outside of the [place]? No            Today's PHQ-2 Score:       7/22/2024     1:09 PM   PHQ-2 ( 1999 Pf[employer])   Q1: Little interest or pleasure in doing things 0   Q2: Feeling down, depressed or hopeless 0   PHQ-2 Score 0           7/22/2024   Substance Use   Alcohol more than 3/day or more than 7/wk Not Applicable   Do you have a current opioid prescription? No   How severe/bad is pain from 1 to 10? 2/10   Do you use any other substances recreationally? No        Social History     Tobacco Use     Smoking status: Never     Smokeless tobacco: Never   Substance Use Topics     Alcohol use: Not Currently     Comment: rare      Drug use: No           10/24/2022   LAST FHS-7 RESULTS   1st degree relative breast or ovarian cancer No   Any relative bilateral breast cancer No   Any male have breast cancer No   Any ONE woman have BOTH breast AND ovarian cancer No   Any woman with breast cancer before 50yrs No   2 or more relatives with breast AND/OR ovarian cancer No   2 or more relatives with breast AND/OR bowel cancer No           Mammogram Screening - After age 74- determine frequency with patient based on health status, life expectancy and patient goals    ASCVD Risk   The 10-year ASCVD risk score (Jeff TELLEZ, et al., 2019) is: 26.8%    Values used to calculate the score:      Age: 74 years      Sex: Female      Is Non- : No      Diabetic: No      Tobacco smoker: No      Systolic Blood Pressure: 137 mmHg      Is BP treated: Yes      HDL Cholesterol: 82 mg/dL      Total Cholesterol: 217 mg/dL            Reviewed and updated as needed this visit by Provider                    Family History   Problem  Relation Age of Onset     Asthma Mother      Diabetes Mother 80        diet and pill tx     Hypertension Mother      Macular Degeneration Mother      Abdominal Aortic Aneurysm Mother      Heart Failure Mother 70        CHF     Asthma Father      Hypertension Father      Alzheimer Disease Father 70     Skin Cancer Father      Heart Failure Sister 60        CHF - mild     Hypertension Sister      Macular Degeneration Sister      Asthma Maternal Grandfather      Asthma Daughter      Glaucoma No family hx of      Cancer No family hx of      Amblyopia No family hx of      Retinal detachment No family hx of        Past Medical History:   Diagnosis Date     Asthma      Breast atypical ductal hyperplasia-- Right 06/13/2002    Lumpectomy.  Problem list name updated by automated process. Provider to review and confirm     Dysphonia 09/08/2014     Eczema 10/15/2012     Gastro-oesophageal reflux disease      HLD (hyperlipidemia)      Hypertension      Kidney stone      Low grade mucinous neoplasm of appendix      Menopausal state -- age 49 08/13/2012    With NEGATIVE HR HPV neg on Pap today, there is NO further need for routine pap screens.        Neck stiffness 11/03/2014     Nephrolithiasis 05/12/2014     Nonsenile cataract      Shoulder pain 03/30/2009    Controlled with stretching, unless lapses or overworks 8/2012      Thoracalgia 03/30/2009     TMJ (temporomandibular joint syndrome) 01/18/2012    Jaw PT and Mouth guard helping! 8/2012      Unilateral vocal fold paresis 11/03/2014     Vaginal atrophy 08/13/2012    Estrogen loss with increasing sxs on Vagifem 10 mcg.  Increase from 2x/wk to 3xs/wk.      Past Surgical History:   Procedure Laterality Date     APPENDECTOMY OPEN N/A 12/27/2018    Procedure: Appendectomy open;  Surgeon: Evelyne Yung MD;  [facility]     BREAST BIOPSY, CORE RT/LT      R-- precancer cells     COLONOSCOPY       COLONOSCOPY N/A 01/28/2020    Procedure: COLONOSCOPY, WITH POLYPECTOMY AND  BIOPSY;  Surgeon: Aparna Soriano MD;  [facility]     EXPLORE NECK N/A 08/19/2014    Procedure: EXPLORE NECK;  Surgeon: Alea Jacobs MD;  [facility]     EXTRACORPOREAL SHOCK WAVE LITHOTRIPSY (ESWL)  02/26/2014    Procedure: EXTRACORPOREAL SHOCK WAVE LITHOTRIPSY (ESWL);  Left Kidney Extracorporeal Shockwave Lithotripsy;  Surgeon: Sabas Choi MD;  [facility]     HYSTERECTOMY TOTAL ABDOMINAL, BILATERAL SALPINGO-OOPHORECTOMY, COMBINED Bilateral 12/27/2018    Procedure: TOTAL ABDOMINAL HYSTERECTOMY WITH BILATERAL SALPINGO-OOPHORECTOMY, appendectomy, pelvic washings;  Surgeon: Evelyne Yung MD;  [facility]     LAPAROSCOPY  08/18/2023     LUMPECTOMY BREAST  age 48    R -- margins clear -- declined tamoxofen     PARATHYROIDECTOMY N/A 08/19/2014    Procedure: PARATHYROIDECTOMY;  Surgeon: Alea Jacobs MD;  [facility]     PHACOEMULSIFICATION CLEAR CORNEA WITH STANDARD INTRAOCULAR LENS IMPLANT Left 03/19/2021    Procedure: LEFT EYE CATARACT REMOVAL WITH INTRAOCULAR LENS IMPLANT;  Surgeon: Bing Horn MD;  [facility]     PHACOEMULSIFICATION CLEAR CORNEA WITH STANDARD INTRAOCULAR LENS IMPLANT Right 04/02/2021    Procedure: RIGHT EYE CATARACT REMOVAL WITH INTRAOCULAR LENS IMPLANT;  Surgeon: Bing Horn MD;  [facility]     STERILIZATION  1984    PP b/4 discharge      TUBAL LIGATION       Current providers sharing in care for this patient include:  Patient Care Team:  Isabel Hicks MD as PCP - General (Internal Medicine)  Zenaida Chinchilla APRN CNP as Assigned Surgical Provider  Malachi Daniel MD as Assigned Pulmonology Provider  Isabel Hicks MD as MD (Internal Medicine)  Isabel Hicks MD as Assigned PCP  Maurice Toledo DO as Assigned Musculoskeletal Provider    The following health maintenance items are reviewed in Epic and correct as of today:  Health Maintenance   Topic Date Due     ASTHMA ACTION  "PLAN  11/27/2019     ASTHMA CONTROL TEST  06/16/2020     MAMMO SCREENING  10/24/2023     COVID-19 Vaccine (9 - 2023-24 season) 05/22/2024     MEDICARE ANNUAL WELLNESS VISIT  06/19/2024     INFLUENZA VACCINE (1) 09/01/2024     FALL RISK ASSESSMENT  07/22/2025     GLUCOSE  02/19/2027     ADVANCE CARE PLANNING  06/19/2028     LIPID  07/03/2028     COLORECTAL CANCER SCREENING  01/28/2030     DTAP/TDAP/TD IMMUNIZATION (5 - Td or Tdap) 03/23/2033     DEXA  06/11/2036     HEPATITIS C SCREENING  Completed     PHQ-2 (once per calendar year)  Completed     Pneumococcal Vaccine: 65+ Years  Completed     ZOSTER IMMUNIZATION  Completed     RSV VACCINE (Pregnancy & 60+)  Completed     IPV IMMUNIZATION  Aged Out     HPV IMMUNIZATION  Aged Out     MENINGITIS IMMUNIZATION  Aged Out     RSV MONOCLONAL ANTIBODY  Aged Out            Objective   Exam  /72   Pulse 76   Ht 1.651 m (5' 5\")   Wt 69.2 kg (152 lb 9.6 oz)   BMI 25.39 kg/m     Estimated body mass index is 25.39 kg/m  as calculated from the following:    Height as of this encounter: 1.651 m (5' 5\").    Weight as of this encounter: 69.2 kg (152 lb 9.6 oz).    Physical Exam  GENERAL: alert and no distress  EYES: Eyes grossly normal to inspection, PERRL and conjunctivae and sclerae normal  RESP: lungs clear to auscultation - no rales, rhonchi or wheezes  CV: regular rate and rhythm, normal S1 S2, no S3 or S4, no murmur, click or rub, no peripheral edema  ABDOMEN: soft, nontender and bowel sounds normal  MS: no gross musculoskeletal defects noted, no edema  NEURO: Normal strength and tone, mentation intact and speech normal  BACK: no CVA tenderness, no paralumbar tenderness        7/22/2024   Mini Cog   Clock Draw Score 2 Normal   3 Item Recall 2 objects recalled   Mini Cog Total Score 4                 Signed Electronically by: Isabel Hicks MD    Answers submitted by the patient for this visit:  ANNE-7 (Submitted on 7/22/2024)  ANNE 7 TOTAL SCORE: 3      Again, " thank you for allowing me to participate in the care of your patient.      Sincerely,    Isabel Hicks MD

## 2024-07-22 NOTE — PATIENT INSTRUCTIONS
Thank you for trusting us with your care!   Please be aware, if you are on Mychart, you may see your results prior to your providers review. If labs are abnormal, we will call or message you on [employer] with a follow up plan.    If you need to contact us for questions about:  Symptoms, Scheduling & Medical Questions; Non-urgent (2-3 day response) Askablogr message, Urgent (needing response today) [phone] (if after 3:30pm next day response)   Prescriptions: Please call your Pharmacy   Billing: [place] [phone] or [facility]:[phone]

## 2024-07-22 NOTE — PROGRESS NOTES
Preventive Care Visit  [facility]  Isabel Hicks MD, Internal Medicine  Jul 22, 2024      Assessment & Plan     Medicare annual wellness visit, subsequent  Reviewed preventive healthcare needs with patient.  Advised on screening and vaccinations.    Encounter for screening mammogram for breast cancer  Discussed mammograms with patient.  Patient wishes to continue with screening.  - MA Screen Bilateral w/Juancarlos; Future    Benign essential hypertension  Blood pressure is within acceptable range.  Patient denies significant medication related side effects.  Recommend to continue with current treatment without changes.  Will check chemistry panel as well as lipid panel today.  - Lipid panel reflex to direct LDL Fasting; Future  - Basic metabolic panel  (Ca, Cl, CO2, Creat, Gluc, K, Na, BUN); Future  - TSH with free T4 reflex; Future            Counseling  Appropriate preventive services were addressed with this patient via screening, questionnaire, or discussion as appropriate for fall prevention, nutrition, physical activity, Tobacco-use cessation, weight loss and cognition.  Checklist reviewing preventive services available has been given to the patient.  Reviewed patient's diet, addressing concerns and/or questions.   She is at risk for psychosocial distress and has been provided with information to reduce risk.           No follow-ups on file.    Rika Lanier is a 74 year old, presenting for the following:  Physical        7/22/2024     1:00 PM   Additional Questions   Roomed by Lu         Health Care Directive  Patient does not have a Health Care Directive or Living Will: Advance Directive received and scanned. Click on Code in the patient header to view.    HPI  Patient is here for a preventive annual visit.  She reports that overall she has been doing well.  She is concerned about her asthma inhaler as she has difficulties with administering  albuterol due to the canister high-profile.  She is planning to discussed the issue with the pharmacist and potentially switch to a different  for the same medication.  Otherwise she denies any significant medication related side effects.            7/22/2024   General Health   How would you rate your overall physical health? Good   Feel stress (tense, anxious, or unable to sleep) Only a little      (!) STRESS CONCERN      7/22/2024   Nutrition   Diet: Regular (no restrictions)            7/22/2024   Exercise   Days per week of moderate/strenous exercise 4 days            7/22/2024   Social Factors   Frequency of gathering with friends or relatives Three times a week   Worry food won't last until get money to buy more No   Food not last or not have enough money for food? No   Do you have housing? (Housing is defined as stable permanent housing and does not include staying ouside in a car, in a tent, in an abandoned building, in an overnight shelter, or couch-surfing.) Yes   Are you worried about losing your housing? No   Lack of transportation? No   Unable to get utilities (heat,electricity)? No            7/22/2024   Fall Risk   Fallen 2 or more times in the past year? No   Trouble with walking or balance? No             7/22/2024   Activities of Daily Living- Home Safety   Needs help with the following daily activites None of the above   Safety concerns in the home None of the above            7/22/2024   Dental   Dentist two times every year? Yes            7/22/2024   Hearing Screening   Hearing concerns? None of the above            7/22/2024   Driving Risk Screening   Patient/family members have concerns about driving No            7/22/2024   General Alertness/Fatigue Screening   Have you been more tired than usual lately? No            7/22/2024   Urinary Incontinence Screening   Bothered by leaking urine in past 6 months No            7/22/2024   TB Screening   Were you born outside of the [place]?  No            Today's PHQ-2 Score:       7/22/2024     1:09 PM   PHQ-2 ( 1999 Pfizer)   Q1: Little interest or pleasure in doing things 0   Q2: Feeling down, depressed or hopeless 0   PHQ-2 Score 0           7/22/2024   Substance Use   Alcohol more than 3/day or more than 7/wk Not Applicable   Do you have a current opioid prescription? No   How severe/bad is pain from 1 to 10? 2/10   Do you use any other substances recreationally? No        Social History     Tobacco Use    Smoking status: Never    Smokeless tobacco: Never   Substance Use Topics    Alcohol use: Not Currently     Comment: rare     Drug use: No           10/24/2022   LAST FHS-7 RESULTS   1st degree relative breast or ovarian cancer No   Any relative bilateral breast cancer No   Any male have breast cancer No   Any ONE woman have BOTH breast AND ovarian cancer No   Any woman with breast cancer before 50yrs No   2 or more relatives with breast AND/OR ovarian cancer No   2 or more relatives with breast AND/OR bowel cancer No           Mammogram Screening - After age 74- determine frequency with patient based on health status, life expectancy and patient goals    ASCVD Risk   The 10-year ASCVD risk score (Jeff TELLEZ, et al., 2019) is: 26.8%    Values used to calculate the score:      Age: 74 years      Sex: Female      Is Non- : No      Diabetic: No      Tobacco smoker: No      Systolic Blood Pressure: 137 mmHg      Is BP treated: Yes      HDL Cholesterol: 82 mg/dL      Total Cholesterol: 217 mg/dL            Reviewed and updated as needed this visit by Provider                    Family History   Problem Relation Age of Onset    Asthma Mother     Diabetes Mother 80        diet and pill tx    Hypertension Mother     Macular Degeneration Mother     Abdominal Aortic Aneurysm Mother     Heart Failure Mother 70        CHF    Asthma Father     Hypertension Father     Alzheimer Disease Father 70    Skin Cancer Father     Heart  Failure Sister 60        CHF - mild    Hypertension Sister     Macular Degeneration Sister     Asthma Maternal Grandfather     Asthma Daughter     Glaucoma No family hx of     Cancer No family hx of     Amblyopia No family hx of     Retinal detachment No family hx of        Past Medical History:   Diagnosis Date    Asthma     Breast atypical ductal hyperplasia-- Right 06/13/2002    Lumpectomy.  Problem list name updated by automated process. Provider to review and confirm    Dysphonia 09/08/2014    Eczema 10/15/2012    Gastro-oesophageal reflux disease     HLD (hyperlipidemia)     Hypertension     Kidney stone     Low grade mucinous neoplasm of appendix     Menopausal state -- age 49 08/13/2012    With NEGATIVE HR HPV neg on Pap today, there is NO further need for routine pap screens.       Neck stiffness 11/03/2014    Nephrolithiasis 05/12/2014    Nonsenile cataract     Shoulder pain 03/30/2009    Controlled with stretching, unless lapses or overworks 8/2012     Thoracalgia 03/30/2009    TMJ (temporomandibular joint syndrome) 01/18/2012    Jaw PT and Mouth guard helping! 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  Increase from 2x/wk to 3xs/wk.      Past Surgical History:   Procedure Laterality Date    APPENDECTOMY OPEN N/A 12/27/2018    Procedure: Appendectomy open;  Surgeon: Evelyne Yung MD;  [facility]    BREAST BIOPSY, CORE RT/LT      R-- precancer cells    COLONOSCOPY      COLONOSCOPY N/A 01/28/2020    Procedure: COLONOSCOPY, WITH POLYPECTOMY AND BIOPSY;  Surgeon: Aparna Soriano MD;  [facility]    EXPLORE NECK N/A 08/19/2014    Procedure: EXPLORE NECK;  Surgeon: Alea Jacobs MD;  [facility]    EXTRACORPOREAL SHOCK WAVE LITHOTRIPSY (ESWL)  02/26/2014    Procedure: EXTRACORPOREAL SHOCK WAVE LITHOTRIPSY (ESWL);  Left Kidney Extracorporeal Shockwave Lithotripsy;  Surgeon:  Sabas Choi MD;  [facility]    HYSTERECTOMY TOTAL ABDOMINAL, BILATERAL SALPINGO-OOPHORECTOMY, COMBINED Bilateral 12/27/2018    Procedure: TOTAL ABDOMINAL HYSTERECTOMY WITH BILATERAL SALPINGO-OOPHORECTOMY, appendectomy, pelvic washings;  Surgeon: Evelyne Yung MD;  [facility]    LAPAROSCOPY  08/18/2023    LUMPECTOMY BREAST  age 48    R -- margins clear -- declined tamoxofen    PARATHYROIDECTOMY N/A 08/19/2014    Procedure: PARATHYROIDECTOMY;  Surgeon: Alea Jacobs MD;  [facility]    PHACOEMULSIFICATION CLEAR CORNEA WITH STANDARD INTRAOCULAR LENS IMPLANT Left 03/19/2021    Procedure: LEFT EYE CATARACT REMOVAL WITH INTRAOCULAR LENS IMPLANT;  Surgeon: Bing Horn MD;  [facility]    PHACOEMULSIFICATION CLEAR CORNEA WITH STANDARD INTRAOCULAR LENS IMPLANT Right 04/02/2021    Procedure: RIGHT EYE CATARACT REMOVAL WITH INTRAOCULAR LENS IMPLANT;  Surgeon: Bing Horn MD;  [facility]    STERILIZATION  1984    PP b/4 discharge     TUBAL LIGATION       Current providers sharing in care for this patient include:  Patient Care Team:  Isabel Hicks MD as PCP - General (Internal Medicine)  Zenaida Chinchilla APRN CNP as Assigned Surgical Provider  Mlaachi Daniel MD as Assigned Pulmonology Provider  Isabel Hicks MD as MD (Internal Medicine)  Isabel Hicks MD as Assigned PCP  Maurice Toledo DO as Assigned Musculoskeletal Provider    The following health maintenance items are reviewed in Epic and correct as of today:  Health Maintenance   Topic Date Due    ASTHMA ACTION PLAN  11/27/2019    ASTHMA CONTROL TEST  06/16/2020    MAMMO SCREENING  10/24/2023    COVID-19 Vaccine (9 - 2023-24 season) 05/22/2024    MEDICARE ANNUAL WELLNESS VISIT  06/19/2024    INFLUENZA VACCINE (1) 09/01/2024    FALL RISK ASSESSMENT  07/22/2025    GLUCOSE  02/19/2027    ADVANCE CARE PLANNING  06/19/2028    LIPID  07/03/2028    COLORECTAL CANCER SCREENING   "01/28/2030    DTAP/TDAP/TD IMMUNIZATION (5 - Td or Tdap) 03/23/2033    DEXA  06/11/2036    HEPATITIS C SCREENING  Completed    PHQ-2 (once per calendar year)  Completed    Pneumococcal Vaccine: 65+ Years  Completed    ZOSTER IMMUNIZATION  Completed    RSV VACCINE (Pregnancy & 60+)  Completed    IPV IMMUNIZATION  Aged Out    HPV IMMUNIZATION  Aged Out    MENINGITIS IMMUNIZATION  Aged Out    RSV MONOCLONAL ANTIBODY  Aged Out            Objective    Exam  /72   Pulse 76   Ht 1.651 m (5' 5\")   Wt 69.2 kg (152 lb 9.6 oz)   BMI 25.39 kg/m     Estimated body mass index is 25.39 kg/m  as calculated from the following:    Height as of this encounter: 1.651 m (5' 5\").    Weight as of this encounter: 69.2 kg (152 lb 9.6 oz).    Physical Exam  GENERAL: alert and no distress  EYES: Eyes grossly normal to inspection, PERRL and conjunctivae and sclerae normal  RESP: lungs clear to auscultation - no rales, rhonchi or wheezes  CV: regular rate and rhythm, normal S1 S2, no S3 or S4, no murmur, click or rub, no peripheral edema  ABDOMEN: soft, nontender and bowel sounds normal  MS: no gross musculoskeletal defects noted, no edema  NEURO: Normal strength and tone, mentation intact and speech normal  BACK: no CVA tenderness, no paralumbar tenderness        7/22/2024   Mini Cog   Clock Draw Score 2 Normal   3 Item Recall 2 objects recalled   Mini Cog Total Score 4                 Signed Electronically by: Isabel Hicks MD    Answers submitted by the patient for this visit:  ANNE-7 (Submitted on 7/22/2024)  ANNE 7 TOTAL SCORE: 3    "

## 2024-07-26 ENCOUNTER — OFFICE VISIT (OUTPATIENT)
Dept: OPHTHALMOLOGY | Facility: CLINIC | Age: 74
End: 2024-07-26
Attending: STUDENT IN AN ORGANIZED HEALTH CARE EDUCATION/TRAINING PROGRAM
Payer: COMMERCIAL

## 2024-07-26 DIAGNOSIS — H52.4 MYOPIA OF LEFT EYE WITH ASTIGMATISM AND PRESBYOPIA: ICD-10-CM

## 2024-07-26 DIAGNOSIS — H01.00A BLEPHARITIS OF UPPER AND LOWER EYELIDS OF BOTH EYES, UNSPECIFIED TYPE: Primary | ICD-10-CM

## 2024-07-26 DIAGNOSIS — H52.202 MYOPIA OF LEFT EYE WITH ASTIGMATISM AND PRESBYOPIA: ICD-10-CM

## 2024-07-26 DIAGNOSIS — H01.00B BLEPHARITIS OF UPPER AND LOWER EYELIDS OF BOTH EYES, UNSPECIFIED TYPE: Primary | ICD-10-CM

## 2024-07-26 DIAGNOSIS — H52.12 MYOPIA OF LEFT EYE WITH ASTIGMATISM AND PRESBYOPIA: ICD-10-CM

## 2024-07-26 DIAGNOSIS — Z96.1 PSEUDOPHAKIA, BOTH EYES: ICD-10-CM

## 2024-07-26 PROCEDURE — 92004 COMPRE OPH EXAM NEW PT 1/>: CPT | Performed by: STUDENT IN AN ORGANIZED HEALTH CARE EDUCATION/TRAINING PROGRAM

## 2024-07-26 PROCEDURE — G0463 HOSPITAL OUTPT CLINIC VISIT: HCPCS | Performed by: STUDENT IN AN ORGANIZED HEALTH CARE EDUCATION/TRAINING PROGRAM

## 2024-07-26 ASSESSMENT — REFRACTION_WEARINGRX
OS_AXIS: 015
OS_ADD: +2.50
OS_CYLINDER: +1.00
OD_AXIS: 150
OD_CYLINDER: +0.75
OS_SPHERE: -0.50
OD_SPHERE: PLANO
OD_ADD: +2.50

## 2024-07-26 ASSESSMENT — SLIT LAMP EXAM - LIDS
COMMENTS: MGD, SCURF
COMMENTS: MGD, SCURF

## 2024-07-26 ASSESSMENT — VISUAL ACUITY
OD_CC: 20/20
OD_CC+: -2
OS_CC+: -2
METHOD: SNELLEN - LINEAR
CORRECTION_TYPE: GLASSES
OS_CC: 20/20

## 2024-07-26 ASSESSMENT — REFRACTION_MANIFEST
OD_ADD: +2.50
OS_SPHERE: -0.25
OS_ADD: +2.50
OD_AXIS: 145
OD_SPHERE: PLANO
OS_CYLINDER: +1.00
OD_CYLINDER: +0.75
OS_AXIS: 010

## 2024-07-26 ASSESSMENT — CONF VISUAL FIELD
OS_NORMAL: 1
OD_SUPERIOR_TEMPORAL_RESTRICTION: 0
OD_INFERIOR_TEMPORAL_RESTRICTION: 0
OS_SUPERIOR_TEMPORAL_RESTRICTION: 0
OD_SUPERIOR_NASAL_RESTRICTION: 0
OS_INFERIOR_NASAL_RESTRICTION: 0
OD_NORMAL: 1
OS_INFERIOR_TEMPORAL_RESTRICTION: 0
METHOD: COUNTING FINGERS
OD_INFERIOR_NASAL_RESTRICTION: 0
OS_SUPERIOR_NASAL_RESTRICTION: 0

## 2024-07-26 ASSESSMENT — EXTERNAL EXAM - LEFT EYE: OS_EXAM: NORMAL

## 2024-07-26 ASSESSMENT — CUP TO DISC RATIO
OD_RATIO: 0.1
OS_RATIO: 0.1

## 2024-07-26 ASSESSMENT — EXTERNAL EXAM - RIGHT EYE: OD_EXAM: NORMAL

## 2024-07-26 ASSESSMENT — TONOMETRY
IOP_METHOD: ICARE
OS_IOP_MMHG: 13
OD_IOP_MMHG: 10

## 2024-07-26 NOTE — PROGRESS NOTES
HPI       COMPREHENSIVE EYE EXAM    In both eyes.  Context:  near vision.  Since onset it is gradually worsening.  Associated symptoms include Negative for eye pain, flashes and floaters.  Treatments tried include no treatments.             Comments    Yolande Hussein is a(n) 74 year old female who presents for a comprehensive exam. Last eye exam was 3 year(s) ago. Since exam, vision has gradually decreased at near - notes occasional haziness when reading. No lubricating drops. No flashes and floaters. No eye pain.     Tobi Feliciano COT 12:14 PM July 26, 2024               Last edited by Tobi Feliciano on 7/26/2024 12:15 PM.          Review of systems for the eyes was negative other than the pertinent positives/negatives listed in the HPI.    Ocular Meds: none    Ocular Hx: refractive error OU; pseudophakia OU; ABMD OU    FOHx: family history of AMD (mom and sister)    PMHx:   Past Medical History:   Diagnosis Date    Asthma     Breast atypical ductal hyperplasia-- Right 06/13/2002    Lumpectomy.  Problem list name updated by automated process. Provider to review and confirm    Dysphonia 09/08/2014    Eczema 10/15/2012    Gastro-oesophageal reflux disease     HLD (hyperlipidemia)     Hypertension     Kidney stone     Low grade mucinous neoplasm of appendix     Menopausal state -- age 49 08/13/2012    With NEGATIVE HR HPV neg on Pap today, there is NO further need for routine pap screens.       Neck stiffness 11/03/2014    Nephrolithiasis 05/12/2014    Nonsenile cataract     Shoulder pain 03/30/2009    Controlled with stretching, unless lapses or overworks 8/2012     Thoracalgia 03/30/2009    TMJ (temporomandibular joint syndrome) 01/18/2012    Jaw PT and Mouth guard helping! 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  Increase from 2x/wk to 3xs/wk.      Assessment & Plan      Yolande Hussein is a 74 year old female with the  following diagnoses:    Pseudophakia OU  - s/p YAG Cap OD  - observe    Anterior/posterior blepharitis RUL/RLL/ELTON/LLL  ABMD OU  - warm compresses daily OU x 5-10 min each time  - lid scrubs daily OU with baby shampoo or lid scrub product such as OcuSOFT  - ATs prn OU    Family history of AMD  - no significant drusen on exam today  - nonsmoker  - can do green leafy vegetables    Myopia of left eye with astigmatism and presbyopia  - happy with current MRx    Counseled return/RD precautions    Patient disposition:   Return in about 1 year (around 7/26/2025) for Annual Visit, or sooner changes.    Attending Physician Attestation:  Complete documentation of historical and exam elements from today's encounter can be found in the full encounter summary report (not reduplicated in this progress note).  I personally obtained the chief complaint(s) and history of present illness.  I confirmed and edited as necessary the review of systems, past medical/surgical history, family history, social history, and examination findings as documented by others; and I examined the patient myself.  I personally reviewed the relevant tests, images, and reports as documented above.  I formulated and edited as necessary the assessment and plan and discussed the findings and management plan with the patient and family. . - Venita Méndez MD

## 2024-07-26 NOTE — PATIENT INSTRUCTIONS
Use artificial tears one drop as needed for comfort to both eyes; some brands include: refresh, systane, thera tears, blink, etc    DO NOT use eye drops that say 'take the red out' including Visine or Clear Eyes    Do warm compresses at least once a day to both eyelids for 5-10 min each time    Do eyelid scrubs once a day to both eyelids with baby shampoo or using lid scrub products such as OCuSOF

## 2024-07-30 ENCOUNTER — LAB (OUTPATIENT)
Dept: LAB | Facility: CLINIC | Age: 74
End: 2024-07-30
Payer: COMMERCIAL

## 2024-07-30 DIAGNOSIS — I10 BENIGN ESSENTIAL HYPERTENSION: ICD-10-CM

## 2024-07-30 LAB
ANION GAP SERPL CALCULATED.3IONS-SCNC: 10 MMOL/L (ref 7–15)
BUN SERPL-MCNC: 10 MG/DL (ref 8–23)
CALCIUM SERPL-MCNC: 9.5 MG/DL (ref 8.8–10.4)
CHLORIDE SERPL-SCNC: 102 MMOL/L (ref 98–107)
CHOLEST SERPL-MCNC: 212 MG/DL
CREAT SERPL-MCNC: 0.61 MG/DL (ref 0.51–0.95)
EGFRCR SERPLBLD CKD-EPI 2021: >90 ML/MIN/1.73M2
FASTING STATUS PATIENT QL REPORTED: YES
FASTING STATUS PATIENT QL REPORTED: YES
GLUCOSE SERPL-MCNC: 91 MG/DL (ref 70–99)
HCO3 SERPL-SCNC: 29 MMOL/L (ref 22–29)
HDLC SERPL-MCNC: 81 MG/DL
LDLC SERPL CALC-MCNC: 119 MG/DL
NONHDLC SERPL-MCNC: 131 MG/DL
POTASSIUM SERPL-SCNC: 3.8 MMOL/L (ref 3.4–5.3)
SODIUM SERPL-SCNC: 141 MMOL/L (ref 135–145)
TRIGL SERPL-MCNC: 59 MG/DL
TSH SERPL DL<=0.005 MIU/L-ACNC: 1.13 UIU/ML (ref 0.3–4.2)

## 2024-07-30 PROCEDURE — 80061 LIPID PANEL: CPT

## 2024-07-30 PROCEDURE — 80048 BASIC METABOLIC PNL TOTAL CA: CPT

## 2024-07-30 PROCEDURE — 84443 ASSAY THYROID STIM HORMONE: CPT

## 2024-07-30 PROCEDURE — 36415 COLL VENOUS BLD VENIPUNCTURE: CPT

## 2024-07-31 NOTE — PROGRESS NOTES
DISCHARGE  Reason for Discharge: Patient chooses to discontinue therapy.    Equipment Issued:     Discharge Plan: Patient to continue home program.    Referring Provider:  Maurice Toledo

## 2024-09-03 ENCOUNTER — OFFICE VISIT (OUTPATIENT)
Dept: PULMONOLOGY | Facility: CLINIC | Age: 74
End: 2024-09-03
Attending: INTERNAL MEDICINE
Payer: COMMERCIAL

## 2024-09-03 VITALS — OXYGEN SATURATION: 95 % | HEART RATE: 71 BPM | SYSTOLIC BLOOD PRESSURE: 126 MMHG | DIASTOLIC BLOOD PRESSURE: 79 MMHG

## 2024-09-03 DIAGNOSIS — J45.40 MODERATE PERSISTENT ASTHMA WITHOUT COMPLICATION: ICD-10-CM

## 2024-09-03 DIAGNOSIS — R91.8 PULMONARY NODULES: ICD-10-CM

## 2024-09-03 PROCEDURE — G0463 HOSPITAL OUTPT CLINIC VISIT: HCPCS | Performed by: INTERNAL MEDICINE

## 2024-09-03 PROCEDURE — 99214 OFFICE O/P EST MOD 30 MIN: CPT | Performed by: INTERNAL MEDICINE

## 2024-09-03 ASSESSMENT — PAIN SCALES - GENERAL: PAINLEVEL: NO PAIN (0)
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
## 2024-09-03 NOTE — NURSING NOTE
Chief Complaint   Patient presents with    Follow Up     Asthma      Vitals were taken and medications were reconciled.    Karen Avalos RMA  12:50 PM
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
## 2024-09-25 ENCOUNTER — ANCILLARY PROCEDURE (OUTPATIENT)
Dept: MAMMOGRAPHY | Facility: CLINIC | Age: 74
End: 2024-09-25
Attending: INTERNAL MEDICINE
Payer: COMMERCIAL

## 2024-09-25 DIAGNOSIS — Z12.31 ENCOUNTER FOR SCREENING MAMMOGRAM FOR BREAST CANCER: ICD-10-CM

## 2024-09-25 PROCEDURE — 77063 BREAST TOMOSYNTHESIS BI: CPT | Mod: GC

## 2024-09-25 PROCEDURE — 77067 SCR MAMMO BI INCL CAD: CPT | Mod: GC

## 2024-10-10 NOTE — PROGRESS NOTES
ASSESSMENT & PLAN    Yolande was seen today for follow up.    Diagnoses and all orders for this visit:    Primary osteoarthritis of left knee  -     (PRE-AUTH REQUEST) 48 mg hylan (SYNVISC ONE) injection 48 mg/6mL-ONCE  -     Orthopedic  Referral; Future    Left knee pain, unspecified chronicity  -     Orthopedic  Referral; Future      Focus on OA now.  Trial visco next, prior auth request placed.  See below.  Questions answered. Discussed signs and symptoms that may indicate more serious issues; the patient was instructed to seek appropriate care if noted. Yolande indicates understanding of these issues and agrees with the plan.      See Patient Instructions  Patient Instructions   Reviewed course with ongoing left knee pain, with underlying osteoarthritis, meniscus tearing, as well as previously noted medial femoral condyle insufficiency fracture.  We discussed potential for additional imaging, though at this point plan to focus more on the underlying arthritis, given duration of symptoms over time.  He may continue with use of the  brace.  Discussed PT; try to keep up with comfortable home exercises from previous physical therapy, particularly strengthening exercises can be beneficial.  Injection options reviewed, including the steroid injection we had done previously (very limited benefit), as well as trial of Visco supplement injection.  Following discussion, placed prior authorization request for Visco supplement injection, and will get you set up with one of my colleagues for an imaging-guided injection to the left knee.    If you have any further questions for your physician or physician s care team you can contact them thru [employer] or by calling [phone].      Maurice Toledo [facility] [place]    SUBJECTIVE- Interim History October 10, 2024    Chief Complaint   Patient presents with    Left Knee - Follow Up       Yolande Hussein is a  "74 year old female who is seen in f/u up for Data Unavailable. Since last visit on 7/12/24 patient has been doing much better with brace.  Still hurts constantly, sometimes a lot.  - Now ~ 11 months from initial onset    The patient is seen by themselves.    Orthopedic/Surgical history: NO  Social History/Occupation: Retired    **  Above information per rooming staff.  Additional history:  Using magnesium for leg cramps, with benefit.  Noting right knee starting to pop like the left knee did previously. Also describes some foot cramping.  Has some plantar foot pain that is responding to stretching.        REVIEW OF SYSTEMS:  Review of Systems    OBJECTIVE:  BP (!) 153/77   Pulse 90   Ht 1.638 m (5' 4.5\")   Wt 68 kg (150 lb)   BMI 25.35 kg/m         RADIOLOGY:  Previous MRI:    EXAMINATION: MRI of the left knee without contrast     DATE:  5/14/2024     HISTORY: Knee pain     TECHNIQUE: Multiplanar multisequence MR imaging of the left knee was  obtained using standard sequences in 3 orthogonal planes without the  use of intravenous or intra-articular gadolinium contrast.     Comparison: Comparison radiographs dated 2/19/2024 were reviewed,  which demonstrated no acute bone abnormality.     FINDINGS:     In the medial compartment, there is a radial tear involving the  posterior horn extending to the posterior root ligament, with a  horizontal tear involving the body and posterior horn. There are  scattered areas of high-grade to full-thickness cartilage loss along  both the medial femoral condyle condyle and medial tibial plateau with  extensive subchondral bone marrow in the medial femoral condyle and  medial tibial plateau. Thickened low signal subchondral bone along the  weightbearing surface of the medial femoral condyle, sagittal series 6  image 8, findings most consistent with an insufficiency fracture.      In the lateral compartment, there is minimal superior surface  irregularity along the body of the " lateral meniscus without displaced  fragment. There is a focal area of at least moderate grade cartilage  loss along the lateral tibial plateau without subchondral edema.     In the patellofemoral compartment, there is no high-grade or  full-thickness cartilage loss or subchondral edema.     The anterior and posterior cruciate ligaments are intact.     The tibial collateral ligament is intact.      The anterior iliotibial band, fibular collateral ligament, biceps  femoris tendon, and popliteus tendons are intact.     There is a moderate size joint effusion. No popliteal cyst. No joint  bodies.     The extensor mechanism is intact and normal in appearance.                                                                       IMPRESSION:  1. Moderate size left knee joint effusion.      2. Extensive bone marrow edema centered at the medial femorotibial  joint compartment. Low signal thickening of the subchondral bone along  the weightbearing surface of the medial femoral condyle, findings most  consistent with an insufficiency fracture.     3. Large radial tear involving the posterior horn and posterior root  ligament of the medial meniscus with horizontal tear involving the  body and posterior horn.     4. Osteoarthrosis in the left knee, most significant within the medial  femorotibial joint compartment. There are large areas of  full-thickness cartilage loss.     5. The anterior and posterior cruciate ligaments, and medial and  lateral supporting structures are intact.     [Consider Follow Up: Likely insufficiency fracture involving the  medial femoral condyle, as above]     This report will be copied to the [facility] [place] to ensure a  provider acknowledges the finding. [facility] is available Monday  through Friday 8am-3:30 pm.     SULEIMAN JONES MD                 20 minutes spent by me on the date of the encounter doing chart review, history and exam, documentation and further activities per the  note

## 2024-10-11 ENCOUNTER — OFFICE VISIT (OUTPATIENT)
Dept: ORTHOPEDICS | Facility: CLINIC | Age: 74
End: 2024-10-11
Payer: COMMERCIAL

## 2024-10-11 VITALS
WEIGHT: 150 LBS | BODY MASS INDEX: 24.99 KG/M2 | DIASTOLIC BLOOD PRESSURE: 77 MMHG | HEIGHT: 65 IN | HEART RATE: 90 BPM | SYSTOLIC BLOOD PRESSURE: 153 MMHG

## 2024-10-11 DIAGNOSIS — M25.562 LEFT KNEE PAIN, UNSPECIFIED CHRONICITY: ICD-10-CM

## 2024-10-11 DIAGNOSIS — M17.12 PRIMARY OSTEOARTHRITIS OF LEFT KNEE: Primary | ICD-10-CM

## 2024-10-11 PROCEDURE — 99213 OFFICE O/P EST LOW 20 MIN: CPT | Performed by: PEDIATRICS

## 2024-10-11 RX ORDER — CALCIUM CARBONATE 500(1250)
1 TABLET ORAL DAILY
COMMUNITY

## 2024-10-11 NOTE — LETTER
10/11/2024      Yolande Hussein  [address]  [place] [address]      Dear Colleague,    Thank you for referring your patient, Yolande Hussein, to the [facility] [place]. Please see a copy of my visit note below.    ASSESSMENT & PLAN    Yolande was seen today for follow up.    Diagnoses and all orders for this visit:    Primary osteoarthritis of left knee  -     (PRE-AUTH REQUEST) 48 mg hylan (SYNVISC ONE) injection 48 mg/6mL-ONCE  -     Orthopedic  Referral; Future    Left knee pain, unspecified chronicity  -     Orthopedic  Referral; Future      Focus on OA now.  Trial visco next, prior auth request placed.  See below.  Questions answered. Discussed signs and symptoms that may indicate more serious issues; the patient was instructed to seek appropriate care if noted. Yolande indicates understanding of these issues and agrees with the plan.      See Patient Instructions  Patient Instructions   Reviewed course with ongoing left knee pain, with underlying osteoarthritis, meniscus tearing, as well as previously noted medial femoral condyle insufficiency fracture.  We discussed potential for additional imaging, though at this point plan to focus more on the underlying arthritis, given duration of symptoms over time.  He may continue with use of the  brace.  Discussed PT; try to keep up with comfortable home exercises from previous physical therapy, particularly strengthening exercises can be beneficial.  Injection options reviewed, including the steroid injection we had done previously (very limited benefit), as well as trial of Visco supplement injection.  Following discussion, placed prior authorization request for Visco supplement injection, and will get you set up with one of my colleagues for an imaging-guided injection to the left knee.    If you have any further questions for your physician or physician s care team you can contact them  "thru [facility] or by calling [phone].      Maurice EwingAtrium Health Mountain IslandDO erickson  [facility] [place]    SUBJECTIVE- Interim History October 10, 2024    Chief Complaint   Patient presents with     Left Knee - Follow Up       Yolande Hussein is a 74 year old female who is seen in f/u up for Data Unavailable. Since last visit on 7/12/24 patient has been doing much better with brace.  Still hurts constantly, sometimes a lot.  - Now ~ 11 months from initial onset    The patient is seen by themselves.    Orthopedic/Surgical history: NO  Social History/Occupation: Retired    **  Above information per rooming staff.  Additional history:  Using magnesium for leg cramps, with benefit.  Noting right knee starting to pop like the left knee did previously. Also describes some foot cramping.  Has some plantar foot pain that is responding to stretching.        REVIEW OF SYSTEMS:  Review of Systems    OBJECTIVE:  BP (!) 153/77   Pulse 90   Ht 1.638 m (5' 4.5\")   Wt 68 kg (150 lb)   BMI 25.35 kg/m         RADIOLOGY:  Previous MRI:    EXAMINATION: MRI of the left knee without contrast     DATE:  5/14/2024     HISTORY: Knee pain     TECHNIQUE: Multiplanar multisequence MR imaging of the left knee was  obtained using standard sequences in 3 orthogonal planes without the  use of intravenous or intra-articular gadolinium contrast.     Comparison: Comparison radiographs dated 2/19/2024 were reviewed,  which demonstrated no acute bone abnormality.     FINDINGS:     In the medial compartment, there is a radial tear involving the  posterior horn extending to the posterior root ligament, with a  horizontal tear involving the body and posterior horn. There are  scattered areas of high-grade to full-thickness cartilage loss along  both the medial femoral condyle condyle and medial tibial plateau with  extensive subchondral bone marrow in the medial femoral condyle and  medial tibial plateau. Thickened low signal " subchondral bone along the  weightbearing surface of the medial femoral condyle, sagittal series 6  image 8, findings most consistent with an insufficiency fracture.      In the lateral compartment, there is minimal superior surface  irregularity along the body of the lateral meniscus without displaced  fragment. There is a focal area of at least moderate grade cartilage  loss along the lateral tibial plateau without subchondral edema.     In the patellofemoral compartment, there is no high-grade or  full-thickness cartilage loss or subchondral edema.     The anterior and posterior cruciate ligaments are intact.     The tibial collateral ligament is intact.      The anterior iliotibial band, fibular collateral ligament, biceps  femoris tendon, and popliteus tendons are intact.     There is a moderate size joint effusion. No popliteal cyst. No joint  bodies.     The extensor mechanism is intact and normal in appearance.                                                                       IMPRESSION:  1. Moderate size left knee joint effusion.      2. Extensive bone marrow edema centered at the medial femorotibial  joint compartment. Low signal thickening of the subchondral bone along  the weightbearing surface of the medial femoral condyle, findings most  consistent with an insufficiency fracture.     3. Large radial tear involving the posterior horn and posterior root  ligament of the medial meniscus with horizontal tear involving the  body and posterior horn.     4. Osteoarthrosis in the left knee, most significant within the medial  femorotibial joint compartment. There are large areas of  full-thickness cartilage loss.     5. The anterior and posterior cruciate ligaments, and medial and  lateral supporting structures are intact.     [Consider Follow Up: Likely insufficiency fracture involving the  medial femoral condyle, as above]     This report will be copied to the [facility] to ensure a  provider  acknowledges the finding. [facility] is available Monday  through Friday 8am-3:30 pm.     SULEIMAN JONES MD                 20 minutes spent by me on the date of the encounter doing chart review, history and exam, documentation and further activities per the note           Again, thank you for allowing me to participate in the care of your patient.        Sincerely,        Maurice Toledo, DO

## 2024-10-11 NOTE — PATIENT INSTRUCTIONS
Reviewed course with ongoing left knee pain, with underlying osteoarthritis, meniscus tearing, as well as previously noted medial femoral condyle insufficiency fracture.  We discussed potential for additional imaging, though at this point plan to focus more on the underlying arthritis, given duration of symptoms over time.  He may continue with use of the  brace.  Discussed PT; try to keep up with comfortable home exercises from previous physical therapy, particularly strengthening exercises can be beneficial.  Injection options reviewed, including the steroid injection we had done previously (very limited benefit), as well as trial of Visco supplement injection.  Following discussion, placed prior authorization request for Visco supplement injection, and will get you set up with one of my colleagues for an imaging-guided injection to the left knee.    If you have any further questions for your physician or physician s care team you can contact them thru [employer] or by calling [phone].

## 2024-10-14 ENCOUNTER — PATIENT OUTREACH (OUTPATIENT)
Dept: CARE COORDINATION | Facility: CLINIC | Age: 74
End: 2024-10-14
Payer: COMMERCIAL

## 2024-10-21 ENCOUNTER — TELEPHONE (OUTPATIENT)
Dept: FAMILY MEDICINE | Facility: CLINIC | Age: 74
End: 2024-10-21
Payer: COMMERCIAL

## 2024-10-21 NOTE — TELEPHONE ENCOUNTER
Called and spoke with patient. Advised that PA request for left knee SynviscOne injection is approved. Patient was appreciative of the call.  Olive Longoria, LAT, ATC

## 2024-10-21 NOTE — TELEPHONE ENCOUNTER
Patient is following up on her synvisc authorization, and if it has been approved. Injection is scheduled on 10/23.    Please let patient know at [phone], any time, okay to leave detailed msg.

## 2024-10-22 NOTE — PROGRESS NOTES
ASSESSMENT & PLAN    Yolande was seen today for pain.    Diagnoses and all orders for this visit:    Primary osteoarthritis of left knee  -     Orthopedic  Referral  -     Large Joint Injection/Arthocentesis: L knee joint    Left knee pain, unspecified chronicity  -     Orthopedic  Referral        Yolande Hussein was seen today in request for a left knee HA injection for previously diagnosed OA.    Patient was independently assessed by me and was deemed appropriate for this procedure. Risks and benefits were discussed with good understanding including, but not limited to, the risks of bleeding, reaction to the medication, infection, and the possibility of the treatment being ineffective. The patient tolerated the procedure well with no complications.    After visit care instructions were discussed in person and provided in AVS.    Follow-up: with referring provider as planned. Could repeat injection after 6 months if injection was helpful but pain returns    Sina Medina MD  [facility] [place]    SUBJECTIVE- October 22, 2024    Chief Complaint   Patient presents with    Left Knee - Pain     injection       Yolande Hussein is a 74 year old female who is seen for left knee SynviscOne injection.    Referred by: Maurice Toledo DO  Previous Diagnosis: Primary osteoarthritis of left knee     Previous injections: steroid injections (most recent date: 2/23/2024 with Dr. Toledo)  Other treatments tried: physical therapy ,  brace, steroid injection       REVIEW OF SYSTEMS:  Negative other than the symptoms noted above in the HPI.      OBJECTIVE:  There were no vitals taken for this visit.   General: healthy, alert and in no distress  Skin: no suspicious lesions or rash noted near area of injection   CV: distal perfusion intact near area of injection  Neuro: Normal light sensory exam near area of injection    RADIOLOGY:  I independently reviewed and  "agree with the final results and radiologist's interpretation from the imaging relevant to today's visit, available in the [facility] health record.   MRI 5/14/24 with insufficiency fx, meniscal tear, full thickness cartilage loss (worst medially)      No results found for: \"A1C\"  Patient on blood thinners: No    Large Joint Injection/Arthocentesis: L knee joint    Date/Time: 10/23/2024 2:08 PM    Performed by: Sina Medina MD  Authorized by: Sina Medina MD    Indications:  Pain and osteoarthritis  Needle Size:  21 G  Guidance: ultrasound    Approach:  Superolateral  Location:  Knee      Medications:  48 mg hylan 48 MG/6ML  Aspirate amount (mL):  7  Aspirate:  Clear and yellow  Outcome:  Tolerated well, no immediate complications  Procedure discussed: discussed risks, benefits, and alternatives    Consent Given by:  Patient  Timeout: timeout called immediately prior to procedure    Prep: patient was prepped and draped in usual sterile fashion     Patient tolerated left knee HA injection and aspiration. Ultrasound guidance was required to ensure correct needle placement for injection,  ensure maximal aspiration, and avoid vital surrounding structures. Ultrasound guided images were permanently stored. Aftercare instructions given to patient. Plan to follow-up as previously discussed with referring provider.     Sina Medina MD          "

## 2024-10-23 ENCOUNTER — OFFICE VISIT (OUTPATIENT)
Dept: ORTHOPEDICS | Facility: CLINIC | Age: 74
End: 2024-10-23
Payer: COMMERCIAL

## 2024-10-23 DIAGNOSIS — M25.562 LEFT KNEE PAIN, UNSPECIFIED CHRONICITY: ICD-10-CM

## 2024-10-23 DIAGNOSIS — M17.12 PRIMARY OSTEOARTHRITIS OF LEFT KNEE: ICD-10-CM

## 2024-10-23 PROCEDURE — 99207 PR DROP WITH A PROCEDURE: CPT | Performed by: STUDENT IN AN ORGANIZED HEALTH CARE EDUCATION/TRAINING PROGRAM

## 2024-10-23 PROCEDURE — 20611 DRAIN/INJ JOINT/BURSA W/US: CPT | Mod: LT | Performed by: STUDENT IN AN ORGANIZED HEALTH CARE EDUCATION/TRAINING PROGRAM

## 2024-10-23 NOTE — LETTER
10/23/2024      Yolande Hussein  [address] [place] [address]      Dear Colleague,    Thank you for referring your patient, Yolande Hussein, [facility] Please see a copy of my visit note below.    ASSESSMENT & PLAN    Yolande was seen today for pain.    Diagnoses and all orders for this visit:    Primary osteoarthritis of left knee  -     Orthopedic  Referral  -     Large Joint Injection/Arthocentesis: L knee joint    Left knee pain, unspecified chronicity  -     Orthopedic  Referral        Yolande Hussein was seen today in request for a left knee HA injection for previously diagnosed OA.    Patient was independently assessed by me and was deemed appropriate for this procedure. Risks and benefits were discussed with good understanding including, but not limited to, the risks of bleeding, reaction to the medication, infection, and the possibility of the treatment being ineffective. The patient tolerated the procedure well with no complications.    After visit care instructions were discussed in person and provided in AVS.    Follow-up: with referring provider as planned. Could repeat injection after 6 months if injection was helpful but pain returns    Sina Medina MD  [facility]    SUBJECTIVE- October 22, 2024    Chief Complaint   Patient presents with     Left Knee - Pain     injection       Yolande Hussein is a 74 year old female who is seen for left knee SynviscOne injection.    Referred by: Maurice Toledo DO  Previous Diagnosis: Primary osteoarthritis of left knee     Previous injections: steroid injections (most recent date: 2/23/2024 with Dr. Toledo)  Other treatments tried: physical therapy ,  brace, steroid injection       REVIEW OF SYSTEMS:  Negative other than the symptoms noted above in the HPI.      OBJECTIVE:  There were no vitals taken for this visit.  "  General: healthy, alert and in no distress  Skin: no suspicious lesions or rash noted near area of injection   CV: distal perfusion intact near area of injection  Neuro: Normal light sensory exam near area of injection    RADIOLOGY:  I independently reviewed and agree with the final results and radiologist's interpretation from the imaging relevant to today's visit, available in the [facility] health record.   MRI 5/14/24 with insufficiency fx, meniscal tear, full thickness cartilage loss (worst medially)      No results found for: \"A1C\"  Patient on blood thinners: No    Large Joint Injection/Arthocentesis: L knee joint    Date/Time: 10/23/2024 2:08 PM    Performed by: Sina Medina MD  Authorized by: Sina Medina MD    Indications:  Pain and osteoarthritis  Needle Size:  21 G  Guidance: ultrasound    Approach:  Superolateral  Location:  Knee      Medications:  48 mg hylan 48 MG/6ML  Aspirate amount (mL):  7  Aspirate:  Clear and yellow  Outcome:  Tolerated well, no immediate complications  Procedure discussed: discussed risks, benefits, and alternatives    Consent Given by:  Patient  Timeout: timeout called immediately prior to procedure    Prep: patient was prepped and draped in usual sterile fashion     Patient tolerated left knee HA injection and aspiration. Ultrasound guidance was required to ensure correct needle placement for injection,  ensure maximal aspiration, and avoid vital surrounding structures. Ultrasound guided images were permanently stored. Aftercare instructions given to patient. Plan to follow-up as previously discussed with referring provider.     Sina Medina MD            Again, thank you for allowing me to participate in the care of your patient.        Sincerely,        Sina Medina MD  "

## 2024-10-23 NOTE — PATIENT INSTRUCTIONS
[facility] [place]    Procedure: left knee hyaluronic acid injection with aspiration    You may shower, however avoid swimming, tub baths or hot tubs for 24 hours following your procedure  You may have a mild to moderate increase in pain for several days following the injection.  It may take up to 30 days for the medication to start working although you may feel the effect as early as a few days after the procedure.  You may use ice packs for 10-15 minutes, 3 to 4 times a day at the injection site for comfort  You may use anti-inflammatory medications (such as Ibuprofen or Aleve or Advil) or Tylenol for pain control if necessary    If you experience any of the following, [place] [facility] [place] [phone] or [phone]  - Fever over 100 degree F  - Swelling, bleeding, redness, drainage, warmth at the injection site  - New or worsening pain

## 2024-10-26 ENCOUNTER — HOSPITAL ENCOUNTER (EMERGENCY)
Facility: CLINIC | Age: 74
Discharge: HOME | End: 2024-10-27
Attending: EMERGENCY MEDICINE | Admitting: EMERGENCY MEDICINE
Payer: COMMERCIAL

## 2024-10-26 DIAGNOSIS — M54.6 ACUTE RIGHT-SIDED THORACIC BACK PAIN: ICD-10-CM

## 2024-10-26 DIAGNOSIS — K80.20 CALCULUS OF GALLBLADDER WITHOUT CHOLECYSTITIS WITHOUT OBSTRUCTION: ICD-10-CM

## 2024-10-26 DIAGNOSIS — M25.511 ACUTE PAIN OF RIGHT SHOULDER: ICD-10-CM

## 2024-10-26 PROCEDURE — 93010 ELECTROCARDIOGRAM REPORT: CPT | Performed by: EMERGENCY MEDICINE

## 2024-10-26 PROCEDURE — 99285 EMERGENCY DEPT VISIT HI MDM: CPT | Performed by: EMERGENCY MEDICINE

## 2024-10-26 PROCEDURE — 93005 ELECTROCARDIOGRAM TRACING: CPT | Performed by: EMERGENCY MEDICINE

## 2024-10-26 PROCEDURE — 99285 EMERGENCY DEPT VISIT HI MDM: CPT | Mod: 25 | Performed by: EMERGENCY MEDICINE

## 2024-10-26 ASSESSMENT — COLUMBIA-SUICIDE SEVERITY RATING SCALE - C-SSRS
6. HAVE YOU EVER DONE ANYTHING, STARTED TO DO ANYTHING, OR PREPARED TO DO ANYTHING TO END YOUR LIFE?: NO
1. IN THE PAST MONTH, HAVE YOU WISHED YOU WERE DEAD OR WISHED YOU COULD GO TO SLEEP AND NOT WAKE UP?: NO
2. HAVE YOU ACTUALLY HAD ANY THOUGHTS OF KILLING YOURSELF IN THE PAST MONTH?: NO

## 2024-10-27 ENCOUNTER — APPOINTMENT (OUTPATIENT)
Dept: ULTRASOUND IMAGING | Facility: CLINIC | Age: 74
End: 2024-10-27
Attending: EMERGENCY MEDICINE
Payer: COMMERCIAL

## 2024-10-27 ENCOUNTER — APPOINTMENT (OUTPATIENT)
Dept: GENERAL RADIOLOGY | Facility: CLINIC | Age: 74
End: 2024-10-27
Attending: EMERGENCY MEDICINE
Payer: COMMERCIAL

## 2024-10-27 VITALS
SYSTOLIC BLOOD PRESSURE: 165 MMHG | OXYGEN SATURATION: 98 % | RESPIRATION RATE: 12 BRPM | TEMPERATURE: 97.9 F | DIASTOLIC BLOOD PRESSURE: 73 MMHG | HEART RATE: 75 BPM

## 2024-10-27 LAB
ALBUMIN SERPL BCG-MCNC: 4.2 G/DL (ref 3.5–5.2)
ALBUMIN UR-MCNC: NEGATIVE MG/DL
ALP SERPL-CCNC: 77 U/L (ref 40–150)
ALT SERPL W P-5'-P-CCNC: 18 U/L (ref 0–50)
AMORPH CRY #/AREA URNS HPF: ABNORMAL /HPF
ANION GAP SERPL CALCULATED.3IONS-SCNC: 13 MMOL/L (ref 7–15)
APPEARANCE UR: CLEAR
AST SERPL W P-5'-P-CCNC: 18 U/L (ref 0–45)
ATRIAL RATE - MUSE: 66 BPM
BASOPHILS # BLD AUTO: 0 10E3/UL (ref 0–0.2)
BASOPHILS NFR BLD AUTO: 1 %
BILIRUB SERPL-MCNC: 0.5 MG/DL
BILIRUB UR QL STRIP: NEGATIVE
BUN SERPL-MCNC: 12 MG/DL (ref 8–23)
CALCIUM SERPL-MCNC: 9.9 MG/DL (ref 8.8–10.4)
CHLORIDE SERPL-SCNC: 101 MMOL/L (ref 98–107)
COLOR UR AUTO: ABNORMAL
CREAT SERPL-MCNC: 0.64 MG/DL (ref 0.51–0.95)
DIASTOLIC BLOOD PRESSURE - MUSE: NORMAL MMHG
EGFRCR SERPLBLD CKD-EPI 2021: >90 ML/MIN/1.73M2
EOSINOPHIL # BLD AUTO: 0 10E3/UL (ref 0–0.7)
EOSINOPHIL NFR BLD AUTO: 1 %
ERYTHROCYTE [DISTWIDTH] IN BLOOD BY AUTOMATED COUNT: 13.6 % (ref 10–15)
GLUCOSE SERPL-MCNC: 124 MG/DL (ref 70–99)
GLUCOSE UR STRIP-MCNC: NEGATIVE MG/DL
HCO3 SERPL-SCNC: 27 MMOL/L (ref 22–29)
HCT VFR BLD AUTO: 43.5 % (ref 35–47)
HGB BLD-MCNC: 14.7 G/DL (ref 11.7–15.7)
HGB UR QL STRIP: NEGATIVE
HYALINE CASTS: 1 /LPF
IMM GRANULOCYTES # BLD: 0 10E3/UL
IMM GRANULOCYTES NFR BLD: 0 %
INTERPRETATION ECG - MUSE: NORMAL
KETONES UR STRIP-MCNC: NEGATIVE MG/DL
LEUKOCYTE ESTERASE UR QL STRIP: ABNORMAL
LIPASE SERPL-CCNC: 23 U/L (ref 13–60)
LYMPHOCYTES # BLD AUTO: 0.9 10E3/UL (ref 0.8–5.3)
LYMPHOCYTES NFR BLD AUTO: 14 %
MCH RBC QN AUTO: 28.9 PG (ref 26.5–33)
MCHC RBC AUTO-ENTMCNC: 33.8 G/DL (ref 31.5–36.5)
MCV RBC AUTO: 86 FL (ref 78–100)
MONOCYTES # BLD AUTO: 0.6 10E3/UL (ref 0–1.3)
MONOCYTES NFR BLD AUTO: 9 %
MUCOUS THREADS #/AREA URNS LPF: PRESENT /LPF
NEUTROPHILS # BLD AUTO: 4.8 10E3/UL (ref 1.6–8.3)
NEUTROPHILS NFR BLD AUTO: 76 %
NITRATE UR QL: NEGATIVE
NRBC # BLD AUTO: 0 10E3/UL
NRBC BLD AUTO-RTO: 0 /100
P AXIS - MUSE: 81 DEGREES
PH UR STRIP: 6 [PH] (ref 5–7)
PLATELET # BLD AUTO: 232 10E3/UL (ref 150–450)
POTASSIUM SERPL-SCNC: 3.4 MMOL/L (ref 3.4–5.3)
PR INTERVAL - MUSE: 136 MS
PROT SERPL-MCNC: 6.9 G/DL (ref 6.4–8.3)
QRS DURATION - MUSE: 76 MS
QT - MUSE: 402 MS
QTC - MUSE: 421 MS
R AXIS - MUSE: 20 DEGREES
RBC # BLD AUTO: 5.08 10E6/UL (ref 3.8–5.2)
RBC URINE: 1 /HPF
SODIUM SERPL-SCNC: 141 MMOL/L (ref 135–145)
SP GR UR STRIP: 1.01 (ref 1–1.03)
SQUAMOUS EPITHELIAL: 1 /HPF
SYSTOLIC BLOOD PRESSURE - MUSE: NORMAL MMHG
T AXIS - MUSE: 20 DEGREES
TROPONIN T SERPL HS-MCNC: 10 NG/L
TROPONIN T SERPL HS-MCNC: 11 NG/L
UROBILINOGEN UR STRIP-MCNC: NORMAL MG/DL
VENTRICULAR RATE- MUSE: 66 BPM
WBC # BLD AUTO: 6.4 10E3/UL (ref 4–11)
WBC URINE: 2 /HPF

## 2024-10-27 PROCEDURE — 36415 COLL VENOUS BLD VENIPUNCTURE: CPT | Performed by: EMERGENCY MEDICINE

## 2024-10-27 PROCEDURE — 81001 URINALYSIS AUTO W/SCOPE: CPT | Performed by: EMERGENCY MEDICINE

## 2024-10-27 PROCEDURE — 80053 COMPREHEN METABOLIC PANEL: CPT | Performed by: EMERGENCY MEDICINE

## 2024-10-27 PROCEDURE — 73030 X-RAY EXAM OF SHOULDER: CPT | Mod: RT

## 2024-10-27 PROCEDURE — 76705 ECHO EXAM OF ABDOMEN: CPT

## 2024-10-27 PROCEDURE — 84484 ASSAY OF TROPONIN QUANT: CPT | Performed by: EMERGENCY MEDICINE

## 2024-10-27 PROCEDURE — 96374 THER/PROPH/DIAG INJ IV PUSH: CPT | Performed by: EMERGENCY MEDICINE

## 2024-10-27 PROCEDURE — 250N000011 HC RX IP 250 OP 636: Performed by: EMERGENCY MEDICINE

## 2024-10-27 PROCEDURE — 85014 HEMATOCRIT: CPT | Performed by: EMERGENCY MEDICINE

## 2024-10-27 PROCEDURE — 71046 X-RAY EXAM CHEST 2 VIEWS: CPT

## 2024-10-27 PROCEDURE — 85004 AUTOMATED DIFF WBC COUNT: CPT | Performed by: EMERGENCY MEDICINE

## 2024-10-27 PROCEDURE — 83690 ASSAY OF LIPASE: CPT | Performed by: EMERGENCY MEDICINE

## 2024-10-27 PROCEDURE — 96376 TX/PRO/DX INJ SAME DRUG ADON: CPT | Performed by: EMERGENCY MEDICINE

## 2024-10-27 RX ORDER — ONDANSETRON 2 MG/ML
4 INJECTION INTRAMUSCULAR; INTRAVENOUS EVERY 30 MIN PRN
Status: DISCONTINUED | OUTPATIENT
Start: 2024-10-27 | End: 2024-10-27 | Stop reason: HOSPADM

## 2024-10-27 RX ORDER — MORPHINE SULFATE 4 MG/ML
4 INJECTION, SOLUTION INTRAMUSCULAR; INTRAVENOUS
Status: COMPLETED | OUTPATIENT
Start: 2024-10-27 | End: 2024-10-27

## 2024-10-27 RX ORDER — ONDANSETRON 4 MG/1
4 TABLET, ORALLY DISINTEGRATING ORAL EVERY 8 HOURS PRN
Qty: 15 TABLET | Refills: 0 | Status: SHIPPED | OUTPATIENT
Start: 2024-10-27

## 2024-10-27 RX ORDER — HYDROCODONE BITARTRATE AND ACETAMINOPHEN 5; 325 MG/1; MG/1
1 TABLET ORAL EVERY 6 HOURS PRN
Qty: 12 TABLET | Refills: 0 | Status: SHIPPED | OUTPATIENT
Start: 2024-10-27 | End: 2024-10-30

## 2024-10-27 RX ADMIN — MORPHINE SULFATE 4 MG: 4 INJECTION, SOLUTION INTRAMUSCULAR; INTRAVENOUS at 02:20

## 2024-10-27 RX ADMIN — MORPHINE SULFATE 4 MG: 4 INJECTION, SOLUTION INTRAMUSCULAR; INTRAVENOUS at 03:50

## 2024-10-27 ASSESSMENT — ACTIVITIES OF DAILY LIVING (ADL)
ADLS_ACUITY_SCORE: 0

## 2024-10-27 NOTE — DISCHARGE INSTRUCTIONS
TODAY'S VISIT:  You were seen today for back pain     -You should have your liver labs checked again in 2 days and follow-up regarding these results with your PCP this week.  If your liver labs are worsening, you may require further testing such as MRCP or ERCP  -   - If you had any labs or imaging/radiology tests performed today, you should also discuss these tests with your usual provider.     FOLLOW-UP:  Please make an appointment to follow up with:  - Your Primary Care Provider. If you do not have a PCP, please call the Primary Care Center (phone: [phone] for an appointment  - Please make an appointment to follow up with Surgery - General Clinic(phone: [phone]) to discuss cholecystectomy    - Have your provider review the results from today's visit with you again to make sure no further follow-up or additional testing is needed based on those results.     RETURN TO THE EMERGENCY DEPARTMENT  Return to the Emergency Department at any time for any new or worsening symptoms or any concerns.

## 2024-10-27 NOTE — ED TRIAGE NOTES
Triage Assessment (Adult)       Row Name 10/26/24 8918          Triage Assessment    Airway WDL WDL        Respiratory WDL    Respiratory WDL WDL        Skin Circulation/Temperature WDL    Skin Circulation/Temperature WDL WDL        Cardiac WDL    Cardiac WDL WDL        Peripheral/Neurovascular WDL    Peripheral Neurovascular WDL pulse assessment;WDL        Cognitive/Neuro/Behavioral WDL    Cognitive/Neuro/Behavioral WDL WDL

## 2024-10-31 ENCOUNTER — OFFICE VISIT (OUTPATIENT)
Dept: INTERNAL MEDICINE | Facility: CLINIC | Age: 74
End: 2024-10-31
Payer: COMMERCIAL

## 2024-10-31 ENCOUNTER — ANCILLARY PROCEDURE (OUTPATIENT)
Dept: CT IMAGING | Facility: CLINIC | Age: 74
End: 2024-10-31
Attending: PEDIATRICS
Payer: COMMERCIAL

## 2024-10-31 ENCOUNTER — LAB (OUTPATIENT)
Dept: LAB | Facility: CLINIC | Age: 74
End: 2024-10-31
Payer: COMMERCIAL

## 2024-10-31 VITALS
OXYGEN SATURATION: 93 % | WEIGHT: 151.2 LBS | BODY MASS INDEX: 25.55 KG/M2 | DIASTOLIC BLOOD PRESSURE: 76 MMHG | SYSTOLIC BLOOD PRESSURE: 127 MMHG | HEART RATE: 100 BPM | TEMPERATURE: 98.5 F | RESPIRATION RATE: 16 BRPM

## 2024-10-31 DIAGNOSIS — M54.9 UPPER BACK PAIN ON RIGHT SIDE: ICD-10-CM

## 2024-10-31 DIAGNOSIS — M54.9 UPPER BACK PAIN ON RIGHT SIDE: Primary | ICD-10-CM

## 2024-10-31 DIAGNOSIS — K80.20 CALCULUS OF GALLBLADDER WITHOUT CHOLECYSTITIS WITHOUT OBSTRUCTION: ICD-10-CM

## 2024-10-31 LAB
ALBUMIN SERPL BCG-MCNC: 4.3 G/DL (ref 3.5–5.2)
ALP SERPL-CCNC: 72 U/L (ref 40–150)
ALT SERPL W P-5'-P-CCNC: 13 U/L (ref 0–50)
ANION GAP SERPL CALCULATED.3IONS-SCNC: 13 MMOL/L (ref 7–15)
AST SERPL W P-5'-P-CCNC: 19 U/L (ref 0–45)
ATRIAL RATE - MUSE: 77 BPM
BILIRUB SERPL-MCNC: 0.6 MG/DL
BUN SERPL-MCNC: 11.5 MG/DL (ref 8–23)
CALCIUM SERPL-MCNC: 9.3 MG/DL (ref 8.8–10.4)
CHLORIDE SERPL-SCNC: 96 MMOL/L (ref 98–107)
CREAT SERPL-MCNC: 0.67 MG/DL (ref 0.51–0.95)
DIASTOLIC BLOOD PRESSURE - MUSE: NORMAL MMHG
EGFRCR SERPLBLD CKD-EPI 2021: 90 ML/MIN/1.73M2
GLUCOSE SERPL-MCNC: 99 MG/DL (ref 70–99)
HCO3 SERPL-SCNC: 29 MMOL/L (ref 22–29)
INTERPRETATION ECG - MUSE: NORMAL
P AXIS - MUSE: 81 DEGREES
POTASSIUM SERPL-SCNC: 3.5 MMOL/L (ref 3.4–5.3)
PR INTERVAL - MUSE: 136 MS
PROT SERPL-MCNC: 7.2 G/DL (ref 6.4–8.3)
QRS DURATION - MUSE: 76 MS
QT - MUSE: 366 MS
QTC - MUSE: 414 MS
R AXIS - MUSE: 1 DEGREES
SODIUM SERPL-SCNC: 138 MMOL/L (ref 135–145)
SYSTOLIC BLOOD PRESSURE - MUSE: NORMAL MMHG
T AXIS - MUSE: 31 DEGREES
VENTRICULAR RATE- MUSE: 77 BPM

## 2024-10-31 PROCEDURE — 80053 COMPREHEN METABOLIC PANEL: CPT | Performed by: PATHOLOGY

## 2024-10-31 PROCEDURE — 36415 COLL VENOUS BLD VENIPUNCTURE: CPT | Performed by: PATHOLOGY

## 2024-10-31 PROCEDURE — 71275 CT ANGIOGRAPHY CHEST: CPT | Performed by: RADIOLOGY

## 2024-10-31 PROCEDURE — 99214 OFFICE O/P EST MOD 30 MIN: CPT | Mod: 25

## 2024-10-31 PROCEDURE — 93005 ELECTROCARDIOGRAM TRACING: CPT

## 2024-10-31 RX ORDER — IOPAMIDOL 755 MG/ML
100 INJECTION, SOLUTION INTRAVASCULAR ONCE
Status: COMPLETED | OUTPATIENT
Start: 2024-10-31 | End: 2024-10-31

## 2024-10-31 RX ORDER — METHOCARBAMOL 500 MG/1
500 TABLET, FILM COATED ORAL 4 TIMES DAILY PRN
Qty: 30 TABLET | Refills: 1 | Status: SHIPPED | OUTPATIENT
Start: 2024-10-31

## 2024-10-31 RX ADMIN — IOPAMIDOL 100 ML: 755 INJECTION, SOLUTION INTRAVASCULAR at 12:12

## 2024-10-31 ASSESSMENT — ASTHMA QUESTIONNAIRES
QUESTION_5 LAST FOUR WEEKS HOW WOULD YOU RATE YOUR ASTHMA CONTROL: WELL CONTROLLED
QUESTION_4 LAST FOUR WEEKS HOW OFTEN HAVE YOU USED YOUR RESCUE INHALER OR NEBULIZER MEDICATION (SUCH AS ALBUTEROL): ONCE A WEEK OR LESS
QUESTION_3 LAST FOUR WEEKS HOW OFTEN DID YOUR ASTHMA SYMPTOMS (WHEEZING, COUGHING, SHORTNESS OF BREATH, CHEST TIGHTNESS OR PAIN) WAKE YOU UP AT NIGHT OR EARLIER THAN USUAL IN THE MORNING: NOT AT ALL
ACT_TOTALSCORE: 22
ACT_TOTALSCORE: 22
QUESTION_1 LAST FOUR WEEKS HOW MUCH OF THE TIME DID YOUR ASTHMA KEEP YOU FROM GETTING AS MUCH DONE AT WORK, SCHOOL OR AT HOME: NONE OF THE TIME
QUESTION_2 LAST FOUR WEEKS HOW OFTEN HAVE YOU HAD SHORTNESS OF BREATH: ONCE OR TWICE A WEEK

## 2024-10-31 NOTE — DISCHARGE INSTRUCTIONS

## 2024-10-31 NOTE — PATIENT INSTRUCTIONS
Summary of today's plan:  - Make sure you stay well-hydrated  - CT scan of your chest with contrast  - Labs today to check liver enzymes  - Pain control with topical Voltaren gel and muscle relaxant (methocarbamol)

## 2024-10-31 NOTE — PROGRESS NOTES
I, Favian Starkey MD saw the patient with the resident, and agree with the resident's findings and plan of care as documented in the resident's note.  BP (!) 84/61 (BP Location: Right arm, Patient Position: Sitting, Cuff Size: Adult Large)   Pulse 100   Temp 98.5  F (36.9  C)   Resp 16   Wt 68.6 kg (151 lb 3.2 oz)   SpO2 93%   BMI 25.55 kg/m   Repeat BP reassuring.  I personally reviewed vital signs and past record.  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  Aortic calc, but 1/11/24 CT reassuring.  My PE shows pain minimal in suprascap area, nonspecific.

## 2024-10-31 NOTE — PROGRESS NOTES
Assessment & Plan   Yolande is a 74yoF with PMH of appendiceal cancer s/p laparoscopic appendectomy 2018 (with simultaneous hysterectomy), followed by [facility], currently on surveillance, who presents for follow-up evaluation of right upper back pain after recent ED visit.     # Right upper back pain  Described as constant pain in right scapular region. Worsened with movements of her right shoulder. Associated with mild nausea. No other GI symptoms, normal abdominal exam. Recent ED workup with abd ultrasound showed gallstones without cholecystitis. There was some concern for choledocholithiasis due to echogenic focus in the CBD, but no CBD dilation, and LFTs normal, so plan was for repeat LFTs as outpatient. Received Norco, which has helped somewhat with the pain. Exam today remains same, with scapular region tenderness on right side and normal abdominal exam. However, the exact nature of her pain was unable to be reproduced on exam, and her pain was overall poorly localized. Most likely, her pain is due to MSK etiology such as muscle spasm. However, given her poor localization and lack of reproducibility on exam, will obtain CTA chest to evaluate for aortic aneurysm/dissection. No urinary symptoms, and UA normal - low suspicion for nephrolithiasis. Had prior hysterectomy - gynecologic etiology unlikely.   - Pain control with diclofenac gel and methocarbamol prn  - CTA chest for further evaluation  - Repeat CMP per prior recommendations  - EKG done in clinic without signs of acute MI    # Transient hypotension  Initial clinic BP was 80s/60s. Patient was asymptomatic without lightheadedness or dizziness. Likely due to dehydration given poor PO intake over last couple of days from her pain (was only eating soup and crackers). Repeat BP after a cup of water was 127/76.     MED REC REQUIRED  Post Medication Reconciliation Status: med rec completed at this visit  BMI  Estimated body mass index is 25.55 kg/m  as calculated  "from the following:    Height as of 10/11/24: 1.638 m (5' 4.5\").    Weight as of this encounter: 68.6 kg (151 lb 3.2 oz).   BMI not addressed at this visit    RTC: TBD pending results    Patient seen and discussed with Dr. Starkey.     Marcel Orozco MD  PGY3, Internal Medicine      Subjective   Yolande is a 74 year old, presenting for the following health issues:  Follow Up (Pt states gallbladder needs to be removed; pt is running out of pain meds )      10/31/2024     9:42 AM   Additional Questions   Roomed by MR AGEE     Here for ED follow-up, evaluation of right upper back pain.     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 Followed by [facility]. Had rising CEA and indeterminate findings on imaging, so underwent robotic exploration in 8/2023, which was negative for any residual disease. On follow-up this year in 9/2024, patient had decrease in CEA and is now planned for [id number] follow-up.     Patient was seen in ED 5 days ago for right upper back pain up to 11/10 in severity. Constant but worsened by certain right shoulder movements. No notable injury but patient was moving some boxes from her basement recently. Was not straining much. Associated with mild nausea. No urinary symptoms. Has been constipated due to taking Norco that was prescribed during her recent ED visit. Last bowel movement was 4 days ago and was normal. Low appetite due to pain, only eating soup and crackers. Has not vomited. No fevers, gets some chills when her pain is really bad. No abdominal pain at all.         Objective    BP (!) 84/61 (BP Location: Right arm, Patient Position: Sitting, Cuff Size: Adult Large)   Pulse 100   Temp 98.5  F (36.9  C)   Resp 16   Wt 68.6 kg (151 lb 3.2 oz)   SpO2 93%   BMI 25.55 kg/m    Body mass index is 25.55 kg/m .  Physical Exam   Gen: No acute distress  HEENT: Normocephalic, atraumatic  CV: Borderline tachycardia with regular rhythm  Pulm: CTAB, " non-labored breathing on room air  Abd: Soft, non-distended, non-tender to palpation, hypoactive bowel sounds, no palpable masses, no hepatomegaly  MSK: Mild tenderness to palpation of right scapular region, slight pain with various right shoulder movements - though pain is not consistently reproduced    EKG - Reviewed and interpreted by me  Sinus rhythm with sinus arrhythmia, no acute ischemic ST-segment changes        Signed Electronically by: Marcel Orozco MD

## 2024-12-30 ENCOUNTER — OFFICE VISIT (OUTPATIENT)
Dept: INTERNAL MEDICINE | Facility: CLINIC | Age: 74
End: 2024-12-30
Payer: COMMERCIAL

## 2024-12-30 VITALS
DIASTOLIC BLOOD PRESSURE: 79 MMHG | HEART RATE: 86 BPM | HEIGHT: 65 IN | SYSTOLIC BLOOD PRESSURE: 145 MMHG | BODY MASS INDEX: 25.55 KG/M2

## 2024-12-30 DIAGNOSIS — M54.9 UPPER BACK PAIN ON RIGHT SIDE: Primary | ICD-10-CM

## 2024-12-30 DIAGNOSIS — R91.8 PULMONARY NODULES: ICD-10-CM

## 2024-12-30 DIAGNOSIS — J45.40 MODERATE PERSISTENT ASTHMA WITHOUT COMPLICATION: ICD-10-CM

## 2024-12-30 DIAGNOSIS — I10 BENIGN ESSENTIAL HYPERTENSION: ICD-10-CM

## 2024-12-30 RX ORDER — HYDROCHLOROTHIAZIDE 25 MG/1
25 TABLET ORAL DAILY
Qty: 90 TABLET | Refills: 3 | Status: SHIPPED | OUTPATIENT
Start: 2024-12-30

## 2024-12-30 RX ORDER — LOSARTAN POTASSIUM 50 MG/1
50 TABLET ORAL DAILY
Qty: 90 TABLET | Refills: 3 | Status: SHIPPED | OUTPATIENT
Start: 2024-12-30

## 2024-12-30 RX ORDER — METHOCARBAMOL 500 MG/1
500 TABLET, FILM COATED ORAL 4 TIMES DAILY PRN
Qty: 30 TABLET | Refills: 1 | Status: SHIPPED | OUTPATIENT
Start: 2024-12-30

## 2024-12-30 NOTE — LETTER
12/30/2024       RE: Yolande Hussein  [address]  [facility] [address]     Dear Colleague,    Thank you for referring your patient, Yolande Hussein, to the [facility] [place] [facility]. Please see a copy of my visit note below.      Assessment & Plan    Upper back pain on right side  Reviewed possible causes of upper back pain with patient.  Given timeline, Parsonage-Jung syndrome with acute onset of pain followed by pain resolution and muscle spasms cannot be completely excluded.  Given that patient is getting relief with muscle relaxants, recommend to continue with occasional use of methocarbamol.  Referral to physical therapy as well as orthopedics were provided.  May consider imaging for further evaluation with MRI.  Blood pressure is elevated today.  Patient reports that she has been under a lot of stress and  - Orthopedic  Referral  - methocarbamol (ROBAXIN) 500 MG tablet; Take 1 tablet (500 mg) by mouth 4 times daily as needed for muscle spasms.  - Physical Therapy  Referral; Future    Benign essential hypertension  Would like to hold off on medication changes at this time.  Recommend follow-up in 1 to 2 months as well as home blood pressure measurements to determine the need for medication adjustments.  Refill for Dulera was provided to the patient.  She reports good control of  - hydrochlorothiazide (HYDRODIURIL) 25 MG tablet; Take 1 tablet (25 mg) by mouth daily.  - losartan (COZAAR) 50 MG tablet; Take 1 tablet (50 mg) by mouth daily.    Moderate persistent asthma without complication  Asthma with current therapy.  - mometasone-formoterol (DULERA) 100-5 MCG/ACT inhaler; Inhale 2 puffs into the lungs 2 times daily.    Pulmonary nodules  - mometasone-formoterol (DULERA) 100-5 MCG/ACT inhaler; Inhale 2 puffs into the lungs 2 times daily.      I spent a total of 20 minutes on the day of the  "visit.   Time spent by me today doing chart review, history and exam, documentation and further activities per the note      BMI  Estimated body mass index is 25.55 kg/m  as calculated from the following:    Height as of this encounter: 1.638 m (5' 4.5\").    Weight as of 10/31/24: 68.6 kg (151 lb 3.2 oz).             No follow-ups on file.    Rika Lanier is a 74 year old, presenting for the following health issues:  Back Pain    HPI     Patient is here for follow-up on upper back pain.  She reports that she was seen initially in urgent care following acute onset of severe pain which subsequently subsided.  She was also evaluated by internal medicine specialist and felt to have muscle strain.  She states that she was also diagnosed with cholelithiasis.  She states that she has not had recurrence of severe pain.  However, she continues to have pain in upper back, especially when she is leaning forward.  She denies numbness or tingling.  She denies appreciable weakness.  She is left-handed and her right-sided pain has not affected her daily activities.  She has been taking muscle relaxant with some relief of discomfort.  She would like to continue without medication on as-needed basis.  She also reports that she would like to know the cause of her discomfort and a way to address it.      Objective   BP (!) 145/79   Pulse 86   Ht 1.638 m (5' 4.5\")   BMI 25.55 kg/m    Body mass index is 25.55 kg/m .  Physical Exam   GENERAL: alert and no distress  EYES: Eyes grossly normal to inspection, PERRL and conjunctivae and sclerae normal  RESP: normal effort  CV: regular rates and rhythm and no peripheral edema  MS: no gross musculoskeletal defects noted, no edema  SKIN: no suspicious lesions or rashes  PSYCH: mentation appears normal, affect normal/bright            Signed Electronically by: Isabel Hicks MD        Again, thank you for allowing me to participate in the care of your patient.  "     Sincerely,    Isabel Hicks MD

## 2024-12-30 NOTE — PATIENT INSTRUCTIONS
Thank you for trusting us with your care!   Please be aware, if you are on Mychart, you may see your results prior to your providers review. If labs are abnormal, we will call or message you on [employer] with a follow up plan.    If you need to contact us for questions about:  Symptoms, Scheduling & Medical Questions; Non-urgent (2-3 day response) [facility] message, Urgent (needing response today) [phone] (if after 3:30pm next day response)   Prescriptions: Please call your Pharmacy   Billing: [place] [phone] or [facility]:[phone]

## 2024-12-31 ENCOUNTER — PATIENT OUTREACH (OUTPATIENT)
Dept: CARE COORDINATION | Facility: CLINIC | Age: 74
End: 2024-12-31
Payer: COMMERCIAL

## 2024-12-31 ENCOUNTER — TELEPHONE (OUTPATIENT)
Dept: INTERNAL MEDICINE | Facility: CLINIC | Age: 74
End: 2024-12-31
Payer: COMMERCIAL

## 2025-01-02 ENCOUNTER — PATIENT OUTREACH (OUTPATIENT)
Dept: CARE COORDINATION | Facility: CLINIC | Age: 75
End: 2025-01-02
Payer: COMMERCIAL

## 2025-01-02 NOTE — PROGRESS NOTES
"  Assessment & Plan     Upper back pain on right side  Reviewed possible causes of upper back pain with patient.  Given timeline, Parsonage-Jung syndrome with acute onset of pain followed by pain resolution and muscle spasms cannot be completely excluded.  Given that patient is getting relief with muscle relaxants, recommend to continue with occasional use of methocarbamol.  Referral to physical therapy as well as orthopedics were provided.  May consider imaging for further evaluation with MRI.  Blood pressure is elevated today.  Patient reports that she has been under a lot of stress and  - Orthopedic  Referral  - methocarbamol (ROBAXIN) 500 MG tablet; Take 1 tablet (500 mg) by mouth 4 times daily as needed for muscle spasms.  - Physical Therapy  Referral; Future    Benign essential hypertension  Would like to hold off on medication changes at this time.  Recommend follow-up in 1 to 2 months as well as home blood pressure measurements to determine the need for medication adjustments.  Refill for Dulera was provided to the patient.  She reports good control of  - hydrochlorothiazide (HYDRODIURIL) 25 MG tablet; Take 1 tablet (25 mg) by mouth daily.  - losartan (COZAAR) 50 MG tablet; Take 1 tablet (50 mg) by mouth daily.    Moderate persistent asthma without complication  Asthma with current therapy.  - mometasone-formoterol (DULERA) 100-5 MCG/ACT inhaler; Inhale 2 puffs into the lungs 2 times daily.    Pulmonary nodules  - mometasone-formoterol (DULERA) 100-5 MCG/ACT inhaler; Inhale 2 puffs into the lungs 2 times daily.      I spent a total of 20 minutes on the day of the visit.   Time spent by me today doing chart review, history and exam, documentation and further activities per the note      BMI  Estimated body mass index is 25.55 kg/m  as calculated from the following:    Height as of this encounter: 1.638 m (5' 4.5\").    Weight as of 10/31/24: 68.6 kg (151 lb 3.2 oz).             No " "follow-ups on file.    Subjective   Yolande is a 74 year old, presenting for the following health issues:  Back Pain    HPI     Patient is here for follow-up on upper back pain.  She reports that she was seen initially in urgent care following acute onset of severe pain which subsequently subsided.  She was also evaluated by internal medicine specialist and felt to have muscle strain.  She states that she was also diagnosed with cholelithiasis.  She states that she has not had recurrence of severe pain.  However, she continues to have pain in upper back, especially when she is leaning forward.  She denies numbness or tingling.  She denies appreciable weakness.  She is left-handed and her right-sided pain has not affected her daily activities.  She has been taking muscle relaxant with some relief of discomfort.  She would like to continue without medication on as-needed basis.  She also reports that she would like to know the cause of her discomfort and a way to address it.      Objective    BP (!) 145/79   Pulse 86   Ht 1.638 m (5' 4.5\")   BMI 25.55 kg/m    Body mass index is 25.55 kg/m .  Physical Exam   GENERAL: alert and no distress  EYES: Eyes grossly normal to inspection, PERRL and conjunctivae and sclerae normal  RESP: normal effort  CV: regular rates and rhythm and no peripheral edema  MS: no gross musculoskeletal defects noted, no edema  SKIN: no suspicious lesions or rashes  PSYCH: mentation appears normal, affect normal/bright            Signed Electronically by: Isabel Hicks MD    "

## 2025-01-16 ENCOUNTER — THERAPY VISIT (OUTPATIENT)
Dept: PHYSICAL THERAPY | Facility: CLINIC | Age: 75
End: 2025-01-16
Attending: INTERNAL MEDICINE
Payer: COMMERCIAL

## 2025-01-16 DIAGNOSIS — M54.9 UPPER BACK PAIN ON RIGHT SIDE: ICD-10-CM

## 2025-01-16 NOTE — PROGRESS NOTES
PHYSICAL THERAPY EVALUATION  Type of Visit: Evaluation        Fall Risk Screen:  Fall screen completed by: PT  Have you fallen 2 or more times in the past year?: No  Have you fallen and had an injury in the past year?: No  Is patient a fall risk?: No    Subjective     Patient reports that, starting in October, was having unrelenting right upper back pain. She went to the ER, had a follow up with her primary and was given muscle relaxants which helped significantly. She feels that a period of moving boxes may have been an aggravated factor. Since this, she has had more symptoms in her right upper trapezius region. She has difficulty performing activities that require neck bending such as isamar sculpture and jigsaw puzzles. She has had some difficulty on the left side as well (similar symptoms), but these have mostly resolved with some PT exercises.        Presenting condition or subjective complaint: upper back  Date of onset: 12/30/25    Relevant medical history: Asthma; Cancer   Dates & types of surgery:      Prior diagnostic imaging/testing results: MRI; CT scan     Prior therapy history for the same diagnosis, illness or injury:        Prior Level of Function  Transfers: Independent  Ambulation: Independent  ADL: Independent  IADL: Driving, Housekeeping, Laundry, Meal preparation    Living Environment  Social support: With a significant other or spouse   Type of home: House; 2-story   Stairs to enter the home: Yes 3 Is there a railing: No     Ramp: No   Stairs inside the home: Yes 14 Is there a railing: Yes     Help at home: None  Equipment owned:       Employment:      Hobbies/Interests:      Patient goals for therapy: spend the amout of time working with my hobbies without pain       Objective   CERVICAL SPINE EVALUATION  PAIN: Pain Level at Rest: 2/10  Pain Level with Use: 8/10  Pain Location: thoracic spine, shoulder, and patient points to right upper trapezius  Pain Quality: Pinching, Stiffness  Pain  Frequency: constant or with exacerbations  Pain is Exacerbated By: bending over/neck flexion, especially for prolonged periods (can work ~30 minutes)  Pain is Relieved By: heat, NSAIDs, and muscle relaxants (prescribed)  Pain Progression: Improved    POSTURE: Sitting Posture: Rounded shoulders, Forward head  ROM: AROM WFL; mild limitation of B sidebend and rotation  PALPATION:  significant TTP right scapular musculature; mild TTP right upper trapezius; significant tightness B upper trapezius    Assessment & Plan   CLINICAL IMPRESSIONS  Medical Diagnosis: Upper back pain on right side    Treatment Diagnosis: Thoracic and shoulder pain, right   Impression/Assessment: Patient is a 74 year old female with right upper back/neck pain complaints.  The following significant findings have been identified: Pain, Decreased ROM/flexibility, Decreased activity tolerance, and Impaired posture. These impairments interfere with their ability to perform recreational activities and household chores as compared to previous level of function.     Clinical Decision Making (Complexity):  Clinical Presentation: Stable/Uncomplicated  Clinical Presentation Rationale: based on medical and personal factors listed in PT evaluation  Clinical Decision Making (Complexity): Low complexity    PLAN OF CARE  Treatment Interventions:  Modalities: Cryotherapy, Hot Pack  Interventions: Manual Therapy, Neuromuscular Re-education, Therapeutic Activity, Therapeutic Exercise, Self-Care/Home Management    Long Term Goals     PT Goal 1  Goal Identifier: Activity tolerance  Goal Description: Patient will report ability to work on hobbies (isamar and puzzles) with shoulder/back pain <3/10.  Rationale: to maximize safety and independence with performance of ADLs and functional tasks  Target Date: 04/16/25      Frequency of Treatment: 2x/month  Duration of Treatment: 3 months    Education Assessment:   Learner/Method:  Patient;Listening;Reading;Demonstration    Risks and benefits of evaluation/treatment have been explained.   Patient/Family/caregiver agrees with Plan of Care.     Evaluation Time:     PT Eval, Low Complexity Minutes (84314): 18     Signing Clinician: ARINA Moss [facility]                                                                                   OUTPATIENT PHYSICAL THERAPY      PLAN OF TREATMENT FOR OUTPATIENT REHABILITATION   Patient's Last Name, First Name, Yolande Perez YOB: 1950   Provider's Name   [facility]   Medical Record No.  [id number]     Onset Date: 12/30/25  Start of Care Date: 01/16/25     Medical Diagnosis:  Upper back pain on right side      PT Treatment Diagnosis:  Thoracic and shoulder pain, right Plan of Treatment  Frequency/Duration: 2x/month/ 3 months    Certification date from 01/16/25 to 04/16/25         See note for plan of treatment details and functional goals     Christopher Tinoco, PT                         I CERTIFY THE NEED FOR THESE SERVICES FURNISHED UNDER        THIS PLAN OF TREATMENT AND WHILE UNDER MY CARE     (Physician attestation of this document indicates review and certification of the therapy plan).              Referring Provider:  Isabel Hicks    Initial Assessment  See Epic Evaluation- Start of Care Date: 01/16/25

## 2025-02-04 ENCOUNTER — THERAPY VISIT (OUTPATIENT)
Dept: PHYSICAL THERAPY | Facility: CLINIC | Age: 75
End: 2025-02-04
Payer: COMMERCIAL

## 2025-02-04 DIAGNOSIS — M54.9 UPPER BACK PAIN ON RIGHT SIDE: Primary | ICD-10-CM

## 2025-02-04 PROCEDURE — 97110 THERAPEUTIC EXERCISES: CPT | Mod: GP

## 2025-02-04 PROCEDURE — 97140 MANUAL THERAPY 1/> REGIONS: CPT | Mod: GP

## 2025-02-04 NOTE — PROGRESS NOTES
"   02/04/25 0500   Appointment Info   Signing clinician's name / credentials Christopher Tinoco DPT   Total/Authorized Visits [employer]   Visits Used 2   Medical Diagnosis Upper back pain on right side   PT Tx Diagnosis Thoracic and shoulder pain, right   Progress Note/Certification   Start of Care Date 01/16/25   Onset of illness/injury or Date of Surgery 12/30/25   Therapy Frequency 2x/month   Predicted Duration 3 months   Certification date from 01/16/25   Certification date to 04/16/25   Progress Note Due Date 04/16/25   Progress Note Completed Date 01/16/25   PT Goal 1   Goal Identifier Activity tolerance   Goal Description Patient will report ability to work on hobbies (isamar and puzzles) with shoulder/back pain <3/10.   Rationale to maximize safety and independence with performance of ADLs and functional tasks   Goal Progress Goal met. Patient reports no pain with these hobbies currently.   Target Date 04/16/25   Subjective Report   Subjective Report Patient reports she's \"doing really well.\" Since initial evaluation and with her PT exercises, she hasn't had the pain she came in for. She feels that she can manage on her own given her HEP and is ready for discharge.   Treatment Interventions (PT)   Interventions Manual Therapy   Therapeutic Procedure/Exercise   Therapeutic Procedures: strength, endurance, ROM, flexibility minutes (55240) 27   Ther Proc 1 Education   Ther Proc 1 - Details Provided education on discharge plan including importannce of continued adherence to HEP.   PTRx Ther Proc 1 Cervical Retraction   PTRx Ther Proc 1 - Details 1x10; verbal cues for technique   PTRx Ther Proc 2 Upper Trapezius Stretch   PTRx Ther Proc 2 - Details 1x30\" B; verbal cues for technique and education on low-load long-duration stretching   PTRx Ther Proc 3 Shoulder Theraband Extension   PTRx Ther Proc 3 - Details 1x15 B; black band; demonstration and verbal cues for technique   PTRx Ther Proc 4 Shoulder Theraband Rows   PTRx " "Ther Proc 4 - Details 1x15 B; black band; demonstration and verbal cues for technique   Skilled Intervention Education, demonstration, and progression of HEP.   Patient Response/Progress Tolerates all exercises well and endorses understanding of education.   PTRx Ther Proc 5 P[place] Stretch   PTRx Ther Proc 5 - Details 2x30\" B; demonstration and verbal cues for technique   Manual Therapy   Manual Therapy: Mobilization, MFR, MLD, friction massage minutes (95916) 11   Manual Therapy Manual Therapy 2;Manual Therapy 3   Manual Therapy 1 Suboccipital release   Manual Therapy 1 - Details 1x1' with clinician overpressure   Skilled Intervention Manual therapy and education for pain and tension relief.   Patient Response/Progress Tolerates well and endorses that all manual interventions feel good. Plans to utilize tennis ball on wall strategy for UT pain and tightness.   Manual Therapy 2 Manual upper trapezius stretch   Manual Therapy 2 - Details 1x1' per side with clinician overpressure and contract-relax strategy   Manual Therapy 3 Upper trapezius massage with tennis ball   Manual Therapy 3 - Details demonstration and education on performance as part of HEP   Education   Learner/Method Patient;Listening;Reading;Demonstration;No Barriers to Learning   Plan   Home program PTRx (Printed)   Updates to plan of care Progression of HEP, discharge plan.   Plan for next session Discharged from PT.   Total Session Time   Timed Code Treatment Minutes 38   Total Treatment Time (sum of timed and untimed services) 38         DISCHARGE  Reason for Discharge: Patient has met all goals.    Discharge Plan: Patient to continue home program.    Referring Provider:  Isabel Hicks    "

## 2025-02-04 NOTE — PROGRESS NOTES
"   02/04/25 0500   Appointment Info   Signing clinician's name / credentials Christopher Tinoco DPT   Total/Authorized Visits E&T   Visits Used 2   Medical Diagnosis Upper back pain on right side   PT Tx Diagnosis Thoracic and shoulder pain, right   Progress Note/Certification   Start of Care Date 01/16/25   Onset of illness/injury or Date of Surgery 12/30/25   Therapy Frequency 2x/month   Predicted Duration 3 months   Certification date from 01/16/25   Certification date to 04/16/25   Progress Note Due Date 04/16/25   Progress Note Completed Date 01/16/25   PT Goal 1   Goal Identifier Activity tolerance   Goal Description Patient will report ability to work on hobbies (isamar and puzzles) with shoulder/back pain <3/10.   Rationale to maximize safety and independence with performance of ADLs and functional tasks   Goal Progress Goal met. Patient reports no pain with these hobbies currently.   Target Date 04/16/25   Subjective Report   Subjective Report Patient reports she's \"doing really well.\" Since initial evaluation and with her PT exercises, she hasn't had the pain she came in for. She feels that she can manage on her own given her HEP and is ready for discharge.   Treatment Interventions (PT)   Interventions Manual Therapy   Therapeutic Procedure/Exercise   Therapeutic Procedures: strength, endurance, ROM, flexibility minutes (23596) 25   Ther Proc 1 Education   Ther Proc 1 - Details Provided education on discharge plan including importannce of continued adherence to HEP.   PTRx Ther Proc 1 Cervical Retraction   PTRx Ther Proc 1 - Details 1x10; verbal cues for technique   PTRx Ther Proc 2 Upper Trapezius Stretch   PTRx Ther Proc 2 - Details 1x30\" B; verbal cues for technique and education on low-load long-duration stretching   PTRx Ther Proc 3 Shoulder Theraband Extension   PTRx Ther Proc 3 - Details 1x15 B; black band; demonstration and verbal cues for technique   PTRx Ther Proc 4 Shoulder Theraband Rows   PTRx " "Ther Proc 4 - Details 1x15 B; black band; demonstration and verbal cues for technique   Skilled Intervention Education, demonstration, and progression of HEP.   Patient Response/Progress Tolerates all exercises well and endorses understanding of education.   PTRx Ther Proc 5 Pec Corner Stretch   PTRx Ther Proc 5 - Details 2x30\" B; demonstration and verbal cues for technique   Manual Therapy   Manual Therapy: Mobilization, MFR, MLD, friction massage minutes (22318) 10   Manual Therapy Manual Therapy 2;Manual Therapy 3   Manual Therapy 1 Suboccipital release   Manual Therapy 1 - Details 1x1' with clinician overpressure   Skilled Intervention Manual therapy and education for pain and tension relief.   Patient Response/Progress Tolerates well and endorses that all manual interventions feel good. Plans to utilize tennis ball on wall strategy for UT pain and tightness.   Manual Therapy 2 Manual upper trapezius stretch   Manual Therapy 2 - Details 1x1' per side with clinician overpressure and contract-relax strategy   Manual Therapy 3 Upper trapezius massage with tennis ball   Manual Therapy 3 - Details demonstration and education on performance as part of HEP   Education   Learner/Method Patient;Listening;Reading;Demonstration;No Barriers to Learning   Plan   Home program PTRx (Printed)   Updates to plan of care Progression of HEP, discharge plan.   Plan for next session Discharged from PT.   Total Session Time   Timed Code Treatment Minutes 35   Total Treatment Time (sum of timed and untimed services) 35         DISCHARGE  Reason for Discharge: Patient has met all goals.    Discharge Plan: Patient to continue home program.    Referring Provider:  Isabel Hicks    "

## 2025-03-03 ENCOUNTER — TELEPHONE (OUTPATIENT)
Dept: PULMONOLOGY | Facility: CLINIC | Age: 75
End: 2025-03-03
Payer: COMMERCIAL

## 2025-03-03 NOTE — TELEPHONE ENCOUNTER
Left Voicemail (1st Attempt) for the patient to call back and schedule the following:    Appointment type: RETURN ASTHMA  Provider: KATIE  Return date: 09/03/025  Specialty phone number: [phone]  Additional appointment(s) needed: N/A  Additonal Notes: N/A

## 2025-03-03 NOTE — PROGRESS NOTES
ASSESSMENT & PLAN    There are no diagnoses linked to this encounter.  This issue is {ACUTE/CHRONIC:113111} and {IMPROVING WORSENIN}.    See Patient Instructions  Patient Instructions   Reviewed options for left knee symptoms from underlying arthritis, including imaging (previous XR and MRI), brace/support (including  brace), physical therapy (done previously), use of medication (including OTC medication, and including the Voltaren gel you've tried), injection options (steroid and visco injections done previously), and referral for further discussion with ortho surgery.  Following discussion, with some benefit from visco injection, will submit prior auth request with insurance for repeat injection. If able to do prior to upcoming trip in April, will plan that. Otherwise, may need to wait until after trip, as those injections usually require 6 months between due to insurance.  Also went ahead and placed referral for further discussion with ortho surgery, given treatment for knee OA to date has not provided lasting benefit.  Will leave follow-up with me open ended. Contact clinic if any questions/concerns.    If you have any further questions for your physician or physician s care team you can contact them thru MyChart or by calling [phone].      [facility]    SUBJECTIVE- Interim History 2025    No chief complaint on file.      Yolande Hussein is a 74 year old female who is seen in f/u up for Data Unavailable. Since last visit on 10/11/24 patient has had an increase in pain over the last 3 months or so. Notes that any amount of walking now causes an increase in pain at the end of the day. Still utilizes  brace as well a compression sleeve.    A left knee gel injection was done on 10/23/24 with Dr. Medina which provided relief for 1-2 months of total pain free relief    The patient is seen by  "themselves.    Reports that plantar foot pain is still bothersome, but home stretches seem to be helping still.      Orthopedic/Surgical history: NO  Social History/Occupation: Retired    **  Above information per rooming staff.  Additional history:  Had done left knee steroid injection 2/23/24.    Aching with any amount of waling. Sometimes wakes at night due to pain.          REVIEW OF SYSTEMS:  Review of Systems    OBJECTIVE:  There were no vitals taken for this visit.   General: healthy, alert and in no distress  Skin: no suspicious lesions or rash.  CV: distal perfusion intact ***  Resp: normal respiratory effort without conversational dyspnea   Psych: normal mood and affect  Gait: {FSOC GAIT:819846::\"NORMAL\"}  Neuro: Normal light sensory exam of *** extremity ***    RADIOLOGY:  Final results and radiologist's interpretation, available in the [facility] health record.  Images were reviewed with the patient in the office today.  My personal interpretation of the performed imaging: ***      {[facility] 2021 Documentation (Optional):280303}  {2021 E&M time (Optional):931350}  {Provider  Link to [facility] Help Grid :589832}     " to full-thickness cartilage loss along  both the medial femoral condyle condyle and medial tibial plateau with  extensive subchondral bone marrow in the medial femoral condyle and  medial tibial plateau. Thickened low signal subchondral bone along the  weightbearing surface of the medial femoral condyle, sagittal series 6  image 8, findings most consistent with an insufficiency fracture.      In the lateral compartment, there is minimal superior surface  irregularity along the body of the lateral meniscus without displaced  fragment. There is a focal area of at least moderate grade cartilage  loss along the lateral tibial plateau without subchondral edema.     In the patellofemoral compartment, there is no high-grade or  full-thickness cartilage loss or subchondral edema.     The anterior and posterior cruciate ligaments are intact.     The tibial collateral ligament is intact.      The anterior iliotibial band, fibular collateral ligament, biceps  femoris tendon, and popliteus tendons are intact.     There is a moderate size joint effusion. No popliteal cyst. No joint  bodies.     The extensor mechanism is intact and normal in appearance.                                                                       IMPRESSION:  1. Moderate size left knee joint effusion.      2. Extensive bone marrow edema centered at the medial femorotibial  joint compartment. Low signal thickening of the subchondral bone along  the weightbearing surface of the medial femoral condyle, findings most  consistent with an insufficiency fracture.     3. Large radial tear involving the posterior horn and posterior root  ligament of the medial meniscus with horizontal tear involving the  body and posterior horn.     4. Osteoarthrosis in the left knee, most significant within the medial  femorotibial joint compartment. There are large areas of  full-thickness cartilage loss.     5. The anterior and posterior cruciate ligaments, and medial  and  lateral supporting structures are intact.     [Consider Follow Up: Likely insufficiency fracture involving the  medial femoral condyle, as above]     This report will be copied to the [facility] to ensure a  provider acknowledges the finding. [facility] is available Monday  through Friday 8am-3:30 pm.     SULEIMAN JONES MD           EXAM: XR KNEE STANDING LEFT 2 VIEWS  LOCATION: [facility]  DATE: 2/19/2024     INDICATION:  Chronic pain of left knee, Chronic pain of left knee  COMPARISON: None.                                                                      IMPRESSION: Normal joint spaces and alignment. No acute fracture. Small joint effusion.            Over 20 minutes spent by me on the date of the encounter doing chart review, history and exam, documentation and further activities per the note

## 2025-03-04 ENCOUNTER — OFFICE VISIT (OUTPATIENT)
Dept: ORTHOPEDICS | Facility: CLINIC | Age: 75
End: 2025-03-04
Payer: COMMERCIAL

## 2025-03-04 DIAGNOSIS — M25.562 CHRONIC PAIN OF LEFT KNEE: ICD-10-CM

## 2025-03-04 DIAGNOSIS — G89.29 CHRONIC PAIN OF LEFT KNEE: ICD-10-CM

## 2025-03-04 DIAGNOSIS — M17.12 PRIMARY OSTEOARTHRITIS OF LEFT KNEE: Primary | ICD-10-CM

## 2025-03-04 PROCEDURE — 99213 OFFICE O/P EST LOW 20 MIN: CPT | Performed by: PEDIATRICS

## 2025-03-04 NOTE — Clinical Note
"3/4/2025      Yolande Hussein  [address] [place] [address]      Dear Colleague,    Thank you for referring your patient, Yolande Hussein, to the [facility]. Please see a copy of my visit note below.    ASSESSMENT & PLAN    There are no diagnoses linked to this encounter.  This issue is {ACUTE/CHRONIC:534315} and {IMPROVING WORSENIN}.    {FOLLOW UP PLANS (Optional):825424}    Maurice Toledo DO  [facility]    SUBJECTIVE- Interim History 2025    No chief complaint on file.      Yolande Hussein is a 74 year old female who is seen in f/u up for Data Unavailable. Since last visit on 10/11/24 patient has had an increase in pain over the last 3 months or so. Notes that any amount of walking now causes an increase in pain at the end of the day. Still utilizes  brace as well a compression sleeve.    A left knee gel injection was done on 10/23/24 with Dr. Medina which provided relief for 1-2 months of total pain free relief    The patient is seen by themselves.    Reports that plantar foot pain is still bothersome, but home stretches seem to be helping still.      Orthopedic/Surgical history: NO  Social History/Occupation: Retired    **  Above information per rooming staff.  Additional history:  Had done left knee steroid injection 24.    Aching with any amount of waling. Sometimes wakes at night due to pain.          REVIEW OF SYSTEMS:  Review of Systems    OBJECTIVE:  There were no vitals taken for this visit.   General: healthy, alert and in no distress  Skin: no suspicious lesions or rash.  CV: distal perfusion intact ***  Resp: normal respiratory effort without conversational dyspnea   Psych: normal mood and affect  Gait: {FSOC GAIT:386043::\"NORMAL\"}  Neuro: Normal light sensory exam of *** extremity ***    RADIOLOGY:  Final results and radiologist's interpretation, available in " the [facility] health record.  Images were reviewed with the patient in the office today.  My personal interpretation of the performed imaging: ***      {[facility] 2021 Documentation (Optional):474571}  {2021 E&M time (Optional):585397}  {Provider  Link to [facility] Help Grid :041700}         Again, thank you for allowing me to participate in the care of your patient.        Sincerely,        Maurice Toledo, DO    Electronically signed

## 2025-03-04 NOTE — PATIENT INSTRUCTIONS
Reviewed options for left knee symptoms from underlying arthritis, including imaging (previous XR and MRI), brace/support (including  brace), physical therapy (done previously), use of medication (including OTC medication, and including the Voltaren gel you've tried), injection options (steroid and visco injections done previously), and referral for further discussion with ortho surgery.  Following discussion, with some benefit from visco injection, will submit prior auth request with insurance for repeat injection. If able to do prior to upcoming trip in April, will plan that. Otherwise, may need to wait until after trip, as those injections usually require 6 months between due to insurance.  Also went ahead and placed referral for further discussion with ortho surgery, given treatment for knee OA to date has not provided lasting benefit.  Will leave follow-up with me open ended. Contact clinic if any questions/concerns.    If you have any further questions for your physician or physician s care team you can contact them thru [employer] or by calling [phone].

## 2025-03-05 ENCOUNTER — PATIENT OUTREACH (OUTPATIENT)
Dept: CARE COORDINATION | Facility: CLINIC | Age: 75
End: 2025-03-05
Payer: COMMERCIAL

## 2025-03-06 ENCOUNTER — TELEPHONE (OUTPATIENT)
Dept: ORTHOPEDICS | Facility: CLINIC | Age: 75
End: 2025-03-06
Payer: COMMERCIAL

## 2025-03-06 NOTE — TELEPHONE ENCOUNTER
Called and spoke with patient in regards to Synvisc injection.  Patient has upcoming trip, unsure of date, but patient noted it was end of April. Earliest patient can get synvisc injection is 4/24/25.  Patient was advised and said that this date was fine, her trip is a few days after this.  Scheduled patient with Dr. Thompson for 4/24/25.  PA Pending from last OV on 3/4/25.  THONY Cardona, ATC

## 2025-03-26 NOTE — ADDENDUM NOTE
Addendum  created 04/05/21 0820 by Rajat Hutchinson APRN CRNA    Intraprocedure Staff edited       room air

## 2025-04-24 ENCOUNTER — OFFICE VISIT (OUTPATIENT)
Dept: ORTHOPEDICS | Facility: CLINIC | Age: 75
End: 2025-04-24
Payer: COMMERCIAL

## 2025-04-24 DIAGNOSIS — G89.29 CHRONIC PAIN OF LEFT KNEE: ICD-10-CM

## 2025-04-24 DIAGNOSIS — M17.12 PRIMARY OSTEOARTHRITIS OF LEFT KNEE: ICD-10-CM

## 2025-04-24 DIAGNOSIS — M25.562 CHRONIC PAIN OF LEFT KNEE: ICD-10-CM

## 2025-04-24 NOTE — PATIENT INSTRUCTIONS
[facility] Injection Discharge Instructions    Procedure: Left knee hyaluronic acid injection with aspiration    You may shower, however avoid swimming, tub baths or hot tubs for 24 hours following your procedure  You may have a mild to moderate increase in pain for several days following the injection.  It may take up to 30 days for the medication to start working although you may feel the effect as early as a few days after the procedure.  You may use ice packs for 10-15 minutes, 3 to 4 times a day at the injection site for comfort  You may use anti-inflammatory medications (such as Ibuprofen or Aleve or Advil) or Tylenol for pain control if necessary    If you experience any of the following, call [facility] @ [phone] or [phone]  -Fever over 100 degree F  -Swelling, bleeding, redness, drainage, warmth at the injection site  - New or worsening pain     It was great seeing you again today!    Jose M Thompson

## 2025-04-24 NOTE — LETTER
4/24/2025      Yolande Hussein  [address] [place] [address]      Dear Colleague,    Thank you for referring your patient, Yolande Hussein, to the [facility]. Please see a copy of my visit note below.    Large Joint Injection/Arthocentesis: L knee joint    Date/Time: 4/24/2025 11:16 AM    Performed by: Jose M Thompson MD  Authorized by: Jose M Thompson MD    Indications:  Pain and osteoarthritis  Guidance: ultrasound    Approach:  Anterolateral  Location:  Knee      Medications:  48 mg hylan 48 MG/6ML  Aspirate amount (mL):  15  Aspirate:  Serous  Outcome:  Tolerated well, no immediate complications  Procedure discussed: discussed risks, benefits, and alternatives    Consent Given by:  Patient  Prep: patient was prepped and draped in usual sterile fashion     Patient reported tolerated left knee joint aspirations/Synvisc injections.  Ultrasound guided images were permanently stored.  Aftercare instructions given to patient.  Plan to follow-up as previously discussed with referring provider.     Jose M Thompson MD [facility]            Again, thank you for allowing me to participate in the care of your patient.        Sincerely,        Jose M Thompson MD    Electronically signed

## 2025-04-24 NOTE — PROGRESS NOTES
Large Joint Injection/Arthocentesis: L knee joint    Date/Time: 4/24/2025 11:16 AM    Performed by: Jose M Thompson MD  Authorized by: Jose M Thompson MD    Indications:  Pain and osteoarthritis  Guidance: ultrasound    Approach:  Anterolateral  Location:  Knee      Medications:  48 mg hylan 48 MG/6ML  Aspirate amount (mL):  15  Aspirate:  Serous  Outcome:  Tolerated well, no immediate complications  Procedure discussed: discussed risks, benefits, and alternatives    Consent Given by:  Patient  Prep: patient was prepped and draped in usual sterile fashion     Patient reported tolerated left knee joint aspirations/Synvisc injections.  Ultrasound guided images were permanently stored.  Aftercare instructions given to patient.  Plan to follow-up as previously discussed with referring provider.     Jose M Thompson MD [facility]

## 2025-05-10 ENCOUNTER — HEALTH MAINTENANCE LETTER (OUTPATIENT)
Age: 75
End: 2025-05-10

## 2025-06-02 DIAGNOSIS — R91.8 PULMONARY NODULES: ICD-10-CM

## 2025-06-02 DIAGNOSIS — J45.40 MODERATE PERSISTENT ASTHMA WITHOUT COMPLICATION: ICD-10-CM

## 2025-06-02 NOTE — TELEPHONE ENCOUNTER
[facility] Call Center    Phone Message    May a detailed message be left on voicemail: yes     Reason for Call: Medication Refill Request    Has the patient contacted the pharmacy for the refill? Yes   Name of medication being requested:   mometasone-formoterol (DULERA) 100-5 MCG/ACT inhaler   Provider who prescribed the medication: Dr. lance  Pharmacy:   [facility] [address] [place] [address], [place]      Date medication is needed: 1 week       Action Taken: Message routed to:  [facility] ([facility]):      Travel Screening: Not Applicable     Date of Service:

## 2025-06-02 NOTE — TELEPHONE ENCOUNTER
Last Written Prescription:  mometasone-formoterol (DULERA) 100-5 MCG/ACT inhaler   13 g 3 12/30/2024     ----------------------  Last Visit Date: 12-  Future Visit Date: 6-  ----------------------  Refill decision    A one-time only 90-day jerry refill given, pt overdue for ACT.care team aware    Future appt with provider on 06/13/2025 and ACT questionnaire assigned at the visit.     Request from pharmacy:  Requested Prescriptions   Pending Prescriptions Disp Refills    mometasone-formoterol (DULERA) 100-5 MCG/ACT inhaler 13 g 3     Sig: Inhale 2 puffs into the lungs 2 times daily.       Inhaled Steroids Protocol Failed - 6/2/2025 11:05 AM        Failed - Asthma control assessment score within normal limits in last 6 months     Please review ACT score.   Last ACT score completed on  10-            Passed - Patient is age 12 or older        Passed - Medication is active on med list and the sig matches. RN to manually verify dose and sig if red X/fail.             Passed - Recent (6 month) or future (90 days) visit with the authorizing provider's specialty (provided they have been seen in the past 9 months)     The patient must have completed an in-person or virtual visit within the past 6 months or has a future visit scheduled within the next 90 days with the authorizing provider s specialty.  Urgent care and e-visits do not quality as an office visit for this protocol.          Passed - Medication indicated for associated diagnosis

## 2025-06-25 ENCOUNTER — TELEPHONE (OUTPATIENT)
Dept: PULMONOLOGY | Facility: CLINIC | Age: 75
End: 2025-06-25
Payer: COMMERCIAL

## 2025-06-25 NOTE — TELEPHONE ENCOUNTER
Left Voicemail (1st Attempt) and Sent Mychart (1st Attempt) for the patient to call back and schedule the following:    Appointment type: rs rtn  Provider: lisseth  Return date: 9/8/2025 to next avail  Specialty phone number: [phone]  Additional appointment(s) needed: na  Additonal Notes: provider unavailable

## 2025-06-26 ENCOUNTER — ALLIED HEALTH/NURSE VISIT (OUTPATIENT)
Dept: FAMILY MEDICINE | Facility: CLINIC | Age: 75
End: 2025-06-26
Payer: COMMERCIAL

## 2025-06-26 ENCOUNTER — LAB (OUTPATIENT)
Dept: LAB | Facility: CLINIC | Age: 75
End: 2025-06-26
Payer: COMMERCIAL

## 2025-06-26 VITALS — DIASTOLIC BLOOD PRESSURE: 73 MMHG | SYSTOLIC BLOOD PRESSURE: 136 MMHG | HEART RATE: 77 BPM

## 2025-06-26 DIAGNOSIS — I10 BENIGN ESSENTIAL HYPERTENSION: ICD-10-CM

## 2025-06-26 DIAGNOSIS — I10 HYPERTENSION GOAL BP (BLOOD PRESSURE) < 140/90: Primary | ICD-10-CM

## 2025-06-26 LAB
ANION GAP SERPL CALCULATED.3IONS-SCNC: 9 MMOL/L (ref 7–15)
BUN SERPL-MCNC: 9.9 MG/DL (ref 8–23)
CALCIUM SERPL-MCNC: 9.3 MG/DL (ref 8.8–10.4)
CHLORIDE SERPL-SCNC: 101 MMOL/L (ref 98–107)
CHOLEST SERPL-MCNC: 211 MG/DL
CREAT SERPL-MCNC: 0.6 MG/DL (ref 0.51–0.95)
EGFRCR SERPLBLD CKD-EPI 2021: >90 ML/MIN/1.73M2
FASTING STATUS PATIENT QL REPORTED: YES
FASTING STATUS PATIENT QL REPORTED: YES
GLUCOSE SERPL-MCNC: 92 MG/DL (ref 70–99)
HCO3 SERPL-SCNC: 30 MMOL/L (ref 22–29)
HDLC SERPL-MCNC: 86 MG/DL
LDLC SERPL CALC-MCNC: 110 MG/DL
NONHDLC SERPL-MCNC: 125 MG/DL
POTASSIUM SERPL-SCNC: 3.8 MMOL/L (ref 3.4–5.3)
SODIUM SERPL-SCNC: 140 MMOL/L (ref 135–145)
TRIGL SERPL-MCNC: 74 MG/DL
TSH SERPL DL<=0.005 MIU/L-ACNC: 1.76 UIU/ML (ref 0.3–4.2)

## 2025-06-26 NOTE — PROGRESS NOTES
Yolande Hussein is a 74 year old patient who comes in today for a Blood Pressure check.  Initial BP:  /73   Pulse 77   LMP  (LMP Unknown)      81  Disposition: results routed to provider        Patient home BP monitor: 156/83 pulse 85    Patient's home BP monitor 2nd readin/74 pulse 67      Adelaida Alexander MA

## 2025-06-26 NOTE — Clinical Note
Patient came in for BP check on ancillary schedule to compare her home bp monitor. Results forwarded to PCP. If any follow up needed, please reach out to patient. Thanks

## 2025-07-03 ENCOUNTER — RESULTS FOLLOW-UP (OUTPATIENT)
Dept: INTERNAL MEDICINE | Facility: CLINIC | Age: 75
End: 2025-07-03

## 2025-07-30 NOTE — TELEPHONE ENCOUNTER
DIAGNOSIS: Discuss L Knee surgery   M17.12 (ICD-10-CM) - Primary osteoarthritis of left knee   M25.562, G89.29 (ICD-10-CM) - Chronic pain of left knee      APPOINTMENT DATE: 8/28/2025   NOTES STATUS DETAILS   OFFICE NOTE from referring provider Self referred    OFFICE NOTE from other specialist Internal Barry Thompson MD (SPTS)  4/24/2025  Maurice Toledo DO (SPTS)  3/4/2025  Sina Medina MD (SPTS)  10/23/2024   Duke Jama, PT  5/24/2024  Isabel Hicks MD (IM)  2/19/2024   INJECTIONS Internal L Knee: 10/11/2024 (gel), 2/23/2024 (steroid)   DISCHARGE REPORT from the ER Internal 10/26/2024: R upper back pain   MEDICATION LIST Internal    LABS     DEXA Internal Hip/Pelv/Spine: 6/11/2021   MRI PACS L Knee: 5/14/2024   CT SCAN PACS Chest: 10/31/2024, 1/11/2024   XRAYS (IMAGES & REPORTS) PACS Chest: 10/27/2024  L Knee: 2/19/2024

## 2025-08-05 ENCOUNTER — TELEPHONE (OUTPATIENT)
Dept: PULMONOLOGY | Facility: CLINIC | Age: 75
End: 2025-08-05
Payer: COMMERCIAL

## 2025-08-20 ENCOUNTER — EXTERNAL ORDER RESULTS (OUTPATIENT)
Dept: ORTHOPEDICS | Facility: CLINIC | Age: 75
End: 2025-08-20
Payer: COMMERCIAL

## 2025-08-20 DIAGNOSIS — M25.562 LEFT KNEE PAIN, UNSPECIFIED CHRONICITY: Primary | ICD-10-CM

## 2025-08-23 ENCOUNTER — HEALTH MAINTENANCE LETTER (OUTPATIENT)
Age: 75
End: 2025-08-23

## 2025-08-28 ENCOUNTER — OFFICE VISIT (OUTPATIENT)
Dept: ORTHOPEDICS | Facility: CLINIC | Age: 75
End: 2025-08-28
Payer: COMMERCIAL

## 2025-08-28 ENCOUNTER — PRE VISIT (OUTPATIENT)
Dept: ORTHOPEDICS | Facility: CLINIC | Age: 75
End: 2025-08-28

## 2025-08-28 VITALS — BODY MASS INDEX: 24.04 KG/M2 | HEIGHT: 67 IN | WEIGHT: 153.2 LBS

## 2025-08-28 DIAGNOSIS — M17.12: ICD-10-CM

## 2025-08-28 DIAGNOSIS — M25.562 LEFT KNEE PAIN, UNSPECIFIED CHRONICITY: Primary | ICD-10-CM

## 2025-08-28 ASSESSMENT — KOOS JR
RISING FROM SITTING: MODERATE
STRAIGHTENING KNEE FULLY: MILD
TWISING OR PIVOTING ON KNEE: MODERATE
BENDING TO THE FLOOR TO PICK UP OBJECT: MODERATE
STANDING UPRIGHT: MODERATE
GOING UP OR DOWN STAIRS: MILD
HOW SEVERE IS YOUR KNEE STIFFNESS AFTER FIRST WAKING IN MORNING: SEVERE
KOOS JR SCORING: 54.84

## 2025-09-02 ENCOUNTER — OFFICE VISIT (OUTPATIENT)
Dept: PULMONOLOGY | Facility: CLINIC | Age: 75
End: 2025-09-02
Attending: INTERNAL MEDICINE
Payer: COMMERCIAL

## 2025-09-02 VITALS
WEIGHT: 156.7 LBS | OXYGEN SATURATION: 95 % | DIASTOLIC BLOOD PRESSURE: 75 MMHG | BODY MASS INDEX: 24.91 KG/M2 | HEART RATE: 90 BPM | SYSTOLIC BLOOD PRESSURE: 138 MMHG

## 2025-09-02 DIAGNOSIS — J45.40 MODERATE PERSISTENT ASTHMA WITHOUT COMPLICATION: ICD-10-CM

## 2025-09-02 DIAGNOSIS — R91.8 PULMONARY NODULES: ICD-10-CM

## 2025-09-02 PROCEDURE — 1126F AMNT PAIN NOTED NONE PRSNT: CPT | Performed by: INTERNAL MEDICINE

## 2025-09-02 PROCEDURE — 99215 OFFICE O/P EST HI 40 MIN: CPT | Performed by: INTERNAL MEDICINE

## 2025-09-02 PROCEDURE — 3078F DIAST BP <80 MM HG: CPT | Performed by: INTERNAL MEDICINE

## 2025-09-02 PROCEDURE — G0463 HOSPITAL OUTPT CLINIC VISIT: HCPCS | Performed by: INTERNAL MEDICINE

## 2025-09-02 PROCEDURE — 3075F SYST BP GE 130 - 139MM HG: CPT | Performed by: INTERNAL MEDICINE

## 2025-09-02 ASSESSMENT — ASTHMA QUESTIONNAIRES
QUESTION_3 LAST FOUR WEEKS HOW OFTEN DID YOUR ASTHMA SYMPTOMS (WHEEZING, COUGHING, SHORTNESS OF BREATH, CHEST TIGHTNESS OR PAIN) WAKE YOU UP AT NIGHT OR EARLIER THAN USUAL IN THE MORNING: NOT AT ALL
QUESTION_1 LAST FOUR WEEKS HOW MUCH OF THE TIME DID YOUR ASTHMA KEEP YOU FROM GETTING AS MUCH DONE AT WORK, SCHOOL OR AT HOME: A LITTLE OF THE TIME
QUESTION_4 LAST FOUR WEEKS HOW OFTEN HAVE YOU USED YOUR RESCUE INHALER OR NEBULIZER MEDICATION (SUCH AS ALBUTEROL): ONCE A WEEK OR LESS
ACT_TOTALSCORE: 22
QUESTION_5 LAST FOUR WEEKS HOW WOULD YOU RATE YOUR ASTHMA CONTROL: COMPLETELY CONTROLLED
ACT_TOTALSCORE: 22
QUESTION_2 LAST FOUR WEEKS HOW OFTEN HAVE YOU HAD SHORTNESS OF BREATH: ONCE OR TWICE A WEEK

## 2025-09-02 ASSESSMENT — PAIN SCALES - GENERAL: PAINLEVEL_OUTOF10: NO PAIN (0)

## (undated) DEVICE — FCP BIOPSY RADIAL JAW 4 JUMBO 3.2MM CHANNEL M00513370

## (undated) DEVICE — DRSG STERI STRIP 1/2X4" R1547

## (undated) DEVICE — ESU GROUND PAD UNIVERSAL W/O CORD

## (undated) DEVICE — SU VICRYL 3-0 CT-1 36" J344H

## (undated) DEVICE — Device

## (undated) DEVICE — SOL WATER IRRIG 500ML BOTTLE 2F7113

## (undated) DEVICE — COVER CAMERA IN-LIGHT DISP LT-C02

## (undated) DEVICE — SOL WATER IRRIG 1000ML BOTTLE 2F7114

## (undated) DEVICE — ESU PENCIL W/SMOKE EVAC NEPTUNE STRYKER 0703-046-000

## (undated) DEVICE — LINEN GOWN X4 5410

## (undated) DEVICE — SYR BULB IRRIG 50ML LATEX FREE 0035280

## (undated) DEVICE — GLOVE PROTEXIS BLUE W/NEU-THERA 7.5  2D73EB75

## (undated) DEVICE — EYE CANN IRR 27GA ANTERIOR CHAMBER 581280

## (undated) DEVICE — SU VICRYL 4-0 PS-2 18" UND J496H

## (undated) DEVICE — PACK CATARACT CUSTOM ASC SEY15CPUMC

## (undated) DEVICE — TUBING SUCTION 12"X1/4" N612

## (undated) DEVICE — EYE PACK CUSTOM ANTERIOR 30DEG TIP CENTURION PPK6682-04

## (undated) DEVICE — GLOVE PROTEXIS MICRO 6.5  2D73PM65

## (undated) DEVICE — EYE CANN IRR 25GA CYSTOTOME 581610

## (undated) DEVICE — KIT ENDO TURNOVER/PROCEDURE CARRY-ON 101822

## (undated) DEVICE — BLADE CLIPPER SGL USE 9680

## (undated) DEVICE — LINEN ORTHO PACK 5446

## (undated) DEVICE — PREP POVIDONE IODINE SOLUTION 10% 120ML

## (undated) DEVICE — LIGHT HANDLE X2

## (undated) DEVICE — PAD CHUX UNDERPAD 30X36" P3036C

## (undated) DEVICE — SOL NACL 0.9% IRRIG 1000ML BOTTLE 2F7124

## (undated) DEVICE — GOWN IMPERVIOUS 2XL BLUE

## (undated) DEVICE — GLOVE PROTEXIS W/NEU-THERA 7.0  2D73TE70

## (undated) DEVICE — SUCTION MANIFOLD NEPTUNE 2 SYS 1 PORT 702-025-000

## (undated) DEVICE — EYE KNIFE SLIT XSTAR VISITEC 2.6MM 45DEG 373726

## (undated) DEVICE — SUCTION MANIFOLD DORNOCH ULTRA CART UL-CL500

## (undated) DEVICE — ESU ELEC BLADE 6" COATED E1450-6

## (undated) DEVICE — SPECIMEN CONTAINER 5OZ STERILE 2600SA

## (undated) DEVICE — DRSG TELFA ISLAND 4X8" 7541

## (undated) DEVICE — STRAP KNEE/BODY 31143004

## (undated) DEVICE — SU VICRYL 0 CT-1 36" J946H

## (undated) DEVICE — SU VICRYL 0 TIE 54" J608H

## (undated) DEVICE — EYE SHIELD PLASTIC

## (undated) DEVICE — SPONGE RAY-TEC 4X8" 7318

## (undated) DEVICE — SPECIMEN CONTAINER 3OZ W/FORMALIN 59901

## (undated) DEVICE — DRAPE MAYO STAND 23X54 8337

## (undated) DEVICE — LINEN TOWEL PACK X5 5464

## (undated) DEVICE — SUCTION TIP YANKAUER W/O VENT K86

## (undated) DEVICE — SUCTION TIP POOLE K770

## (undated) DEVICE — NDL COUNTER 20CT 31142493

## (undated) DEVICE — PREP SKIN SCRUB TRAY 4461A

## (undated) DEVICE — DRAPE SLEEVE 599

## (undated) DEVICE — DRSG ABDOMINAL 07 1/2X8" 7197D

## (undated) DEVICE — PANTIES MESH LG/XLG 2PK 706M2

## (undated) DEVICE — EYE TIP IRRIGATION & ASPIRATION POLYMER CVD 0.3MM 8065751512

## (undated) DEVICE — EYE KNIFE STILETTO VISITEC 1.1MM ANG 45DEG SIDEPORT 376620

## (undated) DEVICE — TUBING SUCTION MEDI-VAC 1/4"X20' N620A

## (undated) DEVICE — PREP CHLORAPREP 26ML TINTED ORANGE  260815

## (undated) DEVICE — WIPES FOLEY CARE SURESTEP PROVON DFC100

## (undated) DEVICE — CATH TRAY FOLEY SURESTEP 16FR WDRAIN BAG STLK LATEX A300316A

## (undated) DEVICE — ESU PENCIL W/HOLSTER E2350H

## (undated) DEVICE — STPL SKIN 35W ROTATING HEAD PRW35

## (undated) DEVICE — SU VICRYL 3-0 SH 27" J316H

## (undated) DEVICE — SU VICRYL 0 CT-1 CR 8X27" UND JJ41G

## (undated) DEVICE — PAD PERI INDIV WRAP 11" 2022A

## (undated) RX ORDER — FENTANYL CITRATE 50 UG/ML
INJECTION, SOLUTION INTRAMUSCULAR; INTRAVENOUS
Status: DISPENSED
Start: 2021-03-19

## (undated) RX ORDER — FENTANYL CITRATE 50 UG/ML
INJECTION, SOLUTION INTRAMUSCULAR; INTRAVENOUS
Status: DISPENSED
Start: 2018-12-27

## (undated) RX ORDER — CEFAZOLIN SODIUM 2 G/100ML
INJECTION, SOLUTION INTRAVENOUS
Status: DISPENSED
Start: 2018-12-27

## (undated) RX ORDER — EPHEDRINE SULFATE 50 MG/ML
INJECTION, SOLUTION INTRAMUSCULAR; INTRAVENOUS; SUBCUTANEOUS
Status: DISPENSED
Start: 2018-12-27

## (undated) RX ORDER — PROPOFOL 10 MG/ML
INJECTION, EMULSION INTRAVENOUS
Status: DISPENSED
Start: 2018-12-27

## (undated) RX ORDER — ONDANSETRON 2 MG/ML
INJECTION INTRAMUSCULAR; INTRAVENOUS
Status: DISPENSED
Start: 2021-04-02

## (undated) RX ORDER — FENTANYL CITRATE 50 UG/ML
INJECTION, SOLUTION INTRAMUSCULAR; INTRAVENOUS
Status: DISPENSED
Start: 2021-04-02

## (undated) RX ORDER — FENTANYL CITRATE 50 UG/ML
INJECTION, SOLUTION INTRAMUSCULAR; INTRAVENOUS
Status: DISPENSED
Start: 2020-01-28

## (undated) RX ORDER — KETOROLAC TROMETHAMINE 30 MG/ML
INJECTION, SOLUTION INTRAMUSCULAR; INTRAVENOUS
Status: DISPENSED
Start: 2018-12-27

## (undated) RX ORDER — DIPHENHYDRAMINE HYDROCHLORIDE 50 MG/ML
INJECTION INTRAMUSCULAR; INTRAVENOUS
Status: DISPENSED
Start: 2020-01-28

## (undated) RX ORDER — DEXAMETHASONE SODIUM PHOSPHATE 4 MG/ML
INJECTION, SOLUTION INTRA-ARTICULAR; INTRALESIONAL; INTRAMUSCULAR; INTRAVENOUS; SOFT TISSUE
Status: DISPENSED
Start: 2018-12-27

## (undated) RX ORDER — GABAPENTIN 300 MG/1
CAPSULE ORAL
Status: DISPENSED
Start: 2018-12-27

## (undated) RX ORDER — ACETAMINOPHEN 325 MG/1
TABLET ORAL
Status: DISPENSED
Start: 2021-04-02

## (undated) RX ORDER — PHENAZOPYRIDINE HYDROCHLORIDE 200 MG/1
TABLET, FILM COATED ORAL
Status: DISPENSED
Start: 2018-12-27

## (undated) RX ORDER — ONDANSETRON 2 MG/ML
INJECTION INTRAMUSCULAR; INTRAVENOUS
Status: DISPENSED
Start: 2018-12-27

## (undated) RX ORDER — ACETAMINOPHEN 325 MG/1
TABLET ORAL
Status: DISPENSED
Start: 2018-12-27

## (undated) RX ORDER — LIDOCAINE HYDROCHLORIDE 20 MG/ML
INJECTION, SOLUTION EPIDURAL; INFILTRATION; INTRACAUDAL; PERINEURAL
Status: DISPENSED
Start: 2018-12-27

## (undated) RX ORDER — ONDANSETRON 2 MG/ML
INJECTION INTRAMUSCULAR; INTRAVENOUS
Status: DISPENSED
Start: 2020-01-28